# Patient Record
Sex: FEMALE | Employment: UNEMPLOYED | URBAN - METROPOLITAN AREA
[De-identification: names, ages, dates, MRNs, and addresses within clinical notes are randomized per-mention and may not be internally consistent; named-entity substitution may affect disease eponyms.]

---

## 2017-01-03 ENCOUNTER — TELEPHONE (OUTPATIENT)
Dept: FAMILY MEDICINE CLINIC | Facility: CLINIC | Age: 42
End: 2017-01-03

## 2017-01-23 ENCOUNTER — OFFICE VISIT (OUTPATIENT)
Dept: FAMILY MEDICINE CLINIC | Facility: CLINIC | Age: 42
End: 2017-01-23

## 2017-01-23 ENCOUNTER — LAB ENCOUNTER (OUTPATIENT)
Dept: LAB | Age: 42
End: 2017-01-23
Attending: FAMILY MEDICINE
Payer: COMMERCIAL

## 2017-01-23 VITALS
TEMPERATURE: 99 F | HEART RATE: 72 BPM | BODY MASS INDEX: 26.16 KG/M2 | HEIGHT: 65 IN | RESPIRATION RATE: 18 BRPM | SYSTOLIC BLOOD PRESSURE: 124 MMHG | WEIGHT: 157 LBS | DIASTOLIC BLOOD PRESSURE: 80 MMHG

## 2017-01-23 DIAGNOSIS — R10.32 LEFT LOWER QUADRANT PAIN: ICD-10-CM

## 2017-01-23 DIAGNOSIS — G43.109 MIGRAINE WITH AURA AND WITHOUT STATUS MIGRAINOSUS, NOT INTRACTABLE: ICD-10-CM

## 2017-01-23 DIAGNOSIS — M50.30 DEGENERATIVE DISC DISEASE, CERVICAL: ICD-10-CM

## 2017-01-23 DIAGNOSIS — M54.2 NECK PAIN: ICD-10-CM

## 2017-01-23 DIAGNOSIS — E56.9 VITAMIN DEFICIENCY: ICD-10-CM

## 2017-01-23 DIAGNOSIS — F32.A ANXIETY AND DEPRESSION: ICD-10-CM

## 2017-01-23 DIAGNOSIS — Z00.00 GENERAL MEDICAL EXAM: ICD-10-CM

## 2017-01-23 DIAGNOSIS — R20.2 PARESTHESIA: ICD-10-CM

## 2017-01-23 DIAGNOSIS — F41.9 ANXIETY AND DEPRESSION: ICD-10-CM

## 2017-01-23 DIAGNOSIS — J45.30 MILD PERSISTENT ASTHMA WITHOUT COMPLICATION: Primary | ICD-10-CM

## 2017-01-23 LAB
25-HYDROXYVITAMIN D (TOTAL): 32.5 NG/ML (ref 30–100)
ALBUMIN SERPL-MCNC: 4.3 G/DL (ref 3.5–4.8)
ALP LIVER SERPL-CCNC: 64 U/L (ref 37–98)
ALT SERPL-CCNC: 31 U/L (ref 14–54)
AST SERPL-CCNC: 16 U/L (ref 15–41)
BASOPHILS # BLD AUTO: 0.05 X10(3) UL (ref 0–0.1)
BASOPHILS NFR BLD AUTO: 0.9 %
BILIRUB SERPL-MCNC: 0.3 MG/DL (ref 0.1–2)
BILIRUB UR QL STRIP.AUTO: NEGATIVE
BUN BLD-MCNC: 9 MG/DL (ref 8–20)
CALCIUM BLD-MCNC: 9.5 MG/DL (ref 8.3–10.3)
CHLORIDE: 106 MMOL/L (ref 101–111)
CHOLEST SMN-MCNC: 239 MG/DL (ref ?–200)
CLARITY UR REFRACT.AUTO: CLEAR
CO2: 30 MMOL/L (ref 22–32)
CREAT BLD-MCNC: 0.8 MG/DL (ref 0.55–1.02)
EOSINOPHIL # BLD AUTO: 0.12 X10(3) UL (ref 0–0.3)
EOSINOPHIL NFR BLD AUTO: 2.2 %
ERYTHROCYTE [DISTWIDTH] IN BLOOD BY AUTOMATED COUNT: 12.2 % (ref 11.5–16)
EST. AVERAGE GLUCOSE BLD GHB EST-MCNC: 108 MG/DL (ref 68–126)
FREE T4: 0.9 NG/DL (ref 0.9–1.8)
GLUCOSE BLD-MCNC: 82 MG/DL (ref 70–99)
GLUCOSE UR STRIP.AUTO-MCNC: NEGATIVE MG/DL
HAV AB SERPL IA-ACNC: >2000 PG/ML (ref 193–986)
HBA1C MFR BLD HPLC: 5.4 % (ref ?–5.7)
HCT VFR BLD AUTO: 41.1 % (ref 34–50)
HDLC SERPL-MCNC: 66 MG/DL (ref 45–?)
HDLC SERPL: 3.62 {RATIO} (ref ?–4.44)
HGB BLD-MCNC: 14.2 G/DL (ref 12–16)
IMMATURE GRANULOCYTE COUNT: 0 X10(3) UL (ref 0–1)
IMMATURE GRANULOCYTE RATIO %: 0 %
KETONES UR STRIP.AUTO-MCNC: NEGATIVE MG/DL
LDLC SERPL CALC-MCNC: 151 MG/DL (ref ?–130)
LEUKOCYTE ESTERASE UR QL STRIP.AUTO: NEGATIVE
LYMPHOCYTES # BLD AUTO: 1.79 X10(3) UL (ref 0.9–4)
LYMPHOCYTES NFR BLD AUTO: 33.2 %
M PROTEIN MFR SERPL ELPH: 7.3 G/DL (ref 6.1–8.3)
MCH RBC QN AUTO: 32.4 PG (ref 27–33.2)
MCHC RBC AUTO-ENTMCNC: 34.5 G/DL (ref 31–37)
MCV RBC AUTO: 93.8 FL (ref 81–100)
MONOCYTES # BLD AUTO: 0.55 X10(3) UL (ref 0.1–0.6)
MONOCYTES NFR BLD AUTO: 10.2 %
NEUTROPHIL ABS PRELIM: 2.88 X10 (3) UL (ref 1.3–6.7)
NEUTROPHILS # BLD AUTO: 2.88 X10(3) UL (ref 1.3–6.7)
NEUTROPHILS NFR BLD AUTO: 53.5 %
NITRITE UR QL STRIP.AUTO: NEGATIVE
NONHDLC SERPL-MCNC: 173 MG/DL (ref ?–130)
PH UR STRIP.AUTO: 7 [PH] (ref 4.5–8)
PLATELET # BLD AUTO: 240 10(3)UL (ref 150–450)
POTASSIUM SERPL-SCNC: 4.4 MMOL/L (ref 3.6–5.1)
PROT UR STRIP.AUTO-MCNC: NEGATIVE MG/DL
RBC # BLD AUTO: 4.38 X10(6)UL (ref 3.8–5.1)
RED CELL DISTRIBUTION WIDTH-SD: 42.4 FL (ref 35.1–46.3)
SODIUM SERPL-SCNC: 140 MMOL/L (ref 136–144)
SP GR UR STRIP.AUTO: 1.01 (ref 1–1.03)
T3FREE SERPL-MCNC: 2.53 PG/ML (ref 2.3–4.2)
TRIGLYCERIDES: 108 MG/DL (ref ?–150)
TSI SER-ACNC: 0.91 MIU/ML (ref 0.35–5.5)
UROBILINOGEN UR STRIP.AUTO-MCNC: <2 MG/DL
VLDL: 22 MG/DL (ref 5–40)
WBC # BLD AUTO: 5.4 X10(3) UL (ref 4–13)

## 2017-01-23 PROCEDURE — 96372 THER/PROPH/DIAG INJ SC/IM: CPT | Performed by: FAMILY MEDICINE

## 2017-01-23 PROCEDURE — 82607 VITAMIN B-12: CPT

## 2017-01-23 PROCEDURE — 84443 ASSAY THYROID STIM HORMONE: CPT

## 2017-01-23 PROCEDURE — 84481 FREE ASSAY (FT-3): CPT

## 2017-01-23 PROCEDURE — 36415 COLL VENOUS BLD VENIPUNCTURE: CPT

## 2017-01-23 PROCEDURE — 85025 COMPLETE CBC W/AUTO DIFF WBC: CPT

## 2017-01-23 PROCEDURE — 80053 COMPREHEN METABOLIC PANEL: CPT

## 2017-01-23 PROCEDURE — 82306 VITAMIN D 25 HYDROXY: CPT

## 2017-01-23 PROCEDURE — 84439 ASSAY OF FREE THYROXINE: CPT

## 2017-01-23 PROCEDURE — 99214 OFFICE O/P EST MOD 30 MIN: CPT | Performed by: FAMILY MEDICINE

## 2017-01-23 PROCEDURE — 81001 URINALYSIS AUTO W/SCOPE: CPT

## 2017-01-23 PROCEDURE — 80061 LIPID PANEL: CPT

## 2017-01-23 PROCEDURE — 83036 HEMOGLOBIN GLYCOSYLATED A1C: CPT

## 2017-01-23 RX ORDER — METHYLPHENIDATE HYDROCHLORIDE 20 MG/1
20 TABLET ORAL DAILY
COMMUNITY
End: 2017-03-20

## 2017-01-23 RX ORDER — FLUTICASONE PROPIONATE AND SALMETEROL 250; 50 UG/1; UG/1
1 POWDER RESPIRATORY (INHALATION) EVERY 12 HOURS SCHEDULED
Qty: 3 PACKAGE | Refills: 0 | Status: SHIPPED | OUTPATIENT
Start: 2017-01-23 | End: 2017-01-23

## 2017-01-23 RX ORDER — TOPIRAMATE 25 MG/1
TABLET ORAL
Qty: 120 TABLET | Refills: 0 | Status: SHIPPED | OUTPATIENT
Start: 2017-01-23 | End: 2017-02-17

## 2017-01-23 RX ORDER — CYANOCOBALAMIN 1000 UG/ML
1000 INJECTION INTRAMUSCULAR; SUBCUTANEOUS ONCE
Status: COMPLETED | OUTPATIENT
Start: 2017-01-23 | End: 2017-01-23

## 2017-01-23 RX ORDER — MONTELUKAST SODIUM 10 MG/1
10 TABLET ORAL NIGHTLY
Qty: 30 TABLET | Refills: 2 | Status: SHIPPED | OUTPATIENT
Start: 2017-01-23 | End: 2017-04-08

## 2017-01-23 RX ADMIN — CYANOCOBALAMIN 1000 MCG: 1000 INJECTION INTRAMUSCULAR; SUBCUTANEOUS at 14:19:00

## 2017-01-23 NOTE — PROGRESS NOTES
HPI:   Tiff Avila is a 37year old female here to follow up on asthma, mood disorder, migraines, GERD, neck pain     Pt is still having migraines; worse with increased stress.   Off propranolol  Side effects with amitriptyline   Discussed topomax; wor Folic Acid 1 MG Oral Tab Take 1 mg by mouth daily. Disp:  Rfl:    Omega-3 Fatty Acids (FISH OIL) 1000 MG Oral Cap Take  by mouth daily. Disp:  Rfl:    Cholecalciferol (VITAMIN D) 2000 UNITS Oral Cap Take  by mouth.  daily Disp:  Rfl:    B Complex Vitamins 7/20/2015      Family History   Problem Relation Age of Onset   • ADD[other] [OTHER] Father    • Diabetes Father    • Hypertension Father    • Breast Cancer Mother 54   • Other[other] [OTHER] Mother    • Cancer Sister      uterine   • Migraines Sister    • RRG  MUSCULOSKELETAL: back is not tender,FROM of the back  EXTREMITIES: no cyanosis, clubbing or edema  NEURO: cranial nerves are intact,motor and sensory are grossly intact  PSYCH: normal mood and affect good eye contact appropriate     ASSESSMENT AND CARLOTTA

## 2017-02-08 ENCOUNTER — HOSPITAL ENCOUNTER (OUTPATIENT)
Dept: MRI IMAGING | Age: 42
Discharge: HOME | End: 2017-02-08
Attending: FAMILY MEDICINE
Payer: COMMERCIAL

## 2017-02-08 DIAGNOSIS — M50.30 DEGENERATIVE DISC DISEASE, CERVICAL: ICD-10-CM

## 2017-02-08 DIAGNOSIS — M54.2 NECK PAIN: ICD-10-CM

## 2017-02-08 DIAGNOSIS — R20.2 PARESTHESIA: ICD-10-CM

## 2017-02-08 PROCEDURE — 72141 MRI NECK SPINE W/O DYE: CPT

## 2017-02-14 ENCOUNTER — TELEPHONE (OUTPATIENT)
Dept: FAMILY MEDICINE CLINIC | Facility: CLINIC | Age: 42
End: 2017-02-14

## 2017-02-14 NOTE — TELEPHONE ENCOUNTER
SAW JAVAN ABOUT BACK PAIN AND NECK PAIN--HAD MRI AND HAS DISC PROBLEMS    WHEN SHE WAKES UP FROM SLEEPING SHE IS IN PAIN AND PAIN IS ALSO WAKING HER UP AT NIGHT. SHE WANTS TO KNOW WHAT SHE CAN DO AT THIS POINT. PLS CALL BACK TO [facility].

## 2017-02-14 NOTE — TELEPHONE ENCOUNTER
Per Dr. Grace Soni visit notes on 1/23/17 patient to see Neuro. Referral had been placed. Information on Dr. Martín Simmons given to patient. She will f/u with Neuro.

## 2017-02-17 RX ORDER — TOPIRAMATE 25 MG/1
50 TABLET ORAL 2 TIMES DAILY
Qty: 120 TABLET | Refills: 0 | Status: SHIPPED | OUTPATIENT
Start: 2017-02-17 | End: 2017-05-30

## 2017-03-06 ENCOUNTER — APPOINTMENT (OUTPATIENT)
Dept: CT IMAGING | Age: 42
End: 2017-03-06
Attending: PHYSICIAN ASSISTANT
Payer: COMMERCIAL

## 2017-03-06 ENCOUNTER — OFFICE VISIT (OUTPATIENT)
Dept: FAMILY MEDICINE CLINIC | Facility: CLINIC | Age: 42
End: 2017-03-06

## 2017-03-06 ENCOUNTER — APPOINTMENT (OUTPATIENT)
Dept: GENERAL RADIOLOGY | Age: 42
End: 2017-03-06
Attending: PHYSICIAN ASSISTANT
Payer: COMMERCIAL

## 2017-03-06 ENCOUNTER — HOSPITAL ENCOUNTER (OUTPATIENT)
Age: 42
Discharge: HOME | End: 2017-03-06
Payer: COMMERCIAL

## 2017-03-06 VITALS
HEIGHT: 65.4 IN | OXYGEN SATURATION: 98 % | TEMPERATURE: 98 F | BODY MASS INDEX: 25.84 KG/M2 | SYSTOLIC BLOOD PRESSURE: 110 MMHG | WEIGHT: 157 LBS | DIASTOLIC BLOOD PRESSURE: 76 MMHG | HEART RATE: 94 BPM | RESPIRATION RATE: 16 BRPM

## 2017-03-06 VITALS
DIASTOLIC BLOOD PRESSURE: 85 MMHG | OXYGEN SATURATION: 98 % | HEART RATE: 100 BPM | SYSTOLIC BLOOD PRESSURE: 124 MMHG | TEMPERATURE: 98 F | RESPIRATION RATE: 18 BRPM

## 2017-03-06 DIAGNOSIS — M54.6 ACUTE RIGHT-SIDED THORACIC BACK PAIN: Primary | ICD-10-CM

## 2017-03-06 LAB
APPEARANCE: CLEAR
MULTISTIX LOT#: ABNORMAL NUMERIC
PH, URINE: 7 (ref 4.5–8)
SPECIFIC GRAVITY: 1.02 (ref 1–1.03)
URINE-COLOR: YELLOW
UROBILINOGEN,SEMI-QN: 0.2 MG/DL (ref 0–1.9)

## 2017-03-06 PROCEDURE — 99214 OFFICE O/P EST MOD 30 MIN: CPT

## 2017-03-06 PROCEDURE — 81003 URINALYSIS AUTO W/O SCOPE: CPT | Performed by: NURSE PRACTITIONER

## 2017-03-06 PROCEDURE — 71020 XR CHEST PA + LAT CHEST (CPT=71020): CPT

## 2017-03-06 PROCEDURE — 87086 URINE CULTURE/COLONY COUNT: CPT | Performed by: NURSE PRACTITIONER

## 2017-03-06 PROCEDURE — 74176 CT ABD & PELVIS W/O CONTRAST: CPT

## 2017-03-06 RX ORDER — CYCLOBENZAPRINE HCL 5 MG
5 TABLET ORAL 3 TIMES DAILY PRN
Qty: 15 TABLET | Refills: 0 | Status: SHIPPED | OUTPATIENT
Start: 2017-03-06 | End: 2017-03-11

## 2017-03-06 RX ORDER — HYDROCODONE BITARTRATE AND ACETAMINOPHEN 5; 325 MG/1; MG/1
1 TABLET ORAL EVERY 6 HOURS PRN
Qty: 15 TABLET | Refills: 0 | Status: SHIPPED | OUTPATIENT
Start: 2017-03-06 | End: 2017-03-09

## 2017-03-06 NOTE — ED PROVIDER NOTES
Patient Seen in: THE MEDICAL [facility]    History   Patient presents with:  Back Pain    Stated Complaint: r side back painx 1 month     HPI    Rosa Graf is a 60-year-old female presents today for evaluation of right-sided back pain.  She has • Anxiety state, unspecified    • Dyspareunia    • Back problem    • Glucose intolerance (pre-diabetes)      diet control   • Malignant hyperthermia      patient's son at 3years of age - 2006           Past Surgical History    TARSAL TUNNEL RELEASE  2005 naproxen (NAPROSYN) 500 MG Oral Tab,  Take 1 tablet by mouth 2 (two) times daily with meals.    VENTOLIN  (90 BASE) MCG/ACT Inhalation Aero Soln,     Esomeprazole Magnesium (NEXIUM) 40 MG Oral Capsule Delayed Release,  Take 40 mg by mouth 2 (two) ti Current:/85 mmHg  Pulse 100  Temp(Src) 98.2 °F (36.8 °C) (Temporal)  Resp 18  SpO2 98%  LMP 01/01/2004        Physical Exam   Constitutional: She is oriented to person, place, and time. She appears well-developed and well-nourished.    HENT:   Head: N 3/6/2017  PROCEDURE:  CT ABDOMEN+PELVIS KIDNEYSTONE 2D RNDR(NO IV,NO ORAL)(CPT=74176)  COMPARISON:  [place], CT ABDOMEN+PELVIS(CONTRAST ONLY)(CPT=74177), 10/10/2016, 13:38. INDICATIONS:  Right-sided back pain for one month.   TECHNIQUE:  Unenhanced mult MDM   Clinical impression: Acute right sided thoracic back pain  Plan: I discussed the x-ray and CT findings with the patient. With the imaging, we have taken kidney stones, kidney masses and pneumonia, reliably off the table.   This could be musculoskelet

## 2017-03-06 NOTE — PROGRESS NOTES
CHIEF COMPLAINT:     Patient presents with:  Back Pain: Mid back, 1 month. HPI:   Tez Sol is a 37year old female who is here for complaints of right mid back pain. Symptoms have been present and slowly worsening for 1 month.   Pain is descri HCl 20 MG Oral Tab Take 1 tablet (20 mg total) by mouth 4 (four) times daily before meals and nightly. Every 6 hrs as needed Disp: 120 tablet Rfl: 1   naproxen (NAPROSYN) 500 MG Oral Tab Take 1 tablet by mouth 2 (two) times daily with meals.  Disp: 60 table patient's son at 4years of age - 81      Social History:    Smoking Status: Former Smoker                   Packs/Day: 0.00  Years:           Types: Cigarettes      Quit date: 01/01/1999    Smokeless Status: Never Used                        Alcohol Dr. Sharri Weeks. - Pt left [facility] in stable condition/no distress and headed to [facility].  - No charge for [facility] visit- just the dipstick/culture (provided pt does present to [facility]).         No prescriptions requested or ordered in this encounter      There are no Patient In

## 2017-03-06 NOTE — ED INITIAL ASSESSMENT (HPI)
Here for eval of right sided back pain x1 month that is getting worse. Sts that the pain is worse when lying on it. Denies any known injury.

## 2017-03-09 ENCOUNTER — OFFICE VISIT (OUTPATIENT)
Dept: FAMILY MEDICINE CLINIC | Facility: CLINIC | Age: 42
End: 2017-03-09

## 2017-03-09 VITALS
TEMPERATURE: 99 F | OXYGEN SATURATION: 98 % | HEART RATE: 98 BPM | DIASTOLIC BLOOD PRESSURE: 70 MMHG | SYSTOLIC BLOOD PRESSURE: 122 MMHG | WEIGHT: 152 LBS | BODY MASS INDEX: 25 KG/M2 | RESPIRATION RATE: 22 BRPM

## 2017-03-09 DIAGNOSIS — G89.29 CHRONIC RIGHT SHOULDER PAIN: Primary | ICD-10-CM

## 2017-03-09 DIAGNOSIS — M25.511 CHRONIC RIGHT SHOULDER PAIN: Primary | ICD-10-CM

## 2017-03-09 DIAGNOSIS — M54.6 ACUTE RIGHT-SIDED THORACIC BACK PAIN: ICD-10-CM

## 2017-03-09 PROCEDURE — 99214 OFFICE O/P EST MOD 30 MIN: CPT | Performed by: FAMILY MEDICINE

## 2017-03-09 RX ORDER — HYDROCODONE BITARTRATE AND ACETAMINOPHEN 5; 325 MG/1; MG/1
1 TABLET ORAL EVERY 6 HOURS PRN
Qty: 30 TABLET | Refills: 0 | Status: SHIPPED | OUTPATIENT
Start: 2017-03-09 | End: 2017-04-26

## 2017-03-09 RX ORDER — METHYLPHENIDATE HYDROCHLORIDE 10 MG/1
TABLET ORAL
Refills: 0 | COMMUNITY
Start: 2017-02-10 | End: 2017-03-09

## 2017-03-09 NOTE — PROGRESS NOTES
HPI:   Michelle Plunkett is a 37year old female here to follow up on right flank pain and right shoulder pain    One month ago right thoracic pain   0/10- 9/10   IC- neg CT and neg cxr neg ucx  Flexeril 10mg not helping     Chronic right shoulder pain ; ab Capsule Delayed Release Take 40 mg by mouth 2 (two) times daily. Disp:  Rfl:    Folic Acid 1 MG Oral Tab Take 1 mg by mouth daily. Disp:  Rfl:    Omega-3 Fatty Acids (FISH OIL) 1000 MG Oral Cap Take  by mouth daily.  Disp:  Rfl:    Cholecalciferol (VITAMIN 1991    Comment multiple    TOTAL ABDOM HYSTERECTOMY      HYSTERECTOMY  7/20/2015      Family History   Problem Relation Age of Onset   • ADD[other] [OTHER] Father    • Diabetes Father    • Hypertension Father    • Breast Cancer Mother 54   • Other[other] lesions  GI: good BS's,no masses, no HSM no tenderness, no RRG  MUSCULOSKELETAL: + right thoracic paraspinal tenderness   + testing of right rotator cuff and long head of bicep  EXTREMITIES: no cyanosis, clubbing or edema  NEURO: cranial nerves are intact,

## 2017-03-16 ENCOUNTER — TELEPHONE (OUTPATIENT)
Dept: FAMILY MEDICINE CLINIC | Facility: CLINIC | Age: 42
End: 2017-03-16

## 2017-03-16 RX ORDER — METHYLPREDNISOLONE 4 MG/1
TABLET ORAL
Qty: 1 KIT | Refills: 0 | Status: SHIPPED | OUTPATIENT
Start: 2017-03-16 | End: 2017-05-04 | Stop reason: ALTCHOICE

## 2017-03-16 RX ORDER — METHYLPREDNISOLONE 4 MG/1
TABLET ORAL
Qty: 1 KIT | Refills: 0 | Status: CANCELLED | OUTPATIENT
Start: 2017-03-16

## 2017-03-16 RX ORDER — TOPIRAMATE 25 MG/1
TABLET ORAL
Qty: 120 TABLET | Refills: 0 | Status: SHIPPED | OUTPATIENT
Start: 2017-03-16 | End: 2017-10-04

## 2017-03-16 NOTE — TELEPHONE ENCOUNTER
Pt states she has low back pain with pain radiating down left leg. States her norco and muscle relaxer are not helping. Pt states she can hardly bend legs. Per Dr. Lien vargas dose kiko and appt next week.  LMTCB

## 2017-03-16 NOTE — TELEPHONE ENCOUNTER
Pt states she has low back pain with pain radiating down left leg. States her norco and muscle relaxer are not helping. Pt states she can hardly bend legs. Per Dr. Ramiro vargas dose kiko and appt next week. Rx sent to pharmacy, pt informed.

## 2017-03-20 ENCOUNTER — OFFICE VISIT (OUTPATIENT)
Dept: FAMILY MEDICINE CLINIC | Facility: CLINIC | Age: 42
End: 2017-03-20

## 2017-03-20 VITALS
BODY MASS INDEX: 26 KG/M2 | TEMPERATURE: 99 F | HEART RATE: 78 BPM | WEIGHT: 158 LBS | RESPIRATION RATE: 20 BRPM | DIASTOLIC BLOOD PRESSURE: 80 MMHG | SYSTOLIC BLOOD PRESSURE: 124 MMHG

## 2017-03-20 DIAGNOSIS — R31.9 HEMATURIA: ICD-10-CM

## 2017-03-20 DIAGNOSIS — F98.8 ADD (ATTENTION DEFICIT DISORDER): ICD-10-CM

## 2017-03-20 DIAGNOSIS — M54.50 LUMBAR PAIN WITH RADIATION DOWN LEFT LEG: Primary | ICD-10-CM

## 2017-03-20 DIAGNOSIS — M79.605 LUMBAR PAIN WITH RADIATION DOWN LEFT LEG: Primary | ICD-10-CM

## 2017-03-20 LAB
APPEARANCE: CLEAR
BILIRUBIN: NEGATIVE
GLUCOSE (URINE DIPSTICK): NEGATIVE MG/DL
KETONES (URINE DIPSTICK): NEGATIVE MG/DL
LEUKOCYTES: NEGATIVE
NITRITE, URINE: NEGATIVE
PH, URINE: 8 (ref 4.5–8)
PROTEIN (URINE DIPSTICK): NEGATIVE MG/DL
SPECIFIC GRAVITY: 1.01 (ref 1–1.03)
URINE-COLOR: YELLOW
UROBILINOGEN,SEMI-QN: 0.2 MG/DL (ref 0–1.9)

## 2017-03-20 PROCEDURE — 81003 URINALYSIS AUTO W/O SCOPE: CPT | Performed by: FAMILY MEDICINE

## 2017-03-20 PROCEDURE — 99214 OFFICE O/P EST MOD 30 MIN: CPT | Performed by: FAMILY MEDICINE

## 2017-03-20 RX ORDER — HYDROCODONE BITARTRATE AND ACETAMINOPHEN 10; 325 MG/1; MG/1
1 TABLET ORAL EVERY 6 HOURS PRN
Qty: 30 TABLET | Refills: 0 | Status: SHIPPED | OUTPATIENT
Start: 2017-03-20 | End: 2017-04-09

## 2017-03-20 RX ORDER — METHYLPHENIDATE HYDROCHLORIDE 20 MG/1
20 TABLET ORAL DAILY
Qty: 30 TABLET | Refills: 0 | Status: SHIPPED | COMMUNITY
Start: 2017-05-20 | End: 2017-03-20

## 2017-03-20 RX ORDER — METHYLPHENIDATE HYDROCHLORIDE 20 MG/1
20 TABLET ORAL DAILY
Qty: 30 TABLET | Refills: 0 | Status: SHIPPED | OUTPATIENT
Start: 2017-03-20 | End: 2017-05-04

## 2017-03-20 RX ORDER — METHYLPHENIDATE HYDROCHLORIDE 20 MG/1
20 TABLET ORAL DAILY
Qty: 30 TABLET | Refills: 0 | Status: SHIPPED | COMMUNITY
Start: 2017-04-20 | End: 2017-03-20

## 2017-03-20 RX ORDER — METHYLPHENIDATE HYDROCHLORIDE 20 MG/1
20 TABLET ORAL DAILY
Qty: 30 TABLET | Refills: 0 | Status: SHIPPED | COMMUNITY
Start: 2017-03-20 | End: 2017-03-20

## 2017-03-20 NOTE — PROGRESS NOTES
HPI:   Reggie Adkins is a 37year old female here with lumbar pain       It started 1 week ago   Pain across the lumbar region   Radiation to left leg   Pt was put on medrol dose pack   Pain is 5-10/10 sharp / feels like \"post op pain \"   Pt is a bit Disp: 120 tablet Rfl: 1   naproxen (NAPROSYN) 500 MG Oral Tab Take 1 tablet by mouth 2 (two) times daily with meals.  Disp: 60 tablet Rfl: 0   VENTOLIN  (90 BASE) MCG/ACT Inhalation Aero Soln  Disp:  Rfl: 1   Esomeprazole Magnesium (NEXIUM) 40 MG Ora NEEDLE LOCALIZATION W/ SPECIMEN 1 SITE RIGHT      COLONOSCOPY N/A 2/6/2015    Comment Procedure: COLONOSCOPY;  Surgeon: Camila Hopper MD;  Location: [facility] ENDOSCOPY    ENDOMETRIAL ABLATION      Comment 2004 2002 Daniele Pace auscultation  NECK: supple,no adenopathy,no thyromegaly, diffuse tenderness  HEENT: atraumatic, normocephalic,ears and throat are clear  EYES:PERRLA, EOMI, normal,conjunctiva are clear  SKIN: norashes,no suspicious lesions  GI: good BS's,no masses, no HSM

## 2017-04-05 ENCOUNTER — PATIENT MESSAGE (OUTPATIENT)
Dept: FAMILY MEDICINE CLINIC | Facility: CLINIC | Age: 42
End: 2017-04-05

## 2017-04-05 NOTE — TELEPHONE ENCOUNTER
From: Kaylene Quinonez  To: Sadia Wells DO  Sent: 4/5/2017 12:27 PM CDT  Subject: Other    Hello,  Awhile back you gave me a name of a neurologist, I can not find what i did with the paper i wrote it on. I really need it now.  My right arm keeps going co

## 2017-04-07 ENCOUNTER — TELEPHONE (OUTPATIENT)
Dept: FAMILY MEDICINE CLINIC | Facility: CLINIC | Age: 42
End: 2017-04-07

## 2017-04-07 DIAGNOSIS — M50.30 DEGENERATION OF CERVICAL INTERVERTEBRAL DISC: Primary | ICD-10-CM

## 2017-04-10 ENCOUNTER — PATIENT MESSAGE (OUTPATIENT)
Dept: FAMILY MEDICINE CLINIC | Facility: CLINIC | Age: 42
End: 2017-04-10

## 2017-04-10 RX ORDER — CITALOPRAM 40 MG/1
TABLET ORAL
Qty: 90 TABLET | Refills: 0 | Status: SHIPPED | OUTPATIENT
Start: 2017-04-10 | End: 2017-05-04

## 2017-04-10 RX ORDER — MONTELUKAST SODIUM 10 MG/1
TABLET ORAL
Qty: 30 TABLET | Refills: 0 | Status: SHIPPED | OUTPATIENT
Start: 2017-04-10 | End: 2017-05-04

## 2017-04-10 NOTE — TELEPHONE ENCOUNTER
From: Anjana Jeffers  To: Priya Mensah DO  Sent: 4/10/2017 11:32 AM CDT  Subject: Non-Urgent Medical Question    Hello,  Did you put the referral to the neurologist in the system? I am unable to make an appointment until you do.   I am so sorry I had to

## 2017-04-26 ENCOUNTER — HOSPITAL ENCOUNTER (OUTPATIENT)
Dept: ULTRASOUND IMAGING | Age: 42
Discharge: HOME | End: 2017-04-26
Attending: FAMILY MEDICINE
Payer: COMMERCIAL

## 2017-04-26 ENCOUNTER — OFFICE VISIT (OUTPATIENT)
Dept: FAMILY MEDICINE CLINIC | Facility: CLINIC | Age: 42
End: 2017-04-26

## 2017-04-26 VITALS
WEIGHT: 153.81 LBS | DIASTOLIC BLOOD PRESSURE: 84 MMHG | HEIGHT: 65 IN | HEART RATE: 68 BPM | OXYGEN SATURATION: 98 % | SYSTOLIC BLOOD PRESSURE: 120 MMHG | RESPIRATION RATE: 18 BRPM | BODY MASS INDEX: 25.63 KG/M2 | TEMPERATURE: 98 F

## 2017-04-26 DIAGNOSIS — M54.16 RADICULOPATHY, LUMBAR REGION: Primary | ICD-10-CM

## 2017-04-26 DIAGNOSIS — E04.9 GOITER: ICD-10-CM

## 2017-04-26 PROCEDURE — 99213 OFFICE O/P EST LOW 20 MIN: CPT | Performed by: FAMILY MEDICINE

## 2017-04-26 PROCEDURE — 76536 US EXAM OF HEAD AND NECK: CPT

## 2017-04-26 RX ORDER — HYDROCODONE BITARTRATE AND ACETAMINOPHEN 5; 325 MG/1; MG/1
1 TABLET ORAL EVERY 6 HOURS PRN
Qty: 30 TABLET | Refills: 0 | Status: SHIPPED | OUTPATIENT
Start: 2017-04-26 | End: 2017-07-13

## 2017-04-26 RX ORDER — METHYLPHENIDATE HYDROCHLORIDE 20 MG/1
TABLET ORAL
Refills: 0 | COMMUNITY
Start: 2017-03-22 | End: 2017-05-04

## 2017-04-26 RX ORDER — TOPIRAMATE 25 MG/1
TABLET ORAL
Qty: 120 TABLET | Refills: 0 | Status: SHIPPED | OUTPATIENT
Start: 2017-04-26 | End: 2017-05-04

## 2017-04-26 NOTE — PROGRESS NOTES
HPI:   Anjana Jeffers is a 37year old female here with lumbar pain     It started in march   Not getting better   Pain across the lumbar region   Radiation to left leg   Pain is 5-10/10 sharp / feels like \"post op pain \"   Pt takes norco or flexeril a Cholecalciferol (VITAMIN D) 2000 UNITS Oral Cap Take  by mouth. daily Disp:  Rfl:    B Complex Vitamins (VITAMIN B COMPLEX OR) Inject  as directed every 30 (thirty) days.  Disp:  Rfl:    Methylphenidate HCl 20 MG Oral Tab TK 1 T PO D Disp:  Rfl: 0      Pa • Diabetes Father    • Hypertension Father    • Breast Cancer Mother 54   • Other[other] [OTHER] Mother    • Cancer Sister      uterine   • Migraines Sister    • Migraines Son    • Heart Disorder Maternal Grandmother      CHF   • Stroke Maternal Grandfat EXTREMITIES: no cyanosis, clubbing or edema  BACK: + left lumbar pain  NEURO: cranial nerves are intact,motor and sensory are grossly intact  PSYCH: normal mood and affect good eye contact appropriate     ASSESSMENT AND PLAN:   Lizeth Watson is a 37 y

## 2017-04-27 RX ORDER — CITALOPRAM 20 MG/1
TABLET ORAL
Qty: 90 TABLET | Refills: 0 | OUTPATIENT
Start: 2017-04-27

## 2017-05-01 ENCOUNTER — TELEPHONE (OUTPATIENT)
Dept: FAMILY MEDICINE CLINIC | Facility: CLINIC | Age: 42
End: 2017-05-01

## 2017-05-01 RX ORDER — METHYLPHENIDATE HYDROCHLORIDE 20 MG/1
1 CAPSULE, EXTENDED RELEASE ORAL DAILY
Qty: 30 CAPSULE | Refills: 0 | Status: SHIPPED | OUTPATIENT
Start: 2017-05-01 | End: 2017-05-04 | Stop reason: ALTCHOICE

## 2017-05-01 RX ORDER — METHYLPHENIDATE HYDROCHLORIDE 20 MG/1
1 CAPSULE, EXTENDED RELEASE ORAL DAILY
Qty: 30 CAPSULE | Refills: 0 | Status: SHIPPED | OUTPATIENT
Start: 2017-05-31 | End: 2017-05-04 | Stop reason: ALTCHOICE

## 2017-05-01 RX ORDER — METHYLPHENIDATE HYDROCHLORIDE 20 MG/1
1 CAPSULE, EXTENDED RELEASE ORAL DAILY
Qty: 30 CAPSULE | Refills: 0 | Status: SHIPPED | OUTPATIENT
Start: 2017-06-30 | End: 2017-05-04

## 2017-05-04 ENCOUNTER — OFFICE VISIT (OUTPATIENT)
Dept: NEUROLOGY | Facility: CLINIC | Age: 42
End: 2017-05-04

## 2017-05-04 VITALS
DIASTOLIC BLOOD PRESSURE: 78 MMHG | SYSTOLIC BLOOD PRESSURE: 126 MMHG | BODY MASS INDEX: 26 KG/M2 | WEIGHT: 154 LBS | RESPIRATION RATE: 18 BRPM | HEART RATE: 80 BPM

## 2017-05-04 DIAGNOSIS — R20.0 BILATERAL HAND NUMBNESS: ICD-10-CM

## 2017-05-04 DIAGNOSIS — G43.009 MIGRAINE WITHOUT AURA AND WITHOUT STATUS MIGRAINOSUS, NOT INTRACTABLE: ICD-10-CM

## 2017-05-04 DIAGNOSIS — M47.812 OSTEOARTHRITIS OF CERVICAL SPINE, UNSPECIFIED SPINAL OSTEOARTHRITIS COMPLICATION STATUS: ICD-10-CM

## 2017-05-04 DIAGNOSIS — G44.86 CERVICOGENIC HEADACHE: Primary | ICD-10-CM

## 2017-05-04 PROCEDURE — 99245 OFF/OP CONSLTJ NEW/EST HI 55: CPT | Performed by: OTHER

## 2017-05-04 RX ORDER — MONTELUKAST SODIUM 10 MG/1
TABLET ORAL
Qty: 90 TABLET | Refills: 1 | Status: SHIPPED | OUTPATIENT
Start: 2017-05-04 | End: 2017-05-22

## 2017-05-04 RX ORDER — GABAPENTIN 100 MG/1
CAPSULE ORAL
Qty: 180 CAPSULE | Refills: 1 | Status: SHIPPED | OUTPATIENT
Start: 2017-05-04 | End: 2017-09-07

## 2017-05-04 NOTE — PATIENT INSTRUCTIONS
Refill policies:    • Allow 2 business days for refills; controlled substances may take longer.   • Contact your pharmacy at least 5 days prior to running out of medication and have them send an electronic request or submit request through the “request re insurance carrier to obtain pre-certification or prior authorization. Unfortunately, [facility] has seen an increase in denial of payment even though the procedure/test has been pre-certified.   You are strongly encouraged to contact your insurance carrier to v

## 2017-05-04 NOTE — PROGRESS NOTES
[facility] [id number]   Neurology- INITIAL CLINIC VISIT  2017, 3:27 PM     Tiff Avila Patient Status:  No patient class for patient encounter    1975 MRN [id number]   Location [address] [facility] Shakila Asher of thyroid      hypothyroidism   • Esophageal reflux    • Abnormal uterine bleeding    • Anxiety state, unspecified    • Dyspareunia    • Back problem    • Glucose intolerance (pre-diabetes)      diet control   • Malignant hyperthermia      patient's son a mg total) by mouth daily. , Disp: 30 tablet, Rfl: 0  •  gabapentin 100 MG Oral Cap, Week 1 take 1 cap TID, week 2 take 2 caps TID, Disp: 180 capsule, Rfl: 1  •  HYDROcodone-acetaminophen 5-325 MG Oral Tab, Take 1 tablet by mouth every 6 (six) hours as neede 37year old female in no acute distress  Cardiac: Normal rate & regular rhythm  Lungs: Clear to auscultation bilaterally  Skin: There are no rashes or other skin lesions.   Musculoskeletal: There is no scoliosis, or joint deformities  Neurologic examination seen with this. I will refer her to PT for neck pain which is triggering her migraines, as she probably has a degree of muscle spasm in her neck. Will start symptomatic treatment with gabapentin as below and obtain EMG.       Signed Prescriptions Disp Refil

## 2017-05-04 NOTE — TELEPHONE ENCOUNTER
Pt states her ER methylphenidate is $300 to more than her previous RX. Pt would like to go back to original RX, approve/deny?

## 2017-05-04 NOTE — PROGRESS NOTES
Patient states bilateral arm numbness and tingling for at least 8 months. States neck pain; related to DJD, recent MRI results.

## 2017-05-05 ENCOUNTER — TELEPHONE (OUTPATIENT)
Dept: NEUROLOGY | Facility: CLINIC | Age: 42
End: 2017-05-05

## 2017-05-05 NOTE — TELEPHONE ENCOUNTER
Pt dropped off 3 RXs yesterday late afternoon for Methylphenidate ER 20mg dated 5-1-17 with start dates of 5-1-17, 5-31-17, 6-30-17 to be shredded. Placed in shedder. Task completed.

## 2017-05-05 NOTE — TELEPHONE ENCOUNTER
Spoke with Dr. Etienne Richard regarding getting an EMG scheduled for the patient. She agreed to schedule the patient on 05/08/17 at 11:40 for an EMG in [place]. Spoke with patient and patient agreed with scheduling the above date.  Apologized for the inconv

## 2017-05-08 ENCOUNTER — OFFICE VISIT (OUTPATIENT)
Dept: NEUROLOGY | Facility: CLINIC | Age: 42
End: 2017-05-08

## 2017-05-08 DIAGNOSIS — R20.0 BILATERAL HAND NUMBNESS: Primary | ICD-10-CM

## 2017-05-08 PROCEDURE — 95886 MUSC TEST DONE W/N TEST COMP: CPT | Performed by: OTHER

## 2017-05-08 PROCEDURE — 95909 NRV CNDJ TST 5-6 STUDIES: CPT | Performed by: OTHER

## 2017-05-08 PROCEDURE — 95885 MUSC TST DONE W/NERV TST LIM: CPT | Performed by: OTHER

## 2017-05-09 NOTE — PROCEDURES
THE MEDICAL [facility]  Nerve Conduction & Electromyography Report            Patient: Anjana Jeffers  Patient ID: [id number]  Sex: Female  YOB: 1975  Age: 40 Years 0 Months  Referring MFITO.: Nela Loyd M.D.: [facility] [place] 2. The right median and ulnar motor responses at the APB and ADM, respectively, were normal.  3. Needle EMG was done on selected muscles of the right upper extremity. There was decreased recruitment in the FCR. Conclusion:   1.  This is a borderline norm

## 2017-05-22 PROBLEM — R10.11 RIGHT UPPER QUADRANT ABDOMINAL PAIN: Status: ACTIVE | Noted: 2017-05-22

## 2017-05-22 PROBLEM — R11.0 NAUSEA: Status: ACTIVE | Noted: 2017-05-22

## 2017-05-25 ENCOUNTER — HOSPITAL ENCOUNTER (OUTPATIENT)
Dept: NUCLEAR MEDICINE | Facility: HOSPITAL | Age: 42
Discharge: HOME | End: 2017-05-25
Attending: SPECIALIST
Payer: COMMERCIAL

## 2017-05-25 DIAGNOSIS — R10.11 RIGHT UPPER QUADRANT PAIN: ICD-10-CM

## 2017-05-25 PROCEDURE — 78227 HEPATOBIL SYST IMAGE W/DRUG: CPT | Performed by: SPECIALIST

## 2017-05-25 RX ORDER — CITALOPRAM 20 MG/1
TABLET ORAL
Qty: 90 TABLET | Refills: 0 | Status: SHIPPED | OUTPATIENT
Start: 2017-05-25 | End: 2017-08-31

## 2017-05-30 RX ORDER — TOPIRAMATE 25 MG/1
TABLET ORAL
Qty: 120 TABLET | Refills: 0 | Status: SHIPPED | OUTPATIENT
Start: 2017-05-30 | End: 2017-09-07

## 2017-07-03 RX ORDER — TOPIRAMATE 25 MG/1
TABLET ORAL
Qty: 120 TABLET | Refills: 0 | Status: SHIPPED | OUTPATIENT
Start: 2017-07-03 | End: 2017-08-01

## 2017-07-13 ENCOUNTER — HOSPITAL ENCOUNTER (OUTPATIENT)
Dept: GENERAL RADIOLOGY | Age: 42
Discharge: HOME | End: 2017-07-13
Attending: FAMILY MEDICINE
Payer: COMMERCIAL

## 2017-07-13 ENCOUNTER — OFFICE VISIT (OUTPATIENT)
Dept: FAMILY MEDICINE CLINIC | Facility: CLINIC | Age: 42
End: 2017-07-13

## 2017-07-13 VITALS
BODY MASS INDEX: 25.33 KG/M2 | OXYGEN SATURATION: 98 % | SYSTOLIC BLOOD PRESSURE: 112 MMHG | WEIGHT: 152 LBS | DIASTOLIC BLOOD PRESSURE: 68 MMHG | HEART RATE: 86 BPM | HEIGHT: 65 IN | RESPIRATION RATE: 20 BRPM | TEMPERATURE: 99 F

## 2017-07-13 DIAGNOSIS — G89.29 CHRONIC RIGHT SHOULDER PAIN: ICD-10-CM

## 2017-07-13 DIAGNOSIS — J45.20 MILD INTERMITTENT ASTHMA WITHOUT COMPLICATION: ICD-10-CM

## 2017-07-13 DIAGNOSIS — G89.29 CHRONIC RIGHT SHOULDER PAIN: Primary | ICD-10-CM

## 2017-07-13 DIAGNOSIS — M75.01 ADHESIVE CAPSULITIS OF RIGHT SHOULDER: ICD-10-CM

## 2017-07-13 DIAGNOSIS — M25.511 CHRONIC RIGHT SHOULDER PAIN: ICD-10-CM

## 2017-07-13 DIAGNOSIS — M25.511 CHRONIC RIGHT SHOULDER PAIN: Primary | ICD-10-CM

## 2017-07-13 PROBLEM — R11.0 NAUSEA: Status: RESOLVED | Noted: 2017-05-22 | Resolved: 2017-07-13

## 2017-07-13 PROCEDURE — 73030 X-RAY EXAM OF SHOULDER: CPT | Performed by: FAMILY MEDICINE

## 2017-07-13 PROCEDURE — 99214 OFFICE O/P EST MOD 30 MIN: CPT | Performed by: FAMILY MEDICINE

## 2017-07-13 RX ORDER — HYDROCODONE BITARTRATE AND ACETAMINOPHEN 10; 325 MG/1; MG/1
1 TABLET ORAL EVERY 6 HOURS PRN
Qty: 30 TABLET | Refills: 0 | Status: SHIPPED | OUTPATIENT
Start: 2017-07-13 | End: 2018-02-05

## 2017-07-13 NOTE — PROGRESS NOTES
HPI:   Michelle Plunkett is a 40year old female here with right shoulder     It started one year ago  Getting worse  No injury or fall  Pt takes ibuprofen daily   Pt is right handed  Pt reports 10/10 at rest  20/10 with movement   Sharp with movement   Ach (FISH OIL) 1000 MG Oral Cap Take  by mouth daily. Disp:  Rfl:    Cholecalciferol (VITAMIN D) 2000 UNITS Oral Cap Take  by mouth. daily Disp:  Rfl:    B Complex Vitamins (VITAMIN B COMPLEX OR) Inject  as directed every 30 (thirty) days.  Disp:  Rfl:       Pa ADD[other] [OTHER] Father    • Diabetes Father    • Hypertension Father    • Breast Cancer Mother 54   • Other[other] [OTHER] Mother    • Heart Disorder Maternal Grandmother      CHF   • Stroke Maternal Grandfather    • Diabetes Paternal Grandmother    • [age] year old female here with    1. Chronic right shoulder pain    - OP REFERRAL TO [facility]  - XR SHOULDER, COMPLETE (MIN 2 VIEWS), RIGHT (CPT=73530); Future  - HYDROcodone-acetaminophen (NORCO)  MG Oral Tab;  Take 1 tablet by mout

## 2017-07-21 ENCOUNTER — APPOINTMENT (OUTPATIENT)
Dept: PHYSICAL THERAPY | Age: 42
End: 2017-07-21
Attending: FAMILY MEDICINE
Payer: COMMERCIAL

## 2017-07-25 ENCOUNTER — OFFICE VISIT (OUTPATIENT)
Dept: PHYSICAL THERAPY | Age: 42
End: 2017-07-25
Attending: FAMILY MEDICINE
Payer: COMMERCIAL

## 2017-07-25 DIAGNOSIS — M75.01 ADHESIVE CAPSULITIS OF RIGHT SHOULDER: ICD-10-CM

## 2017-07-25 DIAGNOSIS — G89.29 CHRONIC RIGHT SHOULDER PAIN: ICD-10-CM

## 2017-07-25 DIAGNOSIS — M25.511 CHRONIC RIGHT SHOULDER PAIN: ICD-10-CM

## 2017-07-25 PROCEDURE — 97110 THERAPEUTIC EXERCISES: CPT | Performed by: PHYSICAL THERAPIST

## 2017-07-25 PROCEDURE — 97162 PT EVAL MOD COMPLEX 30 MIN: CPT | Performed by: PHYSICAL THERAPIST

## 2017-07-25 NOTE — PROGRESS NOTES
UPPER EXTREMITY EVALUATION:   Referring Physician: Dr. Karishma Garcia  Diagnosis: Chronic right shoulder pain (M25.511,G89.29), Adhesive capsulitis of right shoulder (M75.01) Date of Service: 7/25/2017     PATIENT SUMMARY   Reilly Koch is a 40year old y/o R    75%   NE  Repeated Retraction and Rotation- R 75%   NE  Rotation- L    WNL   NE    Palpation: Pain right bicepds long and short head tendons and sharp pain right supraspinatus tendon    Shoulder:   AROM:  Strength (right in neutral)   Flexion: R 95, p visits)  · Pt will improve shoulder abduction AROM to >120 degrees to improve ability to don deodorant, don/doff shirts, and wash hair (8 visits)  · Pt will increase shoulder AROM IR behind back to be able to fasten bra without difficulty or pain (8 visits

## 2017-07-27 ENCOUNTER — APPOINTMENT (OUTPATIENT)
Dept: PHYSICAL THERAPY | Age: 42
End: 2017-07-27
Attending: FAMILY MEDICINE
Payer: COMMERCIAL

## 2017-07-31 ENCOUNTER — TELEPHONE (OUTPATIENT)
Dept: PHYSICAL THERAPY | Age: 42
End: 2017-07-31

## 2017-08-01 ENCOUNTER — OFFICE VISIT (OUTPATIENT)
Dept: PHYSICAL THERAPY | Age: 42
End: 2017-08-01
Attending: FAMILY MEDICINE
Payer: COMMERCIAL

## 2017-08-01 ENCOUNTER — PATIENT MESSAGE (OUTPATIENT)
Dept: FAMILY MEDICINE CLINIC | Facility: CLINIC | Age: 42
End: 2017-08-01

## 2017-08-01 DIAGNOSIS — F41.9 ANXIETY AND DEPRESSION: ICD-10-CM

## 2017-08-01 DIAGNOSIS — F32.A ANXIETY AND DEPRESSION: ICD-10-CM

## 2017-08-01 PROCEDURE — 97110 THERAPEUTIC EXERCISES: CPT | Performed by: PHYSICAL THERAPIST

## 2017-08-01 RX ORDER — TOPIRAMATE 25 MG/1
TABLET ORAL
Qty: 120 TABLET | Refills: 0 | Status: SHIPPED | OUTPATIENT
Start: 2017-08-01 | End: 2017-09-07

## 2017-08-01 NOTE — TELEPHONE ENCOUNTER
From: Jetta Blizzard  To: Matheus Martinez DO  Sent: 8/1/2017 10:20 AM CDT  Subject: Prescription Question    Hello,  I was wondering if I could get a refill on the muscle relaxer for my my frozen shoulder?    I wanted to try that instead of the pain meds a

## 2017-08-01 NOTE — PROGRESS NOTES
Dx: Chronic right shoulder pain (M25.511,G89.29), Adhesive capsulitis of right shoulder (M75.01)  Authorized # of Visits: 2/8 Next MD visit: none scheduled  Fall Risk: standard Precautions: n/a    Subjective: Patient reports pain in entire right arm since

## 2017-08-02 RX ORDER — CYCLOBENZAPRINE HCL 5 MG
5 TABLET ORAL NIGHTLY
Qty: 10 TABLET | Refills: 0 | Status: SHIPPED | OUTPATIENT
Start: 2017-08-02 | End: 2017-09-07

## 2017-08-03 ENCOUNTER — APPOINTMENT (OUTPATIENT)
Dept: PHYSICAL THERAPY | Age: 42
End: 2017-08-03
Payer: COMMERCIAL

## 2017-08-03 RX ORDER — CITALOPRAM 40 MG/1
TABLET ORAL
Qty: 90 TABLET | Refills: 0 | Status: SHIPPED | OUTPATIENT
Start: 2017-08-03 | End: 2017-09-07

## 2017-08-03 RX ORDER — BUPROPION HYDROCHLORIDE 300 MG/1
TABLET ORAL
Qty: 90 TABLET | Refills: 0 | Status: SHIPPED | OUTPATIENT
Start: 2017-08-03 | End: 2017-09-07

## 2017-08-10 ENCOUNTER — APPOINTMENT (OUTPATIENT)
Dept: PHYSICAL THERAPY | Age: 42
End: 2017-08-10
Payer: COMMERCIAL

## 2017-08-10 DIAGNOSIS — F98.8 ATTENTION DEFICIT DISORDER, UNSPECIFIED HYPERACTIVITY PRESENCE: ICD-10-CM

## 2017-08-10 NOTE — TELEPHONE ENCOUNTER
Pt called to request a refill on her medidate prescription 20mg, pt was told by [facility] that they do not carry the brand any longer they can do generic.  Pt requested to please fill her medication as however william Landa will like to prescribe it, if gener

## 2017-08-11 RX ORDER — METHYLPHENIDATE HYDROCHLORIDE 20 MG/1
20 TABLET ORAL DAILY
Qty: 30 TABLET | Refills: 0 | Status: CANCELLED | COMMUNITY
Start: 2017-08-11 | End: 2017-09-10

## 2017-08-11 RX ORDER — METHYLPHENIDATE HYDROCHLORIDE 20 MG/1
20 TABLET ORAL DAILY
Qty: 30 TABLET | Refills: 0 | COMMUNITY
Start: 2017-09-10 | End: 2017-10-10

## 2017-08-11 RX ORDER — METHYLPHENIDATE HYDROCHLORIDE 20 MG/1
20 TABLET ORAL DAILY
Qty: 30 TABLET | Refills: 0 | Status: SHIPPED | COMMUNITY
Start: 2017-08-11 | End: 2017-09-10

## 2017-08-11 RX ORDER — METHYLPHENIDATE HYDROCHLORIDE 20 MG/1
20 TABLET ORAL DAILY
Qty: 30 TABLET | Refills: 0 | COMMUNITY
Start: 2017-10-10 | End: 2017-11-09

## 2017-08-15 ENCOUNTER — APPOINTMENT (OUTPATIENT)
Dept: PHYSICAL THERAPY | Age: 42
End: 2017-08-15
Payer: COMMERCIAL

## 2017-08-17 DIAGNOSIS — K58.9 IRRITABLE BOWEL SYNDROME WITHOUT DIARRHEA: ICD-10-CM

## 2017-08-17 RX ORDER — DICYCLOMINE HCL 20 MG
TABLET ORAL
Qty: 120 TABLET | Refills: 0 | Status: SHIPPED | OUTPATIENT
Start: 2017-08-17 | End: 2018-05-16

## 2017-09-01 RX ORDER — CITALOPRAM 20 MG/1
TABLET ORAL
Qty: 30 TABLET | Refills: 0 | Status: SHIPPED | OUTPATIENT
Start: 2017-09-01 | End: 2017-09-07

## 2017-09-01 RX ORDER — TOPIRAMATE 25 MG/1
TABLET ORAL
Qty: 120 TABLET | Refills: 0 | Status: SHIPPED | OUTPATIENT
Start: 2017-09-01 | End: 2017-09-07

## 2017-09-07 ENCOUNTER — OFFICE VISIT (OUTPATIENT)
Dept: FAMILY MEDICINE CLINIC | Facility: CLINIC | Age: 42
End: 2017-09-07

## 2017-09-07 VITALS
HEART RATE: 95 BPM | TEMPERATURE: 98 F | OXYGEN SATURATION: 96 % | DIASTOLIC BLOOD PRESSURE: 80 MMHG | WEIGHT: 152 LBS | SYSTOLIC BLOOD PRESSURE: 118 MMHG | HEIGHT: 65 IN | RESPIRATION RATE: 16 BRPM | BODY MASS INDEX: 25.33 KG/M2

## 2017-09-07 DIAGNOSIS — J45.20 MILD INTERMITTENT ASTHMA WITHOUT COMPLICATION: ICD-10-CM

## 2017-09-07 DIAGNOSIS — M75.01 ADHESIVE CAPSULITIS OF RIGHT SHOULDER: Primary | ICD-10-CM

## 2017-09-07 DIAGNOSIS — G43.109 MIGRAINE WITH AURA AND WITHOUT STATUS MIGRAINOSUS, NOT INTRACTABLE: ICD-10-CM

## 2017-09-07 DIAGNOSIS — F32.A ANXIETY AND DEPRESSION: ICD-10-CM

## 2017-09-07 DIAGNOSIS — F90.9 ATTENTION DEFICIT DISORDER WITH HYPERACTIVITY(314.01): ICD-10-CM

## 2017-09-07 DIAGNOSIS — F41.9 ANXIETY AND DEPRESSION: ICD-10-CM

## 2017-09-07 PROCEDURE — 99214 OFFICE O/P EST MOD 30 MIN: CPT | Performed by: FAMILY MEDICINE

## 2017-09-07 RX ORDER — CITALOPRAM 20 MG/1
TABLET ORAL
Qty: 30 TABLET | Refills: 5 | Status: SHIPPED | OUTPATIENT
Start: 2017-09-07 | End: 2018-05-17

## 2017-09-07 RX ORDER — BUPROPION HYDROCHLORIDE 300 MG/1
TABLET ORAL
Qty: 30 TABLET | Refills: 5 | Status: SHIPPED | OUTPATIENT
Start: 2017-09-07 | End: 2018-05-17

## 2017-09-07 RX ORDER — METHYLPHENIDATE HYDROCHLORIDE 20 MG/1
20 TABLET ORAL 2 TIMES DAILY
Qty: 60 TABLET | Refills: 0 | Status: SHIPPED | OUTPATIENT
Start: 2017-11-06 | End: 2017-10-04

## 2017-09-07 RX ORDER — CITALOPRAM 40 MG/1
TABLET ORAL
Qty: 30 TABLET | Refills: 5 | Status: SHIPPED | OUTPATIENT
Start: 2017-09-07 | End: 2018-05-17

## 2017-09-07 RX ORDER — MONTELUKAST SODIUM 10 MG/1
10 TABLET ORAL NIGHTLY
Qty: 30 TABLET | Refills: 11 | Status: SHIPPED | OUTPATIENT
Start: 2017-09-07 | End: 2021-03-18

## 2017-09-07 RX ORDER — TOPIRAMATE 25 MG/1
TABLET ORAL
Qty: 120 TABLET | Refills: 5 | Status: SHIPPED | OUTPATIENT
Start: 2017-09-07 | End: 2018-10-16

## 2017-09-07 RX ORDER — METHYLPHENIDATE HYDROCHLORIDE 20 MG/1
20 TABLET ORAL 2 TIMES DAILY
Qty: 60 TABLET | Refills: 0 | Status: SHIPPED | OUTPATIENT
Start: 2017-10-07 | End: 2017-11-06

## 2017-09-07 RX ORDER — CYCLOBENZAPRINE HCL 5 MG
5 TABLET ORAL NIGHTLY
Qty: 20 TABLET | Refills: 0 | Status: SHIPPED | OUTPATIENT
Start: 2017-09-07 | End: 2018-02-05

## 2017-09-07 RX ORDER — METHYLPHENIDATE HYDROCHLORIDE 20 MG/1
20 TABLET ORAL 2 TIMES DAILY
Qty: 60 TABLET | Refills: 0 | Status: SHIPPED | OUTPATIENT
Start: 2017-09-07 | End: 2017-10-04

## 2017-09-07 NOTE — PROGRESS NOTES
HPI:   Radha Vigil is a 40year old female here to follow up on asthma, mood disorder, migraines, GERD, ongoing right shoulder issues    Migraines better on topomax    Asthma is well controlled   Allergies worse ; pt no longer has dust mite cover on m (90 BASE) MCG/ACT Inhalation Aero Soln  Disp:  Rfl: 1   Esomeprazole Magnesium (NEXIUM) 40 MG Oral Capsule Delayed Release Take 40 mg by mouth 2 (two) times daily. Disp:  Rfl:    Folic Acid 1 MG Oral Tab Take 1 mg by mouth daily.  Disp:  Rfl:    Omega-3 Fat date: [facility] NEEDLE LOCALIZATION W/ SPECIMEN 1 SITE RIG*  2001: OTHER SURGICAL HISTORY      Comment: esophagogastroduodenoscopy  2005: TARSAL TUNNEL RELEASE      Comment: right foot  No date: TOTAL ABDOM HYSTERECTOMY   Family History   Problem Relation Age of thyromegaly, diffuse tenderness  UE: 5+ strength 2+ DTR's normal sensation    Right shoulder : decreased ROM; abduction to 40 degrees   HEENT: atraumatic, normocephalic,ears and throat are clear  EYES:PERRLA, EOMI, normal,conjunctiva are clear  SKIN: noras Mild intermittent asthma without complication    - Montelukast Sodium (SINGULAIR) 10 MG Oral Tab; Take 1 tablet (10 mg total) by mouth nightly. Dispense: 30 tablet;  Refill: 11        Questions answered and patient indicates understanding of these issues a

## 2017-10-04 ENCOUNTER — OFFICE VISIT (OUTPATIENT)
Dept: FAMILY MEDICINE CLINIC | Facility: CLINIC | Age: 42
End: 2017-10-04

## 2017-10-04 ENCOUNTER — LABORATORY ENCOUNTER (OUTPATIENT)
Dept: LAB | Age: 42
End: 2017-10-04
Attending: FAMILY MEDICINE
Payer: COMMERCIAL

## 2017-10-04 VITALS
WEIGHT: 152 LBS | TEMPERATURE: 99 F | DIASTOLIC BLOOD PRESSURE: 70 MMHG | HEIGHT: 65 IN | RESPIRATION RATE: 20 BRPM | SYSTOLIC BLOOD PRESSURE: 122 MMHG | HEART RATE: 68 BPM | OXYGEN SATURATION: 99 % | BODY MASS INDEX: 25.33 KG/M2

## 2017-10-04 DIAGNOSIS — Z00.00 GENERAL MEDICAL EXAM: ICD-10-CM

## 2017-10-04 DIAGNOSIS — F90.8 ATTENTION DEFICIT HYPERACTIVITY DISORDER (ADHD), OTHER TYPE: ICD-10-CM

## 2017-10-04 DIAGNOSIS — Z12.31 ENCOUNTER FOR SCREENING MAMMOGRAM FOR HIGH-RISK PATIENT: ICD-10-CM

## 2017-10-04 DIAGNOSIS — J01.00 SUBACUTE MAXILLARY SINUSITIS: Primary | ICD-10-CM

## 2017-10-04 PROCEDURE — 83036 HEMOGLOBIN GLYCOSYLATED A1C: CPT

## 2017-10-04 PROCEDURE — 85025 COMPLETE CBC W/AUTO DIFF WBC: CPT

## 2017-10-04 PROCEDURE — 36415 COLL VENOUS BLD VENIPUNCTURE: CPT

## 2017-10-04 PROCEDURE — 82607 VITAMIN B-12: CPT

## 2017-10-04 PROCEDURE — 84443 ASSAY THYROID STIM HORMONE: CPT

## 2017-10-04 PROCEDURE — 99214 OFFICE O/P EST MOD 30 MIN: CPT | Performed by: FAMILY MEDICINE

## 2017-10-04 PROCEDURE — 82306 VITAMIN D 25 HYDROXY: CPT

## 2017-10-04 PROCEDURE — 80061 LIPID PANEL: CPT

## 2017-10-04 PROCEDURE — 84481 FREE ASSAY (FT-3): CPT

## 2017-10-04 PROCEDURE — 84439 ASSAY OF FREE THYROXINE: CPT

## 2017-10-04 PROCEDURE — 80053 COMPREHEN METABOLIC PANEL: CPT

## 2017-10-04 RX ORDER — AMOXICILLIN AND CLAVULANATE POTASSIUM 875; 125 MG/1; MG/1
1 TABLET, FILM COATED ORAL 2 TIMES DAILY
Qty: 20 TABLET | Refills: 0 | Status: SHIPPED | OUTPATIENT
Start: 2017-10-04 | End: 2017-10-14

## 2017-10-04 NOTE — PROGRESS NOTES
HPI:   Emeka Sanders is a 40year old female here to follow up on ADD and uri symptoms      Migraines better on topomax    Asthma is well controlled   Allergies worse ; pt no longer has dust mite cover on mattress   Sick today   Pt has been sick for 4 d Disp:  Rfl: 1   Esomeprazole Magnesium (NEXIUM) 40 MG Oral Capsule Delayed Release Take 40 mg by mouth 2 (two) times daily. Disp:  Rfl:    Folic Acid 1 MG Oral Tab Take 1 mg by mouth daily.  Disp:  Rfl:    Omega-3 Fatty Acids (FISH OIL) 1000 MG Oral Cap Esequiel 1 SITE RIG*  2001: OTHER SURGICAL HISTORY      Comment: esophagogastroduodenoscopy  2005: TARSAL TUNNEL RELEASE      Comment: right foot  No date: TOTAL ABDOM HYSTERECTOMY   Family History   Problem Relation Age of Onset   • ADD[other] [OTHER] Father    • sensation     HEENT: atraumatic, normocephalic,ears and throat are clear, + sinus tenderness   EYES:PERRLA, EOMI, normal,conjunctiva are clear  SKIN: norashes,no suspicious lesions  GI: good BS's,no masses, no HSM no tenderness, no RRG  MUSCULOSKELETAL: ba

## 2017-10-16 ENCOUNTER — TELEPHONE (OUTPATIENT)
Dept: FAMILY MEDICINE CLINIC | Facility: CLINIC | Age: 42
End: 2017-10-16

## 2017-10-16 DIAGNOSIS — M25.511 CHRONIC RIGHT SHOULDER PAIN: Primary | ICD-10-CM

## 2017-10-16 DIAGNOSIS — G89.29 CHRONIC RIGHT SHOULDER PAIN: Primary | ICD-10-CM

## 2017-10-16 NOTE — TELEPHONE ENCOUNTER
Pt seen 9/7/17 for shoulder pain, not getting better, pt requesting order for MRI or ortho referral.  Notes states pt only did PT x 3days

## 2017-10-16 NOTE — TELEPHONE ENCOUNTER
Patient called she states she was in and saw Dr Josie Avendano for Right shoulder pain, was put on meds. , they are not helping. The pain is getting worse. She tried calling physical therapy and they said that the order is not in system.      Can she have an MRI o

## 2017-10-16 NOTE — TELEPHONE ENCOUNTER
Dr[address] Mace Everett  [address] [facility] [place] [address]., [address]   Phone: [phone]   pt notified, referral entered

## 2017-11-10 ENCOUNTER — TELEPHONE (OUTPATIENT)
Dept: FAMILY MEDICINE CLINIC | Facility: CLINIC | Age: 42
End: 2017-11-10

## 2017-11-10 NOTE — TELEPHONE ENCOUNTER
This happened yesterday- patient advised she needs to be seen in UC or E/R. =still having irregular heartbeats.

## 2017-11-10 NOTE — TELEPHONE ENCOUNTER
Pt called to request to talk to a nurse, pt was sitting in the couch doing nothing and her heart was racing , her bp went up and she is having headaches, her pulse rate is about 120, pt is having irregular heart beats.  Pt's call transferred to triage nurse

## 2017-11-11 ENCOUNTER — OFFICE VISIT (OUTPATIENT)
Dept: FAMILY MEDICINE CLINIC | Facility: CLINIC | Age: 42
End: 2017-11-11

## 2017-11-11 VITALS
RESPIRATION RATE: 22 BRPM | DIASTOLIC BLOOD PRESSURE: 72 MMHG | WEIGHT: 152 LBS | HEART RATE: 86 BPM | BODY MASS INDEX: 24.43 KG/M2 | SYSTOLIC BLOOD PRESSURE: 110 MMHG | HEIGHT: 66 IN | OXYGEN SATURATION: 98 % | TEMPERATURE: 99 F

## 2017-11-11 DIAGNOSIS — R09.89 LABILE BLOOD PRESSURE: Primary | ICD-10-CM

## 2017-11-11 PROCEDURE — 99213 OFFICE O/P EST LOW 20 MIN: CPT | Performed by: FAMILY MEDICINE

## 2017-11-11 RX ORDER — HYDRALAZINE HYDROCHLORIDE 10 MG/1
10 TABLET, FILM COATED ORAL 4 TIMES DAILY PRN
Qty: 90 TABLET | Refills: 0 | Status: SHIPPED | OUTPATIENT
Start: 2017-11-11 | End: 2017-12-12

## 2017-11-11 NOTE — PROGRESS NOTES
HPI:   Azul Ponce is a 40year old female here to discuss BP    Pt has has moments of not feeling well with a diffuse HA  Pt noted an elevated bp on home cuff that has been checked for accuracy in the past.  + family history of HTN      Current Outpa every 30 (thirty) days.  Disp:  Rfl:       Past Medical History:   Diagnosis Date   • Abnormal uterine bleeding    • Acute bronchitis    • Acute esophagitis    • Acute upper respiratory infections of unspecified site    • Anxiety state, unspecified    • Att • Diabetes Father    • Hypertension Father    • Breast Cancer Mother 54   • Aminta Larios Mother    • Heart Disorder Maternal Grandmother      CHF   • Stroke Maternal Grandfather    • Diabetes Paternal Grandmother    • Cancer Sister      uterine Laisha Feldman is a 40year old female here with    1. Labile blood pressure    - hydrALAzine HCl 10 MG Oral Tab; Take 1 tablet (10 mg total) by mouth 4 (four) times daily as needed. Dispense: 90 tablet;  Refill: 0      Questions answered and patient indicates und

## 2017-12-04 ENCOUNTER — OFFICE VISIT (OUTPATIENT)
Dept: PHYSICAL THERAPY | Age: 42
End: 2017-12-04
Attending: FAMILY MEDICINE
Payer: COMMERCIAL

## 2017-12-04 DIAGNOSIS — M75.01 ADHESIVE CAPSULITIS OF RIGHT SHOULDER: ICD-10-CM

## 2017-12-04 PROCEDURE — 97161 PT EVAL LOW COMPLEX 20 MIN: CPT | Performed by: PHYSICAL THERAPIST

## 2017-12-04 PROCEDURE — 97140 MANUAL THERAPY 1/> REGIONS: CPT | Performed by: PHYSICAL THERAPIST

## 2017-12-04 NOTE — PROGRESS NOTES
UPPER EXTREMITY EVALUATION:   Referring Physician: Dr. Noris Duggan   Diagnosis: Adhesive capsulitis right shoulder     Date of Service: 12/4/2017     PATIENT SUMMARY   Reggie Adkins is a 40year old y/o female who presents to therapy today with complain intense pain along the right biceps       AROM:   Shoulder    Flexion: R 130  Abduction: R 100;   ER: R 30;   IR: R 60;    Remainder of right UE ROM is WNL. Left UE ROM WNL.  There is excessive scapular elevation right shoulder with flexion and abduction furnished under this plan of treatment and while under my care.     X___________________________________________________ Date____________________    Certification From: 76/7/5843  To:3/4/2018

## 2017-12-06 ENCOUNTER — OFFICE VISIT (OUTPATIENT)
Dept: PHYSICAL THERAPY | Age: 42
End: 2017-12-06
Attending: FAMILY MEDICINE
Payer: COMMERCIAL

## 2017-12-06 DIAGNOSIS — M75.01 ADHESIVE CAPSULITIS OF RIGHT SHOULDER: ICD-10-CM

## 2017-12-06 PROCEDURE — 97110 THERAPEUTIC EXERCISES: CPT | Performed by: PHYSICAL THERAPIST

## 2017-12-06 PROCEDURE — 97140 MANUAL THERAPY 1/> REGIONS: CPT | Performed by: PHYSICAL THERAPIST

## 2017-12-06 NOTE — PROGRESS NOTES
Dx: Adhesive capsulitis right shoulder         Authorized # of Visits:  8         Next MD visit: none scheduled  Fall Risk: standard         Precautions: n/a             Subjective:  The patient states she has the greatest pain in her right shoulder first t

## 2017-12-11 ENCOUNTER — APPOINTMENT (OUTPATIENT)
Dept: PHYSICAL THERAPY | Age: 42
End: 2017-12-11
Attending: FAMILY MEDICINE
Payer: COMMERCIAL

## 2017-12-11 ENCOUNTER — APPOINTMENT (OUTPATIENT)
Dept: LAB | Age: 42
End: 2017-12-11
Attending: FAMILY MEDICINE
Payer: COMMERCIAL

## 2017-12-11 DIAGNOSIS — E53.8 VITAMIN B12 DEFICIENCY: ICD-10-CM

## 2017-12-11 DIAGNOSIS — E55.9 VITAMIN D DEFICIENCY: ICD-10-CM

## 2017-12-11 DIAGNOSIS — E78.00 ELEVATED CHOLESTEROL: ICD-10-CM

## 2017-12-11 DIAGNOSIS — R73.09 ELEVATED GLUCOSE: ICD-10-CM

## 2017-12-11 PROCEDURE — 82306 VITAMIN D 25 HYDROXY: CPT

## 2017-12-11 PROCEDURE — 82607 VITAMIN B-12: CPT

## 2017-12-11 PROCEDURE — 36415 COLL VENOUS BLD VENIPUNCTURE: CPT

## 2017-12-11 PROCEDURE — 80061 LIPID PANEL: CPT

## 2017-12-11 PROCEDURE — 80053 COMPREHEN METABOLIC PANEL: CPT

## 2017-12-12 ENCOUNTER — OFFICE VISIT (OUTPATIENT)
Dept: FAMILY MEDICINE CLINIC | Facility: CLINIC | Age: 42
End: 2017-12-12

## 2017-12-12 VITALS
OXYGEN SATURATION: 98 % | RESPIRATION RATE: 20 BRPM | TEMPERATURE: 99 F | HEART RATE: 74 BPM | DIASTOLIC BLOOD PRESSURE: 78 MMHG | SYSTOLIC BLOOD PRESSURE: 122 MMHG | BODY MASS INDEX: 24.75 KG/M2 | HEIGHT: 66 IN | WEIGHT: 154 LBS

## 2017-12-12 DIAGNOSIS — R09.89 LABILE BLOOD PRESSURE: ICD-10-CM

## 2017-12-12 DIAGNOSIS — F90.8 ATTENTION DEFICIT HYPERACTIVITY DISORDER (ADHD), OTHER TYPE: ICD-10-CM

## 2017-12-12 DIAGNOSIS — R30.0 DYSURIA: ICD-10-CM

## 2017-12-12 DIAGNOSIS — E04.1 THYROID NODULE: ICD-10-CM

## 2017-12-12 DIAGNOSIS — R73.9 HYPERGLYCEMIA: ICD-10-CM

## 2017-12-12 DIAGNOSIS — E78.00 ELEVATED CHOLESTEROL: Primary | ICD-10-CM

## 2017-12-12 PROCEDURE — 81003 URINALYSIS AUTO W/O SCOPE: CPT | Performed by: FAMILY MEDICINE

## 2017-12-12 PROCEDURE — 87086 URINE CULTURE/COLONY COUNT: CPT | Performed by: FAMILY MEDICINE

## 2017-12-12 PROCEDURE — 99214 OFFICE O/P EST MOD 30 MIN: CPT | Performed by: FAMILY MEDICINE

## 2017-12-12 RX ORDER — LOSARTAN POTASSIUM 25 MG/1
25 TABLET ORAL DAILY
Qty: 30 TABLET | Refills: 0 | Status: SHIPPED | OUTPATIENT
Start: 2017-12-12 | End: 2018-01-15

## 2017-12-12 RX ORDER — LOVASTATIN 10 MG/1
10 TABLET ORAL NIGHTLY
Qty: 30 TABLET | Refills: 2 | Status: SHIPPED | OUTPATIENT
Start: 2017-12-12 | End: 2018-02-15

## 2017-12-12 RX ORDER — METHYLPHENIDATE HYDROCHLORIDE 20 MG/1
20 TABLET ORAL 2 TIMES DAILY
Qty: 30 TABLET | Refills: 0 | Status: SHIPPED | OUTPATIENT
Start: 2017-12-12 | End: 2018-01-15

## 2017-12-12 NOTE — PROGRESS NOTES
HPI:   Karin Cervantes is a 40year old female here to discuss BP, labs, ADD, urinary symptoms     Pt has has moments of not feeling well with a diffuse HA; better with the hydralazine  No side effects   Pt noted an elevated bp on home cuff that has been times daily with meals. Disp: 60 tablet Rfl: 0   VENTOLIN  (90 BASE) MCG/ACT Inhalation Aero Soln  Disp:  Rfl: 1   Esomeprazole Magnesium (NEXIUM) 40 MG Oral Capsule Delayed Release Take 40 mg by mouth 2 (two) times daily.  Disp:  Rfl:    Folic Acid HYSTEROSCOPY,ABLATION ENDOMETRIUM  1991: LAPAROSCOPY,DIAGNOSTIC      Comment: multiple  No date: [facility] NEEDLE LOCALIZATION W/ SPECIMEN 1 SITE RIG*  2001: OTHER SURGICAL HISTORY      Comment: esophagogastroduodenoscopy  No date: OTHER SURGICAL HISTORY      Co SpO2 98%   BMI 24.86 kg/m²   Body mass index is 24.86 kg/m².    GENERAL: alert and oriented X 3, well developed, well nourished,in no apparent distress  CARDIO: RRR without murmur  LUNGS: clear to auscultation  NECK: supple,no adenopathy,no thyromegaly, dif

## 2017-12-13 ENCOUNTER — OFFICE VISIT (OUTPATIENT)
Dept: PHYSICAL THERAPY | Age: 42
End: 2017-12-13
Attending: FAMILY MEDICINE
Payer: COMMERCIAL

## 2017-12-13 DIAGNOSIS — M75.01 ADHESIVE CAPSULITIS OF RIGHT SHOULDER: ICD-10-CM

## 2017-12-13 PROCEDURE — 97110 THERAPEUTIC EXERCISES: CPT | Performed by: PHYSICAL THERAPIST

## 2017-12-13 PROCEDURE — 97140 MANUAL THERAPY 1/> REGIONS: CPT | Performed by: PHYSICAL THERAPIST

## 2017-12-13 NOTE — PROGRESS NOTES
Dx: Adhesive capsulitis right shoulder         Authorized # of Visits:  8         Next MD visit: none scheduled  Fall Risk: standard         Precautions: n/a             Subjective: Pain 6/10 today.      Objective: MR behind back right shoulder and KULDEEP diaz

## 2017-12-18 ENCOUNTER — OFFICE VISIT (OUTPATIENT)
Dept: PHYSICAL THERAPY | Age: 42
End: 2017-12-18
Attending: FAMILY MEDICINE
Payer: COMMERCIAL

## 2017-12-18 DIAGNOSIS — M75.01 ADHESIVE CAPSULITIS OF RIGHT SHOULDER: ICD-10-CM

## 2017-12-18 PROCEDURE — 97140 MANUAL THERAPY 1/> REGIONS: CPT | Performed by: PHYSICAL THERAPIST

## 2017-12-18 PROCEDURE — 97110 THERAPEUTIC EXERCISES: CPT | Performed by: PHYSICAL THERAPIST

## 2017-12-18 NOTE — PROGRESS NOTES
Dx: Adhesive capsulitis right shoulder         Authorized # of Visits:  8         Next MD visit: none scheduled  Fall Risk: standard         Precautions: n/a             Subjective: Pain 7/10. Patient states she is now able to do her hair now.      Objectiv

## 2017-12-20 ENCOUNTER — OFFICE VISIT (OUTPATIENT)
Dept: PHYSICAL THERAPY | Age: 42
End: 2017-12-20
Attending: FAMILY MEDICINE
Payer: COMMERCIAL

## 2017-12-20 DIAGNOSIS — M75.01 ADHESIVE CAPSULITIS OF RIGHT SHOULDER: ICD-10-CM

## 2017-12-20 PROCEDURE — 97110 THERAPEUTIC EXERCISES: CPT | Performed by: PHYSICAL THERAPIST

## 2017-12-20 PROCEDURE — 97140 MANUAL THERAPY 1/> REGIONS: CPT | Performed by: PHYSICAL THERAPIST

## 2017-12-20 NOTE — PROGRESS NOTES
Dx: Adhesive capsulitis right shoulder         Authorized # of Visits:  8         Next MD visit: none scheduled  Fall Risk: standard         Precautions: n/a             Subjective: Pain 9/10 today.  The patient states it has been very sore since last sessi inferior/lat glides, long axis distraction       Pulley AAROM right shoulder 3 minutes PROM right shoulder, inferior/lat glides, long axis distraction Hold/relax lats right UE, IR/ER followed by passive motion         MR behind back 3 minutes with pulley

## 2017-12-26 ENCOUNTER — OFFICE VISIT (OUTPATIENT)
Dept: PHYSICAL THERAPY | Age: 42
End: 2017-12-26
Attending: FAMILY MEDICINE
Payer: COMMERCIAL

## 2017-12-26 DIAGNOSIS — M75.01 ADHESIVE CAPSULITIS OF RIGHT SHOULDER: ICD-10-CM

## 2017-12-26 PROCEDURE — 97140 MANUAL THERAPY 1/> REGIONS: CPT | Performed by: PHYSICAL THERAPIST

## 2017-12-26 PROCEDURE — 97110 THERAPEUTIC EXERCISES: CPT | Performed by: PHYSICAL THERAPIST

## 2017-12-26 NOTE — PROGRESS NOTES
Dx: Adhesive capsulitis right shoulder         Authorized # of Visits:  8         Next MD visit: none scheduled  Fall Risk: standard         Precautions: n/a             Subjective: Patient states most of her pain is at the front of her right shoulder.    O shoulder      Pulley AAROM right shoulder 3 minutes PROM right shoulder, inferior/lat glides, long axis distraction Hold/relax lats right UE, IR/ER followed by passive motion  PROM right shoulder, inferior/lat glides, long axis distraction       MR behind

## 2017-12-27 ENCOUNTER — APPOINTMENT (OUTPATIENT)
Dept: PHYSICAL THERAPY | Age: 42
End: 2017-12-27
Attending: FAMILY MEDICINE
Payer: COMMERCIAL

## 2017-12-28 ENCOUNTER — OFFICE VISIT (OUTPATIENT)
Dept: PHYSICAL THERAPY | Age: 42
End: 2017-12-28
Attending: FAMILY MEDICINE
Payer: COMMERCIAL

## 2017-12-28 DIAGNOSIS — M75.01 ADHESIVE CAPSULITIS OF RIGHT SHOULDER: ICD-10-CM

## 2017-12-28 PROCEDURE — 97110 THERAPEUTIC EXERCISES: CPT | Performed by: PHYSICAL THERAPIST

## 2017-12-28 PROCEDURE — 97140 MANUAL THERAPY 1/> REGIONS: CPT | Performed by: PHYSICAL THERAPIST

## 2017-12-28 NOTE — PROGRESS NOTES
Discharge Summary    Pt has attended 7 visits in Physical Therapy. Subjective: Right shoulder pain 6/10 at rest, 10/10 with activity. The patient states she is using her pulley at home.       Assessment: The patient continues to have capsular tightness 12/18/2017              TX#: 4/ Date:  12/20/2017             TX#: 5/ Date: 12/26/2017              TX#: 6/ Date:   12/28/2017            TX#: 7/ Date:               TX#: 8/   Yoly CHAPPELL 5 minutes UBE 6 minutes L1 3 minutes forward/back UBE L2 6 minutes

## 2018-01-13 DIAGNOSIS — R09.89 LABILE BLOOD PRESSURE: ICD-10-CM

## 2018-01-13 RX ORDER — LOSARTAN POTASSIUM 25 MG/1
TABLET ORAL
Qty: 30 TABLET | Refills: 0 | Status: CANCELLED | OUTPATIENT
Start: 2018-01-13

## 2018-01-15 ENCOUNTER — OFFICE VISIT (OUTPATIENT)
Dept: FAMILY MEDICINE CLINIC | Facility: CLINIC | Age: 43
End: 2018-01-15

## 2018-01-15 VITALS
HEIGHT: 66 IN | RESPIRATION RATE: 24 BRPM | SYSTOLIC BLOOD PRESSURE: 120 MMHG | HEART RATE: 82 BPM | WEIGHT: 152 LBS | BODY MASS INDEX: 24.43 KG/M2 | OXYGEN SATURATION: 98 % | DIASTOLIC BLOOD PRESSURE: 60 MMHG | TEMPERATURE: 99 F

## 2018-01-15 DIAGNOSIS — R09.89 LABILE BLOOD PRESSURE: ICD-10-CM

## 2018-01-15 DIAGNOSIS — F90.8 ATTENTION DEFICIT HYPERACTIVITY DISORDER (ADHD), OTHER TYPE: ICD-10-CM

## 2018-01-15 DIAGNOSIS — E78.00 ELEVATED CHOLESTEROL: Primary | ICD-10-CM

## 2018-01-15 PROCEDURE — 99214 OFFICE O/P EST MOD 30 MIN: CPT | Performed by: FAMILY MEDICINE

## 2018-01-15 RX ORDER — METHYLPHENIDATE HYDROCHLORIDE 20 MG/1
20 TABLET ORAL 2 TIMES DAILY
Qty: 30 TABLET | Refills: 0 | Status: SHIPPED | OUTPATIENT
Start: 2018-01-15 | End: 2018-03-06

## 2018-01-15 RX ORDER — LOSARTAN POTASSIUM 25 MG/1
25 TABLET ORAL DAILY
Qty: 30 TABLET | Refills: 2 | Status: SHIPPED | OUTPATIENT
Start: 2018-01-15 | End: 2018-05-17

## 2018-01-15 RX ORDER — HYDRALAZINE HYDROCHLORIDE 10 MG/1
10 TABLET, FILM COATED ORAL AS NEEDED
Refills: 0 | COMMUNITY
Start: 2017-11-11 | End: 2020-02-12

## 2018-01-15 RX ORDER — FLUTICASONE PROPIONATE 50 MCG
2 SPRAY, SUSPENSION (ML) NASAL NIGHTLY
Qty: 1 BOTTLE | Refills: 0 | Status: SHIPPED | OUTPATIENT
Start: 2018-01-15 | End: 2018-02-15

## 2018-01-15 NOTE — PROGRESS NOTES
HPI:   Tiff Avila is a 40year old female here to discuss BP, ADD and elevated cholesterol     Feeling better on the losartan ; pt does wake up HA most day   Pt has not checked her bp at this time   Patient denies chest pain, shortness of breath, diz (NAPROSYN) 500 MG Oral Tab Take 1 tablet by mouth 2 (two) times daily with meals.  Disp: 60 tablet Rfl: 0   VENTOLIN  (90 BASE) MCG/ACT Inhalation Aero Soln  Disp:  Rfl: 1   Esomeprazole Magnesium (NEXIUM) 40 MG Oral Capsule Delayed Release Take 40 m Comment: 2004 7/20/2015: HYSTERECTOMY  No date: HYSTEROSCOPY,ABLATION ENDOMETRIUM  1991: LAPAROSCOPY,DIAGNOSTIC      Comment: multiple  No date: VENKATESH NEEDLE LOCALIZATION W/ SPECIMEN 1 SITE RIG*  2001: OTHER SURGICAL HISTORY      Comment: Nolvia Russell Resp 24   Ht 66\"   Wt 152 lb   LMP 01/01/2004   SpO2 98%   BMI 24.53 kg/m²   Body mass index is 24.53 kg/m².    GENERAL: alert and oriented X 3, well developed, well nourished,in no apparent distress  CARDIO: RRR without murmur  LUNGS: clear to auscultatio

## 2018-01-25 ENCOUNTER — TELEPHONE (OUTPATIENT)
Dept: FAMILY MEDICINE CLINIC | Facility: CLINIC | Age: 43
End: 2018-01-25

## 2018-01-25 NOTE — TELEPHONE ENCOUNTER
Pt having surgery with Dr. Rivero Persons on 2/9/18,  Letter requesting CMP, EKG, and medical clearance received,    Appt made with pt on 2/1/18 for H & P clearance.

## 2018-02-05 ENCOUNTER — TELEPHONE (OUTPATIENT)
Dept: FAMILY MEDICINE CLINIC | Facility: CLINIC | Age: 43
End: 2018-02-05

## 2018-02-05 RX ORDER — MELATONIN 10 MG
10 CAPSULE ORAL NIGHTLY
COMMUNITY

## 2018-02-06 ENCOUNTER — TELEPHONE (OUTPATIENT)
Dept: FAMILY MEDICINE CLINIC | Facility: CLINIC | Age: 43
End: 2018-02-06

## 2018-02-06 ENCOUNTER — APPOINTMENT (OUTPATIENT)
Dept: LAB | Age: 43
End: 2018-02-06
Attending: FAMILY MEDICINE
Payer: COMMERCIAL

## 2018-02-06 ENCOUNTER — OFFICE VISIT (OUTPATIENT)
Dept: FAMILY MEDICINE CLINIC | Facility: CLINIC | Age: 43
End: 2018-02-06

## 2018-02-06 VITALS
SYSTOLIC BLOOD PRESSURE: 108 MMHG | HEIGHT: 66 IN | TEMPERATURE: 98 F | DIASTOLIC BLOOD PRESSURE: 60 MMHG | WEIGHT: 148 LBS | OXYGEN SATURATION: 99 % | BODY MASS INDEX: 23.78 KG/M2 | HEART RATE: 86 BPM | RESPIRATION RATE: 24 BRPM

## 2018-02-06 DIAGNOSIS — F98.8 ATTENTION DEFICIT DISORDER (ADD) WITHOUT HYPERACTIVITY: ICD-10-CM

## 2018-02-06 DIAGNOSIS — R73.9 HYPERGLYCEMIA: ICD-10-CM

## 2018-02-06 DIAGNOSIS — J45.20 MILD INTERMITTENT ASTHMA WITHOUT COMPLICATION: ICD-10-CM

## 2018-02-06 DIAGNOSIS — E78.00 ELEVATED CHOLESTEROL: ICD-10-CM

## 2018-02-06 DIAGNOSIS — Z01.818 PREOP TESTING: ICD-10-CM

## 2018-02-06 DIAGNOSIS — Z01.818 PREOPERATIVE CLEARANCE: ICD-10-CM

## 2018-02-06 DIAGNOSIS — E04.1 THYROID NODULE: ICD-10-CM

## 2018-02-06 DIAGNOSIS — M75.01 ADHESIVE CAPSULITIS OF RIGHT SHOULDER: Primary | ICD-10-CM

## 2018-02-06 DIAGNOSIS — R09.89 LABILE BLOOD PRESSURE: ICD-10-CM

## 2018-02-06 LAB
ALBUMIN SERPL-MCNC: 4 G/DL (ref 3.5–4.8)
ALP LIVER SERPL-CCNC: 60 U/L (ref 37–98)
ALT SERPL-CCNC: 27 U/L (ref 14–54)
AST SERPL-CCNC: 11 U/L (ref 15–41)
BILIRUB SERPL-MCNC: 0.4 MG/DL (ref 0.1–2)
BUN BLD-MCNC: 12 MG/DL (ref 8–20)
CALCIUM BLD-MCNC: 9.5 MG/DL (ref 8.3–10.3)
CHLORIDE: 107 MMOL/L (ref 101–111)
CHOLEST SMN-MCNC: 217 MG/DL (ref ?–200)
CO2: 27 MMOL/L (ref 22–32)
CREAT BLD-MCNC: 0.9 MG/DL (ref 0.55–1.02)
EST. AVERAGE GLUCOSE BLD GHB EST-MCNC: 114 MG/DL (ref 68–126)
FREE T4: 0.8 NG/DL (ref 0.9–1.8)
GLUCOSE BLD-MCNC: 110 MG/DL (ref 70–99)
HBA1C MFR BLD HPLC: 5.6 % (ref ?–5.7)
HDLC SERPL-MCNC: 53 MG/DL (ref 45–?)
HDLC SERPL: 4.09 {RATIO} (ref ?–4.44)
LDLC SERPL CALC-MCNC: 136 MG/DL (ref ?–130)
M PROTEIN MFR SERPL ELPH: 7.3 G/DL (ref 6.1–8.3)
NONHDLC SERPL-MCNC: 164 MG/DL (ref ?–130)
POTASSIUM SERPL-SCNC: 4.1 MMOL/L (ref 3.6–5.1)
SODIUM SERPL-SCNC: 141 MMOL/L (ref 136–144)
T3FREE SERPL-MCNC: 2.73 PG/ML (ref 2.3–4.2)
TRIGL SERPL-MCNC: 138 MG/DL (ref ?–150)
TSI SER-ACNC: 1.5 MIU/ML (ref 0.35–5.5)
VLDLC SERPL CALC-MCNC: 28 MG/DL (ref 5–40)

## 2018-02-06 PROCEDURE — 80053 COMPREHEN METABOLIC PANEL: CPT

## 2018-02-06 PROCEDURE — 99243 OFF/OP CNSLTJ NEW/EST LOW 30: CPT | Performed by: FAMILY MEDICINE

## 2018-02-06 PROCEDURE — 84439 ASSAY OF FREE THYROXINE: CPT

## 2018-02-06 PROCEDURE — 80061 LIPID PANEL: CPT

## 2018-02-06 PROCEDURE — 84443 ASSAY THYROID STIM HORMONE: CPT

## 2018-02-06 PROCEDURE — 83036 HEMOGLOBIN GLYCOSYLATED A1C: CPT

## 2018-02-06 PROCEDURE — 93000 ELECTROCARDIOGRAM COMPLETE: CPT | Performed by: FAMILY MEDICINE

## 2018-02-06 PROCEDURE — 36415 COLL VENOUS BLD VENIPUNCTURE: CPT

## 2018-02-06 PROCEDURE — 84481 FREE ASSAY (FT-3): CPT

## 2018-02-06 NOTE — PROGRESS NOTES
Elias Horner is a 40year old female who presents for preop clearance  Dr. Kenyatta Garrett reqesting clearance arthroscopic acromioplasty lysis of adhesions right shoulder. 2/13  At [facility]   HPI:   Pt complains of chronic shoulder pain.   Pt sigrid nightly. Disp:  Rfl:    Calcium Carbonate-Vitamin D (CALCIUM 600 + D OR) Take by mouth 2 (two) times daily.  Disp:  Rfl:    HYDROcodone-acetaminophen (NORCO) 5-325 MG Oral Tab 1-2 tablets po every 12 hours as needed for pain Disp: 20 tablet Rfl: 0   hydrALA Date   • Abnormal uterine bleeding    • Acute bronchitis    • Acute esophagitis    • Acute upper respiratory infections of unspecified site    • Anxiety state, unspecified    • Attention deficit disorder without mention of hyperactivity    • Back problem Problem Relation Age of Onset   • ADD[other] [OTHER] Father    • Diabetes Father    • Hypertension Father    • Breast Cancer Mother 54   • Otis Osuna Mother    • Heart Disorder Maternal Grandmother      CHF   • Stroke Maternal Grandfather    • auscultation  CARDIO: RRR without murmur  GI: good BS's,no masses, HSM or tenderness  MUSCULOSKELETAL: back is not tender,FROM of the back  EXTREMITIES: no cyanosis, clubbing or edema  NEURO: Oriented times three,cranial nerves are intact,motor and sensory

## 2018-02-06 NOTE — TELEPHONE ENCOUNTER
Bonifacio Dickerson from pre Admission dept called to request for the nurse to please fax pt's ekg confirmed and signed. To fax # [phone] pt is having surgery on the 13th.  Please call Bonifacio Dickerson if you have any questions regarding her request.

## 2018-02-07 ENCOUNTER — ANESTHESIA EVENT (OUTPATIENT)
Dept: SURGERY | Facility: HOSPITAL | Age: 43
End: 2018-02-07
Payer: COMMERCIAL

## 2018-02-12 NOTE — H&P
[facility]    PATIENT'S NAME: Suzan Joseph   ATTENDING PHYSICIAN: Jose Ortiz M.D.    PATIENT ACCOUNT#:   [de-identified]    LOCATION:  OR   [facility]  MEDICAL RECORD #:   [id number]       YOB: 1975  ADMISSION DATE:       02/13/2018    H arthroscopy, acromioplasty, lysis of adhesions, treatment of cuff and labral pathology as appropriate. Limitations, risks, complications, postoperative scenarios are stressed. She is well counseled and desires carrying out of surgical procedure.   She rey

## 2018-02-13 ENCOUNTER — HOSPITAL ENCOUNTER (OUTPATIENT)
Facility: HOSPITAL | Age: 43
Setting detail: HOSPITAL OUTPATIENT SURGERY
Discharge: HOME | End: 2018-02-13
Attending: ORTHOPAEDIC SURGERY | Admitting: ORTHOPAEDIC SURGERY
Payer: COMMERCIAL

## 2018-02-13 ENCOUNTER — ANESTHESIA (OUTPATIENT)
Dept: SURGERY | Facility: HOSPITAL | Age: 43
End: 2018-02-13
Payer: COMMERCIAL

## 2018-02-13 ENCOUNTER — SURGERY (OUTPATIENT)
Age: 43
End: 2018-02-13

## 2018-02-13 VITALS
BODY MASS INDEX: 24.83 KG/M2 | HEIGHT: 65 IN | SYSTOLIC BLOOD PRESSURE: 101 MMHG | TEMPERATURE: 97 F | WEIGHT: 149 LBS | RESPIRATION RATE: 16 BRPM | HEART RATE: 86 BPM | DIASTOLIC BLOOD PRESSURE: 69 MMHG | OXYGEN SATURATION: 98 %

## 2018-02-13 DIAGNOSIS — M75.01 ADHESIVE CAPSULITIS OF RIGHT SHOULDER: ICD-10-CM

## 2018-02-13 LAB — GLUCOSE BLD-MCNC: 104 MG/DL (ref 65–99)

## 2018-02-13 PROCEDURE — 76942 ECHO GUIDE FOR BIOPSY: CPT | Performed by: ORTHOPAEDIC SURGERY

## 2018-02-13 PROCEDURE — 76937 US GUIDE VASCULAR ACCESS: CPT | Performed by: ORTHOPAEDIC SURGERY

## 2018-02-13 PROCEDURE — 36410 VNPNXR 3YR/> PHY/QHP DX/THER: CPT | Performed by: ORTHOPAEDIC SURGERY

## 2018-02-13 PROCEDURE — 3E0T3BZ INTRODUCTION OF ANESTHETIC AGENT INTO PERIPHERAL NERVES AND PLEXI, PERCUTANEOUS APPROACH: ICD-10-PCS | Performed by: ANESTHESIOLOGY

## 2018-02-13 PROCEDURE — 0RNJ4ZZ RELEASE RIGHT SHOULDER JOINT, PERCUTANEOUS ENDOSCOPIC APPROACH: ICD-10-PCS | Performed by: ORTHOPAEDIC SURGERY

## 2018-02-13 PROCEDURE — 82962 GLUCOSE BLOOD TEST: CPT

## 2018-02-13 RX ORDER — MORPHINE SULFATE 2 MG/ML
2 INJECTION, SOLUTION INTRAMUSCULAR; INTRAVENOUS EVERY 5 MIN PRN
Status: DISCONTINUED | OUTPATIENT
Start: 2018-02-13 | End: 2018-02-13

## 2018-02-13 RX ORDER — SODIUM CHLORIDE, SODIUM LACTATE, POTASSIUM CHLORIDE, CALCIUM CHLORIDE 600; 310; 30; 20 MG/100ML; MG/100ML; MG/100ML; MG/100ML
INJECTION, SOLUTION INTRAVENOUS CONTINUOUS
Status: DISCONTINUED | OUTPATIENT
Start: 2018-02-13 | End: 2018-02-13

## 2018-02-13 RX ORDER — CEFAZOLIN SODIUM/WATER 2 G/20 ML
2 SYRINGE (ML) INTRAVENOUS ONCE
Status: DISCONTINUED | OUTPATIENT
Start: 2018-02-13 | End: 2018-02-13 | Stop reason: HOSPADM

## 2018-02-13 RX ORDER — HYDROCODONE BITARTRATE AND ACETAMINOPHEN 5; 325 MG/1; MG/1
2 TABLET ORAL AS NEEDED
Status: DISCONTINUED | OUTPATIENT
Start: 2018-02-13 | End: 2018-02-13

## 2018-02-13 RX ORDER — DEXAMETHASONE SODIUM PHOSPHATE 4 MG/ML
4 VIAL (ML) INJECTION AS NEEDED
Status: DISCONTINUED | OUTPATIENT
Start: 2018-02-13 | End: 2018-02-13

## 2018-02-13 RX ORDER — NALOXONE HYDROCHLORIDE 0.4 MG/ML
80 INJECTION, SOLUTION INTRAMUSCULAR; INTRAVENOUS; SUBCUTANEOUS AS NEEDED
Status: DISCONTINUED | OUTPATIENT
Start: 2018-02-13 | End: 2018-02-13

## 2018-02-13 RX ORDER — BUPIVACAINE HYDROCHLORIDE AND EPINEPHRINE 5; 5 MG/ML; UG/ML
INJECTION, SOLUTION EPIDURAL; INTRACAUDAL; PERINEURAL AS NEEDED
Status: DISCONTINUED | OUTPATIENT
Start: 2018-02-13 | End: 2018-02-13 | Stop reason: HOSPADM

## 2018-02-13 RX ORDER — SCOLOPAMINE TRANSDERMAL SYSTEM 1 MG/1
1 PATCH, EXTENDED RELEASE TRANSDERMAL
Status: DISCONTINUED | OUTPATIENT
Start: 2018-02-13 | End: 2018-02-13

## 2018-02-13 RX ORDER — ONDANSETRON 2 MG/ML
4 INJECTION INTRAMUSCULAR; INTRAVENOUS AS NEEDED
Status: DISCONTINUED | OUTPATIENT
Start: 2018-02-13 | End: 2018-02-13

## 2018-02-13 RX ORDER — HYDROCODONE BITARTRATE AND ACETAMINOPHEN 5; 325 MG/1; MG/1
1 TABLET ORAL AS NEEDED
Status: DISCONTINUED | OUTPATIENT
Start: 2018-02-13 | End: 2018-02-13

## 2018-02-13 RX ORDER — SCOLOPAMINE TRANSDERMAL SYSTEM 1 MG/1
PATCH, EXTENDED RELEASE TRANSDERMAL
Status: DISCONTINUED
Start: 2018-02-13 | End: 2018-02-13

## 2018-02-13 RX ORDER — DIPHENHYDRAMINE HYDROCHLORIDE 50 MG/ML
12.5 INJECTION INTRAMUSCULAR; INTRAVENOUS AS NEEDED
Status: DISCONTINUED | OUTPATIENT
Start: 2018-02-13 | End: 2018-02-13

## 2018-02-13 RX ORDER — DEXTROSE MONOHYDRATE 25 G/50ML
50 INJECTION, SOLUTION INTRAVENOUS
Status: DISCONTINUED | OUTPATIENT
Start: 2018-02-13 | End: 2018-02-13

## 2018-02-13 RX ORDER — HYDROCODONE BITARTRATE AND ACETAMINOPHEN 5; 325 MG/1; MG/1
TABLET ORAL
Qty: 40 TABLET | Refills: 0 | Status: SHIPPED | OUTPATIENT
Start: 2018-02-13 | End: 2018-03-06

## 2018-02-13 RX ORDER — MIDAZOLAM HYDROCHLORIDE 1 MG/ML
1 INJECTION INTRAMUSCULAR; INTRAVENOUS EVERY 5 MIN PRN
Status: DISCONTINUED | OUTPATIENT
Start: 2018-02-13 | End: 2018-02-13

## 2018-02-13 NOTE — ANESTHESIA POSTPROCEDURE EVALUATION
[facility] Patient Status:  Hospital Outpatient Surgery   Age/Gender 40year old female MRN [id number]   Location [facility] Attending Clint Galvan MD   Hosp Day # 0 PCP DO Flaquita Niño

## 2018-02-13 NOTE — BRIEF OP NOTE
Pre-Operative Diagnosis: Adhesive capsulitis of right shoulder [M75.01]     Post-Operative Diagnosis: Adhesive capsulitis of right shoulder [M75.01]     Procedure Performed:   Procedure(s):  ARTHROSCOPIC ACROMIOPLASTY LYSIS OF ADHESIONS RIGHT SHOULDER

## 2018-02-13 NOTE — OPERATIVE REPORT
[facility]    PATIENT'S NAME: Gisel Keys   ATTENDING PHYSICIAN: Maggie Burton M.D. OPERATING PHYSICIAN: Maggie Burton M.D.    PATIENT ACCOUNT#:   [de-identified]    LOCATION:  [address] [place] [address]  MEDICAL RECORD #:   [id number] to the left lateral decubitus position. The shoulder is examined under anesthetic.   There is profound restriction of motion with limitations of flexion at 90 degrees, abduction to 60, and external rotation to neutral.  With short lever arms, full manipula

## 2018-02-13 NOTE — ANESTHESIA PREPROCEDURE EVALUATION
PRE-OP EVALUATION    Patient Name: Johnny Blackwell    Pre-op Diagnosis: Adhesive capsulitis of right shoulder [M75.01]    Procedure(s):  ARTHROSCOPIC ACROMIOPLASTY LYSIS OF ADHESIONS RIGHT SHOULDER    Surgeon(s) and Role:     * Katt Martini MD - Prim tablet Rfl: 11   DICYCLOMINE HCL 20 MG Oral Tab TAKE ONE TABLET BY MOUTH FOUR TIMES DAILY( EVERY 6 HOURS) BEFORE MEALS AND NIGHTLY AS NEEDED Disp: 120 tablet Rfl: 0   naproxen (NAPROSYN) 500 MG Oral Tab Take 1 tablet by mouth 2 (two) times daily with meals Alcohol use Yes    Comment: occasionally       Drug use: No     Available pre-op labs reviewed.        Lab Results  Component Value Date    02/06/2018   K 4.1 02/06/2018    02/06/2018   CO2 27.0 02/06/2018   BUN 12 02/06/2018   CREATSERUM 0.90 0

## 2018-02-13 NOTE — INTERVAL H&P NOTE
Pre-op Diagnosis: Adhesive capsulitis of right shoulder [M75.01]    The above referenced H&P was reviewed by Irean Collet, MD on 2/13/2018, the patient was examined and no significant changes have occurred in the patient's condition since the H&P was pe

## 2018-02-14 ENCOUNTER — OFFICE VISIT (OUTPATIENT)
Dept: PHYSICAL THERAPY | Age: 43
End: 2018-02-14
Attending: ORTHOPAEDIC SURGERY
Payer: COMMERCIAL

## 2018-02-14 PROBLEM — Z47.89 ORTHOPEDIC AFTERCARE: Status: ACTIVE | Noted: 2018-02-14

## 2018-02-14 PROCEDURE — 97530 THERAPEUTIC ACTIVITIES: CPT

## 2018-02-14 PROCEDURE — 97161 PT EVAL LOW COMPLEX 20 MIN: CPT

## 2018-02-14 NOTE — PROGRESS NOTES
UPPER EXTREMITY EVALUATION:   Referring Physician: Dr. Kenyatta Garrett  Diagnosis: Right Shoulder Adhesive Capsulitis - S/P Manipulation      Date of Service: 2/14/2018     PATIENT SUMMARY   Elias Horner is a 40year old y/o female who presents to therapy Flexion: R 55/145; L 170  Abduction: R 80/125; L 170  ER: R 45; L 85  IR: R Iliac Crest ; L T7     Accessory motion: Mild loss in posterior and inferior glide when compared to the left side. Strength/MMT:NT.       Special tests: Deferred    Today’s T care.    X___________________________________________________ Date____________________    Certification From: 9/17/4412  To:5/15/2018

## 2018-02-15 DIAGNOSIS — E78.00 ELEVATED CHOLESTEROL: ICD-10-CM

## 2018-02-15 RX ORDER — LOVASTATIN 10 MG/1
TABLET ORAL
Qty: 30 TABLET | Refills: 2 | Status: SHIPPED | OUTPATIENT
Start: 2018-02-15 | End: 2018-05-17

## 2018-02-15 RX ORDER — FLUTICASONE PROPIONATE 50 MCG
SPRAY, SUSPENSION (ML) NASAL
Qty: 1 BOTTLE | Refills: 2 | Status: ON HOLD | OUTPATIENT
Start: 2018-02-15 | End: 2020-07-06

## 2018-02-15 RX ORDER — FLUTICASONE PROPIONATE 50 MCG
SPRAY, SUSPENSION (ML) NASAL
Refills: 0 | OUTPATIENT
Start: 2018-02-15

## 2018-02-16 ENCOUNTER — OFFICE VISIT (OUTPATIENT)
Dept: PHYSICAL THERAPY | Age: 43
End: 2018-02-16
Attending: ORTHOPAEDIC SURGERY
Payer: COMMERCIAL

## 2018-02-16 PROCEDURE — 97110 THERAPEUTIC EXERCISES: CPT

## 2018-02-16 PROCEDURE — 97140 MANUAL THERAPY 1/> REGIONS: CPT

## 2018-02-16 NOTE — PROGRESS NOTES
Dx: Right Sided Adhesive Capsulitis         Authorized # of Visits:  12         Next MD visit: none scheduled  Fall Risk: standard         Precautions: n/a             Subjective: States that the shoulder is sore, but she is maintaining HEP.       Objective

## 2018-02-19 ENCOUNTER — OFFICE VISIT (OUTPATIENT)
Dept: PHYSICAL THERAPY | Age: 43
End: 2018-02-19
Attending: FAMILY MEDICINE
Payer: COMMERCIAL

## 2018-02-19 PROCEDURE — 97110 THERAPEUTIC EXERCISES: CPT

## 2018-02-19 PROCEDURE — 97140 MANUAL THERAPY 1/> REGIONS: CPT

## 2018-02-19 NOTE — PROGRESS NOTES
Dx: Right Sided Adhesive Capsulitis         Authorized # of Visits:  12         Next MD visit: none scheduled  Fall Risk: standard         Precautions: n/a             Subjective: States that the shoulder is sore. Experiencing some \"spasms. \"    Oleksandr Nawaf

## 2018-02-20 ENCOUNTER — APPOINTMENT (OUTPATIENT)
Dept: PHYSICAL THERAPY | Age: 43
End: 2018-02-20
Attending: FAMILY MEDICINE
Payer: COMMERCIAL

## 2018-02-23 ENCOUNTER — APPOINTMENT (OUTPATIENT)
Dept: PHYSICAL THERAPY | Age: 43
End: 2018-02-23
Attending: FAMILY MEDICINE
Payer: COMMERCIAL

## 2018-02-23 PROCEDURE — 97110 THERAPEUTIC EXERCISES: CPT

## 2018-02-23 PROCEDURE — 97140 MANUAL THERAPY 1/> REGIONS: CPT

## 2018-02-23 NOTE — PROGRESS NOTES
Dx: Right Sided Adhesive Capsulitis         Authorized # of Visits:  12         Next MD visit:   Fall Risk: standard         Precautions: n/a             Subjective: Saw the MD who said that she is doing well.   Has not been using the arm as much as she zelda

## 2018-02-26 ENCOUNTER — APPOINTMENT (OUTPATIENT)
Dept: PHYSICAL THERAPY | Age: 43
End: 2018-02-26
Attending: FAMILY MEDICINE
Payer: COMMERCIAL

## 2018-02-27 ENCOUNTER — APPOINTMENT (OUTPATIENT)
Dept: PHYSICAL THERAPY | Age: 43
End: 2018-02-27
Attending: FAMILY MEDICINE
Payer: COMMERCIAL

## 2018-02-28 ENCOUNTER — OFFICE VISIT (OUTPATIENT)
Dept: PHYSICAL THERAPY | Age: 43
End: 2018-02-28
Attending: FAMILY MEDICINE
Payer: COMMERCIAL

## 2018-02-28 PROCEDURE — 97140 MANUAL THERAPY 1/> REGIONS: CPT

## 2018-02-28 PROCEDURE — 97110 THERAPEUTIC EXERCISES: CPT

## 2018-02-28 NOTE — PROGRESS NOTES
Dx: Right Sided Adhesive Capsulitis         Authorized # of Visits:  12         Next MD visit:   Fall Risk: standard         Precautions: n/a             Subjective: The patient missed 2 appointments because of illness.   States that the shoulder is stiff t 4. Assess and establish goals for MMT as tolerated (progressing). Plan: Assess response and progress as tolerated. Charges: 2 Manual PT (25 min);  2 TherEx (25 min)       Total Timed Treatment: 50 min  Total Treatment Time: 50 min

## 2018-03-05 ENCOUNTER — OFFICE VISIT (OUTPATIENT)
Dept: PHYSICAL THERAPY | Age: 43
End: 2018-03-05
Attending: FAMILY MEDICINE
Payer: COMMERCIAL

## 2018-03-05 PROCEDURE — 97110 THERAPEUTIC EXERCISES: CPT

## 2018-03-05 PROCEDURE — 97140 MANUAL THERAPY 1/> REGIONS: CPT

## 2018-03-05 NOTE — PROGRESS NOTES
Dx: Right Sided Adhesive Capsulitis         Authorized # of Visits:  12         Next MD visit:   Fall Risk: standard         Precautions: n/a             Subjective: Feels the shoulder stiffness has increased.   Is now having tricep pain that wasn't present shoulder to 160 degrees FLXN, 155 degrees ABD, and 75 degrees ER (progressing). 3. 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     4. Assess and establish goals

## 2018-03-06 ENCOUNTER — OFFICE VISIT (OUTPATIENT)
Dept: PHYSICAL THERAPY | Age: 43
End: 2018-03-06
Attending: FAMILY MEDICINE
Payer: COMMERCIAL

## 2018-03-06 DIAGNOSIS — Z47.89 ORTHOPEDIC AFTERCARE: ICD-10-CM

## 2018-03-06 DIAGNOSIS — M75.01 ADHESIVE CAPSULITIS OF RIGHT SHOULDER: Primary | ICD-10-CM

## 2018-03-06 PROCEDURE — 97110 THERAPEUTIC EXERCISES: CPT

## 2018-03-06 PROCEDURE — 97140 MANUAL THERAPY 1/> REGIONS: CPT

## 2018-03-06 NOTE — PROGRESS NOTES
HPI:   Mar Huertas is a 40year old female here to discuss anxiety     Pt reports her anxiety level is very high ; more tearful  Pt in a lot of pain s/p shoulder surgery   Pt reports she is not sleeping   She is overdoing it due to moving again.    Pt Disp: 30 tablet Rfl: 11   DICYCLOMINE HCL 20 MG Oral Tab TAKE ONE TABLET BY MOUTH FOUR TIMES DAILY( EVERY 6 HOURS) BEFORE MEALS AND NIGHTLY AS NEEDED Disp: 120 tablet Rfl: 0   naproxen (NAPROSYN) 500 MG Oral Tab Take 1 tablet by mouth 2 (two) times daily w COLONOSCOPY N/A      Comment: Procedure: COLONOSCOPY;  Surgeon: Mark Sales MD;  Location: [address] [place] [address] ENDOSCOPY  No date: ENDOMETRIAL ABLATION      Comment: 2004 7/20/2015: HYSTERECTOMY  No date: [address] ENDOMETRIUM  1991: Kim Mcmillan history  ALL/ASTHMA: + asthma    EXAM:   /80   Pulse 91   Temp 98.3 °F (36.8 °C) (Oral)   Resp 20   Ht 66\"   Wt 151 lb   LMP 01/01/2004   SpO2 98%   BMI 24.37 kg/m²   Body mass index is 24.37 kg/m².    GENERAL: alert and oriented X 3, well developed,

## 2018-03-06 NOTE — PROGRESS NOTES
Dx: Right Sided Adhesive Capsulitis         Authorized # of Visits:  12         Next MD visit:   Fall Risk: standard         Precautions: n/a             Subjective: States that she had increased pain after last session, but kept pushing through her packin tolerance PROM to tolerance PROM to tolerance        Assessment: Responded well with increased ROM after treatment. Increased motion and less pain after treatment. Ice to shoulder x 10 minutes following treatment. Goals (12 visits):    1.  Increase

## 2018-03-09 ENCOUNTER — OFFICE VISIT (OUTPATIENT)
Dept: PHYSICAL THERAPY | Age: 43
End: 2018-03-09
Attending: FAMILY MEDICINE
Payer: COMMERCIAL

## 2018-03-09 PROCEDURE — 97140 MANUAL THERAPY 1/> REGIONS: CPT

## 2018-03-09 PROCEDURE — 97110 THERAPEUTIC EXERCISES: CPT

## 2018-03-09 NOTE — PROGRESS NOTES
Dx: Right Sided Adhesive Capsulitis         Authorized # of Visits:  12         Next MD visit:   Fall Risk: standard         Precautions: n/a             Subjective: States that she is feeling better from last session.   Notes an increase in ROM with less p to tolerance. PROM to tolerance. PROM to tolerance PROM to tolerance PROM to tolerance PROM to tolerance       Assessment: Responded well with increased ROM after treatment. Increased motion and less pain after treatment.   Ice to shoulder x 10 minutes

## 2018-03-12 ENCOUNTER — APPOINTMENT (OUTPATIENT)
Dept: PHYSICAL THERAPY | Age: 43
End: 2018-03-12
Attending: FAMILY MEDICINE
Payer: COMMERCIAL

## 2018-03-13 ENCOUNTER — OFFICE VISIT (OUTPATIENT)
Dept: PHYSICAL THERAPY | Age: 43
End: 2018-03-13
Attending: FAMILY MEDICINE
Payer: COMMERCIAL

## 2018-03-13 PROCEDURE — 97140 MANUAL THERAPY 1/> REGIONS: CPT

## 2018-03-13 PROCEDURE — 97110 THERAPEUTIC EXERCISES: CPT

## 2018-03-13 NOTE — PROGRESS NOTES
Dx: Right Sided Adhesive Capsulitis         Authorized # of Visits:  12         Next MD visit: 03.21.2018  Fall Risk: standard         Precautions: n/a             Subjective: States that the shoulder is moving more and that the pain is \"not too bad. \"  D (progressing). 4. Assess and establish goals for MMT as tolerated (progressing). Plan: Assess response and progress as tolerated. Charges: 2 Manual PT (25 min);  2 TherEx (25 min)       Total Timed Treatment: 50 min  Total Treatment Time: 50 min

## 2018-03-14 ENCOUNTER — OFFICE VISIT (OUTPATIENT)
Dept: PHYSICAL THERAPY | Age: 43
End: 2018-03-14
Attending: FAMILY MEDICINE
Payer: COMMERCIAL

## 2018-03-14 PROCEDURE — 97140 MANUAL THERAPY 1/> REGIONS: CPT

## 2018-03-14 PROCEDURE — 97110 THERAPEUTIC EXERCISES: CPT

## 2018-03-14 NOTE — PROGRESS NOTES
Dx: Right Sided Adhesive Capsulitis         Authorized # of Visits:  12         Next MD visit: 03.21.2018  Fall Risk: standard         Precautions: n/a             Subjective: States that she has been a bit more active with the arm over the past 24 hours w manipulation. Ice to shoulder x 10 minutes following treatment. Goals (12 visits):    1. Increase FOTO > 11% from INE.    2. Patient will demonstrate an increase in AROM of the right shoulder to 160 degrees FLXN, 155 degrees ABD, and 75 degrees ER (

## 2018-03-16 ENCOUNTER — OFFICE VISIT (OUTPATIENT)
Dept: PHYSICAL THERAPY | Age: 43
End: 2018-03-16
Attending: FAMILY MEDICINE
Payer: COMMERCIAL

## 2018-03-16 PROCEDURE — 97140 MANUAL THERAPY 1/> REGIONS: CPT

## 2018-03-16 PROCEDURE — 97110 THERAPEUTIC EXERCISES: CPT

## 2018-03-16 NOTE — PROGRESS NOTES
Dx: Right Sided Adhesive Capsulitis         Authorized # of Visits:  12         Next MD visit: 03.21.2018  Fall Risk: standard         Precautions: n/a             Subjective: States that she grabbed a full pitcher of iced tea and noted an increase in shou to tolerance PROM to tolerance PROM to tolerance PROM to tolerance PROM to tolerance PROM to tolerance       Assessment: Responded well with increased ROM with treatment.   Pain appears to be [facility] joint in nature as the GHJ is more restricted at the capsule t

## 2018-03-20 ENCOUNTER — OFFICE VISIT (OUTPATIENT)
Dept: PHYSICAL THERAPY | Age: 43
End: 2018-03-20
Attending: FAMILY MEDICINE
Payer: COMMERCIAL

## 2018-03-20 PROCEDURE — 97140 MANUAL THERAPY 1/> REGIONS: CPT

## 2018-03-20 PROCEDURE — 97110 THERAPEUTIC EXERCISES: CPT

## 2018-03-20 NOTE — PROGRESS NOTES
Dx: Right Sided Adhesive Capsulitis         Authorized # of Visits:  12         Next MD visit: 03.26.2018  Fall Risk: standard         Precautions: n/a             Subjective: Feels she has \"messed the shoulder up. \"  States that any extension or backward PT (25 min) Manual PT (25 min) Manual PT (25 min) Manual PT (25 min) Manual PT (25 min) Manual PT (25 min) Manual PT (25 min)    Posterior and inferior GHJ glides  Posterior and inferior GHJ glides  Posterior and inferior GHJ glides  Posterior and inferior

## 2018-03-28 ENCOUNTER — APPOINTMENT (OUTPATIENT)
Dept: PHYSICAL THERAPY | Age: 43
End: 2018-03-28
Attending: FAMILY MEDICINE
Payer: COMMERCIAL

## 2018-04-03 ENCOUNTER — OFFICE VISIT (OUTPATIENT)
Dept: PHYSICAL THERAPY | Age: 43
End: 2018-04-03
Attending: FAMILY MEDICINE
Payer: COMMERCIAL

## 2018-04-03 PROCEDURE — 97140 MANUAL THERAPY 1/> REGIONS: CPT

## 2018-04-03 PROCEDURE — 97110 THERAPEUTIC EXERCISES: CPT

## 2018-04-03 NOTE — PROGRESS NOTES
Dx: Right Sided Adhesive Capsulitis         Authorized # of Visits:  12         Next MD visit: 03.26.2018  Fall Risk: standard         Precautions: n/a             Subjective: Patient saw the MD, who said that the shoulder looks good and she could drop hussein CC: ADD/EXTN 24# x 20          SL ER/ABD 1# x 20   Manual PT (25 min) Manual PT (25 min) Manual PT (25 min) Manual PT (25 min) Manual PT (25 min) Manual PT (25 min) Manual PT (25 min) Manual PT (25 min)   Posterior and inferior GHJ glides  Posterior and in

## 2018-04-06 ENCOUNTER — APPOINTMENT (OUTPATIENT)
Dept: PHYSICAL THERAPY | Age: 43
End: 2018-04-06
Attending: FAMILY MEDICINE
Payer: COMMERCIAL

## 2018-04-09 ENCOUNTER — TELEPHONE (OUTPATIENT)
Dept: FAMILY MEDICINE CLINIC | Facility: CLINIC | Age: 43
End: 2018-04-09

## 2018-04-09 DIAGNOSIS — R20.2 PARESTHESIA: Primary | ICD-10-CM

## 2018-04-09 NOTE — TELEPHONE ENCOUNTER
Pt called and requested a referral to see Dr. Roberta Garay, pended referral. Please provide the DX and sign off if you approve.      Thank you,   [facility]   Referral Dept    Patient Name: Tayo Zhang   : 77   Reason for the order/referral: justus

## 2018-04-10 ENCOUNTER — OFFICE VISIT (OUTPATIENT)
Dept: PHYSICAL THERAPY | Age: 43
End: 2018-04-10
Attending: FAMILY MEDICINE
Payer: COMMERCIAL

## 2018-04-10 PROCEDURE — 97140 MANUAL THERAPY 1/> REGIONS: CPT

## 2018-04-10 PROCEDURE — 97110 THERAPEUTIC EXERCISES: CPT

## 2018-04-10 NOTE — PROGRESS NOTES
Dx: Right Sided Adhesive Capsulitis         Authorized # of Visits:  12         Next MD visit: 03.26.2018  Fall Risk: standard         Precautions: n/a             Subjective: States that she is having an increase in hand paraesthesia bilaterally.   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 4. Assess and establish goals for MMT as tolerated (progressing). Plan: Assess response and progress as tolerated.       Charges: 2 Manual

## 2018-04-17 ENCOUNTER — APPOINTMENT (OUTPATIENT)
Dept: PHYSICAL THERAPY | Age: 43
End: 2018-04-17
Attending: FAMILY MEDICINE
Payer: COMMERCIAL

## 2018-04-24 ENCOUNTER — OFFICE VISIT (OUTPATIENT)
Dept: PHYSICAL THERAPY | Age: 43
End: 2018-04-24
Attending: FAMILY MEDICINE
Payer: COMMERCIAL

## 2018-04-24 PROCEDURE — 97140 MANUAL THERAPY 1/> REGIONS: CPT

## 2018-04-24 PROCEDURE — 97110 THERAPEUTIC EXERCISES: CPT

## 2018-04-24 NOTE — PROGRESS NOTES
Dx: Right Sided Adhesive Capsulitis         Authorized # of Visits:  12         Next MD visit: 03.26.2018  Fall Risk: standard         Precautions: n/a             Subjective: States that she is feeling better. Seeing neurologist on Friday.   States that s well.  ROM increasing actively and passively. Less pain and increased motion. Strength is progressing. Posterior capsule is still most restricted at this time. Goals (12 visits):    1. Increase FOTO > 11% from INE.    2. Patient will demonstrate a

## 2018-04-27 ENCOUNTER — OFFICE VISIT (OUTPATIENT)
Dept: SURGERY | Facility: CLINIC | Age: 43
End: 2018-04-27

## 2018-04-27 VITALS — SYSTOLIC BLOOD PRESSURE: 100 MMHG | DIASTOLIC BLOOD PRESSURE: 80 MMHG | HEART RATE: 80 BPM

## 2018-04-27 DIAGNOSIS — R26.9 NEUROLOGIC GAIT DYSFUNCTION: ICD-10-CM

## 2018-04-27 DIAGNOSIS — G95.9 CERVICAL MYELOPATHY WITH CERVICAL RADICULOPATHY (HCC): Primary | ICD-10-CM

## 2018-04-27 DIAGNOSIS — R29.898 WEAKNESS OF BOTH UPPER EXTREMITIES: ICD-10-CM

## 2018-04-27 DIAGNOSIS — R29.898 WEAKNESS OF BOTH LOWER EXTREMITIES: ICD-10-CM

## 2018-04-27 DIAGNOSIS — M54.12 CERVICAL MYELOPATHY WITH CERVICAL RADICULOPATHY (HCC): Primary | ICD-10-CM

## 2018-04-27 DIAGNOSIS — R07.89 CHEST WALL PAIN: ICD-10-CM

## 2018-04-27 DIAGNOSIS — R32 URINARY INCONTINENCE, UNSPECIFIED TYPE: ICD-10-CM

## 2018-04-27 PROCEDURE — 99205 OFFICE O/P NEW HI 60 MIN: CPT | Performed by: PHYSICIAN ASSISTANT

## 2018-04-27 RX ORDER — CYCLOBENZAPRINE HCL 10 MG
10 TABLET ORAL 3 TIMES DAILY PRN
Qty: 60 TABLET | Refills: 1 | Status: SHIPPED | OUTPATIENT
Start: 2018-04-27 | End: 2019-09-12

## 2018-04-27 RX ORDER — NAPROXEN 500 MG/1
500 TABLET ORAL 2 TIMES DAILY WITH MEALS
Qty: 60 TABLET | Refills: 1 | Status: SHIPPED | OUTPATIENT
Start: 2018-04-27 | End: 2018-07-21

## 2018-04-27 RX ORDER — HYDROCODONE BITARTRATE AND ACETAMINOPHEN 10; 325 MG/1; MG/1
TABLET ORAL
Qty: 60 TABLET | Refills: 0 | Status: SHIPPED | OUTPATIENT
Start: 2018-04-27 | End: 2018-06-12

## 2018-04-27 RX ORDER — METHYLPREDNISOLONE 4 MG/1
TABLET ORAL
Qty: 1 PACKAGE | Refills: 0 | Status: SHIPPED | OUTPATIENT
Start: 2018-04-27 | End: 2018-05-08 | Stop reason: ALTCHOICE

## 2018-04-27 NOTE — PATIENT INSTRUCTIONS
Refill policies:    • Allow 2-3 business days for refills; controlled substances may take longer.   • Contact your pharmacy at least 5 days prior to running out of medication and have them send an electronic request or submit request through the “request re entire amount billed. Precertification and Prior Authorizations: If your physician has recommended that you have a procedure or additional testing performed.   [facility]) will contact your insurance carrier to obtain pre-certi nighttime spasms  3. Medrol Dosepak followed by naproxen  4. Follow-up in 1-2 weeks  5. Avoid flexion based activities  6.   Norco for pain, Flexeril for

## 2018-04-27 NOTE — H&P
[facility] Neurosurgery Consultation      HISTORY OF PRESENT Sheldon Beck is a 40year old RH  female here for spinal consultation. Patient's been complaining of neck pain on and off for several years.   She complains of cervical pain which goes f without mention of hyperactivity    • Back problem     cervical and lumbar   • Cauda equina syndrome without mention of neurogenic bladder    • Depression    • Diabetes ([facility] [address]     Diet controlled   • Diabetes mellitus ([facility] [address]     Diet controlled   • Dysesthes includes ADD in her father; BRCA gene + in her sister; Breast Cancer (age of onset: 54) in her mother; Cancer in her paternal cousin female, sister, and sister; Diabetes in her father and paternal grandmother; Heart Disorder in her maternal grandmother; Hy times daily. Disp:  Rfl:    hydrALAzine HCl 10 MG Oral Tab  Disp:  Rfl: 0   Losartan Potassium 25 MG Oral Tab Take 1 tablet (25 mg total) by mouth daily.  Disp: 30 tablet Rfl: 2   Citalopram Hydrobromide 20 MG Oral Tab TAKE 1 TABLET BY MOUTH ONCE DAILY( CHERRI spasms in her thoracic and lumbar on forward flexion she gets tightness in her low back that improves with cervical extension. She has no mechanical back pain on flexion-extension. She has a mild scoliosis in her thoracic spine. Gait intact.   Poor tandem stenosis      ASSESSMENT:  1.  C4-5 C5-6 C6-7 spondylosis with kyphosis and axial neck pain  2. Occipital headaches  3. Cervical radiculopathy and myelopathy  4. Upper and lower extremity weakness  5. Urinary incontinence  6. Gait dysfunction  7.   Sta

## 2018-04-27 NOTE — PROGRESS NOTES
Location of Pain: neck pain radiating into shoulder, numbness in both arms and numbness and tingling in both hands    Date Pain Began: yrs worsening since Feb 2018          Work Related:   No        Receiving Work Comp/Disability:   No    Numeric Rating Sc

## 2018-04-29 ENCOUNTER — TELEPHONE (OUTPATIENT)
Dept: NEUROLOGY | Facility: CLINIC | Age: 43
End: 2018-04-29

## 2018-04-29 NOTE — TELEPHONE ENCOUNTER
Paged for having pain after sleeping in brace, defer to Neurosurgery/Taylor, please call pt on Monday morning to follow up.

## 2018-04-30 NOTE — TELEPHONE ENCOUNTER
Spoke with patient who stated she tried using the soft cervical collar on Saturday night. She woke up and was having pain to the back of her head radiating up with neck spasms. States the collar made her pain worse.  She did not use the collar last night an

## 2018-05-01 ENCOUNTER — APPOINTMENT (OUTPATIENT)
Dept: PHYSICAL THERAPY | Age: 43
End: 2018-05-01
Attending: FAMILY MEDICINE
Payer: COMMERCIAL

## 2018-05-03 ENCOUNTER — HOSPITAL ENCOUNTER (OUTPATIENT)
Dept: GENERAL RADIOLOGY | Age: 43
Discharge: HOME | End: 2018-05-03
Attending: PHYSICIAN ASSISTANT
Payer: COMMERCIAL

## 2018-05-03 ENCOUNTER — HOSPITAL ENCOUNTER (OUTPATIENT)
Dept: MRI IMAGING | Age: 43
Discharge: HOME | End: 2018-05-03
Attending: PHYSICIAN ASSISTANT
Payer: COMMERCIAL

## 2018-05-03 DIAGNOSIS — R29.898 WEAKNESS OF BOTH LOWER EXTREMITIES: ICD-10-CM

## 2018-05-03 DIAGNOSIS — R26.9 NEUROLOGIC GAIT DYSFUNCTION: ICD-10-CM

## 2018-05-03 DIAGNOSIS — G95.9 CERVICAL MYELOPATHY WITH CERVICAL RADICULOPATHY (HCC): ICD-10-CM

## 2018-05-03 DIAGNOSIS — M54.12 CERVICAL MYELOPATHY WITH CERVICAL RADICULOPATHY (HCC): ICD-10-CM

## 2018-05-03 DIAGNOSIS — R29.898 WEAKNESS OF BOTH UPPER EXTREMITIES: ICD-10-CM

## 2018-05-03 DIAGNOSIS — R32 URINARY INCONTINENCE, UNSPECIFIED TYPE: ICD-10-CM

## 2018-05-03 PROCEDURE — 72052 X-RAY EXAM NECK SPINE 6/>VWS: CPT | Performed by: PHYSICIAN ASSISTANT

## 2018-05-03 PROCEDURE — 72141 MRI NECK SPINE W/O DYE: CPT | Performed by: PHYSICIAN ASSISTANT

## 2018-05-08 ENCOUNTER — OFFICE VISIT (OUTPATIENT)
Dept: PHYSICAL THERAPY | Age: 43
End: 2018-05-08
Attending: FAMILY MEDICINE
Payer: COMMERCIAL

## 2018-05-08 ENCOUNTER — OFFICE VISIT (OUTPATIENT)
Dept: SURGERY | Facility: CLINIC | Age: 43
End: 2018-05-08

## 2018-05-08 VITALS — HEART RATE: 100 BPM | DIASTOLIC BLOOD PRESSURE: 70 MMHG | SYSTOLIC BLOOD PRESSURE: 100 MMHG

## 2018-05-08 DIAGNOSIS — R26.9 NEUROLOGIC GAIT DYSFUNCTION: ICD-10-CM

## 2018-05-08 DIAGNOSIS — R29.898 WEAKNESS OF BOTH UPPER EXTREMITIES: ICD-10-CM

## 2018-05-08 DIAGNOSIS — M54.12 CERVICAL MYELOPATHY WITH CERVICAL RADICULOPATHY (HCC): Primary | ICD-10-CM

## 2018-05-08 DIAGNOSIS — R32 URINARY INCONTINENCE, UNSPECIFIED TYPE: ICD-10-CM

## 2018-05-08 DIAGNOSIS — G95.9 CERVICAL MYELOPATHY WITH CERVICAL RADICULOPATHY (HCC): Primary | ICD-10-CM

## 2018-05-08 DIAGNOSIS — R29.898 WEAKNESS OF BOTH LOWER EXTREMITIES: ICD-10-CM

## 2018-05-08 PROCEDURE — 97110 THERAPEUTIC EXERCISES: CPT

## 2018-05-08 PROCEDURE — 97140 MANUAL THERAPY 1/> REGIONS: CPT

## 2018-05-08 PROCEDURE — 99213 OFFICE O/P EST LOW 20 MIN: CPT | Performed by: PHYSICIAN ASSISTANT

## 2018-05-08 NOTE — PROGRESS NOTES
[facility] Neurosurgery follow-up        HISTORY OF PRESENT Simone Mcgowan is a 40year old RH  female here to review imaging. She did get a soft cervical collar.   It is somewhat large for her neck and it seems to be causing extension and increasing oc sweats.     PAST MEDICAL HISTORY:       Past Medical History:   Diagnosis Date   • Abnormal uterine bleeding     • Acute bronchitis     • Acute esophagitis     • Acute upper respiratory infections of unspecified site     • Anxiety state, unspecified     • esophagogastroduodenoscopy  No date: OTHER SURGICAL HISTORY      Comment: right thumb trigger finger release   No date: OTHER SURGICAL HISTORY      Comment: ganglion cyst removed left wrist   2005: TARSAL TUNNEL RELEASE      Comment: right foot  No date: T Disp: 1 Bottle Rfl: 2   LOVASTATIN 10 MG Oral Tab TAKE 1 TABLET(10 MG) BY MOUTH EVERY NIGHT Disp: 30 tablet Rfl: 2   Cyanocobalamin (VITAMIN B12 OR) Take by mouth daily. Disp:  Rfl:    Melatonin 10 MG Oral Cap Take by mouth nightly.  Disp:  Rfl:    Calcium Normocephalic, atraumatic  SKIN: Warm, dry, no rashes.     NEUROLOGICAL:  This patient is alert and orientated x 3. Speech fluent. Comprehension intact. Face is symmetrical.      SPINE: Last exam  Moderate neck pain on extension.  Sensation to light touch shows loss of cervical lordosis with a kyphosis apex at C4-5 and C5-6. On flexion she has a 1 mm anterior listhesis of C4-5 and on extension a 1 mm retrolisthesis of C4-5. She has an anterior listhesis of C5-6 on flexion.     MRI of the cervical spine fro

## 2018-05-08 NOTE — PROGRESS NOTES
Dx: Right Sided Adhesive Capsulitis         Authorized # of Visits:  12         Next MD visit: 05.10.2018  Fall Risk: standard         Precautions: n/a             Subjective: Shoulder is doing well. Less pain and increased motion.       Objective: Treatme ANGELICA rubi   PROM to tolerance PROM to tolerance PROM to tolerance          Assessment: Doing well. ROM increasing actively and passively. Less pain and increased motion. Strength is progressing. Posterior capsule is still most restricted at this time.

## 2018-05-08 NOTE — PATIENT INSTRUCTIONS
Refill policies:    • Allow 2-3 business days for refills; controlled substances may take longer.   • Contact your pharmacy at least 5 days prior to running out of medication and have them send an electronic request or submit request through the “request re entire amount billed. Precertification and Prior Authorizations: If your physician has recommended that you have a procedure or additional testing performed.   [facility]) will contact your insurance carrier to obtain pre-certi

## 2018-05-11 DIAGNOSIS — G95.9 CERVICAL MYELOPATHY WITH CERVICAL RADICULOPATHY (HCC): Primary | ICD-10-CM

## 2018-05-11 DIAGNOSIS — M54.12 CERVICAL MYELOPATHY WITH CERVICAL RADICULOPATHY (HCC): Primary | ICD-10-CM

## 2018-05-11 DIAGNOSIS — R29.898 WEAKNESS OF BOTH UPPER EXTREMITIES: ICD-10-CM

## 2018-05-11 DIAGNOSIS — R29.898 WEAKNESS OF BOTH LOWER EXTREMITIES: ICD-10-CM

## 2018-05-15 ENCOUNTER — HOSPITAL ENCOUNTER (OUTPATIENT)
Dept: MRI IMAGING | Age: 43
Discharge: HOME | End: 2018-05-15
Attending: PHYSICIAN ASSISTANT
Payer: COMMERCIAL

## 2018-05-15 DIAGNOSIS — R29.898 WEAKNESS OF BOTH UPPER EXTREMITIES: ICD-10-CM

## 2018-05-15 PROCEDURE — 72141 MRI NECK SPINE W/O DYE: CPT | Performed by: PHYSICIAN ASSISTANT

## 2018-05-16 ENCOUNTER — OFFICE VISIT (OUTPATIENT)
Dept: SURGERY | Facility: CLINIC | Age: 43
End: 2018-05-16

## 2018-05-16 VITALS — SYSTOLIC BLOOD PRESSURE: 100 MMHG | HEART RATE: 80 BPM | DIASTOLIC BLOOD PRESSURE: 70 MMHG

## 2018-05-16 DIAGNOSIS — R29.898 WEAKNESS OF BOTH UPPER EXTREMITIES: ICD-10-CM

## 2018-05-16 DIAGNOSIS — M54.12 CERVICAL MYELOPATHY WITH CERVICAL RADICULOPATHY (HCC): Primary | ICD-10-CM

## 2018-05-16 DIAGNOSIS — R26.9 NEUROLOGIC GAIT DYSFUNCTION: ICD-10-CM

## 2018-05-16 DIAGNOSIS — K58.9 IRRITABLE BOWEL SYNDROME WITHOUT DIARRHEA: ICD-10-CM

## 2018-05-16 DIAGNOSIS — R29.898 WEAKNESS OF BOTH LOWER EXTREMITIES: ICD-10-CM

## 2018-05-16 DIAGNOSIS — G95.9 CERVICAL MYELOPATHY WITH CERVICAL RADICULOPATHY (HCC): Primary | ICD-10-CM

## 2018-05-16 RX ORDER — METHYLPREDNISOLONE 4 MG/1
TABLET ORAL
Qty: 1 PACKAGE | Refills: 0 | Status: SHIPPED | OUTPATIENT
Start: 2018-05-16 | End: 2018-06-12 | Stop reason: ALTCHOICE

## 2018-05-16 RX ORDER — DICYCLOMINE HCL 20 MG
TABLET ORAL
Qty: 120 TABLET | Refills: 0 | Status: SHIPPED | OUTPATIENT
Start: 2018-05-16 | End: 2018-10-23

## 2018-05-16 NOTE — PATIENT INSTRUCTIONS
Refill policies:    • Allow 2-3 business days for refills; controlled substances may take longer.   • Contact your pharmacy at least 5 days prior to running out of medication and have them send an electronic request or submit request through the “request re entire amount billed. Precertification and Prior Authorizations: If your physician has recommended that you have a procedure or additional testing performed.   [facility]) will contact your insurance carrier to obtain pre-certi

## 2018-05-17 ENCOUNTER — OFFICE VISIT (OUTPATIENT)
Dept: FAMILY MEDICINE CLINIC | Facility: CLINIC | Age: 43
End: 2018-05-17

## 2018-05-17 VITALS
WEIGHT: 151 LBS | BODY MASS INDEX: 24.27 KG/M2 | DIASTOLIC BLOOD PRESSURE: 78 MMHG | SYSTOLIC BLOOD PRESSURE: 120 MMHG | RESPIRATION RATE: 16 BRPM | TEMPERATURE: 98 F | HEART RATE: 94 BPM | HEIGHT: 66 IN

## 2018-05-17 DIAGNOSIS — F41.9 ANXIETY: ICD-10-CM

## 2018-05-17 DIAGNOSIS — R09.89 LABILE BLOOD PRESSURE: ICD-10-CM

## 2018-05-17 DIAGNOSIS — F41.9 ANXIETY AND DEPRESSION: ICD-10-CM

## 2018-05-17 DIAGNOSIS — F98.8 ATTENTION DEFICIT DISORDER, UNSPECIFIED HYPERACTIVITY PRESENCE: ICD-10-CM

## 2018-05-17 DIAGNOSIS — E78.00 ELEVATED CHOLESTEROL: ICD-10-CM

## 2018-05-17 DIAGNOSIS — M54.12 RADICULOPATHY, CERVICAL REGION: Primary | ICD-10-CM

## 2018-05-17 DIAGNOSIS — F32.A ANXIETY AND DEPRESSION: ICD-10-CM

## 2018-05-17 PROCEDURE — 99214 OFFICE O/P EST MOD 30 MIN: CPT | Performed by: FAMILY MEDICINE

## 2018-05-17 RX ORDER — CITALOPRAM 20 MG/1
TABLET ORAL
Qty: 30 TABLET | Refills: 5 | Status: SHIPPED | OUTPATIENT
Start: 2018-05-17 | End: 2018-10-29 | Stop reason: ALTCHOICE

## 2018-05-17 RX ORDER — METHYLPHENIDATE HYDROCHLORIDE 20 MG/1
10 TABLET ORAL 2 TIMES DAILY
Qty: 60 TABLET | Refills: 0 | Status: SHIPPED | OUTPATIENT
Start: 2018-06-12 | End: 2018-05-17

## 2018-05-17 RX ORDER — METHYLPHENIDATE HYDROCHLORIDE 20 MG/1
10 TABLET ORAL 2 TIMES DAILY
Qty: 60 TABLET | Refills: 0 | Status: SHIPPED | OUTPATIENT
Start: 2018-05-17 | End: 2018-05-17

## 2018-05-17 RX ORDER — CITALOPRAM 40 MG/1
TABLET ORAL
Qty: 30 TABLET | Refills: 5 | Status: ON HOLD | OUTPATIENT
Start: 2018-05-17 | End: 2018-12-19

## 2018-05-17 RX ORDER — LOVASTATIN 10 MG/1
TABLET ORAL
Qty: 30 TABLET | Refills: 5 | Status: SHIPPED | OUTPATIENT
Start: 2018-05-17 | End: 2018-12-19

## 2018-05-17 RX ORDER — BUPROPION HYDROCHLORIDE 300 MG/1
TABLET ORAL
Qty: 30 TABLET | Refills: 5 | Status: SHIPPED | OUTPATIENT
Start: 2018-05-17 | End: 2018-12-19

## 2018-05-17 RX ORDER — ALPRAZOLAM 0.25 MG/1
0.25 TABLET ORAL 2 TIMES DAILY PRN
Qty: 30 TABLET | Refills: 0 | Status: SHIPPED | OUTPATIENT
Start: 2018-05-17 | End: 2018-11-20

## 2018-05-17 RX ORDER — LOSARTAN POTASSIUM 25 MG/1
25 TABLET ORAL DAILY
Qty: 30 TABLET | Refills: 5 | Status: SHIPPED | OUTPATIENT
Start: 2018-05-17 | End: 2019-09-12

## 2018-05-17 RX ORDER — METHYLPHENIDATE HYDROCHLORIDE 20 MG/1
10 TABLET ORAL 2 TIMES DAILY
Qty: 60 TABLET | Refills: 0 | Status: SHIPPED | OUTPATIENT
Start: 2018-07-08 | End: 2018-06-12 | Stop reason: ALTCHOICE

## 2018-05-17 NOTE — PROGRESS NOTES
HPI:   Genaro Manjarrez is a 44year old female here to discuss radicular cervical issues, BP, ADD, mood and elevated cholesterol. Pt has radicular neck issues ; needs PT    Doing well on the losartan.   Patient denies chest pain, shortness of breath, di Carbonate-Vitamin D (CALCIUM 600 + D OR) Take by mouth 2 (two) times daily. Disp:  Rfl:    hydrALAzine HCl 10 MG Oral Tab  Disp:  Rfl: 0   Losartan Potassium 25 MG Oral Tab Take 1 tablet (25 mg total) by mouth daily.  Disp: 30 tablet Rfl: 2   Citalopram Hyd menstrual cycle    • Lipid screening 09/17/2011   • Lump or mass in breast    • Malaise    • Malignant hyperthermia     patient's son at 3years of age - 80   • Mastodynia    • Migraines    • Other screening mammogram 06/11/2012   • Pap smear for cervica Years: 10.00        Types: Cigarettes     Quit date: 2/5/1999  Smokeless tobacco: Never Used                      Alcohol use: Yes              Comment: occasionally    Occ: . : . Children: 3.    Exercise: minimal.  Diet: watches minimally     REV Elevated cholesterol    - Lovastatin 10 MG Oral Tab; TAKE 1 TABLET(10 MG) BY MOUTH EVERY NIGHT  Dispense: 30 tablet; Refill: 5    5. Attention deficit disorder, unspecified hyperactivity presence  cpm     6. Anxiety    - ALPRAZolam 0.25 MG Oral Tab;  Take 1

## 2018-05-18 ENCOUNTER — TELEPHONE (OUTPATIENT)
Dept: SURGERY | Facility: CLINIC | Age: 43
End: 2018-05-18

## 2018-05-18 NOTE — TELEPHONE ENCOUNTER
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 She is using soft cervical collar as instructed from last office visit.  She spoke with her PCP who advise

## 2018-05-23 NOTE — TELEPHONE ENCOUNTER
Patient was called overall she is no better. She has not started physical therapy yet. She feels she is about the same since her initial visit about a month ago. We will see her at her June appointment.   If she is no better or worse we will have her s

## 2018-06-06 ENCOUNTER — OFFICE VISIT (OUTPATIENT)
Dept: PHYSICAL THERAPY | Age: 43
End: 2018-06-06
Attending: FAMILY MEDICINE
Payer: COMMERCIAL

## 2018-06-06 DIAGNOSIS — M54.12 RADICULOPATHY, CERVICAL REGION: ICD-10-CM

## 2018-06-06 PROCEDURE — 97530 THERAPEUTIC ACTIVITIES: CPT

## 2018-06-06 PROCEDURE — 97161 PT EVAL LOW COMPLEX 20 MIN: CPT

## 2018-06-06 NOTE — PROGRESS NOTES
SPINE EVALUATION:   Referring Physician: Mr. Medina Ocampo   Diagnosis: Cervical Instability with Cervical Radiculopathy     Date of Service: 6/6/2018     PATIENT SUMMARY   Vinod Narayanan is a 44year old y/o female who presents to therapy today with compla posterior scapular strength. Precautions:  None  OBJECTIVE:   Observation/Posture: The patient presents with a structural assessment of a decrease in thoracic kyphosis with increased lower cervical lordosis. Her head sits into left sidebending.     Stella Garcia limitations and be able to correct them independently. 3. Patient will have an increase in spinal mobility to 100% full motion in order to return to looking over her shoulder when driving.     4. Patient will have an increase in core strength to assist w

## 2018-06-08 ENCOUNTER — OFFICE VISIT (OUTPATIENT)
Dept: PHYSICAL THERAPY | Age: 43
End: 2018-06-08
Attending: FAMILY MEDICINE
Payer: COMMERCIAL

## 2018-06-08 PROCEDURE — 97140 MANUAL THERAPY 1/> REGIONS: CPT

## 2018-06-08 PROCEDURE — 97112 NEUROMUSCULAR REEDUCATION: CPT

## 2018-06-08 NOTE — PROGRESS NOTES
Dx: Cervical Instability with Radiculopathy Bilaterally. Authorized # of Visits:  8         Next MD visit: none scheduled  Fall Risk: standard         Precautions: n/a             Subjective: Patient states that she has had a severe HA x 2 day.

## 2018-06-11 ENCOUNTER — OFFICE VISIT (OUTPATIENT)
Dept: PHYSICAL THERAPY | Age: 43
End: 2018-06-11
Attending: FAMILY MEDICINE
Payer: COMMERCIAL

## 2018-06-11 PROCEDURE — 97112 NEUROMUSCULAR REEDUCATION: CPT

## 2018-06-11 PROCEDURE — 97140 MANUAL THERAPY 1/> REGIONS: CPT

## 2018-06-11 PROCEDURE — 97110 THERAPEUTIC EXERCISES: CPT

## 2018-06-11 NOTE — PROGRESS NOTES
Dx: Cervical Instability with Radiculopathy Bilaterally. Authorized # of Visits:  8         Next MD visit: none scheduled  Fall Risk: standard         Precautions: n/a             Subjective: Patient states that she has had a severe HA x 2 day. core strength to assist with returning to all FADLs including lifting objects greater than 10 pounds. 5. Patient will demonstrate an increase in MLT of the pec major and upper trapezius in order to return to sustained posturing.       Plan: Assess respon

## 2018-06-12 ENCOUNTER — OFFICE VISIT (OUTPATIENT)
Dept: SURGERY | Facility: CLINIC | Age: 43
End: 2018-06-12

## 2018-06-12 VITALS
HEIGHT: 65 IN | OXYGEN SATURATION: 99 % | WEIGHT: 148 LBS | HEART RATE: 82 BPM | SYSTOLIC BLOOD PRESSURE: 100 MMHG | DIASTOLIC BLOOD PRESSURE: 78 MMHG | BODY MASS INDEX: 24.66 KG/M2

## 2018-06-12 VITALS — DIASTOLIC BLOOD PRESSURE: 72 MMHG | HEART RATE: 84 BPM | SYSTOLIC BLOOD PRESSURE: 100 MMHG

## 2018-06-12 DIAGNOSIS — G44.86 HEADACHE, CERVICOGENIC: Primary | ICD-10-CM

## 2018-06-12 DIAGNOSIS — R26.9 NEUROLOGIC GAIT DYSFUNCTION: ICD-10-CM

## 2018-06-12 DIAGNOSIS — R32 URINARY INCONTINENCE, UNSPECIFIED TYPE: ICD-10-CM

## 2018-06-12 DIAGNOSIS — R29.898 WEAKNESS OF BOTH LOWER EXTREMITIES: ICD-10-CM

## 2018-06-12 DIAGNOSIS — G95.9 CERVICAL MYELOPATHY WITH CERVICAL RADICULOPATHY (HCC): Primary | ICD-10-CM

## 2018-06-12 DIAGNOSIS — M54.12 CERVICAL RADICULAR PAIN: ICD-10-CM

## 2018-06-12 DIAGNOSIS — R29.898 WEAKNESS OF BOTH UPPER EXTREMITIES: ICD-10-CM

## 2018-06-12 DIAGNOSIS — M54.12 CERVICAL MYELOPATHY WITH CERVICAL RADICULOPATHY (HCC): Primary | ICD-10-CM

## 2018-06-12 PROCEDURE — 99203 OFFICE O/P NEW LOW 30 MIN: CPT | Performed by: ANESTHESIOLOGY

## 2018-06-12 PROCEDURE — 99213 OFFICE O/P EST LOW 20 MIN: CPT | Performed by: PHYSICIAN ASSISTANT

## 2018-06-12 RX ORDER — HYDROCODONE BITARTRATE AND ACETAMINOPHEN 10; 325 MG/1; MG/1
TABLET ORAL
Qty: 90 TABLET | Refills: 0 | Status: SHIPPED | OUTPATIENT
Start: 2018-06-12 | End: 2018-07-19

## 2018-06-12 NOTE — PROGRESS NOTES
HPI:    Patient ID: Mina Alexander is a 44year old female.     HPI    Review of Systems         Current Outpatient Prescriptions:  HYDROcodone-acetaminophen (NORCO)  MG Oral Tab One to two tablets by mouth every four to six hours as needed for pain topiramate 25 MG Oral Tab TAKE 2 TABLETS BY MOUTH TWICE DAILY Disp: 120 tablet Rfl: 5   Montelukast Sodium (SINGULAIR) 10 MG Oral Tab Take 1 tablet (10 mg total) by mouth nightly.  Disp: 30 tablet Rfl: 11   VENTOLIN  (90 BASE) MCG/ACT Inhalation Aero Aggravating Factors:     Other bending neck down    Past Treatments for Current Pain Condition:   Other Norco, flexeril, seroids, PT    Prior diagnostic testing for your pain:  MRI, X-ray

## 2018-06-12 NOTE — H&P
Name: Sabrina Mcneil   : 1975   DOS: 2018     Chief complaint: Headache and upper extremity radicular symptoms    History of present illness:  Sabrina Mcneil is a 44year old female with a complex past medical history who presents with a tw intolerance (pre-diabetes)     diet control   • Hiatal hernia    • High blood pressure    • Hyperlipidemia    • IBS (irritable bowel syndrome)    • Irregular menstrual cycle    • Lipid screening 09/17/2011   • Lump or mass in breast    • Malaise    • Malig 1   Methylphenidate HCl 20 MG Oral Tab Take 1 tablet (20 mg total) by mouth 2 (two) times daily.  Disp: 60 tablet Rfl: 0   FLUTICASONE PROPIONATE 50 MCG/ACT Nasal Suspension SHAKE LIQUID AND USE 2 SPRAYS IN EACH NOSTRIL EVERY NIGHT Disp: 1 Bottle Rfl: 2   C Family History   Problem Relation Age of Onset   • ADD[other] [OTHER] Father    • Diabetes Father    • Hypertension Father    • Breast Cancer Mother 54   • Other[other] [OTHER] Mother    • Heart Disorder Maternal Grandmother      CHF   • Stroke Maternal 5   Knee Extension:   5    5   Knee Flexion:    5    5   Ant.  Tibialis:    5    5  Extensor Hallucis Longus:   5    5  Peroneals:     5    5  Gastrocsoleus:     5    5    Radiology diagnostic studies:     Cervical MRI reviewed independently and with the pa

## 2018-06-12 NOTE — PROGRESS NOTES
[facility] Neurosurgery follow-up        HISTORY OF PRESENT Jordyn Else is a 40year old RH  female here in follow-up. She has gotten a different size cervical collar but he does not seem to be giving any relief. Physical therapy is not helping.   Karan Anthony went off the Norco and is still having constipation she is having frequency of urination or after voiding she has to return and void again in a short time span.   Complains of arm stiffness and like stiffness in the morning.     She denies any loss of visio Procedure: COLONOSCOPY;  Surgeon: Moe Schofield MD;  Location: [facility] ENDOSCOPY  No date: ENDOMETRIAL ABLATION      Comment: 2004 7/20/2015: HYSTERECTOMY  No date: HYSTEROSCOPY,ABLATION ENDOMETRIUM  1991: LAPAROSCOPY,DIAGNOSTIC      Comment: Normocephalic, atraumatic  SKIN: Warm, dry, no rashes.     NEUROLOGICAL:  This patient is alert and orientated x 3. Speech fluent. Comprehension intact. Face is symmetrical.      SPINE: Last exam  Moderate neck pain on extension.  Sensation to light touch alignment. C4-5 C5-6 mild spondylosis with no significant stenosis. Her MRI from 2017 looks worse than this MRI due to her neck position. Cervical flexion and extension imaging were not performed. CT of the abdomen shows mild spondylosis L1-L5.   Mode

## 2018-06-12 NOTE — PATIENT INSTRUCTIONS
Refill policies:    • Allow 2-3 business days for refills; controlled substances may take longer.   • Contact your pharmacy at least 5 days prior to running out of medication and have them send an electronic request or submit request through the “request re entire amount billed. Precertification and Prior Authorizations: If your physician has recommended that you have a procedure or additional testing performed.   [facility]) will contact your insurance carrier to obtain pre-certi Discontinue physical therapy  2. Pain service for cervical epidural and possibly occipital blocks  3. Follow-up with Dr. Roseann Baltazar in 4-6 weeks.   If she is making no improvement and will consider cervical surgery

## 2018-06-12 NOTE — PATIENT INSTRUCTIONS
Refill policies:    • Allow 2-3 business days for refills; controlled substances may take longer.   • Contact your pharmacy at least 5 days prior to running out of medication and have them send an electronic request or submit request through the “request re entire amount billed. Precertification and Prior Authorizations: If your physician has recommended that you have a procedure or additional testing performed.   [facility]) will contact your insurance carrier to obtain pre-certi procedure:  Please update us prior to the procedure if you are experiencing any symptoms of infection such as cough, fever, chills, urinary symptoms, or have recently been prescribed antibiotics, have open wounds, have recently had surgery or dental proced hours  • Greater than 81 mg (325mg) 7 days  • Coumadin       5 days  • Procedure may be cancelled if INR is elevated.    • Excedrin (with aspirin) 7 days  • Plavix (Clopidogrel)  • Epidural ____ - 10 days  • Others 7 days   NSAIDs: 24 hours    • Ibuprofen ( SEVEN DAYS PRIOR TO PROCEDURE**

## 2018-06-15 ENCOUNTER — TELEPHONE (OUTPATIENT)
Dept: SURGERY | Facility: CLINIC | Age: 43
End: 2018-06-15

## 2018-06-15 NOTE — TELEPHONE ENCOUNTER
Left message for patient, confirmed procedure date of 6/20/18 and to be checked in at outpatient registration at 130 pm. Patient instructed to call pre-procedure line before procedure at [phone].  Patient instructed to call office if there are addition

## 2018-06-20 ENCOUNTER — SURGERY (OUTPATIENT)
Age: 43
End: 2018-06-20

## 2018-06-20 ENCOUNTER — HOSPITAL ENCOUNTER (OUTPATIENT)
Facility: HOSPITAL | Age: 43
Setting detail: HOSPITAL OUTPATIENT SURGERY
Discharge: HOME | End: 2018-06-20
Attending: ANESTHESIOLOGY | Admitting: ANESTHESIOLOGY
Payer: COMMERCIAL

## 2018-06-20 ENCOUNTER — APPOINTMENT (OUTPATIENT)
Dept: GENERAL RADIOLOGY | Facility: HOSPITAL | Age: 43
End: 2018-06-20
Attending: ANESTHESIOLOGY
Payer: COMMERCIAL

## 2018-06-20 VITALS
DIASTOLIC BLOOD PRESSURE: 87 MMHG | HEART RATE: 79 BPM | TEMPERATURE: 98 F | OXYGEN SATURATION: 97 % | SYSTOLIC BLOOD PRESSURE: 110 MMHG | RESPIRATION RATE: 20 BRPM

## 2018-06-20 DIAGNOSIS — G44.86 HEADACHE, CERVICOGENIC: ICD-10-CM

## 2018-06-20 DIAGNOSIS — M54.12 CERVICAL RADICULAR PAIN: ICD-10-CM

## 2018-06-20 PROCEDURE — 3E0S33Z INTRODUCTION OF ANTI-INFLAMMATORY INTO EPIDURAL SPACE, PERCUTANEOUS APPROACH: ICD-10-PCS | Performed by: ANESTHESIOLOGY

## 2018-06-20 PROCEDURE — 99152 MOD SED SAME PHYS/QHP 5/>YRS: CPT | Performed by: ANESTHESIOLOGY

## 2018-06-20 RX ORDER — DEXAMETHASONE SODIUM PHOSPHATE 10 MG/ML
INJECTION, SOLUTION INTRAMUSCULAR; INTRAVENOUS AS NEEDED
Status: DISCONTINUED | OUTPATIENT
Start: 2018-06-20 | End: 2018-06-20 | Stop reason: HOSPADM

## 2018-06-20 RX ORDER — DEXTROSE MONOHYDRATE 25 G/50ML
50 INJECTION, SOLUTION INTRAVENOUS
Status: DISCONTINUED | OUTPATIENT
Start: 2018-06-20 | End: 2018-06-20 | Stop reason: HOSPADM

## 2018-06-20 RX ORDER — 0.9 % SODIUM CHLORIDE 0.9 %
VIAL (ML) INJECTION AS NEEDED
Status: DISCONTINUED | OUTPATIENT
Start: 2018-06-20 | End: 2018-06-20 | Stop reason: HOSPADM

## 2018-06-20 RX ORDER — DIPHENHYDRAMINE HYDROCHLORIDE 50 MG/ML
50 INJECTION INTRAMUSCULAR; INTRAVENOUS ONCE AS NEEDED
Status: DISCONTINUED | OUTPATIENT
Start: 2018-06-20 | End: 2018-06-20 | Stop reason: HOSPADM

## 2018-06-20 RX ORDER — INSULIN ASPART 100 [IU]/ML
3 INJECTION, SOLUTION INTRAVENOUS; SUBCUTANEOUS ONCE
Status: DISCONTINUED | OUTPATIENT
Start: 2018-06-20 | End: 2018-06-20 | Stop reason: HOSPADM

## 2018-06-20 RX ORDER — SODIUM CHLORIDE, SODIUM LACTATE, POTASSIUM CHLORIDE, CALCIUM CHLORIDE 600; 310; 30; 20 MG/100ML; MG/100ML; MG/100ML; MG/100ML
100 INJECTION, SOLUTION INTRAVENOUS CONTINUOUS
Status: DISCONTINUED | OUTPATIENT
Start: 2018-06-20 | End: 2018-06-20

## 2018-06-20 RX ORDER — ONDANSETRON 2 MG/ML
4 INJECTION INTRAMUSCULAR; INTRAVENOUS ONCE AS NEEDED
Status: DISCONTINUED | OUTPATIENT
Start: 2018-06-20 | End: 2018-06-20 | Stop reason: HOSPADM

## 2018-06-20 RX ORDER — LIDOCAINE HYDROCHLORIDE 10 MG/ML
INJECTION, SOLUTION EPIDURAL; INFILTRATION; INTRACAUDAL; PERINEURAL AS NEEDED
Status: DISCONTINUED | OUTPATIENT
Start: 2018-06-20 | End: 2018-06-20 | Stop reason: HOSPADM

## 2018-06-20 RX ORDER — MIDAZOLAM HYDROCHLORIDE 1 MG/ML
INJECTION INTRAMUSCULAR; INTRAVENOUS AS NEEDED
Status: DISCONTINUED | OUTPATIENT
Start: 2018-06-20 | End: 2018-06-20 | Stop reason: HOSPADM

## 2018-06-20 NOTE — H&P
History & Physical Examination    Patient Name: Azul Ponce  MRN: [id number]  [facility]: [id number]  YOB: 1975    Pre-Operative Diagnosis:  Cervical radicular pain [M54.12]  Headache, cervicogenic [R51]    Present Illness: She was cervical radic Rfl: 2 Taking   Cyanocobalamin (VITAMIN B12 OR) Take by mouth daily. Disp:  Rfl:  Taking   Melatonin 10 MG Oral Cap Take by mouth nightly. Disp:  Rfl:  Taking   Calcium Carbonate-Vitamin D (CALCIUM 600 + D OR) Take by mouth 2 (two) times daily.  Disp:  Rfl: uterine bleeding    • Acute bronchitis    • Acute esophagitis    • Acute upper respiratory infections of unspecified site    • Anxiety state, unspecified    • Attention deficit disorder without mention of hyperactivity    • Back problem     cervical and edelmira ganglion cyst removed left wrist   2005: TARSAL TUNNEL RELEASE      Comment: right foot  No date: TOTAL ABDOM HYSTERECTOMY  Family History   Problem Relation Age of Onset   • ADD[other] [OTHER] Father    • Diabetes Father    • Hypertension Father    • Farida Mask

## 2018-06-20 NOTE — OPERATIVE REPORT
[facility]  Operative Report  2018     Hiwot Bocanegra Patient Status:  Hospital Outpatient Surgery    1975 MRN [id number]   [facility] SURGERY Attending Alisson Dobbins MD   Hosp Day # 0 PCP Trip Cadena DO     Indication:  through the needle. After obtaining a good epidurogram by injecting Omnipaque 180 1 mL, a combination of normal saline and dexamethasone 10 mg in total volume of 4 mL was injected. The needle was then flushed with normal saline 1 mL.   The stylet re-applie

## 2018-06-21 ENCOUNTER — TELEPHONE (OUTPATIENT)
Dept: SURGERY | Facility: CLINIC | Age: 43
End: 2018-06-21

## 2018-06-21 NOTE — TELEPHONE ENCOUNTER
Left message for patient, confirmed procedure date of 6/27/18 and to be checked in at outpatient registration at 815 am.Patient instructed to call pre-procedure line before procedure at [phone].  Patient instructed to call office if there are additiona

## 2018-06-22 ENCOUNTER — APPOINTMENT (OUTPATIENT)
Dept: PHYSICAL THERAPY | Age: 43
End: 2018-06-22
Attending: FAMILY MEDICINE
Payer: COMMERCIAL

## 2018-06-27 ENCOUNTER — SURGERY (OUTPATIENT)
Age: 43
End: 2018-06-27

## 2018-06-27 ENCOUNTER — APPOINTMENT (OUTPATIENT)
Dept: GENERAL RADIOLOGY | Facility: HOSPITAL | Age: 43
End: 2018-06-27
Attending: ANESTHESIOLOGY
Payer: COMMERCIAL

## 2018-06-27 ENCOUNTER — TELEPHONE (OUTPATIENT)
Dept: SURGERY | Facility: CLINIC | Age: 43
End: 2018-06-27

## 2018-06-27 ENCOUNTER — HOSPITAL ENCOUNTER (OUTPATIENT)
Facility: HOSPITAL | Age: 43
Setting detail: HOSPITAL OUTPATIENT SURGERY
Discharge: HOME | End: 2018-06-27
Attending: ANESTHESIOLOGY | Admitting: ANESTHESIOLOGY
Payer: COMMERCIAL

## 2018-06-27 VITALS
TEMPERATURE: 98 F | RESPIRATION RATE: 18 BRPM | HEART RATE: 86 BPM | SYSTOLIC BLOOD PRESSURE: 111 MMHG | DIASTOLIC BLOOD PRESSURE: 82 MMHG | OXYGEN SATURATION: 99 %

## 2018-06-27 DIAGNOSIS — G44.86 HEADACHE, CERVICOGENIC: ICD-10-CM

## 2018-06-27 DIAGNOSIS — M54.12 CERVICAL RADICULAR PAIN: ICD-10-CM

## 2018-06-27 PROCEDURE — 3E0R33Z INTRODUCTION OF ANTI-INFLAMMATORY INTO SPINAL CANAL, PERCUTANEOUS APPROACH: ICD-10-PCS | Performed by: ANESTHESIOLOGY

## 2018-06-27 PROCEDURE — 82962 GLUCOSE BLOOD TEST: CPT

## 2018-06-27 PROCEDURE — 99152 MOD SED SAME PHYS/QHP 5/>YRS: CPT | Performed by: ANESTHESIOLOGY

## 2018-06-27 RX ORDER — SODIUM CHLORIDE, SODIUM LACTATE, POTASSIUM CHLORIDE, CALCIUM CHLORIDE 600; 310; 30; 20 MG/100ML; MG/100ML; MG/100ML; MG/100ML
100 INJECTION, SOLUTION INTRAVENOUS CONTINUOUS
Status: DISCONTINUED | OUTPATIENT
Start: 2018-06-27 | End: 2018-06-27

## 2018-06-27 RX ORDER — DEXAMETHASONE SODIUM PHOSPHATE 10 MG/ML
INJECTION, SOLUTION INTRAMUSCULAR; INTRAVENOUS AS NEEDED
Status: DISCONTINUED | OUTPATIENT
Start: 2018-06-27 | End: 2018-06-27 | Stop reason: HOSPADM

## 2018-06-27 RX ORDER — INSULIN ASPART 100 [IU]/ML
3 INJECTION, SOLUTION INTRAVENOUS; SUBCUTANEOUS ONCE
Status: DISCONTINUED | OUTPATIENT
Start: 2018-06-27 | End: 2018-06-27 | Stop reason: HOSPADM

## 2018-06-27 RX ORDER — 0.9 % SODIUM CHLORIDE 0.9 %
VIAL (ML) INJECTION AS NEEDED
Status: DISCONTINUED | OUTPATIENT
Start: 2018-06-27 | End: 2018-06-27 | Stop reason: HOSPADM

## 2018-06-27 RX ORDER — DIPHENHYDRAMINE HYDROCHLORIDE 50 MG/ML
50 INJECTION INTRAMUSCULAR; INTRAVENOUS ONCE AS NEEDED
Status: DISCONTINUED | OUTPATIENT
Start: 2018-06-27 | End: 2018-06-27

## 2018-06-27 RX ORDER — ONDANSETRON 2 MG/ML
4 INJECTION INTRAMUSCULAR; INTRAVENOUS ONCE AS NEEDED
Status: DISCONTINUED | OUTPATIENT
Start: 2018-06-27 | End: 2018-06-27

## 2018-06-27 RX ORDER — LIDOCAINE HYDROCHLORIDE 10 MG/ML
INJECTION, SOLUTION EPIDURAL; INFILTRATION; INTRACAUDAL; PERINEURAL AS NEEDED
Status: DISCONTINUED | OUTPATIENT
Start: 2018-06-27 | End: 2018-06-27 | Stop reason: HOSPADM

## 2018-06-27 RX ORDER — MIDAZOLAM HYDROCHLORIDE 1 MG/ML
INJECTION INTRAMUSCULAR; INTRAVENOUS AS NEEDED
Status: DISCONTINUED | OUTPATIENT
Start: 2018-06-27 | End: 2018-06-27 | Stop reason: HOSPADM

## 2018-06-27 RX ORDER — DEXTROSE MONOHYDRATE 25 G/50ML
50 INJECTION, SOLUTION INTRAVENOUS
Status: DISCONTINUED | OUTPATIENT
Start: 2018-06-27 | End: 2018-06-27 | Stop reason: HOSPADM

## 2018-06-27 NOTE — TELEPHONE ENCOUNTER
Spoke with pt who had procedure this morning around 9:30, pt c/o not feeling her left arm it is tingling and numb. Pt has had on going issues with her arms going numb d/t her neck pain for quite some time.  Pt reports the numbness goes to the fingers, mostl

## 2018-06-27 NOTE — OPERATIVE REPORT
[facility]  Operative Report  2018     [facility] Patient Status:  Hospital Outpatient Surgery    1975 MRN [id number]   [facility] SURGERY Attending Lucila Stark MD   Hosp Day # 0 PCP Yonatan Rios DO     Indication: Jose Flroes blood through the needle. After obtaining a good epidurogram by injecting Omnipaque 180 1 mL, a combination of normal saline and dexamethasone 10 mg in total volume of 4 mL was injected. The needle was then flushed with normal saline 1 mL.   The stylet re-

## 2018-06-27 NOTE — TELEPHONE ENCOUNTER
Spoke with Dr. Blanche Paul and informed of message below. Called pt back and confirmed that yes, pt is able to move her arm and fingers without difficulty. Pt educated that she would need to go to the ER if she couldn't move them.  Further explained that per

## 2018-06-27 NOTE — H&P
History & Physical Examination    Patient Name: Gilbert Troy  MRN: [id number]  CSN: [id number]  YOB: 1975    Pre-Operative Diagnosis:  Cervical radicular pain [M54.12]  Headache, cervicogenic [R51]    Present Illness: Patient with cervical NOSTRIL EVERY NIGHT Disp: 1 Bottle Rfl: 2 Taking   Cyanocobalamin (VITAMIN B12 OR) Take by mouth daily. Disp:  Rfl:  Taking   Melatonin 10 MG Oral Cap Take by mouth nightly.  Disp:  Rfl:  Taking   Calcium Carbonate-Vitamin D (CALCIUM 600 + D OR) Take by kai UNKNOWN    Past Medical History:   Diagnosis Date   • Abnormal uterine bleeding    • Acute bronchitis    • Acute esophagitis    • Acute upper respiratory infections of unspecified site    • Anxiety state, unspecified    • Attention deficit disorder without finger release   No date: OTHER SURGICAL HISTORY      Comment: ganglion cyst removed left wrist   2005: TARSAL TUNNEL RELEASE      Comment: right foot  No date: TOTAL ABDOM HYSTERECTOMY  Family History   Problem Relation Age of Onset   • ADD[other] [OTHER]

## 2018-07-02 ENCOUNTER — TELEPHONE (OUTPATIENT)
Dept: SURGERY | Facility: CLINIC | Age: 43
End: 2018-07-02

## 2018-07-02 NOTE — TELEPHONE ENCOUNTER
Pt states the injections don't seem to be helping, pt seems to be more on the right side instead of left. Pt experiences \"horrible headache the day after an injection\".  Please would like call back or [facility] message on whether to continue injections

## 2018-07-06 ENCOUNTER — TELEPHONE (OUTPATIENT)
Dept: SURGERY | Facility: CLINIC | Age: 43
End: 2018-07-06

## 2018-07-06 NOTE — TELEPHONE ENCOUNTER
Left message for patient, confirmed procedure date of 7/11/18 with Dr Ina George and to be checked in at outpatient registration at 9:15 am. Patient instructed to call pre-procedure line before procedure at [phone].  Patient instructed to call office if ther

## 2018-07-19 ENCOUNTER — OFFICE VISIT (OUTPATIENT)
Dept: SURGERY | Facility: CLINIC | Age: 43
End: 2018-07-19

## 2018-07-19 VITALS — HEART RATE: 88 BPM | DIASTOLIC BLOOD PRESSURE: 80 MMHG | SYSTOLIC BLOOD PRESSURE: 120 MMHG

## 2018-07-19 DIAGNOSIS — R29.898 WEAKNESS OF BOTH UPPER EXTREMITIES: ICD-10-CM

## 2018-07-19 DIAGNOSIS — R32 URINARY INCONTINENCE, UNSPECIFIED TYPE: ICD-10-CM

## 2018-07-19 DIAGNOSIS — M54.12 CERVICAL RADICULAR PAIN: ICD-10-CM

## 2018-07-19 DIAGNOSIS — M54.12 CERVICAL MYELOPATHY WITH CERVICAL RADICULOPATHY (HCC): Primary | ICD-10-CM

## 2018-07-19 DIAGNOSIS — G95.9 CERVICAL MYELOPATHY WITH CERVICAL RADICULOPATHY (HCC): Primary | ICD-10-CM

## 2018-07-19 DIAGNOSIS — R29.898 WEAKNESS OF BOTH LOWER EXTREMITIES: ICD-10-CM

## 2018-07-19 DIAGNOSIS — R26.9 NEUROLOGIC GAIT DYSFUNCTION: ICD-10-CM

## 2018-07-19 PROCEDURE — 99213 OFFICE O/P EST LOW 20 MIN: CPT | Performed by: PHYSICIAN ASSISTANT

## 2018-07-19 RX ORDER — HYDROCODONE BITARTRATE AND ACETAMINOPHEN 10; 325 MG/1; MG/1
TABLET ORAL
Qty: 90 TABLET | Refills: 0 | Status: SHIPPED | OUTPATIENT
Start: 2018-07-19 | End: 2018-10-29 | Stop reason: ALTCHOICE

## 2018-07-19 NOTE — PROGRESS NOTES
[facility] Neurosurgery follow-up        HISTORY OF PRESENT Marilyn Curran is a 40year old RH  female here in follow-up discuss surgical intervention. Continues to have cervical pain right C6 radiculopathy.   She had a cervical epidural 6/20 and 6/27 a thoracic and lumbar spasms which get worse at night and with sitting. The spasms are better standing. She has had some tingling down her spine. Prior to her shoulder surgery she was having some incontinence now she is having more incontinence.   She markos cervical cancer screening 7-3-2012     wnl   • Personal history of urinary (tract) infection     • Unspecified disorder of thyroid       hypothyroidism   • Unspecified viral infection, in conditions classified elsewhere and of unspecified site           PA Comment:Tardive dyskinesia  Betadine [Povidone *    Itching  Levofloxacin            Rash, Tightness in Chest  Meperidine              Nausea and vomiting  Other                       Comment:Sensitive skin, milk,dust mites  Quinolones              Unknown loss of cervical lordosis with a kyphosis apex at C4-5 and C5-6. On flexion she has a 1 mm anterior listhesis of C4-5 and on extension a 1 mm retrolisthesis of C4-5. She has an anterior listhesis of C5-6 on flexion.   Images were reviewed by Dr. Lul Kelly

## 2018-07-19 NOTE — PATIENT INSTRUCTIONS
Refill policies:    • Allow 2-3 business days for refills; controlled substances may take longer.   • Contact your pharmacy at least 5 days prior to running out of medication and have them send an electronic request or submit request through the “request re entire amount billed. Precertification and Prior Authorizations: If your physician has recommended that you have a procedure or additional testing performed.   [facility]) will contact your insurance carrier to obtain pre-certi

## 2018-07-21 DIAGNOSIS — R07.89 CHEST WALL PAIN: ICD-10-CM

## 2018-07-23 RX ORDER — NAPROXEN 500 MG/1
TABLET ORAL
Qty: 60 TABLET | Refills: 0 | Status: SHIPPED | OUTPATIENT
Start: 2018-07-23 | End: 2018-08-24

## 2018-07-23 NOTE — TELEPHONE ENCOUNTER
Medication: Naproxen 500 MG     Date of last refill: 4/27/18  60 Tabs  1 Refill   [place]     Last office visit: 7/19/18  Due back to clinic per last office note:  2 to 3 Months   Date next office visit scheduled:  9/

## 2018-08-09 ENCOUNTER — LAB ENCOUNTER (OUTPATIENT)
Dept: LAB | Age: 43
End: 2018-08-09
Attending: FAMILY MEDICINE
Payer: COMMERCIAL

## 2018-08-09 ENCOUNTER — OFFICE VISIT (OUTPATIENT)
Dept: FAMILY MEDICINE CLINIC | Facility: CLINIC | Age: 43
End: 2018-08-09

## 2018-08-09 VITALS
HEART RATE: 88 BPM | SYSTOLIC BLOOD PRESSURE: 120 MMHG | TEMPERATURE: 98 F | BODY MASS INDEX: 25.33 KG/M2 | WEIGHT: 152 LBS | HEIGHT: 65 IN | RESPIRATION RATE: 16 BRPM | DIASTOLIC BLOOD PRESSURE: 66 MMHG

## 2018-08-09 DIAGNOSIS — E04.9 GOITER: ICD-10-CM

## 2018-08-09 DIAGNOSIS — K21.00 GASTROESOPHAGEAL REFLUX DISEASE WITH ESOPHAGITIS: ICD-10-CM

## 2018-08-09 DIAGNOSIS — R10.11 RUQ PAIN: Primary | ICD-10-CM

## 2018-08-09 DIAGNOSIS — R10.11 RUQ PAIN: ICD-10-CM

## 2018-08-09 LAB
ALBUMIN SERPL-MCNC: 4.1 G/DL (ref 3.5–4.8)
ALBUMIN/GLOB SERPL: 1.2 {RATIO} (ref 1–2)
ALP LIVER SERPL-CCNC: 64 U/L (ref 37–98)
ALT SERPL-CCNC: 31 U/L (ref 14–54)
ANION GAP SERPL CALC-SCNC: 7 MMOL/L (ref 0–18)
AST SERPL-CCNC: 17 U/L (ref 15–41)
BASOPHILS # BLD AUTO: 0.04 X10(3) UL (ref 0–0.1)
BASOPHILS NFR BLD AUTO: 0.7 %
BILIRUB SERPL-MCNC: 0.6 MG/DL (ref 0.1–2)
BUN BLD-MCNC: 13 MG/DL (ref 8–20)
BUN/CREAT SERPL: 12.7 (ref 10–20)
CALCIUM BLD-MCNC: 10 MG/DL (ref 8.3–10.3)
CHLORIDE SERPL-SCNC: 110 MMOL/L (ref 101–111)
CO2 SERPL-SCNC: 25 MMOL/L (ref 22–32)
CREAT BLD-MCNC: 1.02 MG/DL (ref 0.55–1.02)
EOSINOPHIL # BLD AUTO: 0.13 X10(3) UL (ref 0–0.3)
EOSINOPHIL NFR BLD AUTO: 2.3 %
ERYTHROCYTE [DISTWIDTH] IN BLOOD BY AUTOMATED COUNT: 12.7 % (ref 11.5–16)
GLOBULIN PLAS-MCNC: 3.4 G/DL (ref 2.5–3.7)
GLUCOSE BLD-MCNC: 104 MG/DL (ref 70–99)
HCT VFR BLD AUTO: 43.7 % (ref 34–50)
HGB BLD-MCNC: 14.5 G/DL (ref 12–16)
IMMATURE GRANULOCYTE COUNT: 0.01 X10(3) UL (ref 0–1)
IMMATURE GRANULOCYTE RATIO %: 0.2 %
LYMPHOCYTES # BLD AUTO: 1.96 X10(3) UL (ref 0.9–4)
LYMPHOCYTES NFR BLD AUTO: 34.8 %
M PROTEIN MFR SERPL ELPH: 7.5 G/DL (ref 6.1–8.3)
MCH RBC QN AUTO: 31.3 PG (ref 27–33.2)
MCHC RBC AUTO-ENTMCNC: 33.2 G/DL (ref 31–37)
MCV RBC AUTO: 94.4 FL (ref 81–100)
MONOCYTES # BLD AUTO: 0.54 X10(3) UL (ref 0.1–1)
MONOCYTES NFR BLD AUTO: 9.6 %
NEUTROPHIL ABS PRELIM: 2.96 X10 (3) UL (ref 1.3–6.7)
NEUTROPHILS # BLD AUTO: 2.96 X10(3) UL (ref 1.3–6.7)
NEUTROPHILS NFR BLD AUTO: 52.4 %
OSMOLALITY SERPL CALC.SUM OF ELEC: 294 MOSM/KG (ref 275–295)
PLATELET # BLD AUTO: 231 10(3)UL (ref 150–450)
POTASSIUM SERPL-SCNC: 5 MMOL/L (ref 3.6–5.1)
RBC # BLD AUTO: 4.63 X10(6)UL (ref 3.8–5.1)
RED CELL DISTRIBUTION WIDTH-SD: 43.7 FL (ref 35.1–46.3)
SODIUM SERPL-SCNC: 142 MMOL/L (ref 136–144)
T4 FREE SERPL-MCNC: 0.7 NG/DL (ref 0.9–1.8)
THYROGLOB SERPL-MCNC: <15 U/ML (ref ?–60)
THYROPEROXIDASE AB SERPL-ACNC: <28 U/ML (ref ?–60)
TSI SER-ACNC: 1.35 MIU/ML (ref 0.35–5.5)
WBC # BLD AUTO: 5.6 X10(3) UL (ref 4–13)

## 2018-08-09 PROCEDURE — 36415 COLL VENOUS BLD VENIPUNCTURE: CPT

## 2018-08-09 PROCEDURE — 84443 ASSAY THYROID STIM HORMONE: CPT

## 2018-08-09 PROCEDURE — 84439 ASSAY OF FREE THYROXINE: CPT

## 2018-08-09 PROCEDURE — 85025 COMPLETE CBC W/AUTO DIFF WBC: CPT

## 2018-08-09 PROCEDURE — 86800 THYROGLOBULIN ANTIBODY: CPT

## 2018-08-09 PROCEDURE — 86376 MICROSOMAL ANTIBODY EACH: CPT

## 2018-08-09 PROCEDURE — 80053 COMPREHEN METABOLIC PANEL: CPT

## 2018-08-09 PROCEDURE — 99214 OFFICE O/P EST MOD 30 MIN: CPT | Performed by: FAMILY MEDICINE

## 2018-08-09 NOTE — PROGRESS NOTES
HPI:   Juana Roper is a 44year old female here to discuss RUQ pain     Pt reports this started 6-8 months ago  Off and on   Pain is off and on; pain will be 8-9/10 lasting  30 min ; 20 min after eating   No pale stool   No fever  No dysuria  some GERD by mouth daily. Disp:  Rfl:    Melatonin 10 MG Oral Cap Take by mouth nightly. Disp:  Rfl:    Calcium Carbonate-Vitamin D (CALCIUM 600 + D OR) Take by mouth 2 (two) times daily.  Disp:  Rfl:    hydrALAzine HCl 10 MG Oral Tab  Disp:  Rfl: 0   topiramate 25 M Unspecified disorder of thyroid     hypothyroidism   • Unspecified viral infection, in conditions classified elsewhere and of unspecified site       Past Surgical History:  1991: APPENDECTOMY  2/6/2015: COLONOSCOPY N/A      Comment: Procedure: COLONOSCOPY; lesions  EYES:denies blurred vision or double vision  HEENT: denies nasal congestion, sinus pain or ST  LUNGS: denies shortness of breath with exertion  CARDIOVASCULAR: denies chest pain on exertion  MUSCULOSKELETAL: denies back pain  HEMATOLOGIC: denies h

## 2018-08-10 ENCOUNTER — HOSPITAL ENCOUNTER (OUTPATIENT)
Dept: ULTRASOUND IMAGING | Age: 43
Discharge: HOME | End: 2018-08-10
Attending: FAMILY MEDICINE
Payer: COMMERCIAL

## 2018-08-10 DIAGNOSIS — E04.9 GOITER: ICD-10-CM

## 2018-08-10 DIAGNOSIS — R10.11 RUQ PAIN: ICD-10-CM

## 2018-08-10 PROCEDURE — 76700 US EXAM ABDOM COMPLETE: CPT | Performed by: FAMILY MEDICINE

## 2018-08-10 PROCEDURE — 76536 US EXAM OF HEAD AND NECK: CPT | Performed by: FAMILY MEDICINE

## 2018-08-16 ENCOUNTER — TELEPHONE (OUTPATIENT)
Dept: SURGERY | Facility: CLINIC | Age: 43
End: 2018-08-16

## 2018-08-16 NOTE — TELEPHONE ENCOUNTER
rcvd fax order confirmation from [facility] products stating they are out of network for the cervical traction requested.  endorsed to RN

## 2018-08-17 ENCOUNTER — TELEPHONE (OUTPATIENT)
Dept: FAMILY MEDICINE CLINIC | Facility: CLINIC | Age: 43
End: 2018-08-17

## 2018-08-17 DIAGNOSIS — R10.11 RIGHT UPPER QUADRANT ABDOMINAL PAIN: Primary | ICD-10-CM

## 2018-08-17 DIAGNOSIS — R11.0 NAUSEA: ICD-10-CM

## 2018-08-17 NOTE — TELEPHONE ENCOUNTER
Pt was referred to go see GI Doctor Gisella Chi and he is able to see her till sept 11, pt is not doing well she is having stomach pain and nausea, pt is not eating she can barely eat saltines and even those she is having a hard time keeping in, pt is a

## 2018-08-17 NOTE — TELEPHONE ENCOUNTER
Please put in referral to [facility]   I would like her to be evaluated by them before ordering another hida scan

## 2018-08-17 NOTE — TELEPHONE ENCOUNTER
See previous message pt states when she does eat she gets nausea and pain to right rib area she is sure its her gall bladder pt would like a Hida scan and Zofran . Acid reflux med not working pt also on [employer] office told her the absolute soonest

## 2018-08-20 ENCOUNTER — OFFICE VISIT (OUTPATIENT)
Dept: SURGERY | Facility: CLINIC | Age: 43
End: 2018-08-20

## 2018-08-20 VITALS
WEIGHT: 152 LBS | HEART RATE: 94 BPM | DIASTOLIC BLOOD PRESSURE: 77 MMHG | HEIGHT: 65 IN | TEMPERATURE: 99 F | SYSTOLIC BLOOD PRESSURE: 109 MMHG | RESPIRATION RATE: 16 BRPM | BODY MASS INDEX: 25.33 KG/M2

## 2018-08-20 DIAGNOSIS — R10.11 RUQ ABDOMINAL PAIN: Primary | ICD-10-CM

## 2018-08-20 DIAGNOSIS — K21.00 GASTROESOPHAGEAL REFLUX DISEASE WITH ESOPHAGITIS: ICD-10-CM

## 2018-08-20 DIAGNOSIS — K58.1 IRRITABLE BOWEL SYNDROME WITH CONSTIPATION: ICD-10-CM

## 2018-08-20 PROCEDURE — 99243 OFF/OP CNSLTJ NEW/EST LOW 30: CPT | Performed by: COLON & RECTAL SURGERY

## 2018-08-20 NOTE — H&P
New Patient Visit Note       Active Problems      1. RUQ abdominal pain    2. Gastroesophageal reflux disease with esophagitis    3.  Irritable bowel syndrome with constipation        Chief Complaint   Patient presents with:  Abdominal Pain: Pt. was referre cervical neck pain. She is currently being evaluated by Dr. Azul Suarez for possible cervical spine surgery in the near future. She currently takes Norco 10's for this pain. She also takes bentyl for IBS.     The patient's medical history is significant for hig syndrome without mention of neurogenic bladder    • Depression    • Diabetes ([address]     Diet controlled   • Diabetes mellitus ([facility] [address]     Diet controlled   • Dysesthesia    • Dyspareunia    • Esophageal reflux    • Essential hypertension    • Extrinsic asthma Diabetes Father    • Hypertension Father    • Breast Cancer Mother 54   • Julio Garay Mother    • Heart Disorder Maternal Grandmother      CHF   • Stroke Maternal Grandfather    • Diabetes Paternal Grandmother    • Cancer Sister      uterine   • B Citalopram Hydrobromide 20 MG Oral Tab TAKE 1 TABLET BY MOUTH ONCE DAILY( TAKE WITH 40 MG TABLETS) Disp: 30 tablet Rfl: 5   DICYCLOMINE HCL 20 MG Oral Tab TAKE ONE TABLET BY MOUTH FOUR TIMES DAILY( EVERY 6 HOURS) BEFORE MEALS AND NIGHTLY AS NEEDED Disp: swelling. Gastrointestinal: Positive for abdominal pain, constipation, nausea and vomiting. Negative for abdominal distention, anal bleeding, blood in stool and diarrhea. Genitourinary: Negative for difficulty urinating, dysuria, frequency and urgency. not diaphoretic. No erythema. Psychiatric: She has a normal mood and affect. Her behavior is normal. Judgment and thought content normal.   Nursing note and vitals reviewed.         PROCEDURE:  NUCLEAR MEDICINE HEPATOBILIARY GALLBLADDER SCAN     COMPARISO 6 months. FINDINGS:    LIVER:  Uniform echotexture. BILIARY:  Nondistended gallbladder. No shadowing calculus. Normal caliber bile ducts. CBD is measured at 0.4 cm. PANCREAS:  Uniform echogenicity. SPLEEN:  Uniform echotexture.   Bipolar Size This was performed by Dr. Jennifer Griffin. In 2015, the patient underwent a total abdominal hysterectomy/bilateral salpingo-oophorectomy with Dr. Sarita Raza. Dr. Kathryn Rolle assisted with lysis of adhesions in the pelvis at that time.   The patient has required additional her HIDA scan. If the patient's HIDA scan is negative for low ejection fraction, the patient does not need to follow-up further with myself. She may see Dr. Emi Scott in gastroenterology for further workup and management of her irritable bowel syndrome.  Sharonda Mckeon

## 2018-08-20 NOTE — PATIENT INSTRUCTIONS
The patient is a pleasant 28-year-old female who presents in consultation for right upper quadrant pain. The patient is referred to me by her primary provider, Dr. Lary Matias.     The patient has been having symptoms of right upper quadrant pain for the disease. The patient presents today with an ultrasound dated August 10, 2018. This examination is negative for gallstones, gallbladder sludge, gallbladder wall thickening, pericholecystic fluid, or dilated intra-/extrahepatic bile ducts.   The patient h

## 2018-08-22 NOTE — TELEPHONE ENCOUNTER
Authorized, but has [employer] as vendor  Contacted Fredrick BELLAMY who stated he would attempt to get unit for patient     Order and authorization faxed to [facility]

## 2018-08-24 DIAGNOSIS — R07.89 CHEST WALL PAIN: ICD-10-CM

## 2018-08-24 RX ORDER — NAPROXEN 500 MG/1
TABLET ORAL
Qty: 60 TABLET | Refills: 1 | Status: ON HOLD | OUTPATIENT
Start: 2018-08-24 | End: 2018-12-20

## 2018-08-24 NOTE — TELEPHONE ENCOUNTER
Medication: Naproxen 500 Mg     Date of last refill: 7/23/18   60 Tabs     [place]     Last office visit: 7/19/18     Due back to clinic per last office note:  2 to 3 Months     Date next office visit scheduled:  9/21

## 2018-08-28 ENCOUNTER — TELEPHONE (OUTPATIENT)
Dept: SURGERY | Facility: CLINIC | Age: 43
End: 2018-08-28

## 2018-08-28 NOTE — TELEPHONE ENCOUNTER
pt bought OTC neck traction device, was just informed that it was approved from insurance for [employer] for neck traction. Should she get that one or could she use the one from OTC?

## 2018-08-28 NOTE — TELEPHONE ENCOUNTER
Spoke with patient who stated she has been using the OTC neck traction and hasn't really notice much difference. Informed patient it is up to her if she would like to try the one that has been authorized by her insurance to see if there is any difference.

## 2018-09-05 ENCOUNTER — HOSPITAL ENCOUNTER (OUTPATIENT)
Dept: NUCLEAR MEDICINE | Facility: HOSPITAL | Age: 43
Discharge: HOME | End: 2018-09-05
Attending: COLON & RECTAL SURGERY
Payer: COMMERCIAL

## 2018-09-05 DIAGNOSIS — R10.11 RUQ ABDOMINAL PAIN: ICD-10-CM

## 2018-09-05 PROCEDURE — 78227 HEPATOBIL SYST IMAGE W/DRUG: CPT | Performed by: COLON & RECTAL SURGERY

## 2018-09-11 NOTE — PROGRESS NOTES
Talked to pt per Mable Irwin. Had fu appt with [facility] today. Pt verbalizes understanding.

## 2018-09-21 ENCOUNTER — TELEPHONE (OUTPATIENT)
Dept: FAMILY MEDICINE CLINIC | Facility: CLINIC | Age: 43
End: 2018-09-21

## 2018-09-21 ENCOUNTER — OFFICE VISIT (OUTPATIENT)
Dept: SURGERY | Facility: CLINIC | Age: 43
End: 2018-09-21

## 2018-09-21 ENCOUNTER — TELEPHONE (OUTPATIENT)
Dept: SURGERY | Facility: CLINIC | Age: 43
End: 2018-09-21

## 2018-09-21 VITALS — SYSTOLIC BLOOD PRESSURE: 108 MMHG | DIASTOLIC BLOOD PRESSURE: 70 MMHG | HEART RATE: 90 BPM

## 2018-09-21 DIAGNOSIS — M54.12 CERVICAL RADICULAR PAIN: ICD-10-CM

## 2018-09-21 DIAGNOSIS — R32 URINARY INCONTINENCE, UNSPECIFIED TYPE: ICD-10-CM

## 2018-09-21 DIAGNOSIS — M54.12 CERVICAL MYELOPATHY WITH CERVICAL RADICULOPATHY (HCC): Primary | ICD-10-CM

## 2018-09-21 DIAGNOSIS — R29.898 WEAKNESS OF BOTH UPPER EXTREMITIES: ICD-10-CM

## 2018-09-21 DIAGNOSIS — R26.9 NEUROLOGIC GAIT DYSFUNCTION: ICD-10-CM

## 2018-09-21 DIAGNOSIS — R29.898 WEAKNESS OF BOTH LOWER EXTREMITIES: ICD-10-CM

## 2018-09-21 DIAGNOSIS — G95.9 CERVICAL MYELOPATHY WITH CERVICAL RADICULOPATHY (HCC): Primary | ICD-10-CM

## 2018-09-21 PROCEDURE — 99214 OFFICE O/P EST MOD 30 MIN: CPT | Performed by: PHYSICIAN ASSISTANT

## 2018-09-21 RX ORDER — HYDROCODONE BITARTRATE AND ACETAMINOPHEN 10; 325 MG/1; MG/1
1 TABLET ORAL EVERY 4 HOURS PRN
Qty: 90 TABLET | Refills: 0 | Status: ON HOLD | OUTPATIENT
Start: 2018-09-21 | End: 2018-12-20

## 2018-09-21 NOTE — PROGRESS NOTES
Pt is here for follow up for neck pain. Pt states that she is still the same since the last time we seen her.

## 2018-09-21 NOTE — TELEPHONE ENCOUNTER
You are scheduled for 2 level  Anterior Cervical Discectomy and Fusion on 12/19/2018 with .     Pre-op instructions discussed with patient and surgical packet provided:     · You will need to contact the Pre-admission department at [phone] to  [employer]th[employer], you will need to contact them for a fitting. We will provide you with the contact information for these vendors. · You may need an external bone growth stimulator.  If ordered for your surgery the vendor, someone from [employer] or [employer] will

## 2018-09-21 NOTE — PATIENT INSTRUCTIONS
Refill policies:    • Allow 2-3 business days for refills; controlled substances may take longer.   • Contact your pharmacy at least 5 days prior to running out of medication and have them send an electronic request or submit request through the “request re entire amount billed. Precertification and Prior Authorizations: If your physician has recommended that you have a procedure or additional testing performed.   [facility]) will contact your insurance carrier to obtain pre-certi needed. You will be informed about a spine class as it related to your surgery. Although the class is not mandatory, your are strongly encouraged to attend. Information for the spine class is provided below.    · The blood work will  include MRSA/MSSA testi (such as Coumadin, Pradaxa, Xarelto, etc) prior to surgery that we had you stop for surgery, please make sure you get instructions about when to resume the medication before you are discharged        from the hospital   · Post operative appointment to angela castro

## 2018-09-21 NOTE — TELEPHONE ENCOUNTER
[facility] Date[facility] 12/19/18. Paperwork placed at [facility]'s triage bin for fup.

## 2018-09-21 NOTE — PROGRESS NOTES
[facility] Neurosurgery follow-up        HISTORY OF PRESENT Mónica Ncihole is a 44year old RH  female here in follow-up for cervical myelopathy. She continues to have cervical pain which is an 8/10.   She continues to have bilateral C5-C6 radiculopathy Patient's been complaining of neck pain on and off for several years. She complains of cervical pain which goes from a 0 to a 10/10. She gets occipital headaches.   She most recently had her right shoulder repaired 2/13/18.     She complains of right C6 r • Cauda equina syndrome without mention of neurogenic bladder     • Depression     • Diabetes (HCC)       Diet controlled   • Diabetes mellitus ([facility] [address]       Diet controlled   • Dysesthesia     • Dyspareunia     • Esophageal reflux     • Essential hypertensi family history includes ADD in her father; BRCA gene + in her sister; Breast Cancer (age of onset: 54) in her mother; Cancer in her paternal cousin female, sister, and sister; Diabetes in her father and paternal grandmother; Heart Disorder in her maternal Right         4*        5         4+*          5 5 4+ 4 4 4+   Left         4*        5         4*          5 5 4+ 4 4 5-      Lower extremity strength:      Iliopsoas  Hamstrings   Quads    D-flexion P-flexion Eversion   Right       4+*         5       5 1.  C4-5 C5-6 spondylosis with kyphosis and axial neck pain  2. Occipital headaches  3. Cervical radiculopathy and myelopathy secondary to anterior cord compression at C4-5, with right C6 uropathy and bicep weakness probably from C5-6  4.   Upper and lowe

## 2018-09-24 ENCOUNTER — TELEPHONE (OUTPATIENT)
Dept: SURGERY | Facility: CLINIC | Age: 43
End: 2018-09-24

## 2018-09-24 NOTE — TELEPHONE ENCOUNTER
Discussed with provider:  No lifting more than 20 Lbs, no over head work, no hyperextending the neck or looking up. Called pt, informed her of message above, she was very thankful and   would like a letter sent via [employer].     Letter pended for provide

## 2018-09-25 NOTE — TELEPHONE ENCOUNTER
Patient has IHP-PA referral will be needed.   CPT: 29334, [address], 34889, 02943  ICD-10: M47.12, M54.12, R29.898, R26.9, R32    Surgery PA pending #: [id number]    Collar pending PA #: [id number]  [facility]GS pending PA #: [id number]

## 2018-10-16 DIAGNOSIS — G43.109 MIGRAINE WITH AURA AND WITHOUT STATUS MIGRAINOSUS, NOT INTRACTABLE: ICD-10-CM

## 2018-10-17 NOTE — TELEPHONE ENCOUNTER
Spoke with patient and reminded her to schedule her appointment with PCP for medical clearance. Patient understood.

## 2018-10-18 RX ORDER — TOPIRAMATE 25 MG/1
TABLET ORAL
Qty: 60 TABLET | Refills: 0 | Status: SHIPPED | OUTPATIENT
Start: 2018-10-18 | End: 2018-11-15

## 2018-10-23 DIAGNOSIS — F41.9 ANXIETY AND DEPRESSION: ICD-10-CM

## 2018-10-23 DIAGNOSIS — K58.9 IRRITABLE BOWEL SYNDROME WITHOUT DIARRHEA: ICD-10-CM

## 2018-10-23 DIAGNOSIS — F32.A ANXIETY AND DEPRESSION: ICD-10-CM

## 2018-10-24 RX ORDER — CITALOPRAM 20 MG/1
TABLET ORAL
Qty: 30 TABLET | Refills: 0 | Status: SHIPPED | OUTPATIENT
Start: 2018-10-24 | End: 2018-11-20

## 2018-10-24 RX ORDER — DICYCLOMINE HCL 20 MG
TABLET ORAL
Qty: 120 TABLET | Refills: 0 | Status: SHIPPED | OUTPATIENT
Start: 2018-10-24 | End: 2019-04-14

## 2018-10-24 NOTE — TELEPHONE ENCOUNTER
Approve/Deny. Set up for 30 days. Last OV 8-9-18 LE RUQ/GERD  Previous OV LE 5-17-18 Anxiety/Depression. Last refill 5/18 for 6 months.

## 2018-10-24 NOTE — TELEPHONE ENCOUNTER
Medication: Cyclobenzaprine 10 Mg     Date of last refill: 4/27/18  60 Tabs 1 Refill     [place]    Last office visit: 9/21/18     Due back to clinic per last office note: Follow up in 14 days postop.      Date next o

## 2018-10-26 RX ORDER — CYCLOBENZAPRINE HCL 10 MG
TABLET ORAL
Qty: 60 TABLET | Refills: 0 | OUTPATIENT
Start: 2018-10-26

## 2018-10-26 NOTE — TELEPHONE ENCOUNTER
She was given Flexeril in April 2018. With no additional refills since. Patient is requesting a refill today. She was placed on citalopram and SSRI in May 2018.       There is potential for serotonin syndrome with these 2 medications so refill was markos

## 2018-10-30 ENCOUNTER — TELEPHONE (OUTPATIENT)
Dept: SURGERY | Facility: CLINIC | Age: 43
End: 2018-10-30

## 2018-10-30 NOTE — TELEPHONE ENCOUNTER
Left a voicemail for the patient. We can try her in a Vista collar if she is Myron received it for surgery. Her surgical date is December 19. Otherwise she can come [facility] for [facility].     As a last resort we could try her on another round of

## 2018-10-30 NOTE — TELEPHONE ENCOUNTER
Patient calling to update Phoenix Mendezma on her condition. Her urinary incontinence is becoming more frequent, she has sensation but \"hits very quickly\". The amount that she \"dribbles\" seems to be a little more. She does not report bowel dysfunction.   Sh

## 2018-10-30 NOTE — TELEPHONE ENCOUNTER
Patient was told by Coral Lombardo to update office if anything changed.  This changed: urinary incontinence is worse, grasping is worse on right hand but both affected, pain down right arm

## 2018-10-31 ENCOUNTER — PATIENT MESSAGE (OUTPATIENT)
Dept: SURGERY | Facility: CLINIC | Age: 43
End: 2018-10-31

## 2018-10-31 NOTE — TELEPHONE ENCOUNTER
From: Bairon Covarrubias  To: Rafael Plata  Sent: 10/31/2018 10:20 AM CDT  Subject: Other    Hello   I am very sorry I missed your call. I am getting the collar, however I was told not until the week before surgery.  I am worried about the urine incon

## 2018-10-31 NOTE — TELEPHONE ENCOUNTER
See TE 10/30/18 for recommendations to below condition update. Patient has PCP clearance appointment scheduled for 11/20/18. Will check back on clearance at that time.

## 2018-11-01 RX ORDER — PREDNISONE 10 MG/1
10 TABLET ORAL DAILY
Qty: 30 TABLET | Refills: 0 | Status: ON HOLD | OUTPATIENT
Start: 2018-11-01 | End: 2018-12-19 | Stop reason: ALTCHOICE

## 2018-11-01 NOTE — TELEPHONE ENCOUNTER
My chart message was sent to the patient.   We will place her on prednisone taper  If we get a cancellation we will move her surgery up  If she continues to decline in spite of the steroids will discuss with Dr. Mirela Castillo about hospital admission

## 2018-11-15 DIAGNOSIS — G43.109 MIGRAINE WITH AURA AND WITHOUT STATUS MIGRAINOSUS, NOT INTRACTABLE: ICD-10-CM

## 2018-11-16 RX ORDER — TOPIRAMATE 25 MG/1
TABLET ORAL
Qty: 60 TABLET | Refills: 0 | Status: SHIPPED | OUTPATIENT
Start: 2018-11-16 | End: 2018-12-06

## 2018-11-20 ENCOUNTER — APPOINTMENT (OUTPATIENT)
Dept: LAB | Age: 43
End: 2018-11-20
Attending: FAMILY MEDICINE
Payer: COMMERCIAL

## 2018-11-20 ENCOUNTER — OFFICE VISIT (OUTPATIENT)
Dept: FAMILY MEDICINE CLINIC | Facility: CLINIC | Age: 43
End: 2018-11-20

## 2018-11-20 ENCOUNTER — LABORATORY ENCOUNTER (OUTPATIENT)
Dept: LAB | Age: 43
End: 2018-11-20
Attending: FAMILY MEDICINE
Payer: COMMERCIAL

## 2018-11-20 ENCOUNTER — TELEPHONE (OUTPATIENT)
Dept: FAMILY MEDICINE CLINIC | Facility: CLINIC | Age: 43
End: 2018-11-20

## 2018-11-20 VITALS
OXYGEN SATURATION: 98 % | BODY MASS INDEX: 24.83 KG/M2 | HEIGHT: 65 IN | DIASTOLIC BLOOD PRESSURE: 68 MMHG | WEIGHT: 149 LBS | RESPIRATION RATE: 16 BRPM | SYSTOLIC BLOOD PRESSURE: 106 MMHG | TEMPERATURE: 98 F | HEART RATE: 88 BPM

## 2018-11-20 DIAGNOSIS — F41.9 ANXIETY AND DEPRESSION: ICD-10-CM

## 2018-11-20 DIAGNOSIS — F90.8 ATTENTION DEFICIT HYPERACTIVITY DISORDER (ADHD), OTHER TYPE: ICD-10-CM

## 2018-11-20 DIAGNOSIS — Z01.818 PREOPERATIVE EXAMINATION: ICD-10-CM

## 2018-11-20 DIAGNOSIS — Z01.818 PREOPERATIVE EXAMINATION: Primary | ICD-10-CM

## 2018-11-20 DIAGNOSIS — Z86.39 HISTORY OF HYPOTHYROIDISM: ICD-10-CM

## 2018-11-20 DIAGNOSIS — G43.109 MIGRAINE WITH AURA AND WITHOUT STATUS MIGRAINOSUS, NOT INTRACTABLE: ICD-10-CM

## 2018-11-20 DIAGNOSIS — Z23 NEED FOR VACCINATION: ICD-10-CM

## 2018-11-20 DIAGNOSIS — F41.9 ANXIETY: ICD-10-CM

## 2018-11-20 DIAGNOSIS — J45.20 MILD INTERMITTENT ASTHMA WITHOUT COMPLICATION: ICD-10-CM

## 2018-11-20 DIAGNOSIS — M54.12 CERVICAL MYELOPATHY WITH CERVICAL RADICULOPATHY (HCC): ICD-10-CM

## 2018-11-20 DIAGNOSIS — R09.89 LABILE BLOOD PRESSURE: ICD-10-CM

## 2018-11-20 DIAGNOSIS — K21.00 GASTROESOPHAGEAL REFLUX DISEASE WITH ESOPHAGITIS: ICD-10-CM

## 2018-11-20 DIAGNOSIS — F32.A ANXIETY AND DEPRESSION: ICD-10-CM

## 2018-11-20 DIAGNOSIS — G95.9 CERVICAL MYELOPATHY WITH CERVICAL RADICULOPATHY (HCC): ICD-10-CM

## 2018-11-20 PROCEDURE — 93005 ELECTROCARDIOGRAM TRACING: CPT

## 2018-11-20 PROCEDURE — 99243 OFF/OP CNSLTJ NEW/EST LOW 30: CPT | Performed by: FAMILY MEDICINE

## 2018-11-20 PROCEDURE — 90686 IIV4 VACC NO PRSV 0.5 ML IM: CPT | Performed by: FAMILY MEDICINE

## 2018-11-20 PROCEDURE — 87081 CULTURE SCREEN ONLY: CPT

## 2018-11-20 PROCEDURE — 85610 PROTHROMBIN TIME: CPT

## 2018-11-20 PROCEDURE — 85025 COMPLETE CBC W/AUTO DIFF WBC: CPT

## 2018-11-20 PROCEDURE — 93010 ELECTROCARDIOGRAM REPORT: CPT | Performed by: INTERNAL MEDICINE

## 2018-11-20 PROCEDURE — 80053 COMPREHEN METABOLIC PANEL: CPT

## 2018-11-20 PROCEDURE — 36415 COLL VENOUS BLD VENIPUNCTURE: CPT

## 2018-11-20 PROCEDURE — 90471 IMMUNIZATION ADMIN: CPT | Performed by: FAMILY MEDICINE

## 2018-11-20 PROCEDURE — 85730 THROMBOPLASTIN TIME PARTIAL: CPT

## 2018-11-20 RX ORDER — METHYLPHENIDATE HYDROCHLORIDE 20 MG/1
20 TABLET ORAL 2 TIMES DAILY
Qty: 60 TABLET | Refills: 0 | Status: SHIPPED | OUTPATIENT
Start: 2018-11-20 | End: 2019-04-01

## 2018-11-20 RX ORDER — ALPRAZOLAM 0.25 MG/1
0.25 TABLET ORAL 2 TIMES DAILY PRN
Qty: 30 TABLET | Refills: 0 | Status: SHIPPED | OUTPATIENT
Start: 2018-11-20 | End: 2019-04-01

## 2018-11-20 RX ORDER — CITALOPRAM 20 MG/1
20 TABLET ORAL DAILY
Qty: 30 TABLET | Refills: 2 | Status: ON HOLD | OUTPATIENT
Start: 2018-11-20 | End: 2018-12-19

## 2018-11-20 NOTE — TELEPHONE ENCOUNTER
Per PCP- 11/20/18: \"Pt is low-moderate risk for a moderate risk procedure. Cleared for surgery. RTC 2 mo or sooner if needed. \"    Nothing further needed for upcoming surgery.

## 2018-11-20 NOTE — PROGRESS NOTES
Devin Alvarez is a 44year old female who presents for preop clearance  Dr. Cammie Wilkins reqesting clearance for ANTERIOR DISCECTOMY AND FUSION. CERVICAL 4-CERVICAL 5    AND CERVICAL 5-CERVICAL 6.  ALLOGRAFT, PLATE AND SCREWS  31/62/33   At [facility]   HPI: ONCE DAILY( TAKE WITH 40 MG TABLETS) Disp: 30 tablet Rfl: 0   DICYCLOMINE HCL 20 MG Oral Tab TAKE ONE TABLET BY MOUTH FOUR TIMES DAILY( EVERY 6 HOURS) BEFORE MEALS AND NIGHTLY AS NEEDED Disp: 120 tablet Rfl: 0   HYDROcodone-acetaminophen  MG Oral Tab MCG/ACT Inhalation Aero Soln as needed. Disp:  Rfl: 1   Esomeprazole Magnesium (NEXIUM) 40 MG Oral Capsule Delayed Release Take 40 mg by mouth 2 (two) times daily. Disp:  Rfl:    Omega-3 Fatty Acids (FISH OIL) 1000 MG Oral Cap Take  by mouth daily.  Disp: hyperthermia     patient's son at 4years of age - 2006   • Mastodynia    • Menses painful    • Migraines    • Nausea 2weeks   • Night sweats    • Other screening mammogram 06/11/2012   • Pain in joints    • Pain with bowel movements Years   • Pap smear fo ganglion cyst removed left wrist    • SHOULDER ARTHROSCOPY Right 2/13/2018    Performed by Marie Beauchamp MD at [address] [facility] OR   • [place] [id number]  2005    right foot   • TONSILLECTOMY     • TOTAL ABDOM HYSTERECTOMY Temp 98.2 °F (36.8 °C) (Oral)   Resp 16   Ht 65\"   Wt 149 lb   LMP 01/01/2004   SpO2 98%   BMI 24.79 kg/m²   Body mass index is 24.79 kg/m².    GENERAL: well developed, well nourished,in no apparent distress  SKIN: no rashes,no suspicious lesions  HEENT: a

## 2018-12-05 ENCOUNTER — HOSPITAL ENCOUNTER (OUTPATIENT)
Dept: PHYSICAL THERAPY | Facility: HOSPITAL | Age: 43
Discharge: HOME | End: 2018-12-05
Attending: NEUROLOGICAL SURGERY
Payer: COMMERCIAL

## 2018-12-05 DIAGNOSIS — G95.9 CERVICAL MYELOPATHY WITH CERVICAL RADICULOPATHY (HCC): ICD-10-CM

## 2018-12-05 DIAGNOSIS — M54.12 CERVICAL MYELOPATHY WITH CERVICAL RADICULOPATHY (HCC): ICD-10-CM

## 2018-12-06 DIAGNOSIS — G43.109 MIGRAINE WITH AURA AND WITHOUT STATUS MIGRAINOSUS, NOT INTRACTABLE: ICD-10-CM

## 2018-12-07 RX ORDER — TOPIRAMATE 25 MG/1
TABLET ORAL
Qty: 120 TABLET | Refills: 1 | Status: SHIPPED | OUTPATIENT
Start: 2018-12-07 | End: 2018-12-19

## 2018-12-07 NOTE — TELEPHONE ENCOUNTER
LOV 11/20/18    LRx 11/16/18 30 day supply    OK to switch to 90 day supply?   #180 pended - please review

## 2018-12-18 ENCOUNTER — TELEPHONE (OUTPATIENT)
Dept: SURGERY | Facility: CLINIC | Age: 43
End: 2018-12-18

## 2018-12-18 ENCOUNTER — ANESTHESIA EVENT (OUTPATIENT)
Dept: SURGERY | Facility: HOSPITAL | Age: 43
End: 2018-12-18

## 2018-12-18 NOTE — TELEPHONE ENCOUNTER
Spoke to provider,   She should not take her anxiety medication since she will be having sedation and other IV medications. She can have something intravenously when she arrives. Pt acknowledged. Nothing further.

## 2018-12-18 NOTE — H&P
History & Physical Examination    Patient Name: Isael Conner  MRN: [id number]  [facility]: [id number]  YOB: 1975    Diagnosis: Cervical myelopathy with cervical radiculopathy, cervical radicular pain, weakness of both upper and lower extremities with same incontinence she is dropping things more the heaviness is gotten worse in her arms feels like the numbness in her legs are getting worse as well.     Last hx:  She has gotten a different size cervical collar but he does not seem to be giving any some time is gotten worse since surgery she went off the Norco and is still having constipation she is having frequency of urination or after voiding she has to return and void again in a short time span.  Complains of arm stiffness and like stiffness in t asthma, unspecified    • Fatigue    • Flatulence/gas pain/belching 2weeks   • Food intolerance    • Frequent urination    • Frequent UTI    • Headache disorder    • Heartburn 29 years ago   • Heavy menses    • Hiatal hernia    • High blood pressure    • Hi (EGD) N/A 5/1/2015    Performed by Javi Baird MD at [facility] ENDOSCOPY   • ESOPHAGOGASTRODUODENOSCOPY (EGD) N/A 2/6/2015    Performed by Javi Baird MD at [facility] ENDOSCOPY   • HYSTERECTOMY  7/20/2015   • HYSTEROSCOPY,ABLATION ENDOMETRIUM     • LAPAROSCOPY ] [ Althea Sami [ Dawson Carbo [ Beatris Route [ Dawson Carbo [ Derinda Catching [ Dawson Carbo [ Jacqui Fire [ Dawson Carbo [ Sosa Buffy [ Dawson Carbo [ Toshia Sin [ Dawson Carbo [ Yamini Lacer     Awake, alert and orientated. Face symmetric. Speech fluent. Comprehension intact.   Upper ex

## 2018-12-19 ENCOUNTER — APPOINTMENT (OUTPATIENT)
Dept: GENERAL RADIOLOGY | Facility: HOSPITAL | Age: 43
DRG: 472 | End: 2018-12-19
Attending: NEUROLOGICAL SURGERY
Payer: COMMERCIAL

## 2018-12-19 ENCOUNTER — ANESTHESIA (OUTPATIENT)
Dept: SURGERY | Facility: HOSPITAL | Age: 43
End: 2018-12-19

## 2018-12-19 ENCOUNTER — HOSPITAL ENCOUNTER (INPATIENT)
Facility: HOSPITAL | Age: 43
LOS: 1 days | Discharge: HOME | DRG: 472 | End: 2018-12-20
Attending: NEUROLOGICAL SURGERY | Admitting: NEUROLOGICAL SURGERY
Payer: COMMERCIAL

## 2018-12-19 DIAGNOSIS — M54.12 CERVICAL RADICULAR PAIN: ICD-10-CM

## 2018-12-19 DIAGNOSIS — M54.12 CERVICAL MYELOPATHY WITH CERVICAL RADICULOPATHY (HCC): Primary | ICD-10-CM

## 2018-12-19 DIAGNOSIS — R29.898 WEAKNESS OF BOTH LOWER EXTREMITIES: ICD-10-CM

## 2018-12-19 DIAGNOSIS — R29.898 WEAKNESS OF BOTH UPPER EXTREMITIES: ICD-10-CM

## 2018-12-19 DIAGNOSIS — G95.9 CERVICAL MYELOPATHY WITH CERVICAL RADICULOPATHY (HCC): Primary | ICD-10-CM

## 2018-12-19 PROBLEM — K58.8 OTHER IRRITABLE BOWEL SYNDROME: Status: ACTIVE | Noted: 2018-08-20

## 2018-12-19 PROCEDURE — 99252 IP/OBS CONSLTJ NEW/EST SF 35: CPT | Performed by: HOSPITALIST

## 2018-12-19 PROCEDURE — 0RB30ZZ EXCISION OF CERVICAL VERTEBRAL DISC, OPEN APPROACH: ICD-10-PCS | Performed by: NEUROLOGICAL SURGERY

## 2018-12-19 PROCEDURE — 76001 XR FLUOROSCOPE EXAM >1 HR EXTENSIVE (CPT=76001): CPT | Performed by: NEUROLOGICAL SURGERY

## 2018-12-19 PROCEDURE — BR111ZZ FLUOROSCOPY OF CERVICAL DISC(S) USING LOW OSMOLAR CONTRAST: ICD-10-PCS | Performed by: NEUROLOGICAL SURGERY

## 2018-12-19 PROCEDURE — 00NW0ZZ RELEASE CERVICAL SPINAL CORD, OPEN APPROACH: ICD-10-PCS | Performed by: NEUROLOGICAL SURGERY

## 2018-12-19 PROCEDURE — 0RG20A0 FUSION OF 2 OR MORE CERVICAL VERTEBRAL JOINTS WITH INTERBODY FUSION DEVICE, ANTERIOR APPROACH, ANTERIOR COLUMN, OPEN APPROACH: ICD-10-PCS | Performed by: NEUROLOGICAL SURGERY

## 2018-12-19 DEVICE — BONE CERV 4 LIFENET VG2C-T46: Type: IMPLANTABLE DEVICE | Site: NECK | Status: FUNCTIONAL

## 2018-12-19 RX ORDER — SODIUM CHLORIDE, SODIUM LACTATE, POTASSIUM CHLORIDE, CALCIUM CHLORIDE 600; 310; 30; 20 MG/100ML; MG/100ML; MG/100ML; MG/100ML
INJECTION, SOLUTION INTRAVENOUS CONTINUOUS
Status: DISCONTINUED | OUTPATIENT
Start: 2018-12-19 | End: 2018-12-19 | Stop reason: HOSPADM

## 2018-12-19 RX ORDER — DIPHENHYDRAMINE HCL 25 MG
25 CAPSULE ORAL EVERY 4 HOURS PRN
Status: DISCONTINUED | OUTPATIENT
Start: 2018-12-19 | End: 2018-12-20

## 2018-12-19 RX ORDER — ACETAMINOPHEN 500 MG
1000 TABLET ORAL ONCE
Status: DISCONTINUED | OUTPATIENT
Start: 2018-12-19 | End: 2018-12-19 | Stop reason: HOSPADM

## 2018-12-19 RX ORDER — ESCITALOPRAM OXALATE 10 MG/1
10 TABLET ORAL EVERY MORNING
Status: DISCONTINUED | OUTPATIENT
Start: 2018-12-20 | End: 2018-12-20

## 2018-12-19 RX ORDER — TRAMADOL HYDROCHLORIDE 50 MG/1
TABLET ORAL EVERY 6 HOURS PRN
Status: DISCONTINUED | OUTPATIENT
Start: 2018-12-19 | End: 2018-12-20

## 2018-12-19 RX ORDER — CEFAZOLIN SODIUM/WATER 2 G/20 ML
2 SYRINGE (ML) INTRAVENOUS EVERY 8 HOURS
Status: COMPLETED | OUTPATIENT
Start: 2018-12-19 | End: 2018-12-20

## 2018-12-19 RX ORDER — ALPRAZOLAM 0.25 MG/1
0.25 TABLET ORAL 2 TIMES DAILY PRN
Status: DISCONTINUED | OUTPATIENT
Start: 2018-12-19 | End: 2018-12-20

## 2018-12-19 RX ORDER — ACETAMINOPHEN 10 MG/ML
INJECTION, SOLUTION INTRAVENOUS
Status: DISCONTINUED | OUTPATIENT
Start: 2018-12-19 | End: 2018-12-19 | Stop reason: HOSPADM

## 2018-12-19 RX ORDER — DEXAMETHASONE SODIUM PHOSPHATE 10 MG/ML
4 INJECTION, SOLUTION INTRAMUSCULAR; INTRAVENOUS EVERY 6 HOURS
Status: COMPLETED | OUTPATIENT
Start: 2018-12-19 | End: 2018-12-20

## 2018-12-19 RX ORDER — CEFAZOLIN SODIUM/WATER 2 G/20 ML
2 SYRINGE (ML) INTRAVENOUS ONCE
Status: COMPLETED | OUTPATIENT
Start: 2018-12-19 | End: 2018-12-19

## 2018-12-19 RX ORDER — TOPIRAMATE 25 MG/1
50 TABLET ORAL 2 TIMES DAILY
Status: DISCONTINUED | OUTPATIENT
Start: 2018-12-19 | End: 2018-12-20

## 2018-12-19 RX ORDER — SODIUM CHLORIDE 9 MG/ML
INJECTION, SOLUTION INTRAVENOUS CONTINUOUS
Status: DISCONTINUED | OUTPATIENT
Start: 2018-12-19 | End: 2018-12-20

## 2018-12-19 RX ORDER — MIDAZOLAM HYDROCHLORIDE 1 MG/ML
1 INJECTION INTRAMUSCULAR; INTRAVENOUS EVERY 5 MIN PRN
Status: DISCONTINUED | OUTPATIENT
Start: 2018-12-19 | End: 2018-12-19 | Stop reason: HOSPADM

## 2018-12-19 RX ORDER — PANTOPRAZOLE SODIUM 40 MG/1
40 TABLET, DELAYED RELEASE ORAL NIGHTLY
Status: DISCONTINUED | OUTPATIENT
Start: 2018-12-19 | End: 2018-12-20

## 2018-12-19 RX ORDER — ORPHENADRINE CITRATE 30 MG/ML
30 INJECTION INTRAMUSCULAR; INTRAVENOUS EVERY 6 HOURS
Status: DISCONTINUED | OUTPATIENT
Start: 2018-12-19 | End: 2018-12-20

## 2018-12-19 RX ORDER — DIPHENHYDRAMINE HYDROCHLORIDE 50 MG/ML
12.5 INJECTION INTRAMUSCULAR; INTRAVENOUS AS NEEDED
Status: DISCONTINUED | OUTPATIENT
Start: 2018-12-19 | End: 2018-12-19 | Stop reason: HOSPADM

## 2018-12-19 RX ORDER — BISACODYL 10 MG
10 SUPPOSITORY, RECTAL RECTAL
Status: DISCONTINUED | OUTPATIENT
Start: 2018-12-19 | End: 2018-12-20

## 2018-12-19 RX ORDER — LABETALOL HYDROCHLORIDE 5 MG/ML
5 INJECTION, SOLUTION INTRAVENOUS EVERY 5 MIN PRN
Status: DISCONTINUED | OUTPATIENT
Start: 2018-12-19 | End: 2018-12-19 | Stop reason: HOSPADM

## 2018-12-19 RX ORDER — ACETAMINOPHEN 500 MG
1000 TABLET ORAL EVERY 6 HOURS
Status: DISCONTINUED | OUTPATIENT
Start: 2018-12-19 | End: 2018-12-20

## 2018-12-19 RX ORDER — ONDANSETRON 2 MG/ML
4 INJECTION INTRAMUSCULAR; INTRAVENOUS EVERY 4 HOURS PRN
Status: DISCONTINUED | OUTPATIENT
Start: 2018-12-19 | End: 2018-12-20

## 2018-12-19 RX ORDER — SENNOSIDES 8.6 MG
17.2 TABLET ORAL NIGHTLY
Status: DISCONTINUED | OUTPATIENT
Start: 2018-12-19 | End: 2018-12-20

## 2018-12-19 RX ORDER — LOVASTATIN 10 MG/1
10 TABLET ORAL NIGHTLY
COMMUNITY
End: 2019-04-23

## 2018-12-19 RX ORDER — CITALOPRAM 40 MG/1
40 TABLET ORAL DAILY
Status: ON HOLD | COMMUNITY
End: 2018-12-19

## 2018-12-19 RX ORDER — MONTELUKAST SODIUM 10 MG/1
10 TABLET ORAL NIGHTLY
Status: DISCONTINUED | OUTPATIENT
Start: 2018-12-19 | End: 2018-12-20

## 2018-12-19 RX ORDER — DIPHENHYDRAMINE HYDROCHLORIDE 50 MG/ML
25 INJECTION INTRAMUSCULAR; INTRAVENOUS EVERY 4 HOURS PRN
Status: DISCONTINUED | OUTPATIENT
Start: 2018-12-19 | End: 2018-12-20

## 2018-12-19 RX ORDER — CITALOPRAM 20 MG/1
20 TABLET ORAL SEE ADMIN INSTRUCTIONS
COMMUNITY
End: 2019-04-01

## 2018-12-19 RX ORDER — LOSARTAN POTASSIUM 25 MG/1
25 TABLET ORAL DAILY
Status: DISCONTINUED | OUTPATIENT
Start: 2018-12-20 | End: 2018-12-20

## 2018-12-19 RX ORDER — DICYCLOMINE HCL 20 MG
20 TABLET ORAL
Status: DISCONTINUED | OUTPATIENT
Start: 2018-12-19 | End: 2018-12-20

## 2018-12-19 RX ORDER — PRAVASTATIN SODIUM 10 MG
10 TABLET ORAL NIGHTLY
Status: DISCONTINUED | OUTPATIENT
Start: 2018-12-19 | End: 2018-12-20

## 2018-12-19 RX ORDER — POLYETHYLENE GLYCOL 3350 17 G/17G
17 POWDER, FOR SOLUTION ORAL DAILY PRN
Status: DISCONTINUED | OUTPATIENT
Start: 2018-12-19 | End: 2018-12-20

## 2018-12-19 RX ORDER — MORPHINE SULFATE 4 MG/ML
4 INJECTION, SOLUTION INTRAMUSCULAR; INTRAVENOUS EVERY 2 HOUR PRN
Status: DISCONTINUED | OUTPATIENT
Start: 2018-12-19 | End: 2018-12-20

## 2018-12-19 RX ORDER — ONDANSETRON 2 MG/ML
4 INJECTION INTRAMUSCULAR; INTRAVENOUS AS NEEDED
Status: DISCONTINUED | OUTPATIENT
Start: 2018-12-19 | End: 2018-12-19 | Stop reason: HOSPADM

## 2018-12-19 RX ORDER — BUPROPION HYDROCHLORIDE 300 MG/1
300 TABLET ORAL DAILY
COMMUNITY
End: 2020-06-17

## 2018-12-19 RX ORDER — BACITRACIN 50000 [USP'U]/1
INJECTION, POWDER, LYOPHILIZED, FOR SOLUTION INTRAMUSCULAR AS NEEDED
Status: DISCONTINUED | OUTPATIENT
Start: 2018-12-19 | End: 2018-12-19 | Stop reason: HOSPADM

## 2018-12-19 RX ORDER — SODIUM PHOSPHATE, DIBASIC AND SODIUM PHOSPHATE, MONOBASIC 7; 19 G/133ML; G/133ML
1 ENEMA RECTAL ONCE AS NEEDED
Status: DISCONTINUED | OUTPATIENT
Start: 2018-12-19 | End: 2018-12-20

## 2018-12-19 RX ORDER — CITALOPRAM 40 MG/1
40 TABLET ORAL SEE ADMIN INSTRUCTIONS
COMMUNITY
End: 2019-04-01

## 2018-12-19 RX ORDER — MORPHINE SULFATE 4 MG/ML
2 INJECTION, SOLUTION INTRAMUSCULAR; INTRAVENOUS EVERY 2 HOUR PRN
Status: DISCONTINUED | OUTPATIENT
Start: 2018-12-19 | End: 2018-12-20

## 2018-12-19 RX ORDER — BUPROPION HYDROCHLORIDE 150 MG/1
150 TABLET, EXTENDED RELEASE ORAL
Status: DISCONTINUED | OUTPATIENT
Start: 2018-12-20 | End: 2018-12-20

## 2018-12-19 RX ORDER — NALOXONE HYDROCHLORIDE 0.4 MG/ML
80 INJECTION, SOLUTION INTRAMUSCULAR; INTRAVENOUS; SUBCUTANEOUS AS NEEDED
Status: DISCONTINUED | OUTPATIENT
Start: 2018-12-19 | End: 2018-12-19 | Stop reason: HOSPADM

## 2018-12-19 RX ORDER — MORPHINE SULFATE 4 MG/ML
1 INJECTION, SOLUTION INTRAMUSCULAR; INTRAVENOUS EVERY 2 HOUR PRN
Status: DISCONTINUED | OUTPATIENT
Start: 2018-12-19 | End: 2018-12-20

## 2018-12-19 RX ORDER — TOPIRAMATE 25 MG/1
50 TABLET ORAL 2 TIMES DAILY
COMMUNITY
End: 2019-04-01

## 2018-12-19 RX ORDER — ESCITALOPRAM OXALATE 20 MG/1
20 TABLET ORAL EVERY MORNING
Status: DISCONTINUED | OUTPATIENT
Start: 2018-12-20 | End: 2018-12-20

## 2018-12-19 NOTE — ANESTHESIA PREPROCEDURE EVALUATION
PRE-OP EVALUATION    Patient Name: Samra Service    Pre-op Diagnosis: Cervical myelopathy with cervical radiculopathy [M47.12]  Cervical radicular pain [M54.12]  Weakness of both upper extremities [R29.898]  Weakness of both lower extremities [R29.898] TABLET BY MOUTH ONCE DAILY( TAKE WITH 40 MG TABLETS) Disp: 30 tablet Rfl: 0   DICYCLOMINE HCL 20 MG Oral Tab TAKE ONE TABLET BY MOUTH FOUR TIMES DAILY( EVERY 6 HOURS) BEFORE MEALS AND NIGHTLY AS NEEDED Disp: 120 tablet Rfl: 0   [DISCONTINUED] TOPIRAMATE 25 Meperidine; Other; Quinolones      Anesthesia Evaluation    Patient summary reviewed.     Anesthetic Complications  (-) history of anesthetic complications         GI/Hepatic/Renal      (+) GERD   (+) hiatal hernia                   (+) irritable bowel synd Clint Galvan MD at [facility] MAIN OR   • [address]  2005    right foot   • TONSILLECTOMY     • TOTAL ABDOM HYSTERECTOMY       Social History    Tobacco Use      Smoking status: Former Smoker        Years: 10.00 Pain with bowel movements   Irregular bowel habits Diarrhea, unspecified   Constipation Food intolerance   Night sweats Loss of appetite   Sleep disturbance Weight loss   Headache disorder High cholesterol   Frequent UTI Frequent urination   Leaking of uri

## 2018-12-19 NOTE — BRIEF OP NOTE
Pre-Operative Diagnosis: Cervical myelopathy with cervical radiculopathy [M47.12]  Cervical radicular pain [M54.12]  Weakness of both upper extremities [R29.898]  Weakness of both lower extremities [R29.898]     Post-Operative Diagnosis: Cervical myelopath

## 2018-12-19 NOTE — ANESTHESIA POSTPROCEDURE EVALUATION
[address] Patient Status:  Outpatient in a Bed   Age/Gender 44year old female MRN [id number]   Location [address] [facility] Attending Sahara Levin MD   Hosp Day # 0 PCP Jaskaran Tripp DO       Anesthesia Po

## 2018-12-20 ENCOUNTER — APPOINTMENT (OUTPATIENT)
Dept: GENERAL RADIOLOGY | Facility: HOSPITAL | Age: 43
DRG: 472 | End: 2018-12-20
Attending: PHYSICIAN ASSISTANT
Payer: COMMERCIAL

## 2018-12-20 VITALS
BODY MASS INDEX: 24.88 KG/M2 | DIASTOLIC BLOOD PRESSURE: 69 MMHG | HEIGHT: 65.5 IN | HEART RATE: 96 BPM | OXYGEN SATURATION: 95 % | SYSTOLIC BLOOD PRESSURE: 124 MMHG | TEMPERATURE: 98 F | WEIGHT: 151.13 LBS | RESPIRATION RATE: 18 BRPM

## 2018-12-20 PROCEDURE — 99232 SBSQ HOSP IP/OBS MODERATE 35: CPT | Performed by: HOSPITALIST

## 2018-12-20 PROCEDURE — 72040 X-RAY EXAM NECK SPINE 2-3 VW: CPT | Performed by: PHYSICIAN ASSISTANT

## 2018-12-20 RX ORDER — HYDROCODONE BITARTRATE AND ACETAMINOPHEN 5; 325 MG/1; MG/1
1-2 TABLET ORAL EVERY 4 HOURS PRN
Status: DISCONTINUED | OUTPATIENT
Start: 2018-12-20 | End: 2018-12-20 | Stop reason: SDUPTHER

## 2018-12-20 RX ORDER — HYDROCODONE BITARTRATE AND ACETAMINOPHEN 5; 325 MG/1; MG/1
2 TABLET ORAL EVERY 4 HOURS PRN
Status: DISCONTINUED | OUTPATIENT
Start: 2018-12-20 | End: 2018-12-20

## 2018-12-20 RX ORDER — DIAZEPAM 5 MG/1
5 TABLET ORAL EVERY 6 HOURS PRN
Status: DISCONTINUED | OUTPATIENT
Start: 2018-12-20 | End: 2018-12-20

## 2018-12-20 RX ORDER — HYDROCODONE BITARTRATE AND ACETAMINOPHEN 5; 325 MG/1; MG/1
1-2 TABLET ORAL EVERY 6 HOURS PRN
Qty: 60 TABLET | Refills: 0 | Status: SHIPPED | OUTPATIENT
Start: 2018-12-20 | End: 2019-01-04

## 2018-12-20 RX ORDER — HYDROCODONE BITARTRATE AND ACETAMINOPHEN 5; 325 MG/1; MG/1
1 TABLET ORAL EVERY 4 HOURS PRN
Status: DISCONTINUED | OUTPATIENT
Start: 2018-12-20 | End: 2018-12-20

## 2018-12-20 RX ORDER — DIAZEPAM 5 MG/1
5 TABLET ORAL EVERY 6 HOURS PRN
Qty: 60 TABLET | Refills: 0 | Status: SHIPPED | OUTPATIENT
Start: 2018-12-20 | End: 2019-01-04

## 2018-12-20 NOTE — PROGRESS NOTES
[facility]  Progress Note     Page Joshua Patient Status:  Inpatient    1975 MRN [id number]   [facility] 3SW-A Attending Adolph Howard MD   Hosp Day # 1 PCP Jeff Sharp DO     Chief Complaint: medical management     S: 10 mg Oral Nightly   • Montelukast Sodium  10 mg Oral Nightly   • topiramate  50 mg Oral BID   • melatonin  10 mg Oral Nightly       ASSESSMENT / PLAN:       1. Pod#1 s/p ACDF  2. HTN-resume home meds-monitor bp  3. IBS-resume bentyl  4.  Hx migraines-resum

## 2018-12-20 NOTE — PHYSICAL THERAPY NOTE
PHYSICAL THERAPY QUICK EVALUATION - INPATIENT    Room Number: 355/355-A  Evaluation Date: 12/20/2018  Presenting Problem: S/p ACDF 4-C5,C5-C6,C6-C7 on 12/19/18  Physician Order: PT Eval and Treat    Problem List  Active Problems:    Other irritable bowel Night sweats    • Other screening mammogram 06/11/2012   • Pain in joints    • Pain with bowel movements Years   • Pap smear for cervical cancer screening 7-3-2012    wnl   • Personal history of urinary (tract) infection    • Pneumonia due to organism    • MAIN OR   • SIGMOIDOSCOPY,DIAGNOSTIC     • TARSAL TUNNEL RELEASE  2005    right foot   • TONSILLECTOMY     • TOTAL ABDOM HYSTERECTOMY         HOME SITUATION  Type of Home: House   Home Layout: One level  Stairs to Enter : 1  Railing: No  Stairs to Bedroom: patient currently need. ..   -   Moving to and from a bed to a chair (including a wheelchair)?: None   -   Need to walk in hospital room?: None   -   Climbing 3-5 steps with a railing?: A Little       AM-PAC Score:  Raw Score: 23   Approx Degree of Impairme Patient discharged from Physical Therapy services. Please re-order if a new functional limitation presents during this admission. GOALS  Patient was able to achieve the following goals . ..     Patient was able to transfer Modified independent   Patient

## 2018-12-20 NOTE — PROGRESS NOTES
POD #1 spine newsletter and swallowing precautions provided to patient. Reviewed indications, side effects of pain medication/narcotics and constipation prevention.  Stressed importance of increased fluids/roughage in diet, continued use stool softeners wander

## 2018-12-20 NOTE — OCCUPATIONAL THERAPY NOTE
OCCUPATIONAL THERAPY QUICK EVALUATION - INPATIENT    Room Number: 355/355-A  Evaluation Date: 12/20/2018     Type of Evaluation: Quick Eval  Presenting Problem: (C4-6 ACDF)    Physician Order: IP Consult to Occupational Therapy  Reason for Therapy:  ADL/IA Occasionally on my legs, not due to dry skin   • Leaking of urine    • Lipid screening 09/17/2011   • Loss of appetite    • Lump or mass in breast    • Malaise    • Malignant hyperthermia     patient's son at 3years of age - 80   • Mastodynia    • Me ADHESIONS RIGHT SHOULDER   • OTHER SURGICAL HISTORY  2001    esophagogastroduodenoscopy   • OTHER SURGICAL HISTORY      right thumb trigger finger release    • OTHER SURGICAL HISTORY      ganglion cyst removed left wrist    • SHOULDER ARTHROSCOPY Right 2/1 None  -   Putting on and taking off regular upper body clothing?: A Little  -   Taking care of personal grooming such as brushing teeth?: None  -   Eating meals?: None    AM-PAC Score:  Score: 22  Approx Degree of Impairment: 25.8%  Standardized Score (AM- leisure and social participation. The AM-JUDY ' '6-Clicks' Inpatient Daily Activity Short Form was completed and  this patient  is demonstrating a 26% degree impairment in activities of daily living.  Research supports that patients with this level of impa

## 2018-12-20 NOTE — PROGRESS NOTES
[facility]  Neurosurgery Progress Note    Romina Coronel Patient Status:  Inpatient    1975 MRN [id number]   [facility] 3SW-A Attending Mark Peterson MD   [facility] Day # 1 PCP Jolanta Rhodes DO     Subjective:  Pt examined, reportin for spasms  Discontinue Tramadol and change to Norco  May benefit from pain management consult  PT/OT consults  DVT prophylaxis- SCDs TEDs  Medical management per hospitalist  If pain controlled, may discharge home later today    MARIO Newell

## 2018-12-21 NOTE — OPERATIVE REPORT
[facility]    OPERATIVE REPORT    Patient:  Marco Tomlinson;  YOB: 1975     CSN:  [id number]; Medical Record Number:  [id number]    Admission Date:  12/19/2018 Operation Date:  12/19/2018    . ..........................     Operating P alignment. Tegaderm was applied to the shoulders protect the skin and adhesive tape was used for shoulder traction to expose the appropriate cervical levels for use of C-arm fluoroscopy.  The C-arm was brought into position and appropriate anterior-posterio bodies down to mid vertebral body level. Loose disc material was curetted out and removed with pituitary rongeurs to visualize the trajectory of the disc space. The distraction pins were used to open up the disc space.   The operative microscope was sukhi sized central osteophyte with significant ventral cord compression and moderate to severe right foraminal stenosis.   At C4-5 was primarily an extremely hypermobile segment with subluxation present until the distraction pins we created a more normal lordosi 14 mm self-tapping screws. At C6 initially 16 mm screws were selected but appeared potentially slightly too long when partially tightened down so they were switched out for 2 14 mm self-tapping rescue screws.  Once an acceptable position on imaging and by

## 2018-12-26 ENCOUNTER — TELEPHONE (OUTPATIENT)
Dept: FAMILY MEDICINE CLINIC | Facility: CLINIC | Age: 43
End: 2018-12-26

## 2018-12-26 ENCOUNTER — OFFICE VISIT (OUTPATIENT)
Dept: FAMILY MEDICINE CLINIC | Facility: CLINIC | Age: 43
End: 2018-12-26

## 2018-12-26 DIAGNOSIS — Z02.9 ADMINISTRATIVE ENCOUNTER: Primary | ICD-10-CM

## 2018-12-26 PROCEDURE — 99998 NO SHOW: CPT | Performed by: FAMILY MEDICINE

## 2018-12-26 NOTE — PROGRESS NOTES
NO SHOW           ROS:   Review of Systems    PHYSICAL EXAM:   Patient's last menstrual period was 01/01/2004. Estimated body mass index is 24.77 kg/m² as calculated from the following:    Height as of 12/19/18: 65.5\".     Weight as of 12/19/18: 151 lb 2 o

## 2018-12-26 NOTE — TELEPHONE ENCOUNTER
PT HAD A 10:45AM WITH JAVAN AND CAME AT 11:25AM.    DR BOONE SAID SHE WOULD SEE HER AFTER SEEING HER SCHED APPTS. OR PATIENT CAN RESCHED. PT DID RESCHED FOR 1-8-19.

## 2019-01-02 ENCOUNTER — TELEPHONE (OUTPATIENT)
Dept: SURGERY | Facility: CLINIC | Age: 44
End: 2019-01-02

## 2019-01-02 RX ORDER — HYDROCODONE BITARTRATE AND ACETAMINOPHEN 5; 325 MG/1; MG/1
1-2 TABLET ORAL EVERY 6 HOURS PRN
Qty: 60 TABLET | Refills: 0 | Status: CANCELLED | OUTPATIENT
Start: 2019-01-02

## 2019-01-02 NOTE — TELEPHONE ENCOUNTER
Patient calling with condition update. Patient has follow up scheduled 1/04/2019 with Bashir Bill PA-C.      Patient has been wearing hard collar as prescribed and only removing when using BGS and showering but in the last 2 days she reports increase in pa

## 2019-01-04 ENCOUNTER — OFFICE VISIT (OUTPATIENT)
Dept: SURGERY | Facility: CLINIC | Age: 44
End: 2019-01-04

## 2019-01-04 VITALS — HEART RATE: 82 BPM | SYSTOLIC BLOOD PRESSURE: 108 MMHG | DIASTOLIC BLOOD PRESSURE: 78 MMHG

## 2019-01-04 DIAGNOSIS — M54.12 CERVICAL MYELOPATHY WITH CERVICAL RADICULOPATHY (HCC): Primary | ICD-10-CM

## 2019-01-04 DIAGNOSIS — M54.12 CERVICAL RADICULAR PAIN: ICD-10-CM

## 2019-01-04 DIAGNOSIS — G95.9 CERVICAL MYELOPATHY WITH CERVICAL RADICULOPATHY (HCC): Primary | ICD-10-CM

## 2019-01-04 PROCEDURE — 99024 POSTOP FOLLOW-UP VISIT: CPT | Performed by: PHYSICIAN ASSISTANT

## 2019-01-04 RX ORDER — HYDROCODONE BITARTRATE AND ACETAMINOPHEN 10; 325 MG/1; MG/1
1 TABLET ORAL EVERY 4 HOURS PRN
Qty: 90 TABLET | Refills: 0 | Status: SHIPPED | OUTPATIENT
Start: 2019-01-04 | End: 2019-02-01

## 2019-01-04 RX ORDER — DIAZEPAM 10 MG/1
10 TABLET ORAL EVERY 8 HOURS PRN
Qty: 60 TABLET | Refills: 0 | Status: SHIPPED | OUTPATIENT
Start: 2019-01-04 | End: 2019-02-01

## 2019-01-04 NOTE — PROGRESS NOTES
Pt is here for post op appointment for the cervical.   Pt states that 3 days ago she has been having pain in the cervical and shoulders. Shooting tingling pain.      Pain scale: 8/10     SX date 12/19/18

## 2019-01-04 NOTE — PROGRESS NOTES
[facility] Neurosurgery follow-up        HISTORY OF PRESENT Reynaldo Lomeli is a 44year old RH  female follow-up status post a C4-5 C5-6 ACDF 12/19/18 by Dr. Lorena Thapa. She has left-sided posterior neck pain which started last several days.   She was taki She has gotten a different size cervical collar but he does not seem to be giving any relief. Physical therapy is not helping. The Medrol did not help. Continues with cervical pain which gets up to a 9/10. Her right arm radiculopathy is about a 6/10. She complains of right C6 radiculopathy. She has bilateral arm and hand numbness in all 5 fingers. She does get some numbness in her feet as well. Her numbness is worse at night and when she wakes up in the morning.   She states since surgery that she fe • Extrinsic asthma, unspecified     • Fatigue     • Glucose intolerance (pre-diabetes)       diet control   • Hiatal hernia     • High blood pressure     • Hyperlipidemia     • IBS (irritable bowel syndrome)     • Irregular menstrual cycle     • Lipid scre family history includes ADD in her father; BRCA gene + in her sister; Breast Cancer (age of onset: 54) in her mother; Cancer in her paternal cousin female, sister, and sister; Diabetes in her father and paternal grandmother; Heart Disorder in her maternal      Deltoid    Triceps     Biceps        Wrist Extension  Finger extension Thumb Abduction  Thumb adduction Intrinsics   Right         5        5         5          5 5 5 4 5 5-   Left         5        5         5-          5 5 5 4 5 5-      Lower ext MRI of the cervical spine from 2/2017 shows loss of cervical lordosis with kyphosis at C4-5 central stenosis at C4-5 with slight flattening of the anterior cord. C5-6 stenosis with deformity of the cord. C6-7 stenosis.   Contrary to the report which repor

## 2019-01-11 ENCOUNTER — TELEPHONE (OUTPATIENT)
Dept: SURGERY | Facility: CLINIC | Age: 44
End: 2019-01-11

## 2019-01-11 ENCOUNTER — APPOINTMENT (OUTPATIENT)
Dept: GENERAL RADIOLOGY | Facility: HOSPITAL | Age: 44
End: 2019-01-11
Attending: EMERGENCY MEDICINE
Payer: COMMERCIAL

## 2019-01-11 ENCOUNTER — HOSPITAL ENCOUNTER (EMERGENCY)
Facility: HOSPITAL | Age: 44
Discharge: HOME | End: 2019-01-11
Attending: EMERGENCY MEDICINE
Payer: COMMERCIAL

## 2019-01-11 VITALS
BODY MASS INDEX: 24.99 KG/M2 | WEIGHT: 150 LBS | OXYGEN SATURATION: 98 % | DIASTOLIC BLOOD PRESSURE: 68 MMHG | RESPIRATION RATE: 16 BRPM | TEMPERATURE: 97 F | HEIGHT: 65 IN | HEART RATE: 70 BPM | SYSTOLIC BLOOD PRESSURE: 100 MMHG

## 2019-01-11 DIAGNOSIS — R33.9 URINARY RETENTION: Primary | ICD-10-CM

## 2019-01-11 LAB
BILIRUB UR QL STRIP.AUTO: NEGATIVE
CLARITY UR REFRACT.AUTO: CLEAR
COLOR UR AUTO: YELLOW
GLUCOSE UR STRIP.AUTO-MCNC: NEGATIVE MG/DL
KETONES UR STRIP.AUTO-MCNC: NEGATIVE MG/DL
LEUKOCYTE ESTERASE UR QL STRIP.AUTO: NEGATIVE
NITRITE UR QL STRIP.AUTO: NEGATIVE
PH UR STRIP.AUTO: 6 [PH] (ref 4.5–8)
PROT UR STRIP.AUTO-MCNC: NEGATIVE MG/DL
RBC UR QL AUTO: NEGATIVE
SP GR UR STRIP.AUTO: 1.01 (ref 1–1.03)
UROBILINOGEN UR STRIP.AUTO-MCNC: <2 MG/DL

## 2019-01-11 PROCEDURE — 81003 URINALYSIS AUTO W/O SCOPE: CPT | Performed by: EMERGENCY MEDICINE

## 2019-01-11 PROCEDURE — 99284 EMERGENCY DEPT VISIT MOD MDM: CPT | Performed by: EMERGENCY MEDICINE

## 2019-01-11 PROCEDURE — 51701 INSERT BLADDER CATHETER: CPT | Performed by: EMERGENCY MEDICINE

## 2019-01-11 PROCEDURE — 72040 X-RAY EXAM NECK SPINE 2-3 VW: CPT | Performed by: EMERGENCY MEDICINE

## 2019-01-11 NOTE — ED INITIAL ASSESSMENT (HPI)
Pt complaining of urinary retention x 2 days. S/p cervical spinal fusion 12/19. States she had neck pain when she placed a sports bra 2 days ago. Pt on cervical collar.

## 2019-01-11 NOTE — TELEPHONE ENCOUNTER
Spoke with patient. She is having difficulty urinating for the last 2-3 days. Patient has been able to go minimally the last 2 days but today she has been unable to urinate completely. Patient states her whole body feels \"shaky\" and feels off.  Also

## 2019-01-11 NOTE — ED PROVIDER NOTES
Patient Seen in: [facility] Emergency Department    History   Patient presents with:  Urinary Symptoms (urologic)    Stated Complaint: Urinary retention x 2 days.  Post op spinal fusion 12/19    HPI    Patient is a 71-year-old female who presents wit • Hyperlipidemia    • IBS (irritable bowel syndrome)    • Irregular bowel habits    • Irregular menstrual cycle    • Itch of skin O    Occasionally on my legs, not due to dry skin   • Leaking of urine    • Lipid screening 09/17/2011   • Loss of appetite HYSTEROSCOPY,ABLATION ENDOMETRIUM     • LAPAROSCOPY,DIAGNOSTIC  1991    multiple   • LYSIS OF ADHESIONS     • VENKATESH NEEDLE LOCALIZATION W/ SPECIMEN 1 SITE RIGHT     • OTHER Right 02/2018    ARTHROSCOPIC ACROMIOPLASTY LYSIS OF ADHESIONS RIGHT SHOULDER   • OTH without rales/rhonchi. Equal breath sounds bilaterally  Cardiac: No tachycardia. No murmurs. Regular rate and rhythm. Abdomen: Soft and nontender throughout. No rebound or guarding  Extremities: No clubbing/cyanosis/edema.   Skin: No rashes, no pallor

## 2019-01-11 NOTE — ED NOTES
Pt reevaluated by er physician. Informed of her plan of care nd test reports. Pt vebralizing understanding.

## 2019-01-11 NOTE — ED NOTES
Pt tolerated straight catheterization, 800 cc clear yellow urine obtained. Specimen sent to lab. Pt verbalizing relief.

## 2019-01-15 ENCOUNTER — TELEPHONE (OUTPATIENT)
Dept: SURGERY | Facility: CLINIC | Age: 44
End: 2019-01-15

## 2019-01-15 NOTE — TELEPHONE ENCOUNTER
pt calling because was in ER on 1/11/19, was told by ER that she needs to f/u with Jorge A Bran in 3 days but has appt 1/31/19, wants to know if that appt is ok or should she come in sooner.  She is feeling weakness in the legs, shaky & weak

## 2019-01-15 NOTE — TELEPHONE ENCOUNTER
Patient is wondering if she needs to be seen prior to 1/31/2019 scheduled visit as ED told her to follow up this week. Please advise if sooner appointment is needed - also still reporting shaky and weakness in legs.

## 2019-01-30 ENCOUNTER — HOSPITAL ENCOUNTER (OUTPATIENT)
Dept: GENERAL RADIOLOGY | Facility: HOSPITAL | Age: 44
Discharge: HOME | End: 2019-01-30
Attending: PHYSICIAN ASSISTANT
Payer: COMMERCIAL

## 2019-01-30 DIAGNOSIS — M54.12 CERVICAL MYELOPATHY WITH CERVICAL RADICULOPATHY (HCC): ICD-10-CM

## 2019-01-30 DIAGNOSIS — M54.12 CERVICAL RADICULAR PAIN: ICD-10-CM

## 2019-01-30 DIAGNOSIS — G95.9 CERVICAL MYELOPATHY WITH CERVICAL RADICULOPATHY (HCC): ICD-10-CM

## 2019-01-30 PROCEDURE — 72040 X-RAY EXAM NECK SPINE 2-3 VW: CPT | Performed by: PHYSICIAN ASSISTANT

## 2019-02-01 ENCOUNTER — OFFICE VISIT (OUTPATIENT)
Dept: SURGERY | Facility: CLINIC | Age: 44
End: 2019-02-01

## 2019-02-01 VITALS — HEART RATE: 82 BPM | SYSTOLIC BLOOD PRESSURE: 126 MMHG | DIASTOLIC BLOOD PRESSURE: 76 MMHG

## 2019-02-01 DIAGNOSIS — R26.9 NEUROLOGIC GAIT DYSFUNCTION: ICD-10-CM

## 2019-02-01 DIAGNOSIS — M54.12 CERVICAL MYELOPATHY WITH CERVICAL RADICULOPATHY (HCC): Primary | ICD-10-CM

## 2019-02-01 DIAGNOSIS — R29.898 WEAKNESS OF BOTH UPPER EXTREMITIES: ICD-10-CM

## 2019-02-01 DIAGNOSIS — Z98.1 S/P CERVICAL SPINAL FUSION: ICD-10-CM

## 2019-02-01 DIAGNOSIS — R29.898 WEAKNESS OF BOTH LOWER EXTREMITIES: ICD-10-CM

## 2019-02-01 DIAGNOSIS — G95.9 CERVICAL MYELOPATHY WITH CERVICAL RADICULOPATHY (HCC): Primary | ICD-10-CM

## 2019-02-01 PROCEDURE — 99024 POSTOP FOLLOW-UP VISIT: CPT | Performed by: PHYSICIAN ASSISTANT

## 2019-02-01 RX ORDER — PREDNISONE 10 MG/1
10 TABLET ORAL DAILY
Qty: 30 TABLET | Refills: 0 | Status: SHIPPED | OUTPATIENT
Start: 2019-02-01 | End: 2019-05-02 | Stop reason: ALTCHOICE

## 2019-02-01 RX ORDER — HYDROCODONE BITARTRATE AND ACETAMINOPHEN 10; 325 MG/1; MG/1
1 TABLET ORAL EVERY 4 HOURS PRN
Qty: 90 TABLET | Refills: 0 | Status: SHIPPED | OUTPATIENT
Start: 2019-02-01 | End: 2019-02-15

## 2019-02-01 RX ORDER — DIAZEPAM 10 MG/1
10 TABLET ORAL EVERY 8 HOURS PRN
Qty: 60 TABLET | Refills: 0 | Status: SHIPPED | OUTPATIENT
Start: 2019-02-01 | End: 2019-03-21

## 2019-02-01 NOTE — PROGRESS NOTES
[facility] Neurosurgery follow-up        HISTORY OF PRESENT Bessie Gamez is a 44year old RH  female follow-up status post a C4-5 C5-6 ACDF 12/19/18 by Dr. Mirela Benz.     At her last visit she was in the emergency room 1/11/19 with bladder retention she wa She has gotten a different size cervical collar but he does not seem to be giving any relief. Physical therapy is not helping. The Medrol did not help. Continues with cervical pain which gets up to a 9/10. Her right arm radiculopathy is about a 6/10. She complains of right C6 radiculopathy. She has bilateral arm and hand numbness in all 5 fingers. She does get some numbness in her feet as well. Her numbness is worse at night and when she wakes up in the morning.   She states since surgery that she fe • Extrinsic asthma, unspecified     • Fatigue     • Glucose intolerance (pre-diabetes)       diet control   • Hiatal hernia     • High blood pressure     • Hyperlipidemia     • IBS (irritable bowel syndrome)     • Irregular menstrual cycle     • Lipid scre family history includes ADD in her father; BRCA gene + in her sister; Breast Cancer (age of onset: 54) in her mother; Cancer in her paternal cousin female, sister, and sister; Diabetes in her father and paternal grandmother; Heart Disorder in her maternal Right         5        5         5          5 5 5 4 5 5-   Left         5        5         5-          5 5 5 4 5 4+      Lower extremity strength:      Iliopsoas  Hamstrings   Quads    D-flexion P-flexion Eversion   Right       5         5       5 MRI of the cervical spine from 2/2017 shows loss of cervical lordosis with kyphosis at C4-5 central stenosis at C4-5 with slight flattening of the anterior cord. C5-6 stenosis with deformity of the cord. C6-7 stenosis.   Contrary to the report which repor

## 2019-02-01 NOTE — TELEPHONE ENCOUNTER
Patient is scheduled today for follow up appointment     Future Appointments   Date Time Provider Tanisha Plunkett   2/1/2019  2:30 PM Rafael Griffin [facility] [place]

## 2019-02-01 NOTE — PROGRESS NOTES
Pt is here for post op for the cervical/ Imaging Xray of the cervical was obtained. Pt states that she still has soreness in the back of the neck. Pt is wearing her vista collar.      Pain scale: 7/10

## 2019-02-01 NOTE — PATIENT INSTRUCTIONS
Refill policies:    • Allow 2-3 business days for refills; controlled substances may take longer.   • Contact your pharmacy at least 5 days prior to running out of medication and have them send an electronic request or submit request through the “request re been approved by your insurer. Depending on your insurance carrier, approval may take 3-10 days. It is highly recommended patients contact their insurance carrier directly to determine coverage.   If test is done without insurance authorization, patient ma Follow-up in 1-2 weeks  4.   CT of the cervical spine

## 2019-02-05 ENCOUNTER — HOSPITAL ENCOUNTER (OUTPATIENT)
Dept: CT IMAGING | Facility: HOSPITAL | Age: 44
Discharge: HOME | End: 2019-02-05
Attending: PHYSICIAN ASSISTANT
Payer: COMMERCIAL

## 2019-02-05 DIAGNOSIS — M54.12 CERVICAL MYELOPATHY WITH CERVICAL RADICULOPATHY (HCC): ICD-10-CM

## 2019-02-05 DIAGNOSIS — R29.898 WEAKNESS OF BOTH LOWER EXTREMITIES: ICD-10-CM

## 2019-02-05 DIAGNOSIS — G95.9 CERVICAL MYELOPATHY WITH CERVICAL RADICULOPATHY (HCC): ICD-10-CM

## 2019-02-05 DIAGNOSIS — Z98.1 S/P CERVICAL SPINAL FUSION: ICD-10-CM

## 2019-02-05 DIAGNOSIS — R26.9 NEUROLOGIC GAIT DYSFUNCTION: ICD-10-CM

## 2019-02-05 DIAGNOSIS — R29.898 WEAKNESS OF BOTH UPPER EXTREMITIES: ICD-10-CM

## 2019-02-05 PROCEDURE — 72125 CT NECK SPINE W/O DYE: CPT | Performed by: PHYSICIAN ASSISTANT

## 2019-02-07 ENCOUNTER — TELEPHONE (OUTPATIENT)
Dept: SURGERY | Facility: CLINIC | Age: 44
End: 2019-02-07

## 2019-02-07 NOTE — TELEPHONE ENCOUNTER
Pt asking if MRI is needed, states she was advised by JOSH Mack that MRI may not be needed based on CT results, please call patient to discuss

## 2019-02-11 ENCOUNTER — HOSPITAL ENCOUNTER (OUTPATIENT)
Dept: MRI IMAGING | Facility: HOSPITAL | Age: 44
Discharge: HOME | End: 2019-02-11
Attending: PHYSICIAN ASSISTANT
Payer: COMMERCIAL

## 2019-02-11 DIAGNOSIS — R26.9 NEUROLOGIC GAIT DYSFUNCTION: ICD-10-CM

## 2019-02-11 DIAGNOSIS — Z98.1 S/P CERVICAL SPINAL FUSION: ICD-10-CM

## 2019-02-11 DIAGNOSIS — G95.9 CERVICAL MYELOPATHY WITH CERVICAL RADICULOPATHY (HCC): ICD-10-CM

## 2019-02-11 DIAGNOSIS — R29.898 WEAKNESS OF BOTH UPPER EXTREMITIES: ICD-10-CM

## 2019-02-11 DIAGNOSIS — R29.898 WEAKNESS OF BOTH LOWER EXTREMITIES: ICD-10-CM

## 2019-02-11 DIAGNOSIS — M54.12 CERVICAL MYELOPATHY WITH CERVICAL RADICULOPATHY (HCC): ICD-10-CM

## 2019-02-11 PROCEDURE — 72141 MRI NECK SPINE W/O DYE: CPT | Performed by: PHYSICIAN ASSISTANT

## 2019-02-15 ENCOUNTER — OFFICE VISIT (OUTPATIENT)
Dept: SURGERY | Facility: CLINIC | Age: 44
End: 2019-02-15

## 2019-02-15 DIAGNOSIS — R32 URINARY INCONTINENCE, UNSPECIFIED TYPE: ICD-10-CM

## 2019-02-15 DIAGNOSIS — R29.898 WEAKNESS OF BOTH LOWER EXTREMITIES: ICD-10-CM

## 2019-02-15 DIAGNOSIS — R33.9 URINARY RETENTION: ICD-10-CM

## 2019-02-15 DIAGNOSIS — M54.12 CERVICAL MYELOPATHY WITH CERVICAL RADICULOPATHY (HCC): Primary | ICD-10-CM

## 2019-02-15 DIAGNOSIS — R42 DIZZINESS: ICD-10-CM

## 2019-02-15 DIAGNOSIS — G95.9 CERVICAL MYELOPATHY WITH CERVICAL RADICULOPATHY (HCC): Primary | ICD-10-CM

## 2019-02-15 DIAGNOSIS — Z98.1 S/P CERVICAL SPINAL FUSION: ICD-10-CM

## 2019-02-15 DIAGNOSIS — R26.9 NEUROLOGIC GAIT DYSFUNCTION: ICD-10-CM

## 2019-02-15 DIAGNOSIS — R29.898 WEAKNESS OF BOTH UPPER EXTREMITIES: ICD-10-CM

## 2019-02-15 PROCEDURE — 99024 POSTOP FOLLOW-UP VISIT: CPT | Performed by: PHYSICIAN ASSISTANT

## 2019-02-15 RX ORDER — HYDROCODONE BITARTRATE AND ACETAMINOPHEN 10; 325 MG/1; MG/1
1 TABLET ORAL EVERY 4 HOURS PRN
Qty: 90 TABLET | Refills: 0 | Status: SHIPPED | OUTPATIENT
Start: 2019-02-15 | End: 2019-09-12

## 2019-02-15 NOTE — PROGRESS NOTES
[facility] Neurosurgery follow-up        HISTORY OF PRESENT Rosa Gramajo is a 44year old RH  female follow-up status post a C4-5 C5-6 ACDF 12/19/18 by Dr. Donald Collier. Here in follow-up.   She still states she is having some trouble starting urination sh here in follow-up for cervical myelopathy. She continues to have cervical pain which is an 8/10. She continues to have bilateral C5-C6 radiculopathy which is a 5-6/10. She continues with numbness and tingling in both hands.   She continues to have [employer] She complains of right C6 radiculopathy. She has bilateral arm and hand numbness in all 5 fingers. She does get some numbness in her feet as well. Her numbness is worse at night and when she wakes up in the morning.   She states since surgery that she fe • Extrinsic asthma, unspecified     • Fatigue     • Glucose intolerance (pre-diabetes)       diet control   • Hiatal hernia     • High blood pressure     • Hyperlipidemia     • IBS (irritable bowel syndrome)     • Irregular menstrual cycle     • Lipid scre family history includes ADD in her father; BRCA gene + in her sister; Breast Cancer (age of onset: 54) in her mother; Cancer in her paternal cousin female, sister, and sister; Diabetes in her father and paternal grandmother; Heart Disorder in her maternal Right         5        5         5          5 5 5 4 5 5-   Left         5        5         5-          5 5 5 4 5 4+      Lower extremity strength:      Iliopsoas  Hamstrings   Quads    D-flexion P-flexion Eversion   Right       5         5       5 CT of the abdomen shows mild spondylosis L1-L5. Moderate spondylosis at L5-S1 with an intact pars.   No significant stenosis     Chest x-ray shows a mild thoracic scoliosis     MRI of the cervical spine from 2/2017 shows loss of cervical lordosis with kyph

## 2019-02-15 NOTE — PATIENT INSTRUCTIONS
Refill policies:    • Allow 2-3 business days for refills; controlled substances may take longer.   • Contact your pharmacy at least 5 days prior to running out of medication and have them send an electronic request or submit request through the “request re been approved by your insurer. Depending on your insurance carrier, approval may take 3-10 days. It is highly recommended patients contact their insurance carrier directly to determine coverage.   If test is done without insurance authorization, patient ma Dr. Anabelle Laura  4. MRI of the brain, thoracic and lumbar spine.   If these are negative will consider a neurology consultation and possibly urology consultation

## 2019-02-20 ENCOUNTER — HOSPITAL ENCOUNTER (OUTPATIENT)
Dept: MRI IMAGING | Age: 44
Discharge: HOME | End: 2019-02-20
Attending: PHYSICIAN ASSISTANT
Payer: COMMERCIAL

## 2019-02-20 DIAGNOSIS — R32 URINARY INCONTINENCE, UNSPECIFIED TYPE: ICD-10-CM

## 2019-02-20 DIAGNOSIS — R42 DIZZINESS: ICD-10-CM

## 2019-02-20 DIAGNOSIS — R26.9 NEUROLOGIC GAIT DYSFUNCTION: ICD-10-CM

## 2019-02-20 DIAGNOSIS — R33.9 URINARY RETENTION: ICD-10-CM

## 2019-02-20 DIAGNOSIS — R29.898 WEAKNESS OF BOTH LOWER EXTREMITIES: ICD-10-CM

## 2019-02-20 DIAGNOSIS — R29.898 WEAKNESS OF BOTH UPPER EXTREMITIES: ICD-10-CM

## 2019-02-20 PROCEDURE — 72146 MRI CHEST SPINE W/O DYE: CPT | Performed by: PHYSICIAN ASSISTANT

## 2019-02-20 PROCEDURE — 70551 MRI BRAIN STEM W/O DYE: CPT | Performed by: PHYSICIAN ASSISTANT

## 2019-02-20 PROCEDURE — 72148 MRI LUMBAR SPINE W/O DYE: CPT | Performed by: PHYSICIAN ASSISTANT

## 2019-03-06 ENCOUNTER — TELEPHONE (OUTPATIENT)
Dept: FAMILY MEDICINE CLINIC | Facility: CLINIC | Age: 44
End: 2019-03-06

## 2019-03-06 NOTE — TELEPHONE ENCOUNTER
Fax recd from [facility] / Disease Management - patient was flagged as a diabetic. Diabetes-diet controlled is listed in her past medical history but she is not on any diabetes medication per fax    Please verify if diabetes is an active problem for patient.

## 2019-03-07 ENCOUNTER — OFFICE VISIT (OUTPATIENT)
Dept: SURGERY | Facility: CLINIC | Age: 44
End: 2019-03-07

## 2019-03-07 VITALS — DIASTOLIC BLOOD PRESSURE: 74 MMHG | SYSTOLIC BLOOD PRESSURE: 124 MMHG | HEART RATE: 80 BPM

## 2019-03-07 DIAGNOSIS — Z98.1 S/P CERVICAL SPINAL FUSION: Primary | ICD-10-CM

## 2019-03-07 DIAGNOSIS — R26.9 NEUROLOGIC GAIT DYSFUNCTION: ICD-10-CM

## 2019-03-07 DIAGNOSIS — M54.2 CERVICAL PAIN: ICD-10-CM

## 2019-03-07 DIAGNOSIS — R32 URINARY INCONTINENCE, UNSPECIFIED TYPE: ICD-10-CM

## 2019-03-07 PROCEDURE — 99024 POSTOP FOLLOW-UP VISIT: CPT | Performed by: PHYSICIAN ASSISTANT

## 2019-03-07 NOTE — PROGRESS NOTES
[facility] Neurosurgery follow-up        HISTORY OF PRESENT Bessie Gamez is a 44year old RH  female follow-up status post a C4-5 C5-6 ACDF 12/19/18 by Dr. Mirela Benz. Coming in to review imaging. Last hx:   Here in follow-up.   She still states she here in follow-up for cervical myelopathy. She continues to have cervical pain which is an 8/10. She continues to have bilateral C5-C6 radiculopathy which is a 5-6/10. She continues with numbness and tingling in both hands.   She continues to have [employer] She complains of right C6 radiculopathy. She has bilateral arm and hand numbness in all 5 fingers. She does get some numbness in her feet as well. Her numbness is worse at night and when she wakes up in the morning.   She states since surgery that she fe • Extrinsic asthma, unspecified     • Fatigue     • Glucose intolerance (pre-diabetes)       diet control   • Hiatal hernia     • High blood pressure     • Hyperlipidemia     • IBS (irritable bowel syndrome)     • Irregular menstrual cycle     • Lipid scre family history includes ADD in her father; BRCA gene + in her sister; Breast Cancer (age of onset: 54) in her mother; Cancer in her paternal cousin female, sister, and sister; Diabetes in her father and paternal grandmother; Heart Disorder in her maternal    Iliopsoas  Hamstrings   Quads    D-flexion P-flexion Eversion   Right       5         5       5         5 5 5   Left       5         5       5         5 5 5      Reflexes DTRs:      Biceps   Brachioradialis   Triceps    Patellar    Ankle  Hamstring   Ri CT of the abdomen shows mild spondylosis L1-L5. Moderate spondylosis at L5-S1 with an intact pars.   No significant stenosis     Chest x-ray shows a mild thoracic scoliosis     MRI of the cervical spine from 2/2017 shows loss of cervical lordosis with kyph

## 2019-03-07 NOTE — PATIENT INSTRUCTIONS
Refill policies:    • Allow 2-3 business days for refills; controlled substances may take longer.   • Contact your pharmacy at least 5 days prior to running out of medication and have them send an electronic request or submit request through the “request re been approved by your insurer. Depending on your insurance carrier, approval may take 3-10 days. It is highly recommended patients contact their insurance carrier directly to determine coverage.   If test is done without insurance authorization, patient ma XR CS next visit  4.  consider a neurology consultation and possibly urology consultation

## 2019-03-07 NOTE — PROGRESS NOTES
Pt is here for follow up for post imaging MRI of the brain thoracic and lumbar. Pt states that she is still the same since LOV. Pt states no new symptomes to be reported.

## 2019-03-08 NOTE — TELEPHONE ENCOUNTER
Violet Mann from [facility] is calling about on weather this pt is considered an active diabetic pt. There was paper work faxed over the other day. It was put in Dr Christopher Navarro. Once completed, it can be faxed over to [phone].     Any questions

## 2019-03-12 NOTE — TELEPHONE ENCOUNTER
Please sign/initial form stating diabetes is NOT an active problem for this pt.  Left in LE inbox, return to triage

## 2019-03-14 ENCOUNTER — TELEPHONE (OUTPATIENT)
Dept: SURGERY | Facility: CLINIC | Age: 44
End: 2019-03-14

## 2019-03-14 DIAGNOSIS — Z98.1 S/P CERVICAL SPINAL FUSION: Primary | ICD-10-CM

## 2019-03-14 DIAGNOSIS — R26.9 NEUROLOGIC GAIT DYSFUNCTION: ICD-10-CM

## 2019-03-19 DIAGNOSIS — E78.00 ELEVATED CHOLESTEROL: ICD-10-CM

## 2019-03-20 RX ORDER — LOVASTATIN 10 MG/1
TABLET ORAL
Qty: 30 TABLET | Refills: 0 | Status: SHIPPED | OUTPATIENT
Start: 2019-03-20 | End: 2019-04-01

## 2019-03-20 NOTE — TELEPHONE ENCOUNTER
Called patient, pt states she understands that she must follow up for controlled RX and get her lipid panel done. She was transferred to scheduling.

## 2019-03-20 NOTE — TELEPHONE ENCOUNTER
Rx Request  Lovastatin 10 mg    Disp:   30                 R: 0    Last Visit: 12/26/2018     Last Refilled: 12/19/2018

## 2019-03-21 ENCOUNTER — TELEPHONE (OUTPATIENT)
Dept: SURGERY | Facility: CLINIC | Age: 44
End: 2019-03-21

## 2019-03-21 RX ORDER — DIAZEPAM 10 MG/1
10 TABLET ORAL EVERY 8 HOURS PRN
Qty: 60 TABLET | Refills: 1 | Status: SHIPPED
Start: 2019-03-21 | End: 2019-06-10

## 2019-03-21 NOTE — TELEPHONE ENCOUNTER
Pt states can't get to the neurologist until May 5th & she shakes all over her body & the valium seemed to help. Can she get a refill until she gets in to see the neurologist? Or can he get her in sooner?

## 2019-03-21 NOTE — TELEPHONE ENCOUNTER
Patient has appointment scheduled with  5/2/19. Will see if diazepam can be refilled until neurology appointment.     Medication: Diazepam 10 mg    Date of last refill: 2/1/19  [place] 9[place]84    Last office vis

## 2019-03-22 ENCOUNTER — HOSPITAL ENCOUNTER (OUTPATIENT)
Dept: PHYSICAL THERAPY | Facility: HOSPITAL | Age: 44
Setting detail: THERAPIES SERIES
Discharge: HOME | End: 2019-03-22
Attending: PHYSICIAN ASSISTANT
Payer: COMMERCIAL

## 2019-03-22 DIAGNOSIS — R26.9 NEUROLOGIC GAIT DYSFUNCTION: ICD-10-CM

## 2019-03-22 DIAGNOSIS — Z98.1 S/P CERVICAL SPINAL FUSION: ICD-10-CM

## 2019-03-22 PROCEDURE — 97110 THERAPEUTIC EXERCISES: CPT

## 2019-03-22 PROCEDURE — 97163 PT EVAL HIGH COMPLEX 45 MIN: CPT

## 2019-03-22 NOTE — TELEPHONE ENCOUNTER
Faxed medication Diazepam 10 Mg to Pt's preferred [facility] [employer].      Fax Number: [phone]

## 2019-03-22 NOTE — PROGRESS NOTES
SPINE EVALUATION:   Referring Physician: Dr. Bailey Carlsile  Diagnosis: S/P cervical spinal fusion,  Neurologic gait dysfunction   Date of Service: 3/22/2019     PATIENT SUMMARY   Junie Mcghee is a 44year old female who presents to therapy today with comp include everything; she states that the constant pain and feeling of weakness/lacking endurance makes most activities and movements a chore for her. States that her legs often get shaky and weak, the more she does, the more she shakes.  She has fallen 3x si • Frequent UTI    • Headache disorder    • Heartburn 29 years ago   • Heavy menses    • Hiatal hernia    • High cholesterol    • Hyperlipidemia    • IBS (irritable bowel syndrome)    • Irregular bowel habits    • Irregular menstrual cycle    • Itch of sk balance deficits not in accordance with her age.  In agreement with FOTO score and clinical rationale, this evaluation involved High Complexity decision making due to 3+ personal factors/comorbidities, 4+ body structures involved/activity limitations, and e 3 sec, L 5 sec    FGA: to be assessed in the future     Today’s Treatment and Response: Good overall response to tx, patient happy to be working on returning to functional activities. No notable increase in pain following evaluation and assessment.    Alessio referral. Please co-sign or sign and return this letter via fax as soon as possible to [phone].  If you have any questions, please contact me at Dept: [phone]    Sincerely,  Electronically signed by therapist: Corbin Camarillo PT, DPT    Physician's

## 2019-04-01 ENCOUNTER — OFFICE VISIT (OUTPATIENT)
Dept: FAMILY MEDICINE CLINIC | Facility: CLINIC | Age: 44
End: 2019-04-01

## 2019-04-01 ENCOUNTER — HOSPITAL ENCOUNTER (OUTPATIENT)
Dept: PHYSICAL THERAPY | Facility: HOSPITAL | Age: 44
Setting detail: THERAPIES SERIES
Discharge: HOME | End: 2019-04-01
Attending: PHYSICIAN ASSISTANT
Payer: COMMERCIAL

## 2019-04-01 DIAGNOSIS — Z00.00 GENERAL MEDICAL EXAM: Primary | ICD-10-CM

## 2019-04-01 DIAGNOSIS — E78.00 ELEVATED CHOLESTEROL: ICD-10-CM

## 2019-04-01 DIAGNOSIS — Z12.31 ENCOUNTER FOR SCREENING MAMMOGRAM FOR HIGH-RISK PATIENT: ICD-10-CM

## 2019-04-01 DIAGNOSIS — F41.9 ANXIETY: ICD-10-CM

## 2019-04-01 DIAGNOSIS — J45.20 MILD INTERMITTENT ASTHMA WITHOUT COMPLICATION: ICD-10-CM

## 2019-04-01 DIAGNOSIS — F90.8 ATTENTION DEFICIT HYPERACTIVITY DISORDER (ADHD), OTHER TYPE: ICD-10-CM

## 2019-04-01 DIAGNOSIS — G43.109 MIGRAINE WITH AURA AND WITHOUT STATUS MIGRAINOSUS, NOT INTRACTABLE: ICD-10-CM

## 2019-04-01 DIAGNOSIS — F32.0 CURRENT MILD EPISODE OF MAJOR DEPRESSIVE DISORDER, UNSPECIFIED WHETHER RECURRENT (HCC): ICD-10-CM

## 2019-04-01 PROCEDURE — 97110 THERAPEUTIC EXERCISES: CPT

## 2019-04-01 PROCEDURE — 99214 OFFICE O/P EST MOD 30 MIN: CPT | Performed by: FAMILY MEDICINE

## 2019-04-01 PROCEDURE — 97140 MANUAL THERAPY 1/> REGIONS: CPT

## 2019-04-01 RX ORDER — TOPIRAMATE 25 MG/1
50 TABLET ORAL 2 TIMES DAILY
Qty: 60 TABLET | Refills: 5 | Status: SHIPPED | OUTPATIENT
Start: 2019-04-01 | End: 2019-05-02

## 2019-04-01 RX ORDER — CITALOPRAM 20 MG/1
20 TABLET ORAL SEE ADMIN INSTRUCTIONS
Qty: 30 TABLET | Refills: 5 | Status: SHIPPED | OUTPATIENT
Start: 2019-04-01 | End: 2019-09-12

## 2019-04-01 RX ORDER — METHYLPHENIDATE HYDROCHLORIDE 20 MG/1
20 TABLET ORAL 2 TIMES DAILY
Qty: 60 TABLET | Refills: 0 | Status: SHIPPED | OUTPATIENT
Start: 2019-04-01 | End: 2019-06-26

## 2019-04-01 RX ORDER — ALPRAZOLAM 0.25 MG/1
0.25 TABLET ORAL 2 TIMES DAILY PRN
Qty: 30 TABLET | Refills: 0 | Status: SHIPPED | OUTPATIENT
Start: 2019-04-01 | End: 2019-07-12

## 2019-04-01 RX ORDER — LOVASTATIN 10 MG/1
TABLET ORAL
Qty: 30 TABLET | Refills: 5 | Status: SHIPPED | OUTPATIENT
Start: 2019-04-01 | End: 2019-04-23 | Stop reason: ALTCHOICE

## 2019-04-01 RX ORDER — CITALOPRAM 40 MG/1
40 TABLET ORAL SEE ADMIN INSTRUCTIONS
Qty: 30 TABLET | Refills: 5 | Status: SHIPPED | OUTPATIENT
Start: 2019-04-01 | End: 2019-09-12

## 2019-04-01 RX ORDER — BUPROPION HYDROCHLORIDE 150 MG/1
450 TABLET ORAL DAILY
Qty: 90 TABLET | Refills: 0 | Status: SHIPPED | OUTPATIENT
Start: 2019-04-01 | End: 2019-06-26

## 2019-04-01 NOTE — PROGRESS NOTES
Dx: S/P cervical spinal fusion, Neurologic gait dysfunction           Authorized # of Visits:  10 requested         Next MD visit: none scheduled  Fall Risk: Moderate         Precautions: Fall Risk, no lifting greater than 10#, cervical mobility within goyo TX#: 5/ Date:               TX#: 6/ Date:               TX#: 7/ Date:               TX#: 8/   UBE L1, 2 min fwd/2 min retro          Supine cervical AAROM rotation within available range 3 x 5 R/L         YTB rows 2 x 10         YTB SONYA ext 2

## 2019-04-01 NOTE — PROGRESS NOTES
HPI:   Kaylene Quinonez is a 44year old female here to discuss cervical spine issues, BP, ADD, mood and elevated cholesterol.     Pt has radicular neck issues- s/p surgery ; pt has another bulging disc  ; in PT  Some weakness  Balance is off   Some dizzine HYDROcodone-acetaminophen  MG Oral Tab Take 1 tablet by mouth every 4 (four) hours as needed for Pain. Disp: 90 tablet Rfl: 0   predniSONE 10 MG Oral Tab Take 1 tablet (10 mg total) by mouth daily.  40 mg per day x 4 days, 30 mg per day x 3 days, 20 Acute bronchitis    • Acute esophagitis    • Acute upper respiratory infections of unspecified site    • Anxiety state, unspecified    • Asthma    • Attention deficit disorder without mention of hyperactivity    • Back pain    • Back problem     cervical a conditions classified elsewhere and of unspecified site    • Visual impairment     glasses   • Vomiting 1week   • Wears glasses    • Weight loss       Past Surgical History:   Procedure Laterality Date   • ABDOMINAL HYSTERECTOMY, BSO Bilateral 7/20/2015 • Breast Cancer Mother 54   • Other (Other[other]) Mother    • Diabetes Mother    • Stroke Mother    • Heart Disorder Maternal Grandmother         CHF   • Stroke Maternal Grandfather    • Diabetes Paternal Grandmother    • Cancer Sister         uterine year old female here with  1. General medical exam    - FREE T4 (FREE THYROXINE); Future  - ASSAY, THYROID STIM HORMONE; Future  - LIPID PANEL; Future  - CBC WITH DIFFERENTIAL WITH PLATELET;  Future  - VITAMIN B12; Future  - VITAMIN D, 25-HYDROXY; Future  - tablet; Refill: 5  - Citalopram Hydrobromide 40 MG Oral Tab; Take 1 tablet (40 mg total) by mouth See Admin Instructions. Takes with Citalopram Hydrobromide 20 MG . Total dose of 60mg daily. Dispense: 30 tablet;  Refill: 5  - buPROPion HCl ER, XL, 150 MG O

## 2019-04-02 VITALS
HEIGHT: 65 IN | TEMPERATURE: 98 F | BODY MASS INDEX: 25.99 KG/M2 | RESPIRATION RATE: 18 BRPM | HEART RATE: 93 BPM | WEIGHT: 156 LBS | SYSTOLIC BLOOD PRESSURE: 116 MMHG | DIASTOLIC BLOOD PRESSURE: 70 MMHG | OXYGEN SATURATION: 98 %

## 2019-04-03 ENCOUNTER — HOSPITAL ENCOUNTER (OUTPATIENT)
Dept: PHYSICAL THERAPY | Facility: HOSPITAL | Age: 44
Setting detail: THERAPIES SERIES
Discharge: HOME | End: 2019-04-03
Attending: PHYSICIAN ASSISTANT
Payer: COMMERCIAL

## 2019-04-03 PROCEDURE — 97140 MANUAL THERAPY 1/> REGIONS: CPT

## 2019-04-03 PROCEDURE — 97110 THERAPEUTIC EXERCISES: CPT

## 2019-04-03 NOTE — PROGRESS NOTES
Dx: S/P cervical spinal fusion, Neurologic gait dysfunction           Authorized # of Visits:  10 requested         Next MD visit: none scheduled  Fall Risk: Moderate         Precautions: Fall Risk, no lifting greater than 10#, cervical mobility within goyo TX#: 3/10   Date:               TX#: 4/ Date:               TX#: 5/ Date:               TX#: 6/ Date:               TX#: 7/ Date:               TX#: 8/   UBE L1, 2 min fwd/2 min retro  UBE L1 x 4 min fwd         Supine cervical AAROM rotation within availa

## 2019-04-08 ENCOUNTER — HOSPITAL ENCOUNTER (OUTPATIENT)
Dept: PHYSICAL THERAPY | Facility: HOSPITAL | Age: 44
Setting detail: THERAPIES SERIES
Discharge: HOME | End: 2019-04-08
Attending: PHYSICIAN ASSISTANT
Payer: COMMERCIAL

## 2019-04-08 PROCEDURE — 97110 THERAPEUTIC EXERCISES: CPT

## 2019-04-08 PROCEDURE — 97140 MANUAL THERAPY 1/> REGIONS: CPT

## 2019-04-08 NOTE — PROGRESS NOTES
Dx: S/P cervical spinal fusion, Neurologic gait dysfunction           Authorized # of Visits:  10 requested         Next MD visit: none scheduled  Fall Risk: Moderate         Precautions: Fall Risk, no lifting greater than 10#, cervical mobility within goyo to maintain progress made in PT. - issued and progressed    Plan: Continue PT with progression as indicated, UE and scap strengthening. Follow-up deep neck flexor strengthening.    Date: 4/1/2019 TX#: 2/10 Date: 4/3/19      TX#: 3/10   Date: 4/8/19     TX#: HEP as listed below. HEP: waking program, cervical rotation AROM (non-painful available only), feet together EC, yellow putty squeezes/manipulation, RTB rows, RTB SONYA ext, chin tuck.      Charges: Manual x 1, Therex x 2       Total Timed Treatment: 45 min

## 2019-04-14 DIAGNOSIS — K58.9 IRRITABLE BOWEL SYNDROME WITHOUT DIARRHEA: ICD-10-CM

## 2019-04-15 ENCOUNTER — HOSPITAL ENCOUNTER (OUTPATIENT)
Dept: PHYSICAL THERAPY | Facility: HOSPITAL | Age: 44
Setting detail: THERAPIES SERIES
Discharge: HOME | End: 2019-04-15
Attending: PHYSICIAN ASSISTANT
Payer: COMMERCIAL

## 2019-04-15 PROCEDURE — 97110 THERAPEUTIC EXERCISES: CPT

## 2019-04-15 PROCEDURE — 97140 MANUAL THERAPY 1/> REGIONS: CPT

## 2019-04-15 RX ORDER — DICYCLOMINE HCL 20 MG
TABLET ORAL
Qty: 120 TABLET | Refills: 0 | Status: SHIPPED | OUTPATIENT
Start: 2019-04-15 | End: 2019-07-02

## 2019-04-15 NOTE — PROGRESS NOTES
Dx: S/P cervical spinal fusion, Neurologic gait dysfunction           Authorized # of Visits:  10 requested         Next MD visit: none scheduled  Fall Risk: Moderate         Precautions: Fall Risk, no lifting greater than 10#, cervical mobility within goyo when driving. - progress  2. Patient will improve BUE strength to at least 4+/5 to improve ease of ADLs and performing housework like loading groceries into cabinets. - progress  3.  Patient will improve  strength to at least 70# SONYA to improve ability cervical retraction with lift 2 x 5      DKTC 3 x 20s Airex  - DLS feet together, passing 2# ball behind to therapist with cervical rotation  - feet together EC balance, intermittent CGA 3 x 30s Airex  - DLS feet together, passing 2# ball behind to therapi

## 2019-04-17 ENCOUNTER — HOSPITAL ENCOUNTER (OUTPATIENT)
Dept: PHYSICAL THERAPY | Facility: HOSPITAL | Age: 44
Setting detail: THERAPIES SERIES
Discharge: HOME | End: 2019-04-17
Attending: PHYSICIAN ASSISTANT
Payer: COMMERCIAL

## 2019-04-17 PROCEDURE — 97110 THERAPEUTIC EXERCISES: CPT

## 2019-04-17 PROCEDURE — 97140 MANUAL THERAPY 1/> REGIONS: CPT

## 2019-04-17 NOTE — PROGRESS NOTES
Dx: S/P cervical spinal fusion, Neurologic gait dysfunction           Authorized # of Visits:  10 requested         Next MD visit: none scheduled  Fall Risk: Moderate         Precautions: Fall Risk, no lifting greater than 10#, cervical mobility within goyo a coffee mug and perform fine motor tasks. - progress  4. Patient will score at least 26/30 on the FGA to be considered at dec risk for falls at home and in the community. - progress  5.  Patient will be independent and compliant in HEP to maintain progress retraction x 10  - supine cervical retraction with lift 2 x 5 - 12# ball SONYA bicep curl x 10, pronated curl x 10  - supine cervical retraction x 10  - supine cervical retraction with lift x 10     DKTC 3 x 20s Airex  - DLS feet together, passing 2# ball be min  Total Treatment Time: 45 min

## 2019-04-22 ENCOUNTER — TELEPHONE (OUTPATIENT)
Dept: FAMILY MEDICINE CLINIC | Facility: CLINIC | Age: 44
End: 2019-04-22

## 2019-04-22 DIAGNOSIS — R32 URINARY INCONTINENCE, UNSPECIFIED TYPE: Primary | ICD-10-CM

## 2019-04-22 NOTE — TELEPHONE ENCOUNTER
Pt was in on 4/1/19 and was told to call and remind Dr Ramiro Dorman to look for the 3rd immune test by Dr Stefany Gutierrez. Dr Mirela Ocampo did take copy of the script. She wasn't sure if she needed to call lab .  Pt also had a cervical spine infusion Pt is having trouble going

## 2019-04-22 NOTE — TELEPHONE ENCOUNTER
Spoke to patient - she states you took a copy of labs needed from Dr. Gold Ellington that patient had brought in. 2. She had infusion by Dr. Ghassan Paige and contacted them about urine issue and they referred her to a Urologist but she can't remember who.  Dr. Ghassan Paige office

## 2019-04-22 NOTE — TELEPHONE ENCOUNTER
Pt states Dr Vincent Salcido the [facility] requested a lab and she was supposed to remind Dr Yoly Tam about it.     Second problem - pt's urinary problems have been worsening, she has trouble starting and 10/10 pain for approx 30 minutes pos

## 2019-04-23 ENCOUNTER — OFFICE VISIT (OUTPATIENT)
Dept: SURGERY | Facility: CLINIC | Age: 44
End: 2019-04-23

## 2019-04-23 ENCOUNTER — HOSPITAL ENCOUNTER (OUTPATIENT)
Dept: GENERAL RADIOLOGY | Facility: HOSPITAL | Age: 44
Discharge: HOME | End: 2019-04-23
Attending: PHYSICIAN ASSISTANT
Payer: COMMERCIAL

## 2019-04-23 VITALS — SYSTOLIC BLOOD PRESSURE: 105 MMHG | DIASTOLIC BLOOD PRESSURE: 68 MMHG | HEART RATE: 78 BPM

## 2019-04-23 DIAGNOSIS — M54.2 CERVICAL PAIN: ICD-10-CM

## 2019-04-23 DIAGNOSIS — R32 URINARY INCONTINENCE, UNSPECIFIED TYPE: ICD-10-CM

## 2019-04-23 DIAGNOSIS — Z98.1 S/P CERVICAL SPINAL FUSION: ICD-10-CM

## 2019-04-23 DIAGNOSIS — R26.9 NEUROLOGIC GAIT DYSFUNCTION: ICD-10-CM

## 2019-04-23 DIAGNOSIS — M54.2 NECK PAIN: ICD-10-CM

## 2019-04-23 DIAGNOSIS — Z98.1 S/P CERVICAL SPINAL FUSION: Primary | ICD-10-CM

## 2019-04-23 PROCEDURE — 72050 X-RAY EXAM NECK SPINE 4/5VWS: CPT | Performed by: PHYSICIAN ASSISTANT

## 2019-04-23 PROCEDURE — 99213 OFFICE O/P EST LOW 20 MIN: CPT | Performed by: PHYSICIAN ASSISTANT

## 2019-04-23 RX ORDER — DIAZEPAM 10 MG/1
10 TABLET ORAL EVERY 8 HOURS PRN
Qty: 30 TABLET | Refills: 0 | Status: SHIPPED | OUTPATIENT
Start: 2019-04-23 | End: 2019-05-02

## 2019-04-23 NOTE — PROGRESS NOTES
PT here for f/u. Left side of neck very painful over the last few days. PT twice daily, unable to go this morning, could not feel left leg, also had tingling. Feeling off balance, PT stated balance is off. Legs shake when going down stairs.   HA since x

## 2019-04-25 ENCOUNTER — TELEPHONE (OUTPATIENT)
Dept: PHYSICAL THERAPY | Facility: HOSPITAL | Age: 44
End: 2019-04-25

## 2019-04-25 ENCOUNTER — APPOINTMENT (OUTPATIENT)
Dept: PHYSICAL THERAPY | Facility: HOSPITAL | Age: 44
End: 2019-04-25
Attending: PHYSICIAN ASSISTANT
Payer: COMMERCIAL

## 2019-04-25 NOTE — TELEPHONE ENCOUNTER
Patient LVM to cancel PT for today due to LE symptoms and bed rest.     Called patient back at 10:00 to follow-up in regards to symptoms and plans for PT. LVM as patient did not answer. Requested return call at earliest convenience.

## 2019-04-29 ENCOUNTER — TELEPHONE (OUTPATIENT)
Dept: SURGERY | Facility: CLINIC | Age: 44
End: 2019-04-29

## 2019-04-29 DIAGNOSIS — M54.2 NECK PAIN: Primary | ICD-10-CM

## 2019-04-29 DIAGNOSIS — M54.12 CERVICAL RADICULOPATHY: ICD-10-CM

## 2019-04-29 DIAGNOSIS — R51.9 WORSENING HEADACHES: ICD-10-CM

## 2019-04-29 DIAGNOSIS — R32 URINARY INCONTINENCE, UNSPECIFIED TYPE: ICD-10-CM

## 2019-04-29 DIAGNOSIS — Z98.890 HX OF CERVICAL SPINE SURGERY: ICD-10-CM

## 2019-04-29 DIAGNOSIS — R26.9 NEUROLOGIC GAIT DYSFUNCTION: ICD-10-CM

## 2019-04-29 DIAGNOSIS — M50.30 DDD (DEGENERATIVE DISC DISEASE), CERVICAL: ICD-10-CM

## 2019-04-29 NOTE — TELEPHONE ENCOUNTER
Maryjo refd patient to pain management and Dr. Bhupendra Mosley per Dr. Glory Quijano. Needs referrals placed for both.

## 2019-04-30 NOTE — TELEPHONE ENCOUNTER
Patient called to clarify request for referrals. Patient has been seen prior by Dr. Jalil Torres with pain management. Patient states due to insurance, needs a new referral.  Referral placed. Patient is also requesting a referral to neurology.   States that she

## 2019-05-01 NOTE — TELEPHONE ENCOUNTER
BENJY Mitchell 2 Nurse 14 hours ago (6:31 PM)      Referrals placed, see TE. Patient aware. Anything else we need to do?  Thanks :)    Routing comment

## 2019-05-02 ENCOUNTER — OFFICE VISIT (OUTPATIENT)
Dept: NEUROLOGY | Facility: CLINIC | Age: 44
End: 2019-05-02

## 2019-05-02 VITALS
SYSTOLIC BLOOD PRESSURE: 128 MMHG | BODY MASS INDEX: 26 KG/M2 | RESPIRATION RATE: 18 BRPM | DIASTOLIC BLOOD PRESSURE: 72 MMHG | WEIGHT: 156 LBS | HEART RATE: 70 BPM

## 2019-05-02 DIAGNOSIS — G43.109 MIGRAINE WITH AURA AND WITHOUT STATUS MIGRAINOSUS, NOT INTRACTABLE: Primary | ICD-10-CM

## 2019-05-02 PROCEDURE — 99204 OFFICE O/P NEW MOD 45 MIN: CPT | Performed by: OTHER

## 2019-05-02 RX ORDER — TOPIRAMATE 25 MG/1
75 TABLET ORAL 2 TIMES DAILY
Qty: 180 TABLET | Refills: 1 | Status: SHIPPED | OUTPATIENT
Start: 2019-05-02 | End: 2019-07-12

## 2019-05-02 NOTE — PROGRESS NOTES
The patient has blurry vision, headaches and tremors in the lower extremities. The patient denies any falling. The patient states when turning her neck she felt a sharp pain and numbness on the left side of her body.  The patient states she has on and off n

## 2019-05-02 NOTE — H&P
Neurology H&P    Osmin Loaiza Patient Status:  No patient class for patient encounter    1975 MRN [id number]   Location [facility], [address] [place] [address] Drive, [address] [place] [address] Attending No att. providers found   Hosp Day # 0 PCP DO Freda Parra AT NIGHT AS NEEDED Disp: 120 tablet Rfl: 0   topiramate 25 MG Oral Tab Take 2 tablets (50 mg total) by mouth 2 (two) times daily. Disp: 60 tablet Rfl: 5   Citalopram Hydrobromide 20 MG Oral Tab Take 1 tablet (20 mg total) by mouth See Admin Instructions.  Fabiola Vernon 10 mg by mouth nightly. Disp:  Rfl:    Calcium Carbonate-Vitamin D (CALCIUM 600 + D OR) Take 1 tablet by mouth 2 (two) times daily. Disp:  Rfl:    hydrALAzine HCl 10 MG Oral Tab Take 10 mg by mouth as needed.    Disp:  Rfl: 0   Montelukast Sodium (ANITHA Bad breath     Started when stomach pain got worse   • Bloating 2weeks   • Cauda equina syndrome without mention of neurogenic bladder    • Constipation Occasionaly   • Decorative tattoo    • Depression    • [facility]     Diet controlled; no meds   • Performed by Elaine Gonzalez MD at [address]   • ANTERIOR CERVICAL FUSION BG & INST 2 LEVEL N/A 12/19/2018    Performed by Adolph Howard MD at [facility] MAIN OR   • APPENDECTOMY  1991   • CERVICAL EPIDURAL STEROID INJECTION N/A 6/27/2018    Performed by Pe Hypertension Father    • Colon Polyps Father    • Breast Cancer Mother 54   • Other (Other[other]) Mother    • Diabetes Mother    • Stroke Mother    • Heart Disorder Maternal Grandmother         CHF   • Stroke Maternal Grandfather    • Diabetes Paternal Gr fasciculations, normal strength throughout in UEs and LEs     SENSORY EXAMINATION:  UE: intact to light touch, pinprick intact  LE: intact to light touch, pinprick intact    COORDINATION:  No dysmetria, or intention tremors    REFLEXES: 2+ at biceps, 2+ tr these findings would include migraine headaches, postinfectious/postinflammatory changes, nonspecific gliosis,   and accelerated microvascular ischemic disease amongst various other etiologies. Clinical correlation is recommended. Assessment:   This is

## 2019-05-07 ENCOUNTER — TELEPHONE (OUTPATIENT)
Dept: PAIN CLINIC | Facility: CLINIC | Age: 44
End: 2019-05-07

## 2019-05-07 ENCOUNTER — OFFICE VISIT (OUTPATIENT)
Dept: PAIN CLINIC | Facility: CLINIC | Age: 44
End: 2019-05-07

## 2019-05-07 VITALS
HEIGHT: 65 IN | SYSTOLIC BLOOD PRESSURE: 106 MMHG | HEART RATE: 82 BPM | BODY MASS INDEX: 25.99 KG/M2 | WEIGHT: 156 LBS | OXYGEN SATURATION: 99 % | DIASTOLIC BLOOD PRESSURE: 78 MMHG

## 2019-05-07 DIAGNOSIS — M54.81 OCCIPITAL NEURALGIA OF LEFT SIDE: ICD-10-CM

## 2019-05-07 DIAGNOSIS — M47.812 FACET ARTHROPATHY, CERVICAL: Primary | ICD-10-CM

## 2019-05-07 PROCEDURE — 99214 OFFICE O/P EST MOD 30 MIN: CPT | Performed by: NURSE PRACTITIONER

## 2019-05-07 NOTE — TELEPHONE ENCOUNTER
Created [facility] referral [id number]  Cervical facet [id number]  Uploaded clinical notes  Case pending

## 2019-05-07 NOTE — PROGRESS NOTES
Name: Rosa Isela Solorzano   : 1975   DOS: 2019     Pain Clinic Follow Up Visit:   Rosa Isela Solorzano is a 44year old female who presents for recheck of her chronic neck pain. Patient is s/p ACDF on 19 by Dr. Ulices Buckley.  Today she is c/o pain to the mg total) by mouth 2 (two) times daily. Disp: 60 tablet Rfl: 0   buPROPion HCl ER, XL, 150 MG Oral Tablet 24 Hr Take 3 tablets (450 mg total) by mouth daily.  Disp: 90 tablet Rfl: 0   diazepam 10 MG Oral Tab Take 1 tablet (10 mg total) by mouth every 8 (eig BMI 25.96 kg/m²   General: Michelle Plunkett IS A 44year old female in no acute distress. Neurologic: Alert, oriented, articulate. Normal gait. Psychiatric: Normal mood. Neck: ++ tenderness to the left cervical facet area with palpation.  ++ tendernes

## 2019-05-07 NOTE — PROGRESS NOTES
Spoke with patient regarding injection scheduling process. Reviewed pre-op instructions. Patient verbalized understanding, no further questions at this time. Pre-op instructions given to patient in room.

## 2019-05-07 NOTE — PATIENT INSTRUCTIONS
Refill policies:    • Allow 2-3 business days for refills; controlled substances may take longer.   • Contact your pharmacy at least 5 days prior to running out of medication and have them send an electronic request or submit request through the “request re been approved by your insurer. Depending on your insurance carrier, approval may take 3-10 days. It is highly recommended patients contact their insurance carrier directly to determine coverage.   If test is done without insurance authorization, patient ma TO AVOID CANCELLATION AND/OR RESCHEDULING: PLEASE CALL [facility] PRE-PROCEDURE LINE -[phone] FOR DETAILED INSTRUCTIONS FIVE TO SEVEN DAYS PRIOR TO PROCEDURE**        Prior to the procedure:  Please update us prior to the procedure if you are experiencing medications:  • Aggrenox 10 days   • Agrylin (Anagrelide) 10 days   • Enbrel (Etanercept) 24 hours   • Fragmin (Dalteparin) 24 hours   • Pletal (Cilostazol) 7 days  • Effient (Prasugrel) 7 days  • Pradaxa 10 days  • Trental 7 days  • Eliquis (Apixaban) 3 d as you may require some medication adjustment. It is normal to have increased pain at injection site for up to 3-5 days after procedure, you can        use heat or ice (20 minutes on 20 minutes off) for comfort.     ** TO AVOID CANCELLATION AND/OR RE numbed, placing the injection needle often feels like more of a strong pressure and pinching, often not as sharp pain. Will I be \"put out\" for the facet joint injection? No, this procedure is done with a local anesthetic.  Some patients choose to recei recommended. Can I have more than three facet joint injections? Most patients do not receive more than three injections in a six-month period. This is because the effect of the medication injected frequently lasts for six months or more.  If three inject

## 2019-05-07 NOTE — TELEPHONE ENCOUNTER
Medical clearance needed- no     Pt seen in OV today by NIYAH Drake and recommended for Facet injection (X 1). Please begin PA process for procedure(s).      Laterality: Left  Level(s): c3-c7    Pt informed callback will be given when PA feedback re

## 2019-05-13 NOTE — TELEPHONE ENCOUNTER
Patient returned call, patient scheduled for Facet injection. Pre-procedure instructions reviewed, including fasting and ride requirements. Patient confirms that she has not taken any Fish Oil this week, advised to remain off.  Patient verbalized understand ? Please bring your Insurance Card, Photo ID, List of Current Medications and Referral (if applicable) to your appointment. Check in at [facility] ([address] [facility]. [address] [place] [address] [place], [place]) outpatient registration in the [employer].   ? Please note-No p Please contact your insurance carrier to determine what your financial  responsibility will be for the procedure(s).     Cancellation/Rescheduling Appointment:   In the event you need to cancel or reschedule your appointment, you must notify the office 24

## 2019-05-13 NOTE — TELEPHONE ENCOUNTER
Perry Corcoran from [facility] she states if providers have same tax id referral is still good and okay.

## 2019-05-13 NOTE — TELEPHONE ENCOUNTER
Prior authorization request completed for: FACET   Authorization #[id number]     Authorization dates: 05/07/19 thru 08/05/19  CPT codes approved: 34366,58007,64984  Number of visits/dates of service approved: 1  Physician: Justo Vail     Patient can be brandon

## 2019-05-15 ENCOUNTER — APPOINTMENT (OUTPATIENT)
Dept: GENERAL RADIOLOGY | Facility: HOSPITAL | Age: 44
End: 2019-05-15
Attending: ANESTHESIOLOGY
Payer: COMMERCIAL

## 2019-05-15 ENCOUNTER — HOSPITAL ENCOUNTER (OUTPATIENT)
Facility: HOSPITAL | Age: 44
Setting detail: HOSPITAL OUTPATIENT SURGERY
Discharge: HOME | End: 2019-05-15
Attending: ANESTHESIOLOGY | Admitting: ANESTHESIOLOGY
Payer: COMMERCIAL

## 2019-05-15 VITALS
DIASTOLIC BLOOD PRESSURE: 82 MMHG | SYSTOLIC BLOOD PRESSURE: 110 MMHG | OXYGEN SATURATION: 96 % | TEMPERATURE: 98 F | RESPIRATION RATE: 14 BRPM | HEART RATE: 73 BPM

## 2019-05-15 DIAGNOSIS — M47.812 FACET ARTHROPATHY, CERVICAL: ICD-10-CM

## 2019-05-15 PROCEDURE — 82962 GLUCOSE BLOOD TEST: CPT

## 2019-05-15 PROCEDURE — 3E0U33Z INTRODUCTION OF ANTI-INFLAMMATORY INTO JOINTS, PERCUTANEOUS APPROACH: ICD-10-PCS | Performed by: ANESTHESIOLOGY

## 2019-05-15 PROCEDURE — 99152 MOD SED SAME PHYS/QHP 5/>YRS: CPT | Performed by: ANESTHESIOLOGY

## 2019-05-15 PROCEDURE — 3E0U3BZ INTRODUCTION OF ANESTHETIC AGENT INTO JOINTS, PERCUTANEOUS APPROACH: ICD-10-PCS | Performed by: ANESTHESIOLOGY

## 2019-05-15 RX ORDER — DEXAMETHASONE SODIUM PHOSPHATE 10 MG/ML
INJECTION, SOLUTION INTRAMUSCULAR; INTRAVENOUS AS NEEDED
Status: DISCONTINUED | OUTPATIENT
Start: 2019-05-15 | End: 2019-05-15

## 2019-05-15 RX ORDER — SODIUM CHLORIDE, SODIUM LACTATE, POTASSIUM CHLORIDE, CALCIUM CHLORIDE 600; 310; 30; 20 MG/100ML; MG/100ML; MG/100ML; MG/100ML
100 INJECTION, SOLUTION INTRAVENOUS CONTINUOUS
Status: DISCONTINUED | OUTPATIENT
Start: 2019-05-15 | End: 2019-05-15

## 2019-05-15 RX ORDER — INSULIN ASPART 100 [IU]/ML
3 INJECTION, SOLUTION INTRAVENOUS; SUBCUTANEOUS ONCE
Status: DISCONTINUED | OUTPATIENT
Start: 2019-05-15 | End: 2019-05-15

## 2019-05-15 RX ORDER — MIDAZOLAM HYDROCHLORIDE 1 MG/ML
INJECTION INTRAMUSCULAR; INTRAVENOUS AS NEEDED
Status: DISCONTINUED | OUTPATIENT
Start: 2019-05-15 | End: 2019-05-15

## 2019-05-15 RX ORDER — DEXTROSE MONOHYDRATE 25 G/50ML
50 INJECTION, SOLUTION INTRAVENOUS
Status: DISCONTINUED | OUTPATIENT
Start: 2019-05-15 | End: 2019-05-15

## 2019-05-15 RX ORDER — LIDOCAINE HYDROCHLORIDE 10 MG/ML
INJECTION, SOLUTION EPIDURAL; INFILTRATION; INTRACAUDAL; PERINEURAL AS NEEDED
Status: DISCONTINUED | OUTPATIENT
Start: 2019-05-15 | End: 2019-05-15

## 2019-05-15 RX ORDER — ONDANSETRON 2 MG/ML
4 INJECTION INTRAMUSCULAR; INTRAVENOUS ONCE AS NEEDED
Status: COMPLETED | OUTPATIENT
Start: 2019-05-15 | End: 2019-05-15

## 2019-05-15 RX ORDER — ONDANSETRON 2 MG/ML
INJECTION INTRAMUSCULAR; INTRAVENOUS
Status: COMPLETED
Start: 2019-05-15 | End: 2019-05-15

## 2019-05-15 RX ORDER — 0.9 % SODIUM CHLORIDE 0.9 %
VIAL (ML) INJECTION AS NEEDED
Status: DISCONTINUED | OUTPATIENT
Start: 2019-05-15 | End: 2019-05-15

## 2019-05-15 RX ORDER — ONDANSETRON 2 MG/ML
4 INJECTION INTRAMUSCULAR; INTRAVENOUS ONCE AS NEEDED
Status: ACTIVE | OUTPATIENT
Start: 2019-05-15 | End: 2019-05-15

## 2019-05-15 RX ORDER — DIPHENHYDRAMINE HYDROCHLORIDE 50 MG/ML
50 INJECTION INTRAMUSCULAR; INTRAVENOUS ONCE AS NEEDED
Status: ACTIVE | OUTPATIENT
Start: 2019-05-15 | End: 2019-05-15

## 2019-05-15 NOTE — H&P
History & Physical Examination    Patient Name: Randy Bernstein  MRN: [id number]  CSN: [id number]  YOB: 1975    Pre-Operative Diagnosis:  Facet arthropathy, cervical [M47.812]    Present Illness: Patient with cervical facet arthritis here for tablet (10 mg total) by mouth 3 (three) times daily as needed for Muscle spasms.  Disp: 60 tablet Rfl: 1 Taking   FLUTICASONE PROPIONATE 50 MCG/ACT Nasal Suspension SHAKE LIQUID AND USE 2 SPRAYS IN EACH NOSTRIL EVERY NIGHT Disp: 1 Bottle Rfl: 2 Taking   Cya Comment:Tardive dyskinesia  Betadine [Povidone *    ITCHING  Meperidine              NAUSEA AND VOMITING  Other                   OTHER (SEE COMMENTS)    Comment:Sensitive skin, milk,dust mites  Quinolones              UNKNOWN    Past Medical History:   Ja Boss screening 7-3-2012    wnl   • Personal history of urinary (tract) infection    • Pneumonia due to organism    • PONV (postoperative nausea and vomiting)    • Problems with swallowing 29 years ago   • Sleep disturbance    • Stress    • Uncomfortable fullnes SHOULDER ARTHROSCOPY Right 2/13/2018    Performed by Marie Beauchamp MD at [facility] MAIN OR   • [address]  2005    right foot   • TONSILLECTOMY     • TOTAL ABDOM HYSTERECTOMY       Family History   Problem Relation Ag

## 2019-05-15 NOTE — OPERATIVE REPORT
[facility]  Operative Report  5/15/2019     Genaro Manjarrez Patient Status:  Hospital Outpatient Surgery    1975 MRN [id number]   [place] ENDOSCOPY Attending Bc Toussaint MD   Hosp Day # 0 PCP Anastasia Stephen DO     Indication: S Following negative aspiration for CSF and blood, 0.5 mL 1% lidocaine and 2.5 mg of Decadron were injected. Similar procedures were performed at C5, C6, and C7 levels. The needles were removed with tips intact.   The patient tolerated the procedure very we

## 2019-05-16 ENCOUNTER — TELEPHONE (OUTPATIENT)
Dept: FAMILY MEDICINE CLINIC | Facility: CLINIC | Age: 44
End: 2019-05-16

## 2019-05-16 NOTE — TELEPHONE ENCOUNTER
Notified pt to go to ER for eval.  She states she took b/p at 113/57, pt feeling better,  She agrees to go to ER if b/p drops or she feels dizzy.       Took b/p now 114/72

## 2019-05-16 NOTE — TELEPHONE ENCOUNTER
Pt b/p at procedure yesterday was 106/78. She usually takes valium at night,   Has not taken anything for pain,  She is sore from the injections. Feels dizzy on and off, no other symptoms, no chest pain or sob.    Took b/p 9am at 88/44, she feels okay as

## 2019-05-16 NOTE — TELEPHONE ENCOUNTER
Pt  is experiencing low blood pressure today 88/44  Pt had a procedure done yesterday and they could not give her pain med because it was low/ she did not remember the number  Pt is also experiencing dizzy spells  Le took her off bp med several months ago

## 2019-06-10 RX ORDER — DIAZEPAM 10 MG/1
TABLET ORAL
Refills: 0 | OUTPATIENT
Start: 2019-06-10

## 2019-06-10 RX ORDER — DIAZEPAM 10 MG/1
10 TABLET ORAL EVERY 12 HOURS PRN
Qty: 60 TABLET | Refills: 0 | Status: SHIPPED
Start: 2019-06-10 | End: 2019-09-12

## 2019-06-10 NOTE — TELEPHONE ENCOUNTER
Medication: Diazepam 10 Mg     Date of last refill: 3/21/19 60 Tabs 1 Refill     [place] Dispensed 9/5/18 60 Tabs     Last office visit: 4/23/19     Due back to clinic per last office note: 2 Months     Date next office The patient agrees with the plan, verbalized understanding and is appreciative.  All questions were sought out and thoroughly answered to satisfaction.

## 2019-06-11 RX ORDER — DIAZEPAM 10 MG/1
TABLET ORAL
Qty: 30 TABLET | Refills: 0 | OUTPATIENT
Start: 2019-06-11

## 2019-06-21 ENCOUNTER — TELEPHONE (OUTPATIENT)
Dept: SURGERY | Facility: CLINIC | Age: 44
End: 2019-06-21

## 2019-06-24 ENCOUNTER — TELEPHONE (OUTPATIENT)
Dept: ADMINISTRATIVE | Age: 44
End: 2019-06-24

## 2019-06-24 DIAGNOSIS — M54.12 CERVICAL MYELOPATHY WITH CERVICAL RADICULOPATHY (HCC): Primary | ICD-10-CM

## 2019-06-24 DIAGNOSIS — G95.9 CERVICAL MYELOPATHY WITH CERVICAL RADICULOPATHY (HCC): Primary | ICD-10-CM

## 2019-06-24 NOTE — TELEPHONE ENCOUNTER
.Reason for the order/referral: FOLLOW UP  PCP:  Dee Esposito DO  Refer to Provider (first and last name):  TYLER GASTON  Specialty: NEURO  Patient Insurance: Payor: [facility] BLUE PRECISION HMO / Plan: [id number] BP HMO / Product Type: HMO /   Duration/Summary of S

## 2019-06-26 DIAGNOSIS — F90.8 ATTENTION DEFICIT HYPERACTIVITY DISORDER (ADHD), OTHER TYPE: ICD-10-CM

## 2019-06-26 DIAGNOSIS — F32.0 CURRENT MILD EPISODE OF MAJOR DEPRESSIVE DISORDER, UNSPECIFIED WHETHER RECURRENT (HCC): ICD-10-CM

## 2019-06-26 RX ORDER — METHYLPHENIDATE HYDROCHLORIDE 20 MG/1
20 TABLET ORAL 2 TIMES DAILY
Qty: 60 TABLET | Refills: 0 | Status: SHIPPED | OUTPATIENT
Start: 2019-06-26 | End: 2019-09-12

## 2019-06-26 NOTE — TELEPHONE ENCOUNTER
Pt needs refill of methylphenidate not adderall. LOV 4/1/19    Last refill 4/1/19 #60.     Please advise    Routing to [facility] for LE

## 2019-06-27 ENCOUNTER — HOSPITAL ENCOUNTER (OUTPATIENT)
Dept: GENERAL RADIOLOGY | Facility: HOSPITAL | Age: 44
Discharge: HOME | End: 2019-06-27
Attending: PHYSICIAN ASSISTANT
Payer: COMMERCIAL

## 2019-06-27 ENCOUNTER — OFFICE VISIT (OUTPATIENT)
Dept: SURGERY | Facility: CLINIC | Age: 44
End: 2019-06-27

## 2019-06-27 VITALS — HEART RATE: 70 BPM | SYSTOLIC BLOOD PRESSURE: 132 MMHG | DIASTOLIC BLOOD PRESSURE: 70 MMHG

## 2019-06-27 DIAGNOSIS — Z98.1 S/P CERVICAL SPINAL FUSION: ICD-10-CM

## 2019-06-27 DIAGNOSIS — Z98.1 HX OF FUSION OF CERVICAL SPINE: ICD-10-CM

## 2019-06-27 DIAGNOSIS — M50.30 DDD (DEGENERATIVE DISC DISEASE), CERVICAL: Primary | ICD-10-CM

## 2019-06-27 PROCEDURE — 72052 X-RAY EXAM NECK SPINE 6/>VWS: CPT | Performed by: PHYSICIAN ASSISTANT

## 2019-06-27 PROCEDURE — 99213 OFFICE O/P EST LOW 20 MIN: CPT | Performed by: PHYSICIAN ASSISTANT

## 2019-06-27 RX ORDER — BUPROPION HYDROCHLORIDE 150 MG/1
TABLET ORAL
Qty: 90 TABLET | Refills: 0 | Status: SHIPPED | OUTPATIENT
Start: 2019-06-27 | End: 2019-08-14

## 2019-06-27 NOTE — PROGRESS NOTES
Neurosurgery Cervical  Follow up      REASON FOR VISIT: Hx of ACDF C4-6, cervical follow up      [address] Roseann Reynolds is a 38year old female with a history of ACDF 12/19/18 with Dr. Terrel Nyhan. Resents for cervical follow-up.     Had inj None       IMAGING:  PROCEDURE:  XR CERVICAL SPINE COMPLETE W/FLEX + EXT (CPT=72052)     TECHNIQUE:  AP, lateral, obliques, coned down view, and flexion/extension views of the spine were obtained.      COMPARISON:  [facility] , XR CERVICAL SPINE (4VIEWS) (CPT=7 reviewing imaging, providing patient education and counseling. [facility] TUNDE Spence M.S., BENJY Arellano  [address], [address] [facility] Fritz Garvey, [address] [place] [address]  [phone]  6/27/2019 2:06 PM

## 2019-06-27 NOTE — TELEPHONE ENCOUNTER
Rx Request  buPROPion HCl ER, XL, 150 MG Oral Tablet 24 Hr    Disp:      90              R: 0    Associated Dx: Current mild episode of major depressive disorder, unspecified whether recurrent     Last Visit: 04/01/2019    Last Refilled: 04/01/2019

## 2019-06-27 NOTE — PROGRESS NOTES
Pt is here for follow up. Imaging Xray of the cervical obtained.        LEFT CERVICAL FACET INJECTION WITH SEDATION 5/15/19 35% of relief to present

## 2019-07-02 DIAGNOSIS — K58.9 IRRITABLE BOWEL SYNDROME WITHOUT DIARRHEA: ICD-10-CM

## 2019-07-03 RX ORDER — DICYCLOMINE HCL 20 MG
TABLET ORAL
Qty: 120 TABLET | Refills: 0 | Status: SHIPPED | OUTPATIENT
Start: 2019-07-03 | End: 2019-09-15

## 2019-07-12 DIAGNOSIS — G43.109 MIGRAINE WITH AURA AND WITHOUT STATUS MIGRAINOSUS, NOT INTRACTABLE: ICD-10-CM

## 2019-07-12 DIAGNOSIS — F41.9 ANXIETY: ICD-10-CM

## 2019-07-15 RX ORDER — TOPIRAMATE 25 MG/1
TABLET ORAL
Qty: 180 TABLET | Refills: 0 | Status: SHIPPED | OUTPATIENT
Start: 2019-07-15 | End: 2019-08-15

## 2019-07-15 RX ORDER — ALPRAZOLAM 0.25 MG/1
TABLET ORAL
Qty: 30 TABLET | Refills: 0 | Status: SHIPPED | OUTPATIENT
Start: 2019-07-15 | End: 2019-08-27

## 2019-07-15 NOTE — TELEPHONE ENCOUNTER
Medication: TOPIRAMATE 25 MG Oral Tab    Date of last refill: 05/02/19 (#180/1)  [place]    Last office visit: 5/2/2019  Due back to clinic per last office note:  Around 07/02/19  Date next office visit scheduled:  N

## 2019-07-31 ENCOUNTER — PATIENT MESSAGE (OUTPATIENT)
Dept: SURGERY | Facility: CLINIC | Age: 44
End: 2019-07-31

## 2019-07-31 NOTE — TELEPHONE ENCOUNTER
From: Oleksandr Booth  To: Arsenio Fong [place]  Sent: 7/31/2019 11:12 AM CDT  Subject: Other    Hi   I was wondering if neck cracking, with some pulling is normal? It does cause pain.  Also, I know Dr Yobany Green on said my lower back does look bad, but the mo

## 2019-08-01 ENCOUNTER — PATIENT MESSAGE (OUTPATIENT)
Dept: SURGERY | Facility: CLINIC | Age: 44
End: 2019-08-01

## 2019-08-01 NOTE — TELEPHONE ENCOUNTER
From: Jessica Pinedo  To: Rafael Muir  Sent: 8/1/2019 11:59 AM CDT  Subject: Other    Hi. I’d be happy to come in and see you. I did have an X-ray done in June. And saw dr Jorge A Avila.  I can come back and take a new X-ray and see you if you think t

## 2019-08-14 DIAGNOSIS — F32.0 CURRENT MILD EPISODE OF MAJOR DEPRESSIVE DISORDER, UNSPECIFIED WHETHER RECURRENT (HCC): ICD-10-CM

## 2019-08-14 RX ORDER — BUPROPION HYDROCHLORIDE 150 MG/1
TABLET ORAL
Qty: 90 TABLET | Refills: 0 | Status: SHIPPED | OUTPATIENT
Start: 2019-08-14 | End: 2019-09-12

## 2019-08-15 DIAGNOSIS — G43.109 MIGRAINE WITH AURA AND WITHOUT STATUS MIGRAINOSUS, NOT INTRACTABLE: ICD-10-CM

## 2019-08-15 NOTE — TELEPHONE ENCOUNTER
Medication: TOPIRAMATE 25 MG Oral Tab    Date of last refill: 07/15/19 (#180/0)  [place]    Last office visit: 5/2/2019  Due back to clinic per last office note:  Around 07/02/19  Date next office visit scheduled:

## 2019-08-16 RX ORDER — TOPIRAMATE 25 MG/1
TABLET ORAL
Qty: 180 TABLET | Refills: 0 | Status: SHIPPED | OUTPATIENT
Start: 2019-08-16 | End: 2019-09-16

## 2019-08-27 ENCOUNTER — TELEPHONE (OUTPATIENT)
Dept: FAMILY MEDICINE CLINIC | Facility: CLINIC | Age: 44
End: 2019-08-27

## 2019-08-27 DIAGNOSIS — F41.9 ANXIETY: ICD-10-CM

## 2019-08-28 RX ORDER — ALPRAZOLAM 0.25 MG/1
TABLET ORAL
Qty: 15 TABLET | Refills: 0 | Status: SHIPPED | OUTPATIENT
Start: 2019-08-28 | End: 2019-08-28

## 2019-08-28 RX ORDER — ALPRAZOLAM 0.25 MG/1
TABLET ORAL
Qty: 15 TABLET | Refills: 0 | Status: SHIPPED | OUTPATIENT
Start: 2019-08-28 | End: 2019-10-31

## 2019-08-28 NOTE — TELEPHONE ENCOUNTER
Last ov 4/1/19  Last refill 7/15/19    . Pt is due for a f/u on anxiety medication.  Please call to schedule one

## 2019-09-12 ENCOUNTER — OFFICE VISIT (OUTPATIENT)
Dept: FAMILY MEDICINE CLINIC | Facility: CLINIC | Age: 44
End: 2019-09-12

## 2019-09-12 VITALS
BODY MASS INDEX: 24.16 KG/M2 | HEIGHT: 65 IN | TEMPERATURE: 98 F | HEART RATE: 95 BPM | RESPIRATION RATE: 16 BRPM | SYSTOLIC BLOOD PRESSURE: 112 MMHG | DIASTOLIC BLOOD PRESSURE: 74 MMHG | WEIGHT: 145 LBS | OXYGEN SATURATION: 98 %

## 2019-09-12 DIAGNOSIS — F41.9 ANXIETY: ICD-10-CM

## 2019-09-12 DIAGNOSIS — F90.8 ATTENTION DEFICIT HYPERACTIVITY DISORDER (ADHD), OTHER TYPE: ICD-10-CM

## 2019-09-12 DIAGNOSIS — G47.09 OTHER INSOMNIA: Primary | ICD-10-CM

## 2019-09-12 DIAGNOSIS — N31.9 BLADDER DYSFUNCTION: ICD-10-CM

## 2019-09-12 DIAGNOSIS — F32.0 CURRENT MILD EPISODE OF MAJOR DEPRESSIVE DISORDER, UNSPECIFIED WHETHER RECURRENT (HCC): ICD-10-CM

## 2019-09-12 LAB
BILIRUBIN: NEGATIVE
GLUCOSE (URINE DIPSTICK): NEGATIVE MG/DL
KETONES (URINE DIPSTICK): NEGATIVE MG/DL
LEUKOCYTES: NEGATIVE
MULTISTIX LOT#: NORMAL NUMERIC
NITRITE, URINE: NEGATIVE
OCCULT BLOOD: NEGATIVE
PH, URINE: 7.5 (ref 4.5–8)
PROTEIN (URINE DIPSTICK): NEGATIVE MG/DL
SPECIFIC GRAVITY: 1.02 (ref 1–1.03)
URINE-COLOR: YELLOW
UROBILINOGEN,SEMI-QN: 1 MG/DL (ref 0–1.9)

## 2019-09-12 PROCEDURE — 99214 OFFICE O/P EST MOD 30 MIN: CPT | Performed by: FAMILY MEDICINE

## 2019-09-12 PROCEDURE — 87086 URINE CULTURE/COLONY COUNT: CPT | Performed by: FAMILY MEDICINE

## 2019-09-12 PROCEDURE — 81003 URINALYSIS AUTO W/O SCOPE: CPT | Performed by: FAMILY MEDICINE

## 2019-09-12 RX ORDER — BUPROPION HYDROCHLORIDE 150 MG/1
TABLET ORAL
Qty: 90 TABLET | Refills: 5 | Status: SHIPPED | OUTPATIENT
Start: 2019-09-12 | End: 2019-12-27 | Stop reason: ALTCHOICE

## 2019-09-12 RX ORDER — MIRTAZAPINE 7.5 MG/1
7.5 TABLET, FILM COATED ORAL NIGHTLY
Qty: 30 TABLET | Refills: 0 | Status: SHIPPED | OUTPATIENT
Start: 2019-09-12 | End: 2019-10-10

## 2019-09-12 RX ORDER — METHYLPHENIDATE HYDROCHLORIDE 20 MG/1
20 TABLET ORAL 2 TIMES DAILY
Qty: 60 TABLET | Refills: 0 | Status: SHIPPED | OUTPATIENT
Start: 2019-09-12 | End: 2019-10-21

## 2019-09-12 RX ORDER — CITALOPRAM 40 MG/1
40 TABLET ORAL SEE ADMIN INSTRUCTIONS
Qty: 30 TABLET | Refills: 5 | Status: SHIPPED | OUTPATIENT
Start: 2019-09-12 | End: 2020-04-12

## 2019-09-12 RX ORDER — CITALOPRAM 20 MG/1
20 TABLET ORAL SEE ADMIN INSTRUCTIONS
Qty: 30 TABLET | Refills: 5 | Status: SHIPPED | OUTPATIENT
Start: 2019-09-12 | End: 2020-04-12

## 2019-09-12 NOTE — PROGRESS NOTES
HPI:   Kaylene Quinonez is a 38year old female here to discuss ADD, mood   Bladder complaints     Off bp meds since surgery     Pt reports ritalin working well ;pt taking it bid        Mood not great   Pt on max dose of celexa and wellbutrin   Lots of fam Cyclobenzaprine HCl 10 MG Oral Tab Take 1 tablet (10 mg total) by mouth 3 (three) times daily as needed for Muscle spasms.  Disp: 60 tablet Rfl: 1   FLUTICASONE PROPIONATE 50 MCG/ACT Nasal Suspension SHAKE LIQUID AND USE 2 SPRAYS IN EACH NOSTRIL EVERY NIG Dysesthesia    • Dyspareunia    • Easy bruising    • Endometriosis    • Esophageal reflux    • Essential hypertension    • Extrinsic asthma, unspecified    • Fatigue    • Flatulence/gas pain/belching 2weeks   • Food intolerance    • Frequent urination    • Bebe Del Toro MD at [facility] MAIN OR   • CERVICAL FACET INJECTION Left 5/15/2019    Performed by Bebe Del Toro MD at [facility] ENDOSCOPY   • COLONOSCOPY N/A 2/6/2015    Performed by Derrek Ta MD at [facility] ENDOSCOPY   • ENDOMETRIAL ABLATION      2004   • ESOPHAGOGASTRO • Other Daughter         Mvp, eds      Social History:   Social History    Tobacco Use      Smoking status: Former Smoker        Years: 10.00        Types: Cigarettes        Quit date: 1999        Years since quittin.6      Smokeless tobacco: Ne Admin Instructions. Take with Citalopram Hydrobromide 40 MG . Total dose of 60mg daily. Dispense: 30 tablet; Refill: 5  - Citalopram Hydrobromide 40 MG Oral Tab; Take 1 tablet (40 mg total) by mouth See Admin Instructions.  Takes with Citalopram Hydrobromi

## 2019-09-15 DIAGNOSIS — K58.9 IRRITABLE BOWEL SYNDROME WITHOUT DIARRHEA: ICD-10-CM

## 2019-09-16 DIAGNOSIS — G43.109 MIGRAINE WITH AURA AND WITHOUT STATUS MIGRAINOSUS, NOT INTRACTABLE: ICD-10-CM

## 2019-09-16 RX ORDER — DICYCLOMINE HCL 20 MG
TABLET ORAL
Qty: 120 TABLET | Refills: 0 | Status: SHIPPED | OUTPATIENT
Start: 2019-09-16 | End: 2020-02-12

## 2019-09-16 NOTE — TELEPHONE ENCOUNTER
Sent the patient a reminder to make an appt for medication.      Medication: TOPIRAMATE 25 MG Oral Tab    Date of last refill: 08/16/19 (#180/0)  [place]    Last office visit: 5/2/2019  Due back to clinic per last off

## 2019-09-17 RX ORDER — TOPIRAMATE 25 MG/1
TABLET ORAL
Qty: 180 TABLET | Refills: 0 | Status: ON HOLD | OUTPATIENT
Start: 2019-09-17 | End: 2020-07-06

## 2019-10-10 ENCOUNTER — LAB ENCOUNTER (OUTPATIENT)
Dept: LAB | Age: 44
End: 2019-10-10
Attending: FAMILY MEDICINE
Payer: COMMERCIAL

## 2019-10-10 ENCOUNTER — OFFICE VISIT (OUTPATIENT)
Dept: FAMILY MEDICINE CLINIC | Facility: CLINIC | Age: 44
End: 2019-10-10

## 2019-10-10 ENCOUNTER — APPOINTMENT (OUTPATIENT)
Dept: LAB | Age: 44
End: 2019-10-10
Attending: PEDIATRICS
Payer: COMMERCIAL

## 2019-10-10 VITALS
HEART RATE: 102 BPM | BODY MASS INDEX: 25.33 KG/M2 | SYSTOLIC BLOOD PRESSURE: 106 MMHG | DIASTOLIC BLOOD PRESSURE: 64 MMHG | HEIGHT: 65 IN | WEIGHT: 152 LBS | TEMPERATURE: 98 F | OXYGEN SATURATION: 98 % | RESPIRATION RATE: 16 BRPM

## 2019-10-10 DIAGNOSIS — G47.09 OTHER INSOMNIA: ICD-10-CM

## 2019-10-10 DIAGNOSIS — E04.9 GOITER: Primary | ICD-10-CM

## 2019-10-10 DIAGNOSIS — E78.00 ELEVATED CHOLESTEROL: ICD-10-CM

## 2019-10-10 DIAGNOSIS — F32.0 CURRENT MILD EPISODE OF MAJOR DEPRESSIVE DISORDER, UNSPECIFIED WHETHER RECURRENT (HCC): ICD-10-CM

## 2019-10-10 DIAGNOSIS — D83.9 COMMON VARIABLE IMMUNODEFICIENCY (HCC): Primary | ICD-10-CM

## 2019-10-10 DIAGNOSIS — Z00.00 GENERAL MEDICAL EXAM: ICD-10-CM

## 2019-10-10 PROCEDURE — 84443 ASSAY THYROID STIM HORMONE: CPT

## 2019-10-10 PROCEDURE — 36415 COLL VENOUS BLD VENIPUNCTURE: CPT

## 2019-10-10 PROCEDURE — 86774 TETANUS ANTIBODY: CPT

## 2019-10-10 PROCEDURE — 80061 LIPID PANEL: CPT

## 2019-10-10 PROCEDURE — 83036 HEMOGLOBIN GLYCOSYLATED A1C: CPT

## 2019-10-10 PROCEDURE — 86735 MUMPS ANTIBODY: CPT

## 2019-10-10 PROCEDURE — 82306 VITAMIN D 25 HYDROXY: CPT

## 2019-10-10 PROCEDURE — 84439 ASSAY OF FREE THYROXINE: CPT

## 2019-10-10 PROCEDURE — 82607 VITAMIN B-12: CPT

## 2019-10-10 PROCEDURE — 99214 OFFICE O/P EST MOD 30 MIN: CPT | Performed by: FAMILY MEDICINE

## 2019-10-10 PROCEDURE — 86317 IMMUNOASSAY INFECTIOUS AGENT: CPT

## 2019-10-10 PROCEDURE — 85025 COMPLETE CBC W/AUTO DIFF WBC: CPT

## 2019-10-10 PROCEDURE — 80053 COMPREHEN METABOLIC PANEL: CPT

## 2019-10-10 PROCEDURE — 86648 DIPHTHERIA ANTIBODY: CPT

## 2019-10-10 RX ORDER — MIRTAZAPINE 7.5 MG/1
7.5 TABLET, FILM COATED ORAL NIGHTLY
Qty: 30 TABLET | Refills: 2 | Status: SHIPPED | OUTPATIENT
Start: 2019-10-10 | End: 2020-06-17

## 2019-10-10 NOTE — PROGRESS NOTES
HPI:   Tayo Zhang is a 38year old female here to discuss ADD, mood     Pt reports ritalin working well ;pt taking it bid     Mood not great   Under stress with work and home like   [facility]   Pt on max dose of fanny FLUTICASONE PROPIONATE 50 MCG/ACT Nasal Suspension SHAKE LIQUID AND USE 2 SPRAYS IN EACH NOSTRIL EVERY NIGHT Disp: 1 Bottle Rfl: 2   Cyanocobalamin (VITAMIN B12 OR) Take 1 tablet by mouth daily.    Disp:  Rfl:    Melatonin 10 MG Oral Cap Take 10 mg by kai Frequent UTI    • Headache disorder    • Heartburn 29 years ago   • Heavy menses    • Hematuria    • Hiatal hernia    • High cholesterol    • Hyperlipidemia    • IBS (irritable bowel syndrome)    • Irregular bowel habits    • Irregular menstrual cycle    • ESOPHAGOGASTRODUODENOSCOPY (EGD) N/A 5/1/2015    Performed by Stacie Barron MD at [facility] ENDOSCOPY   • ESOPHAGOGASTRODUODENOSCOPY (EGD) N/A 2/6/2015    Performed by Stacie Barron MD at [facility] ENDOSCOPY   • HYSTERECTOMY  7/20/2015   • HYSTEROSCOPY,ABLATION EN tobacco: Never Used    Alcohol use: Yes      Comment: occasionally    Drug use: No    Occ: . : . Children: 3.    Exercise: minimal.  Diet: watches minimally     REVIEW OF SYSTEMS:   GENERAL: feels well otherwise  SKIN: denies any unusual skin lesions

## 2019-10-18 ENCOUNTER — PATIENT MESSAGE (OUTPATIENT)
Dept: SURGERY | Facility: CLINIC | Age: 44
End: 2019-10-18

## 2019-10-18 NOTE — TELEPHONE ENCOUNTER
From: Tayo Zhang  To: JOSH Springer  Sent: 10/18/2019 1:20 PM CDT  Subject: Non-Urgent Medical Question    Hello,  My arms are going numb a lot again, especially when i am doing anything that I need to look down for. Do I need to come in?   Melissa Huggins

## 2019-10-20 ENCOUNTER — PATIENT MESSAGE (OUTPATIENT)
Dept: FAMILY MEDICINE CLINIC | Facility: CLINIC | Age: 44
End: 2019-10-20

## 2019-10-20 DIAGNOSIS — F90.8 ATTENTION DEFICIT HYPERACTIVITY DISORDER (ADHD), OTHER TYPE: ICD-10-CM

## 2019-10-21 RX ORDER — METHYLPHENIDATE HYDROCHLORIDE 20 MG/1
20 TABLET ORAL 2 TIMES DAILY
Qty: 60 TABLET | Refills: 0 | Status: SHIPPED | OUTPATIENT
Start: 2019-10-21 | End: 2020-03-02

## 2019-10-21 NOTE — TELEPHONE ENCOUNTER
From: Michelle Plunkett  To: Josephine Way DO  Sent: 10/20/2019 12:14 PM CDT  Subject: Prescription Question    Hello,  When I saw Dr. Earline Ramachandran last I asked for a refill on my Methylphenidate 20mg. Tablets. I think she forgot to send it to the pharmacy.  I ta

## 2019-10-28 ENCOUNTER — OFFICE VISIT (OUTPATIENT)
Dept: SURGERY | Facility: CLINIC | Age: 44
End: 2019-10-28

## 2019-10-28 VITALS — HEART RATE: 68 BPM | SYSTOLIC BLOOD PRESSURE: 110 MMHG | DIASTOLIC BLOOD PRESSURE: 70 MMHG

## 2019-10-28 DIAGNOSIS — M50.30 DDD (DEGENERATIVE DISC DISEASE), CERVICAL: Primary | ICD-10-CM

## 2019-10-28 DIAGNOSIS — Z98.1 S/P CERVICAL SPINAL FUSION: ICD-10-CM

## 2019-10-28 DIAGNOSIS — Z98.1 HX OF FUSION OF CERVICAL SPINE: ICD-10-CM

## 2019-10-28 DIAGNOSIS — M54.2 NECK PAIN: ICD-10-CM

## 2019-10-28 PROCEDURE — 99213 OFFICE O/P EST LOW 20 MIN: CPT | Performed by: PHYSICIAN ASSISTANT

## 2019-10-28 RX ORDER — METHYLPREDNISOLONE 4 MG/1
TABLET ORAL
Qty: 1 PACKAGE | Refills: 0 | Status: SHIPPED | OUTPATIENT
Start: 2019-10-28 | End: 2019-11-18 | Stop reason: ALTCHOICE

## 2019-10-28 NOTE — PROGRESS NOTES
[facility] Neurosurgery follow-up        HISTORY OF PRESENT Philip Mcmahan is a 38year old RH  female follow-up. She states she did well when she was last seen back in June. However 4 weeks ago she woke up with both arms numb.   She is having some zelda She has left-sided posterior neck pain which started last several days. She was taking Norco 5 mg tablets 2 every 6 hours. She was taking 5 mg of Valium as well. She denies any arm pain she has had occasional numbness in her hands.   She feels like her s • Lump or mass in breast     • Malaise     • Malignant hyperthermia       patient's son at 3years of age - 80   • Mastodynia     • Migraines     • Other screening mammogram 06/11/2012   • Pap smear for cervical cancer screening 7-3-2012     wnl   • Lacie Carballo reports that she quit smoking about 19 years ago. Her smoking use included Cigarettes. She quit after 10.00 years of use. She has never used smokeless tobacco. She reports that she drinks alcohol.  She reports that she does not use drugs.     ALLERGIES:    EMG of the upper extremities from 5/19/17 shows possibly some early right C6-C7 radiculitis.   Negative for any peripheral nerve involvement     EMG from 2012 was normal     IMAGING:  X-rays of the cervical spine from June 2019 show spondylosis at C3-4 fusi MRI of the cervical spine from 2/2017 shows loss of cervical lordosis with kyphosis at C4-5 central stenosis at C4-5 with slight flattening of the anterior cord. C5-6 stenosis with deformity of the cord. C6-7 stenosis.   Contrary to the report which repor

## 2019-10-28 NOTE — PROGRESS NOTES
Pt is here for follow up: Pt was last seen 6/27/19 and advised to follow up PRN. Pt states that for the past month she has been having issues with numbness and tingling in the bilateral arms/hands. Pt reports that she has issues with weakness.  Pt state

## 2019-10-28 NOTE — PATIENT INSTRUCTIONS
Refill policies:    • Allow 2-3 business days for refills; controlled substances may take longer.   • Contact your pharmacy at least 5 days prior to running out of medication and have them send an electronic request or submit request through the “request re Depending on your insurance carrier, approval may take 3-10 days. It is highly recommended patients contact their insurance carrier directly to determine coverage.   If test is done without insurance authorization, patient may be responsible for the entire in 2–3 weeks

## 2019-10-30 ENCOUNTER — HOSPITAL ENCOUNTER (OUTPATIENT)
Dept: GENERAL RADIOLOGY | Facility: HOSPITAL | Age: 44
Discharge: HOME | End: 2019-10-30
Attending: PHYSICIAN ASSISTANT
Payer: COMMERCIAL

## 2019-10-30 DIAGNOSIS — M54.2 NECK PAIN: ICD-10-CM

## 2019-10-30 DIAGNOSIS — Z98.1 HX OF FUSION OF CERVICAL SPINE: ICD-10-CM

## 2019-10-30 DIAGNOSIS — M50.30 DDD (DEGENERATIVE DISC DISEASE), CERVICAL: ICD-10-CM

## 2019-10-30 DIAGNOSIS — Z98.1 S/P CERVICAL SPINAL FUSION: ICD-10-CM

## 2019-10-30 PROCEDURE — 72050 X-RAY EXAM NECK SPINE 4/5VWS: CPT | Performed by: PHYSICIAN ASSISTANT

## 2019-10-31 ENCOUNTER — TELEPHONE (OUTPATIENT)
Dept: SURGERY | Facility: CLINIC | Age: 44
End: 2019-10-31

## 2019-10-31 DIAGNOSIS — F41.9 ANXIETY: ICD-10-CM

## 2019-10-31 NOTE — TELEPHONE ENCOUNTER
Relayed below information per Moses Wang. She asked if she is able to go about lifting etc., I explained she should wait until her appointment on 11/18 for a reassessment. Patient happy for the return call.

## 2019-10-31 NOTE — TELEPHONE ENCOUNTER
Patient calling for xray results from 10/30/19    FINDINGS:    Changes of discectomy and anterior fusion from C4 through C6 are again demonstrated. No fracture or subluxation. Limited range of motion in flexion and good range of motion in extension.   No

## 2019-11-01 RX ORDER — ALPRAZOLAM 0.25 MG/1
TABLET ORAL
Qty: 15 TABLET | Refills: 0 | Status: SHIPPED | OUTPATIENT
Start: 2019-11-01 | End: 2020-06-10

## 2019-11-01 RX ORDER — DIAZEPAM 10 MG/1
TABLET ORAL
Qty: 60 TABLET | Refills: 0 | Status: SHIPPED | OUTPATIENT
Start: 2019-11-01 | End: 2019-12-27

## 2019-11-01 NOTE — TELEPHONE ENCOUNTER
Medication: Diazepam 10 Mg     Date of last refill: 6/10/19 60 Tabs     [place]     Last office visit: 10/28/19     Due back to clinic per last office note:2-3Weeks     Date next office visit scheduled: 11/18/19     L

## 2019-11-18 ENCOUNTER — OFFICE VISIT (OUTPATIENT)
Dept: SURGERY | Facility: CLINIC | Age: 44
End: 2019-11-18

## 2019-11-18 VITALS — HEART RATE: 70 BPM | DIASTOLIC BLOOD PRESSURE: 68 MMHG | SYSTOLIC BLOOD PRESSURE: 124 MMHG

## 2019-11-18 DIAGNOSIS — M54.2 CERVICAL PAIN: ICD-10-CM

## 2019-11-18 DIAGNOSIS — R26.9 NEUROLOGIC GAIT DYSFUNCTION: ICD-10-CM

## 2019-11-18 DIAGNOSIS — M54.2 NECK PAIN: ICD-10-CM

## 2019-11-18 DIAGNOSIS — M54.12 CERVICAL RADICULOPATHY: ICD-10-CM

## 2019-11-18 DIAGNOSIS — Z98.1 S/P CERVICAL SPINAL FUSION: ICD-10-CM

## 2019-11-18 DIAGNOSIS — M50.30 DDD (DEGENERATIVE DISC DISEASE), CERVICAL: Primary | ICD-10-CM

## 2019-11-18 DIAGNOSIS — M54.12 CERVICAL MYELOPATHY WITH CERVICAL RADICULOPATHY (HCC): ICD-10-CM

## 2019-11-18 DIAGNOSIS — R51.9 WORSENING HEADACHES: ICD-10-CM

## 2019-11-18 DIAGNOSIS — R32 URINARY INCONTINENCE, UNSPECIFIED TYPE: ICD-10-CM

## 2019-11-18 DIAGNOSIS — G95.9 CERVICAL MYELOPATHY WITH CERVICAL RADICULOPATHY (HCC): ICD-10-CM

## 2019-11-18 DIAGNOSIS — Z98.1 HX OF FUSION OF CERVICAL SPINE: ICD-10-CM

## 2019-11-18 PROCEDURE — 99213 OFFICE O/P EST LOW 20 MIN: CPT | Performed by: PHYSICIAN ASSISTANT

## 2019-11-18 RX ORDER — PREDNISONE 10 MG/1
10 TABLET ORAL DAILY
Qty: 30 TABLET | Refills: 0 | Status: SHIPPED | OUTPATIENT
Start: 2019-11-18 | End: 2019-12-27 | Stop reason: ALTCHOICE

## 2019-11-18 NOTE — PATIENT INSTRUCTIONS
Refill policies:    • Allow 2-3 business days for refills; controlled substances may take longer.   • Contact your pharmacy at least 5 days prior to running out of medication and have them send an electronic request or submit request through the “request re Depending on your insurance carrier, approval may take 3-10 days. It is highly recommended patients contact their insurance carrier directly to determine coverage.   If test is done without insurance authorization, patient may be responsible for the entire Follow-up in 5 weeks  4.   Prednisone taper

## 2019-11-18 NOTE — PROGRESS NOTES
Pt is here for follow up. Pt was last seen in office 10/28/19      cervical collar: Pt states that she wears the cervical collar daily. Pt states that she does get some relief.        Medrol Dosepak: Finished    Xray of the cervical: Obtained       Pt state

## 2019-11-18 NOTE — PROGRESS NOTES
[facility] Neurosurgery follow-up        HISTORY OF PRESENT Janay Castro is a 38year old RH  female follow-up. She took the Medrol Dosepak and it helped briefly. She still having quite severe pain at 8/10.   She continues with C7 radiculopathy on th well.  She denies any arm pain she has had occasional numbness in her hands. She feels like her strength is much improved. Her urinary urgency is resolved. She denies any bowel bladder incontinence.   Had one episode of shaking in her legs but overall sh mammogram 06/11/2012   • Pap smear for cervical cancer screening 7-3-2012     wnl   • Personal history of urinary (tract) infection     • Unspecified disorder of thyroid       hypothyroidism   • Unspecified viral infection, in conditions classified elsewhe Other (See Comments)    Comment:Tardive dyskinesia  Betadine [Povidone *    Itching  Levofloxacin            Rash, Tightness in Chest  Meperidine              Nausea and vomiting  Other                       Comment:Sensitive skin, milk,dust mites  Tim Jiménez with mild spondylosis C6-7    MRI Brain 2/20/19 Negative  MRI TS 2/20/19 negative  MRI LS spine 2/20/19 L4-5 spondylosis    CT of the neck 2/5/19 shows normal architecture changes consistent with a C4-6 ACDF no obvious modalities.     MRI of the cervical sp and blurry vision     PLAN:  1.   cervical collar comfort, Aspen cervical collar was reordered  2. Home exercise, wall glides  3. Follow-up in 5 weeks  4. Prednisone taper         TEugenio Herring M.S., PA-C  [address]

## 2019-12-06 DIAGNOSIS — E78.00 ELEVATED CHOLESTEROL: ICD-10-CM

## 2019-12-06 RX ORDER — LOVASTATIN 10 MG/1
TABLET ORAL
Qty: 30 TABLET | Refills: 0 | Status: SHIPPED | OUTPATIENT
Start: 2019-12-06 | End: 2020-01-10

## 2019-12-06 NOTE — TELEPHONE ENCOUNTER
Rx Request  Lovastatin 10 MG Oral Tab     Disp:       30             R: 0    Last Visit: 10/10/2019    Last Refilled: 04/01/2019 (D:30 R:5)    Protocol Passed?  Yes[ X ]       No[  ]

## 2019-12-24 ENCOUNTER — TELEPHONE (OUTPATIENT)
Dept: FAMILY MEDICINE CLINIC | Facility: CLINIC | Age: 44
End: 2019-12-24

## 2019-12-24 DIAGNOSIS — F90.8 ATTENTION DEFICIT HYPERACTIVITY DISORDER (ADHD), OTHER TYPE: ICD-10-CM

## 2019-12-24 RX ORDER — METHYLPHENIDATE HYDROCHLORIDE 20 MG/1
20 TABLET ORAL 2 TIMES DAILY
Qty: 60 TABLET | Refills: 0 | Status: SHIPPED | OUTPATIENT
Start: 2019-12-24 | End: 2020-01-24 | Stop reason: ALTCHOICE

## 2019-12-27 ENCOUNTER — TELEPHONE (OUTPATIENT)
Dept: SURGERY | Facility: CLINIC | Age: 44
End: 2019-12-27

## 2019-12-27 ENCOUNTER — OFFICE VISIT (OUTPATIENT)
Dept: SURGERY | Facility: CLINIC | Age: 44
End: 2019-12-27

## 2019-12-27 VITALS — HEART RATE: 70 BPM | SYSTOLIC BLOOD PRESSURE: 102 MMHG | DIASTOLIC BLOOD PRESSURE: 68 MMHG

## 2019-12-27 DIAGNOSIS — R32 URINARY INCONTINENCE, UNSPECIFIED TYPE: ICD-10-CM

## 2019-12-27 DIAGNOSIS — M54.2 CERVICAL PAIN: ICD-10-CM

## 2019-12-27 DIAGNOSIS — G95.9 CERVICAL MYELOPATHY WITH CERVICAL RADICULOPATHY (HCC): ICD-10-CM

## 2019-12-27 DIAGNOSIS — Z98.1 S/P CERVICAL SPINAL FUSION: ICD-10-CM

## 2019-12-27 DIAGNOSIS — R26.9 NEUROLOGIC GAIT DYSFUNCTION: ICD-10-CM

## 2019-12-27 DIAGNOSIS — R51.9 WORSENING HEADACHES: ICD-10-CM

## 2019-12-27 DIAGNOSIS — M54.2 NECK PAIN: ICD-10-CM

## 2019-12-27 DIAGNOSIS — M54.12 CERVICAL RADICULOPATHY: ICD-10-CM

## 2019-12-27 DIAGNOSIS — M54.2 NECK PAIN: Primary | ICD-10-CM

## 2019-12-27 DIAGNOSIS — Z98.1 HX OF FUSION OF CERVICAL SPINE: ICD-10-CM

## 2019-12-27 DIAGNOSIS — M54.12 CERVICAL MYELOPATHY WITH CERVICAL RADICULOPATHY (HCC): ICD-10-CM

## 2019-12-27 DIAGNOSIS — M50.30 DDD (DEGENERATIVE DISC DISEASE), CERVICAL: Primary | ICD-10-CM

## 2019-12-27 PROCEDURE — 99214 OFFICE O/P EST MOD 30 MIN: CPT | Performed by: PHYSICIAN ASSISTANT

## 2019-12-27 RX ORDER — HYDROCODONE BITARTRATE AND ACETAMINOPHEN 10; 325 MG/1; MG/1
1 TABLET ORAL EVERY 4 HOURS PRN
Qty: 60 TABLET | Refills: 0 | Status: SHIPPED | OUTPATIENT
Start: 2019-12-27 | End: 2020-01-24

## 2019-12-27 RX ORDER — DIAZEPAM 10 MG/1
TABLET ORAL
Qty: 60 TABLET | Refills: 1 | Status: SHIPPED | OUTPATIENT
Start: 2019-12-27 | End: 2020-02-03

## 2019-12-27 NOTE — TELEPHONE ENCOUNTER
Central scheduling was unable to use original order for cervical MRI placed at office visit today. Cervical MRI reordered.

## 2019-12-27 NOTE — PROGRESS NOTES
Pt is here for follow up      cervical collar     Aspen cervical collar: Haven't received. Prednisone taper: Completed    Pt states that she is still the same since LOV. No new symptome's to be reported.

## 2019-12-27 NOTE — PROGRESS NOTES
[facility] Neurosurgery follow-up        HISTORY OF PRESENT Lennox Branch is a 38year old RH  female follow-up. She did take the prednisone tapers she had some modest improvement while she was on it.   She continues to have symptoms her neck pain is a hands.  The steroids did not seem to help. She is in a cervical collar which does not seem to be helping. He has had some blurry vision and headaches.   Denies any other focal complaints    Last visit 2/1/19  At her last visit she was in the emergency andrew without mention of neurogenic bladder     • Depression     • Diabetes ([address]       Diet controlled   • Diabetes mellitus ([facility]       Diet controlled   • Dysesthesia     • Dyspareunia     • Esophageal reflux     • Essential hypertension     • Extrinsic asthm (age of onset: 54) in her mother; Cancer in her paternal cousin female, sister, and sister; Diabetes in her father and paternal grandmother; Heart Disorder in her maternal grandmother; Hypertension in her father; Migraines in her sister and son;  Other in h 4+       5-         4+ 5       Reflexes DTRs:      Biceps   Brachioradialis   Triceps    Patellar    Ankle  Hamstring   Right      1+           1+       0        2+       2+           Left      1+           1+       0        2+       2+               thoracic scoliosis     MRI of the cervical spine from 2/2017 shows loss of cervical lordosis with kyphosis at C4-5 central stenosis at C4-5 with slight flattening of the anterior cord. C5-6 stenosis with deformity of the cord. C6-7 stenosis.   Contrary to

## 2019-12-27 NOTE — PATIENT INSTRUCTIONS
Refill policies:    • Allow 2-3 business days for refills; controlled substances may take longer.   • Contact your pharmacy at least 5 days prior to running out of medication and have them send an electronic request or submit request through the “request re Depending on your insurance carrier, approval may take 3-10 days. It is highly recommended patients contact their insurance carrier directly to determine coverage.   If test is done without insurance authorization, patient may be responsible for the entire follow-up with Dr. Lorena Thapa  4. Valium and Norco given  5.   Repeat MRI of the cervical spine

## 2019-12-30 ENCOUNTER — HOSPITAL ENCOUNTER (OUTPATIENT)
Dept: MRI IMAGING | Age: 44
Discharge: HOME | End: 2019-12-30
Attending: PHYSICIAN ASSISTANT
Payer: COMMERCIAL

## 2019-12-30 DIAGNOSIS — Z98.1 S/P CERVICAL SPINAL FUSION: ICD-10-CM

## 2019-12-30 DIAGNOSIS — M54.2 NECK PAIN: ICD-10-CM

## 2019-12-30 DIAGNOSIS — R51.9 WORSENING HEADACHES: ICD-10-CM

## 2019-12-30 DIAGNOSIS — R26.9 NEUROLOGIC GAIT DYSFUNCTION: ICD-10-CM

## 2019-12-30 DIAGNOSIS — M54.12 CERVICAL MYELOPATHY WITH CERVICAL RADICULOPATHY (HCC): ICD-10-CM

## 2019-12-30 DIAGNOSIS — M54.12 CERVICAL RADICULOPATHY: ICD-10-CM

## 2019-12-30 DIAGNOSIS — G95.9 CERVICAL MYELOPATHY WITH CERVICAL RADICULOPATHY (HCC): ICD-10-CM

## 2019-12-30 DIAGNOSIS — M54.2 CERVICAL PAIN: ICD-10-CM

## 2019-12-30 PROCEDURE — 72141 MRI NECK SPINE W/O DYE: CPT | Performed by: PHYSICIAN ASSISTANT

## 2019-12-31 ENCOUNTER — TELEPHONE (OUTPATIENT)
Dept: SURGERY | Facility: CLINIC | Age: 44
End: 2019-12-31

## 2019-12-31 DIAGNOSIS — Z98.1 S/P CERVICAL SPINAL FUSION: ICD-10-CM

## 2019-12-31 DIAGNOSIS — M54.12 CERVICAL MYELOPATHY WITH CERVICAL RADICULOPATHY (HCC): ICD-10-CM

## 2019-12-31 DIAGNOSIS — G95.9 CERVICAL MYELOPATHY WITH CERVICAL RADICULOPATHY (HCC): ICD-10-CM

## 2019-12-31 DIAGNOSIS — R26.9 NEUROLOGIC GAIT DYSFUNCTION: ICD-10-CM

## 2019-12-31 DIAGNOSIS — R32 URINARY INCONTINENCE, UNSPECIFIED TYPE: ICD-10-CM

## 2019-12-31 DIAGNOSIS — M54.12 CERVICAL RADICULOPATHY: ICD-10-CM

## 2019-12-31 DIAGNOSIS — M54.2 NECK PAIN: ICD-10-CM

## 2019-12-31 DIAGNOSIS — M54.2 CERVICAL PAIN: ICD-10-CM

## 2019-12-31 DIAGNOSIS — R51.9 WORSENING HEADACHES: ICD-10-CM

## 2019-12-31 DIAGNOSIS — M50.30 DDD (DEGENERATIVE DISC DISEASE), CERVICAL: Primary | ICD-10-CM

## 2019-12-31 DIAGNOSIS — Z98.1 HX OF FUSION OF CERVICAL SPINE: ICD-10-CM

## 2020-01-02 ENCOUNTER — PATIENT MESSAGE (OUTPATIENT)
Dept: SURGERY | Facility: CLINIC | Age: 45
End: 2020-01-02

## 2020-01-02 NOTE — TELEPHONE ENCOUNTER
From: Brandy Dennisort  To: JOSH Arevalo  Sent: 1/2/2020 2:30 PM CST  Subject: Test Results Question    Hi,  I got the MRI results, which I really do not understand. Should I see you sooner then the 10?  I have been in a lot of pain and trying not t

## 2020-01-02 NOTE — TELEPHONE ENCOUNTER
LOV on 12/27/19 per JOSH Martin:    \"ASSESSMENT:  1.  C4-5 C5-6 ACDF 12/19/18 Dr. Nikita Archuleta  2.  Occipital headaches resolved  3.    C7 radiculopathy  4.  Upper and lower extremity weakness improved  5.  Urinary incontinence and retention same  6.  Ga

## 2020-01-06 NOTE — TELEPHONE ENCOUNTER
Spoke to patient regarding her insurance having changed. Unable to start prior authorization due to insurance not active. Called patient to inform. She states her insurance is changing to [facility].   She is to call us back with information

## 2020-01-06 NOTE — TELEPHONE ENCOUNTER
Spoke with Maribell Hernadez at [facility] [phone]    Per Maribell Hernadez patient is no longer active under insurance plan for [employer]. Maribell Hernadez mentioned that patient probably might have changed insurance.

## 2020-01-08 ENCOUNTER — HOSPITAL ENCOUNTER (OUTPATIENT)
Dept: ULTRASOUND IMAGING | Age: 45
Discharge: HOME | End: 2020-01-08
Attending: FAMILY MEDICINE
Payer: COMMERCIAL

## 2020-01-08 DIAGNOSIS — E04.9 GOITER: ICD-10-CM

## 2020-01-08 PROCEDURE — 76536 US EXAM OF HEAD AND NECK: CPT | Performed by: FAMILY MEDICINE

## 2020-01-10 ENCOUNTER — OFFICE VISIT (OUTPATIENT)
Dept: SURGERY | Facility: CLINIC | Age: 45
End: 2020-01-10
Payer: COMMERCIAL

## 2020-01-10 ENCOUNTER — TELEPHONE (OUTPATIENT)
Dept: SURGERY | Facility: CLINIC | Age: 45
End: 2020-01-10

## 2020-01-10 DIAGNOSIS — G95.9 CERVICAL MYELOPATHY WITH CERVICAL RADICULOPATHY (HCC): ICD-10-CM

## 2020-01-10 DIAGNOSIS — Z98.1 HX OF FUSION OF CERVICAL SPINE: ICD-10-CM

## 2020-01-10 DIAGNOSIS — M50.30 DDD (DEGENERATIVE DISC DISEASE), CERVICAL: Primary | ICD-10-CM

## 2020-01-10 DIAGNOSIS — Z98.1 S/P CERVICAL SPINAL FUSION: ICD-10-CM

## 2020-01-10 DIAGNOSIS — M54.12 CERVICAL MYELOPATHY WITH CERVICAL RADICULOPATHY (HCC): ICD-10-CM

## 2020-01-10 DIAGNOSIS — M54.2 NECK PAIN: ICD-10-CM

## 2020-01-10 DIAGNOSIS — E78.00 ELEVATED CHOLESTEROL: ICD-10-CM

## 2020-01-10 PROCEDURE — 99213 OFFICE O/P EST LOW 20 MIN: CPT | Performed by: PHYSICIAN ASSISTANT

## 2020-01-10 RX ORDER — LOVASTATIN 10 MG/1
TABLET ORAL
Qty: 30 TABLET | Refills: 0 | Status: SHIPPED | OUTPATIENT
Start: 2020-01-10 | End: 2020-03-30

## 2020-01-10 RX ORDER — PREDNISONE 10 MG/1
10 TABLET ORAL DAILY
Qty: 30 TABLET | Refills: 0 | Status: SHIPPED | OUTPATIENT
Start: 2020-01-10 | End: 2020-01-29 | Stop reason: ALTCHOICE

## 2020-01-10 NOTE — PATIENT INSTRUCTIONS
Refill policies:    • Allow 2-3 business days for refills; controlled substances may take longer.   • Contact your pharmacy at least 5 days prior to running out of medication and have them send an electronic request or submit request through the “request re Depending on your insurance carrier, approval may take 3-10 days. It is highly recommended patients contact their insurance carrier directly to determine coverage.   If test is done without insurance authorization, patient may be responsible for the entire taper  5.   Tioga therapy collar was dispensed  •

## 2020-01-10 NOTE — PROGRESS NOTES
HPI:   Vinod Narayanan is a 38year old female here to discuss ADD, mood     Pt is having more neurologic issues   Pt seeing Taylor  Back in a neck pain     Pt reports ritalin working well ;pt taking it bid   Working fairly well     Mood not great   Pt f total) by mouth See Admin Instructions. Take with Citalopram Hydrobromide 40 MG . Total dose of 60mg daily. 30 tablet 5   • Citalopram Hydrobromide 40 MG Oral Tab Take 1 tablet (40 mg total) by mouth See Admin Instructions.  Takes with Citalopram Hydrobromi bladder    • Constipation Occasionaly   • Decorative tattoo    • Depression    • Diabetes ([facility]     Diet controlled; no meds   • Diabetes mellitus ([facility]     Diet controlled   • Diarrhea, unspecified Occasionaly   • Dysesthesia    • Dyspareunia    • Easy br Danielle Yi MD at [facility] MAIN OR   • APPENDECTOMY  1991   • CERVICAL EPIDURAL STEROID INJECTION N/A 6/27/2018    Performed by Quinton Oh MD at [address] [place] [address]   • CERVICAL EPIDURAL STEROID INJECTION N/A 6/20/2018    Performed by Quinton Oh MD at [place] [address]. Sister         uterine   • BRCA gene + Sister    • Uterine Cancer Sister    • Bleeding Disorders Sister    • Cancer Paternal Cousin Female         cervical   • Cancer Sister         uterine   • Migraines Sister    • Migraines Son    • Diabetes Son    • [place] REFERRAL TO [facility]    3. Current mild episode of major depressive disorder, unspecified whether recurrent (HCC)    - OP REFERRAL TO [facility]    4. Anxiety    - OP REFERRAL TO [facility]    5.  Other chronic pain    - OP REFERRAL TO [facility]          Loi

## 2020-01-10 NOTE — PROGRESS NOTES
MARCO ANTONIO Neurosurgery follow-up        HISTORY OF PRESENT Marilyn Curran is a 38year old RH  female follow-up. She is having increasing left arm pain. She is having increasing weakness in the hands numbness in the right foot increasing neck pain. the legs she is tripping and falling. She is kicking furniture. She has had a return of her bladder spasms. Last hx:   Here in follow-up. She still states she is having some trouble starting urination she is controlling herself.   She has to bear do Medical History:   Diagnosis Date   • Abnormal uterine bleeding     • Acute bronchitis     • Acute esophagitis     • Acute upper respiratory infections of unspecified site     • Anxiety state, unspecified     • Attention deficit disorder without mention of SURGICAL HISTORY      Comment: right thumb trigger finger release   No date: OTHER SURGICAL HISTORY      Comment: ganglion cyst removed left wrist   2005: TARSAL TUNNEL RELEASE      Comment: right foot  No date: TOTAL ABDOM HYSTERECTOMY     FAMILY HISTORY: collar  Upper extremity strength:        Deltoid    Triceps     Biceps        Wrist Extension  Finger extension Thumb Abduction  Thumb adduction Intrinsics   Right         4        5         4+          4+ 5 4 4 4+ 4   Left         4        5         4 kyphosis apex at C4-5 and C5-6. On flexion she has a 1 mm anterior listhesis of C4-5 and on extension a 1 mm retrolisthesis of C4-5. She has an anterior listhesis of C5-6 on flexion.   Images were reviewed by Dr. Glory Quijano      MRI of the cervical spine from

## 2020-01-10 NOTE — TELEPHONE ENCOUNTER
Rx Request  Lovastatin 10 MG Oral Tab    Disp:     30               R: 0    Associated Dx: Elevated cholesterol    Last Visit: 01/10/20    Last Refilled: 12/06/2019    Protocol Passed?  Yes[ X ]       No[  ]

## 2020-01-10 NOTE — TELEPHONE ENCOUNTER
Patient face sheet, provider order, and insurance card faxed to [employer] at fax number:    [phone]

## 2020-01-13 ENCOUNTER — TELEPHONE (OUTPATIENT)
Dept: SURGERY | Facility: CLINIC | Age: 45
End: 2020-01-13

## 2020-01-13 NOTE — TELEPHONE ENCOUNTER
S: Bridger Lima in shower yesterday    B: S/P C4-5 C5-6 ACDF 12/19/18, LOV 12/27/19. A: Yesterday towards the end of her shower her legs \"all of a sudden\" got shaky / \"jello-ey\" and she fell, hit the top/hairline area of her head.     No LOC noted, did not s

## 2020-01-13 NOTE — TELEPHONE ENCOUNTER
Pt calling stating she fell in shower yestarday 1/12/20, Pt states she hit her head. Pt states on a scale 1-10, her pain is a 8.

## 2020-01-14 ENCOUNTER — PATIENT MESSAGE (OUTPATIENT)
Dept: SURGERY | Facility: CLINIC | Age: 45
End: 2020-01-14

## 2020-01-14 NOTE — TELEPHONE ENCOUNTER
From: Elias Horner  To: JOSH Senior  Sent: 1/14/2020 2:55 PM CST  Subject: Other    Hello,  I did not go to the hospital to get an MRI or xray after I fell. It would cost more $500 for   If you want to order and x-ray or anything please let

## 2020-01-14 NOTE — TELEPHONE ENCOUNTER
Most recent imaging completed on 12/30/19-MRI Cervical spine. 10/30/19 X-ray cervical spine. Hx: C4-5 C5-6 ACDF 12/19/18 Dr. Anabelle Laura     Patient's Elinore Luis scheduled for 1/30/20 with Dr. Anabelle Laura. Forwarded on to providers.

## 2020-01-21 ENCOUNTER — TELEPHONE (OUTPATIENT)
Dept: SURGERY | Facility: CLINIC | Age: 45
End: 2020-01-21

## 2020-01-21 NOTE — TELEPHONE ENCOUNTER
Patient states her began feeling numbness to both upper extremities off and on, also, she states when she removed her collar she feels \"drunk\" and walks funny. In speaking to patient, she was driving her car.   Explained this is not safe if her upper ext

## 2020-01-24 ENCOUNTER — OFFICE VISIT (OUTPATIENT)
Dept: SURGERY | Facility: CLINIC | Age: 45
End: 2020-01-24
Payer: COMMERCIAL

## 2020-01-24 VITALS — HEART RATE: 70 BPM | DIASTOLIC BLOOD PRESSURE: 68 MMHG | SYSTOLIC BLOOD PRESSURE: 100 MMHG

## 2020-01-24 DIAGNOSIS — M54.2 CERVICAL PAIN: ICD-10-CM

## 2020-01-24 DIAGNOSIS — R51.9 WORSENING HEADACHES: ICD-10-CM

## 2020-01-24 DIAGNOSIS — R26.9 NEUROLOGIC GAIT DYSFUNCTION: ICD-10-CM

## 2020-01-24 DIAGNOSIS — M54.12 CERVICAL MYELOPATHY WITH CERVICAL RADICULOPATHY (HCC): ICD-10-CM

## 2020-01-24 DIAGNOSIS — Z98.890 HX OF CERVICAL SPINE SURGERY: ICD-10-CM

## 2020-01-24 DIAGNOSIS — Z98.1 S/P CERVICAL SPINAL FUSION: ICD-10-CM

## 2020-01-24 DIAGNOSIS — R32 URINARY INCONTINENCE, UNSPECIFIED TYPE: ICD-10-CM

## 2020-01-24 DIAGNOSIS — Z98.1 HX OF FUSION OF CERVICAL SPINE: ICD-10-CM

## 2020-01-24 DIAGNOSIS — M54.2 NECK PAIN: ICD-10-CM

## 2020-01-24 DIAGNOSIS — G95.9 CERVICAL MYELOPATHY WITH CERVICAL RADICULOPATHY (HCC): ICD-10-CM

## 2020-01-24 DIAGNOSIS — M50.30 DDD (DEGENERATIVE DISC DISEASE), CERVICAL: Primary | ICD-10-CM

## 2020-01-24 DIAGNOSIS — M54.12 CERVICAL RADICULOPATHY: ICD-10-CM

## 2020-01-24 PROCEDURE — 99214 OFFICE O/P EST MOD 30 MIN: CPT | Performed by: PHYSICIAN ASSISTANT

## 2020-01-24 RX ORDER — HYDROCODONE BITARTRATE AND ACETAMINOPHEN 10; 325 MG/1; MG/1
1 TABLET ORAL EVERY 4 HOURS PRN
Qty: 60 TABLET | Refills: 0 | Status: SHIPPED | OUTPATIENT
Start: 2020-01-24 | End: 2020-05-17

## 2020-01-24 NOTE — PROGRESS NOTES
[facility]CO ANTONIO Neurosurgery follow-up        HISTORY OF PRESENT Rosa Gramajo is a 38year old RH  female follow-up. Since her last visit she states the prednisone did not help her between her visit on January 10 through January 16.   She was driving and h having quite severe pain at 8/10. She continues with C7 radiculopathy on the left. She has ongoing back pain and cramping in her legs. Last history: 10/28/2019  She states she did well when she was last seen back in June.   However 4 weeks ago she wo legs but overall she is doing much better with walking. She is wearing the cervical collar. Last hx: 9/21/18  here in follow-up for cervical myelopathy. She continues to have cervical pain which is an 8/10.   She continues to have bilateral C5-C6 ra conditions classified elsewhere and of unspecified site           PAST SURGICAL HISTORY:  Past Surgical History:  1991: APPENDECTOMY  2/6/2015: COLONOSCOPY N/A      Comment: Procedure: COLONOSCOPY;  Surgeon: Alfreda Cox MD;  Location: E skin, milk,dust mites  Quinolones              Unknown        REVIEW OF SYSTEMS:  Negative     PHYSICAL EXAMINATION:  GENERAL:  Patient is in no acute distress. HEENT:  Normocephalic, atraumatic    SKIN: Warm, dry, no rashes.   Anterior cervical incision i C4-C6 with mild spondylosis C6-7.   On cervical extension she does have some acquired foraminal stenosis at C6-7    MRI Brain 2/20/19 Negative  MRI TS 2/20/19 negative  MRI LS spine 2/20/19 L4-5 spondylosis    CT of the neck 2/5/19 shows normal architecture shoulder surgery 2/13/18  8. Abdominal pain with adhesions  9. Headaches and dizziness and blurry vision     PLAN:  1.   cervical collar comfort  2. Home exercise, wall glides  3. Follow-up next week with Dr. Fariha Bender  4. Cervical myelogram  5.   Vista

## 2020-01-24 NOTE — PROGRESS NOTES
Pt is here for follow up:     cervical collar: Daily     Home exercise: Daily     Prednisone taper: Completed.

## 2020-01-24 NOTE — PATIENT INSTRUCTIONS
Refill policies:    • Allow 2-3 business days for refills; controlled substances may take longer.   • Contact your pharmacy at least 5 days prior to running out of medication and have them send an electronic request or submit request through the “request re Depending on your insurance carrier, approval may take 3-10 days. It is highly recommended patients contact their insurance carrier directly to determine coverage.   If test is done without insurance authorization, patient may be responsible for the entire Annie  4. Cervical myelogram  5. Vista therapy collar   6.   Harmony for pain

## 2020-01-27 ENCOUNTER — TELEPHONE (OUTPATIENT)
Dept: SURGERY | Facility: CLINIC | Age: 45
End: 2020-01-27

## 2020-01-27 NOTE — TELEPHONE ENCOUNTER
Patient is scheduled for an imaging follow up appt with Dr Glory Quijano on 01- but her imaging is not scheduled until 02-. FYI to see if her appt with Dr Glory Quijano should be rescheduled.  Thank you

## 2020-01-27 NOTE — TELEPHONE ENCOUNTER
Please have her see Dr. Dariusz England as scheduled on January 30.   We can review the myelogram after her procedure on February 3

## 2020-01-27 NOTE — TELEPHONE ENCOUNTER
Called patient to inform she is to keep her appointment as scheduled with Dr. Jaylin Ortega on 01/30. Her Myelogram can be reviewed after her appointment .   Patient happy for the call back yes

## 2020-01-29 VITALS — HEIGHT: 65 IN | BODY MASS INDEX: 23.99 KG/M2 | WEIGHT: 144 LBS

## 2020-01-29 NOTE — PAT NURSING NOTE
Procedure Arrival Time: 1000     Arrival instructions:  Bring insurance card(s) and picture ID, Leave all valuables and jewelry at home, Wear appropriate clothing and Contacts or glasses as needed    Parking: CT biopsies: Park in blue lot, use Nvest

## 2020-01-30 ENCOUNTER — OFFICE VISIT (OUTPATIENT)
Dept: SURGERY | Facility: CLINIC | Age: 45
End: 2020-01-30
Payer: COMMERCIAL

## 2020-01-30 ENCOUNTER — TELEPHONE (OUTPATIENT)
Dept: SURGERY | Facility: CLINIC | Age: 45
End: 2020-01-30

## 2020-01-30 VITALS — HEART RATE: 86 BPM | DIASTOLIC BLOOD PRESSURE: 70 MMHG | SYSTOLIC BLOOD PRESSURE: 110 MMHG

## 2020-01-30 DIAGNOSIS — M54.2 CERVICAL PAIN: ICD-10-CM

## 2020-01-30 DIAGNOSIS — M50.30 DDD (DEGENERATIVE DISC DISEASE), CERVICAL: Primary | ICD-10-CM

## 2020-01-30 DIAGNOSIS — R32 URINARY INCONTINENCE, UNSPECIFIED TYPE: ICD-10-CM

## 2020-01-30 DIAGNOSIS — Z98.890 HX OF CERVICAL SPINE SURGERY: ICD-10-CM

## 2020-01-30 DIAGNOSIS — R51.9 WORSENING HEADACHES: ICD-10-CM

## 2020-01-30 DIAGNOSIS — Z98.1 HX OF FUSION OF CERVICAL SPINE: ICD-10-CM

## 2020-01-30 DIAGNOSIS — M54.12 CERVICAL RADICULOPATHY: ICD-10-CM

## 2020-01-30 DIAGNOSIS — Z98.1 S/P CERVICAL SPINAL FUSION: ICD-10-CM

## 2020-01-30 DIAGNOSIS — M54.12 CERVICAL MYELOPATHY WITH CERVICAL RADICULOPATHY (HCC): ICD-10-CM

## 2020-01-30 DIAGNOSIS — G95.9 CERVICAL MYELOPATHY WITH CERVICAL RADICULOPATHY (HCC): ICD-10-CM

## 2020-01-30 DIAGNOSIS — R26.9 NEUROLOGIC GAIT DYSFUNCTION: ICD-10-CM

## 2020-01-30 DIAGNOSIS — M54.2 NECK PAIN: ICD-10-CM

## 2020-01-30 PROCEDURE — 99213 OFFICE O/P EST LOW 20 MIN: CPT | Performed by: PHYSICIAN ASSISTANT

## 2020-01-30 NOTE — TELEPHONE ENCOUNTER
Pt directed to FU post myelogram w/Dr. Bonds Drop in 2-4 wks, please advise where to schedule, provider does not have availability in this time frame

## 2020-01-30 NOTE — PATIENT INSTRUCTIONS
Refill policies:    • Allow 2-3 business days for refills; controlled substances may take longer.   • Contact your pharmacy at least 5 days prior to running out of medication and have them send an electronic request or submit request through the “request re Depending on your insurance carrier, approval may take 3-10 days. It is highly recommended patients contact their insurance carrier directly to determine coverage.   If test is done without insurance authorization, patient may be responsible for the entire call with results  4. Cervical myelogram  5. Vista therapy collar   6. Norco for pain  7.  May need neurology evaluation

## 2020-01-30 NOTE — PROGRESS NOTES
MARCO ANTONIO Neurosurgery follow-up        HISTORY OF PRESENT [place] Riding is a 38year old RH  female follow-up. Left CS and shoulder pain. Unsteady with walking. Worse in the morning. Stairs unsteady. Writing declining. Urinary incontinence.      statu is unsteady she did break her toe because of a misstep. Last hx: 11/18/19  She took the Medrol Dosepak and it helped briefly. She still having quite severe pain at 8/10. She continues with C7 radiculopathy on the left.   She has ongoing back pain and c strength is much improved. Her urinary urgency is resolved. She denies any bowel bladder incontinence. Had one episode of shaking in her legs but overall she is doing much better with walking. She is wearing the cervical collar.        Last hx: 9/21/18 history of urinary (tract) infection     • Unspecified disorder of thyroid       hypothyroidism   • Unspecified viral infection, in conditions classified elsewhere and of unspecified site           PAST SURGICAL HISTORY:  Past Surgical History:  1991: HENRI Itching  Levofloxacin            Rash, Tightness in Chest  Meperidine              Nausea and vomiting  Other                       Comment:Sensitive skin, milk,dust mites  Quinolones              Unknown        REVIEW OF SYSTEMS:  Negative     PHYSICAL EX and C6-7. X-rays of the cervical spine from June 2019 show spondylosis at C3-4,  fusion from C4-C6 with mild spondylosis C6-7.   On cervical extension she does have some acquired foraminal stenosis at C6-7    MRI Brain 2/20/19 Negative  MRI TS 2/20/19 ne Urinary incontinence and retention same  6. Gait dysfunction improved  7. Status post right shoulder surgery 2/13/18  8. Abdominal pain with adhesions  9. Headaches and dizziness and blurry vision     PLAN:  1.  cervical collar comfort  2.   Home exerci

## 2020-01-30 NOTE — PROGRESS NOTES
Pt is here for follow up. Wall greyson: Pt states it does not help    Cervical myelogram: scheduled     Woolstock therapy collar: Pt states it is not helpful and uncomfortable. Norco: Pt states it is not helping.      Pt states she feels the same sinc

## 2020-01-31 NOTE — IMAGING NOTE
Left voicemail for patient regarding arrival time, NPO status,  and LOS. Instructed to hold anticoagulants, NSAIDs, and herbal supplements for x5 days. Call back number given with questions or concerns.

## 2020-02-03 ENCOUNTER — HOSPITAL ENCOUNTER (OUTPATIENT)
Dept: CT IMAGING | Facility: HOSPITAL | Age: 45
Discharge: HOME | End: 2020-02-03
Attending: PHYSICIAN ASSISTANT
Payer: COMMERCIAL

## 2020-02-03 ENCOUNTER — HOSPITAL ENCOUNTER (OUTPATIENT)
Dept: GENERAL RADIOLOGY | Facility: HOSPITAL | Age: 45
Discharge: HOME | End: 2020-02-03
Attending: PHYSICIAN ASSISTANT
Payer: COMMERCIAL

## 2020-02-03 VITALS
OXYGEN SATURATION: 97 % | DIASTOLIC BLOOD PRESSURE: 78 MMHG | RESPIRATION RATE: 18 BRPM | SYSTOLIC BLOOD PRESSURE: 102 MMHG | HEART RATE: 72 BPM

## 2020-02-03 DIAGNOSIS — M54.12 CERVICAL MYELOPATHY WITH CERVICAL RADICULOPATHY (HCC): ICD-10-CM

## 2020-02-03 DIAGNOSIS — M54.2 NECK PAIN: ICD-10-CM

## 2020-02-03 DIAGNOSIS — Z98.1 S/P CERVICAL SPINAL FUSION: ICD-10-CM

## 2020-02-03 DIAGNOSIS — G95.9 CERVICAL MYELOPATHY WITH CERVICAL RADICULOPATHY (HCC): ICD-10-CM

## 2020-02-03 DIAGNOSIS — Z98.1 HX OF FUSION OF CERVICAL SPINE: ICD-10-CM

## 2020-02-03 DIAGNOSIS — M50.30 DDD (DEGENERATIVE DISC DISEASE), CERVICAL: ICD-10-CM

## 2020-02-03 PROCEDURE — 72126 CT NECK SPINE W/DYE: CPT | Performed by: PHYSICIAN ASSISTANT

## 2020-02-03 PROCEDURE — 62302 MYELOGRAPHY LUMBAR INJECTION: CPT | Performed by: PHYSICIAN ASSISTANT

## 2020-02-03 RX ORDER — DIAZEPAM 10 MG/1
TABLET ORAL
Qty: 60 TABLET | Refills: 1 | Status: ON HOLD | OUTPATIENT
Start: 2020-02-03 | End: 2020-07-08

## 2020-02-03 NOTE — TELEPHONE ENCOUNTER
Medication: Diazepam 10 Mg     Date of last refill: 12/27/19 60 Tabs 1 Refills     [place]     Last office visit: 1/30/2020     Due back to clinic per last office note:  Follow up after Myelogram     Date next office v

## 2020-02-03 NOTE — IMAGING NOTE
Identified patient with full name and . Allergies, NPO status, , LOS and meds reviewed. Patient denies any NSAID, anticoagulant, vitamin E/ fish oil usage in x5 days. VSS. 1510 VSS. Dressing clean, dry, and intact.  Discharge instructed provide

## 2020-02-03 NOTE — IMAGING NOTE
1407:  Transfer of care from Antonieta King RN. Mrs. Florian Odom post cervical myelogram,  at bedside. Dressing to lower mid-back dry and intact. Mrs. Florian Odom reported neck and head discomfort 8/10.   Reports history of chronic neck discomfort and mi

## 2020-02-06 ENCOUNTER — TELEPHONE (OUTPATIENT)
Dept: SURGERY | Facility: CLINIC | Age: 45
End: 2020-02-06

## 2020-02-06 NOTE — TELEPHONE ENCOUNTER
Patient to be seen in office post ER visit from 02/06/20 and asking to see [facility].   Patient scheduled for 02/07/20

## 2020-02-06 NOTE — TELEPHONE ENCOUNTER
Pt calling stating she was in ER [facility] yesterday for inability to walk straight, cannot urinate, pain, headache; pt directed by ER to be seen asap, please advise where to schedule if urgent

## 2020-02-07 ENCOUNTER — OFFICE VISIT (OUTPATIENT)
Dept: SURGERY | Facility: CLINIC | Age: 45
End: 2020-02-07
Payer: COMMERCIAL

## 2020-02-07 VITALS — SYSTOLIC BLOOD PRESSURE: 100 MMHG | DIASTOLIC BLOOD PRESSURE: 70 MMHG | HEART RATE: 72 BPM

## 2020-02-07 DIAGNOSIS — R51.9 WORSENING HEADACHES: ICD-10-CM

## 2020-02-07 DIAGNOSIS — M50.30 DDD (DEGENERATIVE DISC DISEASE), CERVICAL: Primary | ICD-10-CM

## 2020-02-07 DIAGNOSIS — M54.2 NECK PAIN: ICD-10-CM

## 2020-02-07 DIAGNOSIS — R26.9 NEUROLOGIC GAIT DYSFUNCTION: ICD-10-CM

## 2020-02-07 DIAGNOSIS — R32 URINARY INCONTINENCE, UNSPECIFIED TYPE: ICD-10-CM

## 2020-02-07 DIAGNOSIS — M54.12 CERVICAL MYELOPATHY WITH CERVICAL RADICULOPATHY (HCC): ICD-10-CM

## 2020-02-07 DIAGNOSIS — Z98.890 HX OF CERVICAL SPINE SURGERY: ICD-10-CM

## 2020-02-07 DIAGNOSIS — G95.9 CERVICAL MYELOPATHY WITH CERVICAL RADICULOPATHY (HCC): ICD-10-CM

## 2020-02-07 DIAGNOSIS — Z98.1 S/P CERVICAL SPINAL FUSION: ICD-10-CM

## 2020-02-07 DIAGNOSIS — Z98.1 HX OF FUSION OF CERVICAL SPINE: ICD-10-CM

## 2020-02-07 DIAGNOSIS — M54.2 CERVICAL PAIN: ICD-10-CM

## 2020-02-07 DIAGNOSIS — M54.12 CERVICAL RADICULOPATHY: ICD-10-CM

## 2020-02-07 PROCEDURE — 99213 OFFICE O/P EST LOW 20 MIN: CPT | Performed by: PHYSICIAN ASSISTANT

## 2020-02-07 NOTE — PROGRESS NOTES
Pt is here for follow up     Pt was currently in the ER for neck pain 2/5/2020     Pt states that since  Then she has been having issues with weakness and pain in the cervical area, Pt states that she has been having issues with dizziness and vomiting.  Pt

## 2020-02-07 NOTE — PROGRESS NOTES
MARCO ANTONIO Neurosurgery follow-up        HISTORY OF PRESENT Hortensia Patel is a 38year old RH  female follow-up after a myelogram for cervical imaging on February 3, 2020. She comes in today for evaluation. She does have a spinal headache.   She state have a soft collar but the Vista therapy collar has not been approved yet. She will be changing insurances next month to a PPO week and we can dispense at next month. She continues with a left C7 radiculopathy which is 8/10. She had difficulty sleeping. pain having trouble with urinary control and having to bear down hard to void. Having increased shaking in her legs and tremors in the hands. Last history: 1/4/19  She has left-sided posterior neck pain which started last several days.   She was taking Hiatal hernia     • High blood pressure     • Hyperlipidemia     • IBS (irritable bowel syndrome)     • Irregular menstrual cycle     • Lipid screening 09/17/2011   • Lump or mass in breast     • Malaise     • Malignant hyperthermia       patient's son at smoking about 19 years ago. Her smoking use included Cigarettes. She quit after 10.00 years of use. She has never used smokeless tobacco. She reports that she drinks alcohol.  She reports that she does not use drugs.     ALLERGIES:     Demerol 1+           Left      1+           1+       2+        2+       1+                DIAGNOSTIC DATA:   EMG of the upper extremities from 5/19/17 shows possibly some early right C6-C7 radiculitis.   Negative for any peripheral nerve involvement     EMG f 2017 looks worse than this MRI due to her neck position. Cervical flexion and extension imaging were not performed. CT of the abdomen shows mild spondylosis L1-L5. Moderate spondylosis at L5-S1 with an intact pars.   No significant stenosis     Chest x

## 2020-02-07 NOTE — PATIENT INSTRUCTIONS
Refill policies:    • Allow 2-3 business days for refills; controlled substances may take longer.   • Contact your pharmacy at least 5 days prior to running out of medication and have them send an electronic request or submit request through the “request re Depending on your insurance carrier, approval may take 3-10 days. It is highly recommended patients contact their insurance carrier directly to determine coverage.   If test is done without insurance authorization, patient may be responsible for the entire neurology  4. Follow-up with Dr. King Grissom for review of her exam weakness for other etiologies  5. Vista therapy collar   6.   Pain clinic for possible blood patch

## 2020-02-10 ENCOUNTER — OFFICE VISIT (OUTPATIENT)
Dept: PAIN CLINIC | Facility: CLINIC | Age: 45
End: 2020-02-10
Payer: COMMERCIAL

## 2020-02-10 ENCOUNTER — TELEPHONE (OUTPATIENT)
Dept: PAIN CLINIC | Facility: CLINIC | Age: 45
End: 2020-02-10

## 2020-02-10 ENCOUNTER — ANESTHESIA EVENT (OUTPATIENT)
Dept: EMERGENCY DEPT | Facility: HOSPITAL | Age: 45
End: 2020-02-10
Payer: COMMERCIAL

## 2020-02-10 ENCOUNTER — APPOINTMENT (OUTPATIENT)
Dept: CT IMAGING | Facility: HOSPITAL | Age: 45
End: 2020-02-10
Attending: EMERGENCY MEDICINE
Payer: COMMERCIAL

## 2020-02-10 ENCOUNTER — HOSPITAL ENCOUNTER (EMERGENCY)
Facility: HOSPITAL | Age: 45
Discharge: HOME | End: 2020-02-10
Attending: EMERGENCY MEDICINE
Payer: COMMERCIAL

## 2020-02-10 ENCOUNTER — ANESTHESIA (OUTPATIENT)
Dept: EMERGENCY DEPT | Facility: HOSPITAL | Age: 45
End: 2020-02-10
Payer: COMMERCIAL

## 2020-02-10 VITALS
BODY MASS INDEX: 23.82 KG/M2 | HEART RATE: 87 BPM | WEIGHT: 143 LBS | DIASTOLIC BLOOD PRESSURE: 60 MMHG | HEIGHT: 65 IN | OXYGEN SATURATION: 98 % | SYSTOLIC BLOOD PRESSURE: 100 MMHG

## 2020-02-10 VITALS
WEIGHT: 143 LBS | BODY MASS INDEX: 23.82 KG/M2 | OXYGEN SATURATION: 95 % | RESPIRATION RATE: 16 BRPM | DIASTOLIC BLOOD PRESSURE: 79 MMHG | SYSTOLIC BLOOD PRESSURE: 132 MMHG | HEART RATE: 66 BPM | HEIGHT: 65 IN | TEMPERATURE: 98 F

## 2020-02-10 DIAGNOSIS — M54.2 NECK PAIN: ICD-10-CM

## 2020-02-10 DIAGNOSIS — Z98.1 HX OF FUSION OF CERVICAL SPINE: ICD-10-CM

## 2020-02-10 DIAGNOSIS — G97.1 POST-DURAL PUNCTURE HEADACHE: Primary | ICD-10-CM

## 2020-02-10 DIAGNOSIS — G97.1 SPINAL HEADACHE: Primary | ICD-10-CM

## 2020-02-10 LAB
ALBUMIN SERPL-MCNC: 4.1 G/DL (ref 3.4–5)
ALBUMIN/GLOB SERPL: 1.2 {RATIO} (ref 1–2)
ALP LIVER SERPL-CCNC: 60 U/L (ref 37–98)
ALT SERPL-CCNC: 120 U/L (ref 13–56)
ANION GAP SERPL CALC-SCNC: 3 MMOL/L (ref 0–18)
AST SERPL-CCNC: 85 U/L (ref 15–37)
BASOPHILS # BLD AUTO: 0.03 X10(3) UL (ref 0–0.2)
BASOPHILS NFR BLD AUTO: 0.5 %
BILIRUB SERPL-MCNC: 0.4 MG/DL (ref 0.1–2)
BUN BLD-MCNC: 8 MG/DL (ref 7–18)
BUN/CREAT SERPL: 9.3 (ref 10–20)
CALCIUM BLD-MCNC: 9.7 MG/DL (ref 8.5–10.1)
CHLORIDE SERPL-SCNC: 110 MMOL/L (ref 98–112)
CO2 SERPL-SCNC: 28 MMOL/L (ref 21–32)
CREAT BLD-MCNC: 0.86 MG/DL (ref 0.55–1.02)
DEPRECATED RDW RBC AUTO: 44.4 FL (ref 35.1–46.3)
EOSINOPHIL # BLD AUTO: 0.05 X10(3) UL (ref 0–0.7)
EOSINOPHIL NFR BLD AUTO: 0.8 %
ERYTHROCYTE [DISTWIDTH] IN BLOOD BY AUTOMATED COUNT: 13.1 % (ref 11–15)
GLOBULIN PLAS-MCNC: 3.3 G/DL (ref 2.8–4.4)
GLUCOSE BLD-MCNC: 96 MG/DL (ref 70–99)
HCT VFR BLD AUTO: 42.3 % (ref 35–48)
HGB BLD-MCNC: 14.1 G/DL (ref 12–16)
IMM GRANULOCYTES # BLD AUTO: 0.04 X10(3) UL (ref 0–1)
IMM GRANULOCYTES NFR BLD: 0.7 %
LYMPHOCYTES # BLD AUTO: 1.73 X10(3) UL (ref 1–4)
LYMPHOCYTES NFR BLD AUTO: 29 %
M PROTEIN MFR SERPL ELPH: 7.4 G/DL (ref 6.4–8.2)
MCH RBC QN AUTO: 30.9 PG (ref 26–34)
MCHC RBC AUTO-ENTMCNC: 33.3 G/DL (ref 31–37)
MCV RBC AUTO: 92.6 FL (ref 80–100)
MONOCYTES # BLD AUTO: 0.52 X10(3) UL (ref 0.1–1)
MONOCYTES NFR BLD AUTO: 8.7 %
NEUTROPHILS # BLD AUTO: 3.59 X10 (3) UL (ref 1.5–7.7)
NEUTROPHILS # BLD AUTO: 3.59 X10(3) UL (ref 1.5–7.7)
NEUTROPHILS NFR BLD AUTO: 60.3 %
OSMOLALITY SERPL CALC.SUM OF ELEC: 290 MOSM/KG (ref 275–295)
PLATELET # BLD AUTO: 209 10(3)UL (ref 150–450)
POTASSIUM SERPL-SCNC: 3.8 MMOL/L (ref 3.5–5.1)
RBC # BLD AUTO: 4.57 X10(6)UL (ref 3.8–5.3)
SODIUM SERPL-SCNC: 141 MMOL/L (ref 136–145)
WBC # BLD AUTO: 6 X10(3) UL (ref 4–11)

## 2020-02-10 PROCEDURE — 70450 CT HEAD/BRAIN W/O DYE: CPT | Performed by: EMERGENCY MEDICINE

## 2020-02-10 PROCEDURE — 62273 INJECT EPIDURAL PATCH: CPT | Performed by: ANESTHESIOLOGY

## 2020-02-10 PROCEDURE — 99214 OFFICE O/P EST MOD 30 MIN: CPT | Performed by: NURSE PRACTITIONER

## 2020-02-10 PROCEDURE — 96361 HYDRATE IV INFUSION ADD-ON: CPT

## 2020-02-10 PROCEDURE — 96375 TX/PRO/DX INJ NEW DRUG ADDON: CPT

## 2020-02-10 PROCEDURE — 80053 COMPREHEN METABOLIC PANEL: CPT | Performed by: EMERGENCY MEDICINE

## 2020-02-10 PROCEDURE — 99284 EMERGENCY DEPT VISIT MOD MDM: CPT

## 2020-02-10 PROCEDURE — 99285 EMERGENCY DEPT VISIT HI MDM: CPT

## 2020-02-10 PROCEDURE — 3E0S3GC INTRODUCTION OF OTHER THERAPEUTIC SUBSTANCE INTO EPIDURAL SPACE, PERCUTANEOUS APPROACH: ICD-10-PCS | Performed by: ANESTHESIOLOGY

## 2020-02-10 PROCEDURE — 85025 COMPLETE CBC W/AUTO DIFF WBC: CPT | Performed by: EMERGENCY MEDICINE

## 2020-02-10 PROCEDURE — 96374 THER/PROPH/DIAG INJ IV PUSH: CPT

## 2020-02-10 RX ORDER — KETOROLAC TROMETHAMINE 30 MG/ML
15 INJECTION, SOLUTION INTRAMUSCULAR; INTRAVENOUS ONCE
Status: COMPLETED | OUTPATIENT
Start: 2020-02-10 | End: 2020-02-10

## 2020-02-10 RX ORDER — HYDROMORPHONE HYDROCHLORIDE 1 MG/ML
1 INJECTION, SOLUTION INTRAMUSCULAR; INTRAVENOUS; SUBCUTANEOUS EVERY 30 MIN PRN
Status: DISCONTINUED | OUTPATIENT
Start: 2020-02-10 | End: 2020-02-10

## 2020-02-10 RX ORDER — SODIUM CHLORIDE 9 MG/ML
125 INJECTION, SOLUTION INTRAVENOUS CONTINUOUS
Status: DISCONTINUED | OUTPATIENT
Start: 2020-02-10 | End: 2020-02-10

## 2020-02-10 RX ORDER — ONDANSETRON 2 MG/ML
4 INJECTION INTRAMUSCULAR; INTRAVENOUS ONCE
Status: COMPLETED | OUTPATIENT
Start: 2020-02-10 | End: 2020-02-10

## 2020-02-10 RX ORDER — DIPHENHYDRAMINE HYDROCHLORIDE 50 MG/ML
25 INJECTION INTRAMUSCULAR; INTRAVENOUS ONCE
Status: COMPLETED | OUTPATIENT
Start: 2020-02-10 | End: 2020-02-10

## 2020-02-10 NOTE — ED PROVIDER NOTES
Patient Seen in: [facility] Emergency Department      History   Patient presents with:  Pain    Stated Complaint: pain, would like a blood patch    HPI    Patient a 78-year-old female who has a history of migraine headaches.   Patient also has back a • Heartburn 29 years ago   • Heavy menses    • Hematuria    • Hiatal hernia    • High cholesterol    • Hyperlipidemia    • IBS (irritable bowel syndrome)    • Irregular bowel habits    • Irregular menstrual cycle    • Itch of skin O    Occasionally on my N/A 5/1/2015    Performed by Lorena Velez MD at [facility] ENDOSCOPY   • ESOPHAGOGASTRODUODENOSCOPY (EGD) N/A 2/6/2015    Performed by Lorena Velez MD at [facility] ENDOSCOPY   • HYSTERECTOMY  7/20/2015   • HYSTEROSCOPY,ABLATION ENDOMETRIUM     • [place] Temp 98.3 °F (36.8 °C) (Oral)   Resp 16   Ht 165.1 cm (5' 5\")   Wt 64.9 kg   LMP 01/01/2004   SpO2 95%   BMI 23.80 kg/m²         Physical Exam  GENERAL: Patient resting comfortably on the cart in no acute distress.   HEENT: Extraocular muscles intact, pup blood patch performed in the emergency department. Patient's was given Dilaudid prior to this and headache did resolve on the emergency department. Patient felt comfortable going home.   Recommend follow-up with pain specialist.  Return if new or worse sy

## 2020-02-10 NOTE — ED INITIAL ASSESSMENT (HPI)
Pt was sent from pain clinic for blood patch. Pt was being seen for neck pain in the pain clinic. Pt has had a headache x 1 week. Pt states laying flat helps a small amount.

## 2020-02-10 NOTE — TELEPHONE ENCOUNTER
Medical clearance needed- No    Implanted cardiac device/SCS/PNS: NA    Pt seen in OV today by MARIO Parker and recommended for Blood patch (X 1) by Dr. Minoo Huggins. Please begin PA process for procedure(s).      Laterality: NA  Level(s): NA    Pt informed c

## 2020-02-10 NOTE — PROGRESS NOTES
Name: Sabrina Mcneil   : 1975   DOS: 2/10/2020     Pain Clinic Follow Up Visit:   Sabrina Mcneli is a 38year old female who presents for spinal headache.  Today she is c/o top of the head/frontal headache that started after she underwent a CT my 0  DICYCLOMINE HCL 20 MG Oral Tab, TAKE 1 TABLET BY MOUTH FOUR TIMES DAILY( EVERY 6 HOURS) BEFORE MEALS AND AT NIGHT AS NEEDED, Disp: 120 tablet, Rfl: 0  Citalopram Hydrobromide 20 MG Oral Tab, Take 1 tablet (20 mg total) by mouth See Admin Instructions.  Lindsey Martinez oriented, articulate. Normal gait. Psychiatric: Normal mood. Head: 10/10 headache with sitting to 4/10 headache with laying down for 2 minutes.      ASSESSMENT AND PLAN:   Post-dural puncture headache  (primary encounter diagnosis)  Hx of fusion of cervi

## 2020-02-10 NOTE — TELEPHONE ENCOUNTER
Prior authorization request completed for: Blood Patch   Authorization #No Prior Authorization/Nor Pre Determination is Required -Based on Medical Necessity.   Spoke with Naresh at [facility]  Reference #:[id number]    Maddie Cantu

## 2020-02-10 NOTE — ED NOTES
Upon entering room, pt crying. Pt states, \"I can't handle this pain anymore, did you call anesthesia? Are they coming, when is this going to be done, they said I would get a blood patch\". Reassurance given. Pt given warm blanket.  Family arrived at [facility]

## 2020-02-10 NOTE — PROGRESS NOTES
Spoke with patient and discussed scheduling process. Reviewed pre-op instructions. Patient verbalized understanding, and agreeable to holding Fish oil for 5 days (patient states she has not taken it recently).  Encouraged pt to contact office if changes in ? A family member or friend is required to stay in our waiting room because of the sedation you will receive, you may be sleepy and forgetful. You may not remember anything told to you after your procedure including discharge instructions.   ? Please park i Ibuprofen (Motrin, Advil, Vicoprofen), Naproxen (Naprosyn, Aleve), Piroxcam (Feldene), Meloxicam (Mobic)--(Cervical procedures will require 3 days), Oxaprozin (Daypro), Diclofenac (Voltaren), Indomethacin (Indocin), Etodolac (Lodine), Nabumetone (Relafen

## 2020-02-10 NOTE — ANESTHESIA PROCEDURE NOTES
Blood Patch  Performed by: Haley Saunders MD  Authorized by: Haley Saunders MD     General Information and Staff    Start Time:  2/10/2020 3:30 PM  End Time:  2/10/2020 3:39 PM  Anesthesiologist:  Haley Saunders MD  Performed b

## 2020-02-10 NOTE — PROGRESS NOTES
Patient presents in office today with reported pain in neck    Current pain level reported = 10/10    Last reported dose of HYDROcodone-acetaminophen (NORCO)  MG Oral Tab last night

## 2020-02-11 ENCOUNTER — TELEPHONE (OUTPATIENT)
Dept: FAMILY MEDICINE CLINIC | Facility: CLINIC | Age: 45
End: 2020-02-11

## 2020-02-11 ENCOUNTER — OFFICE VISIT (OUTPATIENT)
Dept: FAMILY MEDICINE CLINIC | Facility: CLINIC | Age: 45
End: 2020-02-11
Payer: COMMERCIAL

## 2020-02-11 VITALS
BODY MASS INDEX: 23.82 KG/M2 | HEIGHT: 65 IN | SYSTOLIC BLOOD PRESSURE: 104 MMHG | HEART RATE: 98 BPM | WEIGHT: 143 LBS | TEMPERATURE: 98 F | OXYGEN SATURATION: 96 % | DIASTOLIC BLOOD PRESSURE: 64 MMHG | RESPIRATION RATE: 16 BRPM

## 2020-02-11 DIAGNOSIS — J01.00 SUBACUTE MAXILLARY SINUSITIS: ICD-10-CM

## 2020-02-11 DIAGNOSIS — R11.0 NAUSEA: Primary | ICD-10-CM

## 2020-02-11 DIAGNOSIS — G97.1 SPINAL HEADACHE: ICD-10-CM

## 2020-02-11 DIAGNOSIS — R53.83 FATIGUE, UNSPECIFIED TYPE: ICD-10-CM

## 2020-02-11 PROCEDURE — 99214 OFFICE O/P EST MOD 30 MIN: CPT | Performed by: FAMILY MEDICINE

## 2020-02-11 RX ORDER — AMOXICILLIN AND CLAVULANATE POTASSIUM 875; 125 MG/1; MG/1
1 TABLET, FILM COATED ORAL 2 TIMES DAILY
Qty: 20 TABLET | Refills: 0 | Status: SHIPPED | OUTPATIENT
Start: 2020-02-11 | End: 2020-02-21

## 2020-02-11 RX ORDER — ONDANSETRON 4 MG/1
4 TABLET, ORALLY DISINTEGRATING ORAL EVERY 8 HOURS PRN
Qty: 20 TABLET | Refills: 0 | Status: SHIPPED | OUTPATIENT
Start: 2020-02-11 | End: 2020-08-04

## 2020-02-11 RX ORDER — BUPROPION HYDROCHLORIDE 150 MG/1
TABLET ORAL
COMMUNITY
Start: 2020-02-05 | End: 2020-02-12

## 2020-02-11 NOTE — TELEPHONE ENCOUNTER
Will you add this pt into your schedule tonight. Was in ER yesterday for spinal headaches, still not feeling well.

## 2020-02-11 NOTE — TELEPHONE ENCOUNTER
Patient was seen in ER yesterday for spinal headache. She is still having issues and wants to know if LE can squeeze her in today for 10 mintues. Please advise.

## 2020-02-12 ENCOUNTER — OFFICE VISIT (OUTPATIENT)
Dept: NEUROLOGY | Facility: CLINIC | Age: 45
End: 2020-02-12
Payer: COMMERCIAL

## 2020-02-12 VITALS
SYSTOLIC BLOOD PRESSURE: 108 MMHG | RESPIRATION RATE: 14 BRPM | BODY MASS INDEX: 24 KG/M2 | HEART RATE: 64 BPM | WEIGHT: 143 LBS | DIASTOLIC BLOOD PRESSURE: 60 MMHG

## 2020-02-12 DIAGNOSIS — M54.12 LEFT CERVICAL RADICULOPATHY: ICD-10-CM

## 2020-02-12 DIAGNOSIS — R29.898 WEAKNESS OF BOTH LEGS: ICD-10-CM

## 2020-02-12 DIAGNOSIS — R26.81 GAIT INSTABILITY: ICD-10-CM

## 2020-02-12 DIAGNOSIS — K58.9 IRRITABLE BOWEL SYNDROME WITHOUT DIARRHEA: ICD-10-CM

## 2020-02-12 DIAGNOSIS — R29.898 WEAKNESS OF BOTH HANDS: ICD-10-CM

## 2020-02-12 PROCEDURE — 99244 OFF/OP CNSLTJ NEW/EST MOD 40: CPT | Performed by: OTHER

## 2020-02-12 RX ORDER — DICYCLOMINE HCL 20 MG
TABLET ORAL
Qty: 120 TABLET | Refills: 0 | Status: ON HOLD | OUTPATIENT
Start: 2020-02-12 | End: 2020-07-06

## 2020-02-12 NOTE — TELEPHONE ENCOUNTER
Rx Request  DICYCLOMINE HCL 20 MG Oral Tab    Disp:       120             R: 0    Associated Dx:   Irritable bowel syndrome without diarrhea    Last Visit: 02/11/20    Last Refilled: 09/16/2019

## 2020-02-12 NOTE — PROGRESS NOTES
HPI:    Patient ID: Genaro Manjarrez is a 38year old female.   Referring provider: Dr Rodriguez Butler - [place]    HPI    Patient is a 38year old female with history of cervical disc disease post fusion surgery C4-7 in Dec 2018, lumbar disc bulging, migraines, a unspecified Occasionaly   • Dysesthesia    • Dyspareunia    • Easy bruising    • Endometriosis    • Esophageal reflux    • Essential hypertension    • Extrinsic asthma, unspecified    • Fatigue    • Flatulence/gas pain/belching 2weeks   • Food intolerance CERVICAL EPIDURAL STEROID INJECTION N/A 6/20/2018    Performed by Brandy Robles MD at [facility]   • CERVICAL FACET INJECTION Left 5/15/2019    Performed by Brandy Robles MD at [facility] ENDOSCOPY   • COLONOSCOPY N/A 2/6/2015    Performed by Sonido Marx MD at [facility]         uterine   • Migraines Sister    • Migraines Son    • Diabetes Son    • Other Son         Multiple issues   • Other Daughter         Mvp, eds      Social History    Tobacco Use      Smoking status: Former Smoker        Packs/day: 0.00 BEDTIME 30 tablet 0   • ALPRAZOLAM 0.25 MG Oral Tab TAKE 1 TABLET BY MOUTH TWICE DAILY AS NEEDED FOR ANXIETY OR SLEEP 15 tablet 0   • Methylphenidate HCl 20 MG Oral Tab Take 1 tablet (20 mg total) by mouth 2 (two) times daily.  60 tablet 0   • mirtazapine 7 Take  by mouth daily.        Allergies:  Demerol                 OTHER (SEE COMMENTS)    Comment:Difficulty breathing  Levofloxacin            RASH, Tightness in Chest  Reglan [Metoclopram*    OTHER (SEE COMMENTS)    Comment:Tardive dyskinesia  Betadine [Po Reflexes: symmetric and present, 1+ biceps and brachioradialis, 2+ triceps, 3+ patellar and 1+ achilles  Coordination: Intact   Gait: Normal based and mildly ataxic. Romberg sways to the left      IMAGING/TESTS     CT cervical myelogram- 2/3/2020  1.  L radiculopathy    (R26.81) Gait instability    Patient with status post C4-6 ACDF presenting with left C7 radiculopathy and weakness in both hands and legs and gait instability  Extensive cervical spine imaging done, cervical myelogram from 2/3/20 shows spo

## 2020-02-12 NOTE — PROGRESS NOTES
Pt reports she was referred for bulging cervical disk. Pt states [facility] told her they are not sure if surgery is warranted. She states she was told bulging disc is not well visualized.     Pt states she has been experiencing BUE numbness and tingling for 5 mon

## 2020-02-15 NOTE — PROGRESS NOTES
HPI:   Lizeth Watson is a 38year old female here to discuss sinus pain, spinal headache and nausea    Pt s/p ER visit for a blood patch for a spinal HA after pain injection   Pt reports overall better but still nauseated  Pt now can better differentiat See Admin Instructions. Take with Citalopram Hydrobromide 40 MG . Total dose of 60mg daily. 30 tablet 5   • Citalopram Hydrobromide 40 MG Oral Tab Take 1 tablet (40 mg total) by mouth See Admin Instructions. Takes with Citalopram Hydrobromide 20 MG .  Total meds   • Diabetes mellitus ([facility]     Diet controlled   • Diarrhea, unspecified Occasionaly   • Dysesthesia    • Dyspareunia    • Easy bruising    • Endometriosis    • Esophageal reflux    • Essential hypertension    • Extrinsic asthma, unspecified    • Fat INJECTION N/A 6/27/2018    Performed by Brandy Robles MD at [address] [place] [address]   • CERVICAL EPIDURAL STEROID INJECTION N/A 6/20/2018    Performed by Brandy Robles MD at [facility] MAIN OR   • CERVICAL FACET INJECTION Left 5/15/2019    Performed by Brandy Robles MD at [facility] ENDO Disorders Sister    • Cancer Paternal Cousin Female         cervical   • Cancer Sister         uterine   • Migraines Sister    • Migraines Son    • Diabetes Son    • Other Son         Multiple issues   • Other Daughter         Mvp, eds      Social History: as needed for Nausea. Dispense: 20 tablet; Refill: 0    2. Spinal headache      3. Subacute maxillary sinusitis    - Amoxicillin-Pot Clavulanate 875-125 MG Oral Tab; Take 1 tablet by mouth 2 (two) times daily for 10 days. Dispense: 20 tablet;  Refill: 0

## 2020-02-20 ENCOUNTER — OFFICE VISIT (OUTPATIENT)
Dept: SURGERY | Facility: CLINIC | Age: 45
End: 2020-02-20
Payer: COMMERCIAL

## 2020-02-20 VITALS — HEART RATE: 68 BPM | DIASTOLIC BLOOD PRESSURE: 62 MMHG | SYSTOLIC BLOOD PRESSURE: 100 MMHG

## 2020-02-20 DIAGNOSIS — M50.30 DDD (DEGENERATIVE DISC DISEASE), CERVICAL: Primary | ICD-10-CM

## 2020-02-20 DIAGNOSIS — M54.2 NECK PAIN: ICD-10-CM

## 2020-02-20 DIAGNOSIS — M54.12 CERVICAL MYELOPATHY WITH CERVICAL RADICULOPATHY (HCC): ICD-10-CM

## 2020-02-20 DIAGNOSIS — R51.9 WORSENING HEADACHES: ICD-10-CM

## 2020-02-20 DIAGNOSIS — Z98.1 S/P CERVICAL SPINAL FUSION: ICD-10-CM

## 2020-02-20 DIAGNOSIS — Z98.1 HX OF FUSION OF CERVICAL SPINE: ICD-10-CM

## 2020-02-20 DIAGNOSIS — G95.9 CERVICAL MYELOPATHY WITH CERVICAL RADICULOPATHY (HCC): ICD-10-CM

## 2020-02-20 PROCEDURE — 99213 OFFICE O/P EST LOW 20 MIN: CPT | Performed by: PHYSICIAN ASSISTANT

## 2020-02-20 NOTE — PROGRESS NOTES
Pt is here for follow up      cervical collar: Daily     Pt obtained blood patch: 2/10/2020     Pt seen Dr Wily Sin for re valuation on weakness. Per Pt headaches are better since LOV. Pt states that blood patch has helped.      Pt is scheduled to [facility]

## 2020-02-20 NOTE — PROGRESS NOTES
MARCO ANTONIO Neurosurgery follow-up        HISTORY OF PRESENT Alireza Plunkett is a 38year old RH  female here in follow up. She had a blood patch 2/10/2020. She is here to discuss CS myelogram. Mid scapular pain, Numbness. Worse sitting.  Left leg numbness take the prednisone tapers she had some modest improvement while she was on it. She continues to have symptoms her neck pain is a 6–8/10 its worst of motion. She does have a soft collar but the [place] therapy collar has not been approved yet.   She will be complaints    Last visit 2/1/19  At her last visit she was in the emergency room 1/11/19 with bladder retention she was straight cath it has improved. She has increasing neck pain having trouble with urinary control and having to bear down hard to void. Dyspareunia     • Esophageal reflux     • Essential hypertension     • Extrinsic asthma, unspecified     • Fatigue     • Glucose intolerance (pre-diabetes)       diet control   • Hiatal hernia     • High blood pressure     • Hyperlipidemia     • IBS (irrit grandmother; Hypertension in her father; Migraines in her sister and son; Other in her mother; Stroke in her maternal grandfather.     SOCIAL HISTORY:   reports that she quit smoking about 19 years ago. Her smoking use included Cigarettes.  She quit after 1 4+       5         4+ 5       Reflexes DTRs:'s last exam     Biceps   Brachioradialis   Triceps    Patellar    Ankle  Hamstring   Right      1+           1+       1+        2+       1+           Left      1+           1+       2+        2+       1+ 5/3/2018 shows a patient in somewhat lordotic posture of her cervical spine with near normal alignment. C4-5 C5-6 mild spondylosis with no significant stenosis. Her MRI from 2017 looks worse than this MRI due to her neck position.   Cervical flexion and e

## 2020-02-20 NOTE — PATIENT INSTRUCTIONS
Refill policies:    • Allow 2-3 business days for refills; controlled substances may take longer.   • Contact your pharmacy at least 5 days prior to running out of medication and have them send an electronic request or submit request through the “request re Depending on your insurance carrier, approval may take 3-10 days. It is highly recommended patients contact their insurance carrier directly to determine coverage.   If test is done without insurance authorization, patient may be responsible for the entire Follow-up 2 months  4. Follow-up with neurology  5. [place] therapy collar   6.   PT for CS

## 2020-02-25 ENCOUNTER — TELEPHONE (OUTPATIENT)
Dept: SURGERY | Facility: CLINIC | Age: 45
End: 2020-02-25

## 2020-02-25 DIAGNOSIS — Z98.1 S/P CERVICAL SPINAL FUSION: ICD-10-CM

## 2020-02-25 DIAGNOSIS — Z98.1 HX OF FUSION OF CERVICAL SPINE: ICD-10-CM

## 2020-02-25 DIAGNOSIS — M54.12 CERVICAL MYELOPATHY WITH CERVICAL RADICULOPATHY (HCC): ICD-10-CM

## 2020-02-25 DIAGNOSIS — M50.30 DDD (DEGENERATIVE DISC DISEASE), CERVICAL: Primary | ICD-10-CM

## 2020-02-25 DIAGNOSIS — G95.9 CERVICAL MYELOPATHY WITH CERVICAL RADICULOPATHY (HCC): ICD-10-CM

## 2020-02-25 DIAGNOSIS — M54.2 NECK PAIN: ICD-10-CM

## 2020-02-25 DIAGNOSIS — R51.9 WORSENING HEADACHES: ICD-10-CM

## 2020-02-26 NOTE — TELEPHONE ENCOUNTER
Called an d spoke to [facility] pharmacy     Prior Authorization is not required at this time per pharmacy     Nothing further needed at this time.

## 2020-02-27 ENCOUNTER — PROCEDURE VISIT (OUTPATIENT)
Dept: NEUROLOGY | Facility: CLINIC | Age: 45
End: 2020-02-27
Payer: COMMERCIAL

## 2020-02-27 VITALS — HEIGHT: 65 IN | WEIGHT: 142 LBS | BODY MASS INDEX: 23.66 KG/M2

## 2020-02-27 PROCEDURE — 95886 MUSC TEST DONE W/N TEST COMP: CPT | Performed by: OTHER

## 2020-02-27 PROCEDURE — 95913 NRV CNDJ TEST 13/> STUDIES: CPT | Performed by: OTHER

## 2020-02-27 NOTE — PROCEDURES
HYSICAL:    Cranial nerves grossly normal. Motor examination showed strength to be 5 of 5 throughout, however it was somewhat effort dependent. HISTORY:    Kris Mendez is a 38year old female with a complaint of generalized weakness and numbness. Nerve / Sites Muscle Latency Amplitude Rel Amp Durat Segments Distance Lat Diff Velocity     ms mV % ms  cm ms m/s   R Median - APB      Wrist APB 3.59 11.5 100 8.49 Wrist - APB 8        Elbow APB 7.86 11.6 100 8.59 Elbow - Wrist 22 4.27 52   L Median - AP Nerve / Sites Rec.  Site Onset Lat Peak Lat NP Amp PP Amp Segments Peak Diff     ms ms µV µV  ms   R Median - DigII UlnarV      Median Wrist Dig II 3.02 3.49 7.1 38.1 Median Wrist - Dig II       Ulnar Wrist Dig V 2.76 3.44 16.8 72.9 Ulnar Wrist - Median [place] L. First dorsal interosseous Ulnar C8-T1 N None None None None Normal N N N N   R. First dorsal interosseous Ulnar C8-T1 N None None None None Normal N N N N   L. Vastus medialis Femoral L2-L4 N None None None None Normal N N N N   R.  Vastus medialis Femor

## 2020-03-02 DIAGNOSIS — F90.8 ATTENTION DEFICIT HYPERACTIVITY DISORDER (ADHD), OTHER TYPE: ICD-10-CM

## 2020-03-03 RX ORDER — METHYLPHENIDATE HYDROCHLORIDE 20 MG/1
20 TABLET ORAL 2 TIMES DAILY
Qty: 60 TABLET | Refills: 0 | Status: ON HOLD | OUTPATIENT
Start: 2020-03-03 | End: 2020-07-06

## 2020-03-09 ENCOUNTER — LAB ENCOUNTER (OUTPATIENT)
Dept: LAB | Age: 45
End: 2020-03-09
Attending: FAMILY MEDICINE
Payer: COMMERCIAL

## 2020-03-09 DIAGNOSIS — R53.83 FATIGUE, UNSPECIFIED TYPE: ICD-10-CM

## 2020-03-09 LAB
ALBUMIN SERPL-MCNC: 4.2 G/DL (ref 3.4–5)
ALBUMIN/GLOB SERPL: 1.2 {RATIO} (ref 1–2)
ALP LIVER SERPL-CCNC: 53 U/L (ref 37–98)
ALT SERPL-CCNC: 24 U/L (ref 13–56)
ANION GAP SERPL CALC-SCNC: 3 MMOL/L (ref 0–18)
AST SERPL-CCNC: 13 U/L (ref 15–37)
BASOPHILS # BLD AUTO: 0.02 X10(3) UL (ref 0–0.2)
BASOPHILS NFR BLD AUTO: 0.4 %
BILIRUB SERPL-MCNC: 0.4 MG/DL (ref 0.1–2)
BUN BLD-MCNC: 13 MG/DL (ref 7–18)
BUN/CREAT SERPL: 15.1 (ref 10–20)
CALCIUM BLD-MCNC: 10.1 MG/DL (ref 8.5–10.1)
CHLORIDE SERPL-SCNC: 108 MMOL/L (ref 98–112)
CO2 SERPL-SCNC: 28 MMOL/L (ref 21–32)
CREAT BLD-MCNC: 0.86 MG/DL (ref 0.55–1.02)
DEPRECATED HBV CORE AB SER IA-ACNC: 23.9 NG/ML (ref 12–240)
DEPRECATED RDW RBC AUTO: 45.1 FL (ref 35.1–46.3)
EOSINOPHIL # BLD AUTO: 0.06 X10(3) UL (ref 0–0.7)
EOSINOPHIL NFR BLD AUTO: 1.2 %
ERYTHROCYTE [DISTWIDTH] IN BLOOD BY AUTOMATED COUNT: 13 % (ref 11–15)
GLOBULIN PLAS-MCNC: 3.4 G/DL (ref 2.8–4.4)
GLUCOSE BLD-MCNC: 101 MG/DL (ref 70–99)
HCT VFR BLD AUTO: 42.8 % (ref 35–48)
HGB BLD-MCNC: 14.5 G/DL (ref 12–16)
IMM GRANULOCYTES # BLD AUTO: 0.01 X10(3) UL (ref 0–1)
IMM GRANULOCYTES NFR BLD: 0.2 %
LYMPHOCYTES # BLD AUTO: 1.85 X10(3) UL (ref 1–4)
LYMPHOCYTES NFR BLD AUTO: 38.5 %
M PROTEIN MFR SERPL ELPH: 7.6 G/DL (ref 6.4–8.2)
MCH RBC QN AUTO: 32 PG (ref 26–34)
MCHC RBC AUTO-ENTMCNC: 33.9 G/DL (ref 31–37)
MCV RBC AUTO: 94.5 FL (ref 80–100)
MONOCYTES # BLD AUTO: 0.46 X10(3) UL (ref 0.1–1)
MONOCYTES NFR BLD AUTO: 9.6 %
NEUTROPHILS # BLD AUTO: 2.41 X10 (3) UL (ref 1.5–7.7)
NEUTROPHILS # BLD AUTO: 2.41 X10(3) UL (ref 1.5–7.7)
NEUTROPHILS NFR BLD AUTO: 50.1 %
OSMOLALITY SERPL CALC.SUM OF ELEC: 288 MOSM/KG (ref 275–295)
PATIENT FASTING Y/N/NP: NO
PLATELET # BLD AUTO: 224 10(3)UL (ref 150–450)
POTASSIUM SERPL-SCNC: 4.4 MMOL/L (ref 3.5–5.1)
RBC # BLD AUTO: 4.53 X10(6)UL (ref 3.8–5.3)
SODIUM SERPL-SCNC: 139 MMOL/L (ref 136–145)
T4 FREE SERPL-MCNC: 0.8 NG/DL (ref 0.8–1.7)
TSI SER-ACNC: 0.65 MIU/ML (ref 0.36–3.74)
VIT B12 SERPL-MCNC: 559 PG/ML (ref 193–986)
VIT D+METAB SERPL-MCNC: 56.4 NG/ML (ref 30–100)
WBC # BLD AUTO: 4.8 X10(3) UL (ref 4–11)

## 2020-03-09 PROCEDURE — 80050 GENERAL HEALTH PANEL: CPT | Performed by: FAMILY MEDICINE

## 2020-03-09 PROCEDURE — 82306 VITAMIN D 25 HYDROXY: CPT | Performed by: FAMILY MEDICINE

## 2020-03-09 PROCEDURE — 84439 ASSAY OF FREE THYROXINE: CPT | Performed by: FAMILY MEDICINE

## 2020-03-09 PROCEDURE — 82607 VITAMIN B-12: CPT | Performed by: FAMILY MEDICINE

## 2020-03-09 PROCEDURE — 36415 COLL VENOUS BLD VENIPUNCTURE: CPT | Performed by: FAMILY MEDICINE

## 2020-03-09 PROCEDURE — 82728 ASSAY OF FERRITIN: CPT | Performed by: FAMILY MEDICINE

## 2020-03-20 ENCOUNTER — TELEPHONE (OUTPATIENT)
Dept: NEUROLOGY | Facility: CLINIC | Age: 45
End: 2020-03-20

## 2020-03-20 NOTE — TELEPHONE ENCOUNTER
Spoke with patient regarding provider recommendations from [address] [place] [address]. Advised she speak with Neurosurgery regarding activity restrictions. Patient states she is still having intermittent numbness of arm.  Instructed to call if she has any other questions or [place]

## 2020-03-30 DIAGNOSIS — E78.00 ELEVATED CHOLESTEROL: ICD-10-CM

## 2020-03-31 RX ORDER — LOVASTATIN 10 MG/1
10 TABLET ORAL NIGHTLY
Qty: 30 TABLET | Refills: 0 | Status: SHIPPED | OUTPATIENT
Start: 2020-03-31 | End: 2020-05-21

## 2020-04-11 DIAGNOSIS — F41.9 ANXIETY: ICD-10-CM

## 2020-04-11 DIAGNOSIS — F32.0 CURRENT MILD EPISODE OF MAJOR DEPRESSIVE DISORDER, UNSPECIFIED WHETHER RECURRENT (HCC): ICD-10-CM

## 2020-04-12 RX ORDER — BUPROPION HYDROCHLORIDE 150 MG/1
TABLET ORAL
Qty: 90 TABLET | Refills: 0 | Status: SHIPPED | OUTPATIENT
Start: 2020-04-12 | End: 2020-06-04

## 2020-04-12 RX ORDER — CITALOPRAM 40 MG/1
TABLET ORAL
Qty: 30 TABLET | Refills: 0 | Status: SHIPPED | OUTPATIENT
Start: 2020-04-12 | End: 2020-05-18

## 2020-04-12 RX ORDER — CITALOPRAM 20 MG/1
TABLET ORAL
Qty: 30 TABLET | Refills: 0 | Status: SHIPPED | OUTPATIENT
Start: 2020-04-12 | End: 2020-05-18

## 2020-04-22 ENCOUNTER — TELEPHONE (OUTPATIENT)
Dept: SURGERY | Facility: CLINIC | Age: 45
End: 2020-04-22

## 2020-04-29 ENCOUNTER — VIRTUAL PHONE E/M (OUTPATIENT)
Dept: NEUROLOGY | Facility: CLINIC | Age: 45
End: 2020-04-29
Payer: COMMERCIAL

## 2020-04-29 DIAGNOSIS — R29.898 WEAKNESS OF BOTH LEGS: ICD-10-CM

## 2020-04-29 DIAGNOSIS — R29.898 WEAKNESS OF BOTH HANDS: ICD-10-CM

## 2020-04-29 DIAGNOSIS — M50.30 DDD (DEGENERATIVE DISC DISEASE), CERVICAL: ICD-10-CM

## 2020-04-29 PROCEDURE — 99212 OFFICE O/P EST SF 10 MIN: CPT | Performed by: OTHER

## 2020-04-29 NOTE — PROGRESS NOTES
Virtual Telephone Check-In    Shanta Casas verbally consents a Virtual/Telephone Check-In visit on 04/29/20. Patient understands and accepts financial responsibility for any deductible, co-insurance and/or co-pays associated with this service.

## 2020-05-15 DIAGNOSIS — F41.9 ANXIETY: ICD-10-CM

## 2020-05-15 DIAGNOSIS — F32.0 CURRENT MILD EPISODE OF MAJOR DEPRESSIVE DISORDER, UNSPECIFIED WHETHER RECURRENT (HCC): ICD-10-CM

## 2020-05-17 ENCOUNTER — PATIENT MESSAGE (OUTPATIENT)
Dept: SURGERY | Facility: CLINIC | Age: 45
End: 2020-05-17

## 2020-05-17 DIAGNOSIS — F90.8 ATTENTION DEFICIT HYPERACTIVITY DISORDER (ADHD), OTHER TYPE: ICD-10-CM

## 2020-05-18 RX ORDER — HYDROCODONE BITARTRATE AND ACETAMINOPHEN 10; 325 MG/1; MG/1
1 TABLET ORAL 2 TIMES DAILY PRN
Qty: 60 TABLET | Refills: 0 | Status: ON HOLD | OUTPATIENT
Start: 2020-05-18 | End: 2020-07-08

## 2020-05-18 RX ORDER — CITALOPRAM 20 MG/1
TABLET ORAL
Qty: 10 TABLET | Refills: 0 | Status: SHIPPED | OUTPATIENT
Start: 2020-05-18 | End: 2020-06-04

## 2020-05-18 RX ORDER — CITALOPRAM 40 MG/1
TABLET ORAL
Qty: 10 TABLET | Refills: 0 | Status: SHIPPED | OUTPATIENT
Start: 2020-05-18 | End: 2020-06-04

## 2020-05-18 RX ORDER — METHYLPHENIDATE HYDROCHLORIDE 20 MG/1
20 TABLET ORAL 2 TIMES DAILY
Qty: 60 TABLET | Refills: 0 | OUTPATIENT
Start: 2020-05-18

## 2020-05-18 NOTE — TELEPHONE ENCOUNTER
Norco changed to BID #[id number]. [facility] sent patient needs FU apt.    We will work on weaning

## 2020-05-18 NOTE — TELEPHONE ENCOUNTER
Medication:    Disp Refills Start End    HYDROcodone-acetaminophen (NORCO)  MG Oral Tab 60 tablet 0 1/24/2020     Sig - Route:  Take 1 tablet by mouth every 4 (four) hours as needed for Pain. - Oral    Sent to pharmacy as: HYDROcodone-Acetaminophen 10

## 2020-05-18 NOTE — TELEPHONE ENCOUNTER
Per JOSH Gray at [address] [place] [address] on 2/20/20:    \"ASSESSMENT:  1.  C4-5 C5-6 ACDF 12/19/18 Dr. Da Acosta  2.  Occipital headaches resolved  3.  C7 radiculopathy  4.  Upper and lower extremity weakness declined  5.  Urinary incontinence and retention same  6.  [place]

## 2020-05-18 NOTE — TELEPHONE ENCOUNTER
From: Jetta Blizzard  To: JOSH Albarado  Sent: 5/17/2020 9:05 AM CDT  Subject: Other    Hello,  I was doing some yard work,then the dogs got out, had to alfonso them. The next day my legs gave out, fell an hit my head.  Now I've been in bed for two d

## 2020-05-19 ENCOUNTER — OFFICE VISIT (OUTPATIENT)
Dept: SURGERY | Facility: CLINIC | Age: 45
End: 2020-05-19
Payer: MEDICAID

## 2020-05-19 VITALS — DIASTOLIC BLOOD PRESSURE: 72 MMHG | SYSTOLIC BLOOD PRESSURE: 105 MMHG | HEART RATE: 72 BPM

## 2020-05-19 DIAGNOSIS — G95.9 CERVICAL MYELOPATHY WITH CERVICAL RADICULOPATHY (HCC): ICD-10-CM

## 2020-05-19 DIAGNOSIS — R26.9 NEUROLOGIC GAIT DYSFUNCTION: ICD-10-CM

## 2020-05-19 DIAGNOSIS — M54.12 CERVICAL MYELOPATHY WITH CERVICAL RADICULOPATHY (HCC): ICD-10-CM

## 2020-05-19 DIAGNOSIS — R32 URINARY INCONTINENCE, UNSPECIFIED TYPE: ICD-10-CM

## 2020-05-19 DIAGNOSIS — Z98.1 HX OF FUSION OF CERVICAL SPINE: ICD-10-CM

## 2020-05-19 DIAGNOSIS — M50.30 DDD (DEGENERATIVE DISC DISEASE), CERVICAL: Primary | ICD-10-CM

## 2020-05-19 DIAGNOSIS — M54.2 NECK PAIN: ICD-10-CM

## 2020-05-19 PROCEDURE — 3078F DIAST BP <80 MM HG: CPT | Performed by: PHYSICIAN ASSISTANT

## 2020-05-19 PROCEDURE — 99214 OFFICE O/P EST MOD 30 MIN: CPT | Performed by: PHYSICIAN ASSISTANT

## 2020-05-19 PROCEDURE — 3074F SYST BP LT 130 MM HG: CPT | Performed by: PHYSICIAN ASSISTANT

## 2020-05-19 RX ORDER — METHYLPREDNISOLONE 4 MG/1
TABLET ORAL
Qty: 1 PACKAGE | Refills: 0 | Status: SHIPPED | OUTPATIENT
Start: 2020-05-19 | End: 2020-06-17

## 2020-05-19 NOTE — PATIENT INSTRUCTIONS
Refill policies:    • Allow 2-3 business days for refills; controlled substances may take longer.   • Contact your pharmacy at least 5 days prior to running out of medication and have them send an electronic request or submit request through the “request re Depending on your insurance carrier, approval may take 3-10 days. It is highly recommended patients contact their insurance carrier directly to determine coverage.   If test is done without insurance authorization, patient may be responsible for the entire Follow-up with neurology  5. [place] therapy collar   6.   Medrol Dosepak

## 2020-05-19 NOTE — PROGRESS NOTES
PT here for f/u. Had a fall her in her bedroom having hit her head. Having stabbing / numbing pain to neck, upper and lower extremities, having bladder leakage which has stopped after surgery last year and now returned.   Patient also states her gait is o

## 2020-05-19 NOTE — PROGRESS NOTES
AMRCO ANTONIO Neurosurgery follow-up        HISTORY OF PRESENT Karishma Muro is a 38year old RH  female here in follow up. Since her last visit she has had increasing symptoms.   She states she stumbled and fell hit her head on the headboard but did not She denied any changes in her symptoms in her upper and lower extremities with the impact. But she did find later she got more unsteady with ambulation. She was unsteady in the shower she slipped and fell due to her leg numbness.   She did not lose consci to an 8/10 it goes down between her shoulder blades and somewhat lower than what it was prior to her surgery. It worse with increased activity. She continues to have ongoing lower back pain which is old. She feels like her legs are shaky.   She is gettin worse.  She continues to drop things. The heaviness is getting worse in the arms and legs the numbness seems to be getting worse as well. She having more trouble walking.   She notices her hands and arms seem to be more shaky.       PAST MEDICAL HISTORY: multiple  No date: [facility] NEEDLE LOCALIZATION W/ SPECIMEN 1 SITE RIG*  02/2018: OTHER Right      Comment: ARTHROSCOPIC ACROMIOPLASTY LYSIS OF ADHESIONS                RIGHT SHOULDER  2001: OTHER SURGICAL HISTORY      Comment: esophagogastroduodenoscopy  No da decreased bilateral in both arms and legs. Gait intact more unsteady. Negative Chin's. Increased tone in both lower extremities left greater than the right.   With looking down she gets shaking in her torso and lower extremities with looking up this shows normal architecture changes consistent with a C4-6 ACDF no obvious modalities.     MRI of the cervical spine from 2/11/19 show artifact at C4-6 consistent with her surgery remaining levels of C2-3 C3-4 and C6-7 are intact with no acute disc herniation with neurology  5. [place] therapy collar   6. Medrol Dosepak      T.  Nic cabrales M.S., PABariC  [facility] [place] [facility]  [address] [facility], [address], [address]  [phone]

## 2020-05-20 ENCOUNTER — HOSPITAL ENCOUNTER (OUTPATIENT)
Dept: GENERAL RADIOLOGY | Age: 45
Discharge: HOME | End: 2020-05-20
Attending: PHYSICIAN ASSISTANT
Payer: MEDICAID

## 2020-05-20 ENCOUNTER — HOSPITAL ENCOUNTER (OUTPATIENT)
Dept: MRI IMAGING | Age: 45
Discharge: HOME | End: 2020-05-20
Attending: PHYSICIAN ASSISTANT
Payer: MEDICAID

## 2020-05-20 DIAGNOSIS — Z98.1 HX OF FUSION OF CERVICAL SPINE: ICD-10-CM

## 2020-05-20 DIAGNOSIS — M54.2 NECK PAIN: ICD-10-CM

## 2020-05-20 DIAGNOSIS — M54.12 CERVICAL MYELOPATHY WITH CERVICAL RADICULOPATHY (HCC): ICD-10-CM

## 2020-05-20 DIAGNOSIS — R26.9 NEUROLOGIC GAIT DYSFUNCTION: ICD-10-CM

## 2020-05-20 DIAGNOSIS — G95.9 CERVICAL MYELOPATHY WITH CERVICAL RADICULOPATHY (HCC): ICD-10-CM

## 2020-05-20 DIAGNOSIS — M50.30 DDD (DEGENERATIVE DISC DISEASE), CERVICAL: ICD-10-CM

## 2020-05-20 DIAGNOSIS — R32 URINARY INCONTINENCE, UNSPECIFIED TYPE: ICD-10-CM

## 2020-05-20 PROCEDURE — 72141 MRI NECK SPINE W/O DYE: CPT | Performed by: PHYSICIAN ASSISTANT

## 2020-05-20 PROCEDURE — 72050 X-RAY EXAM NECK SPINE 4/5VWS: CPT | Performed by: PHYSICIAN ASSISTANT

## 2020-05-21 ENCOUNTER — TELEPHONE (OUTPATIENT)
Dept: FAMILY MEDICINE CLINIC | Facility: CLINIC | Age: 45
End: 2020-05-21

## 2020-05-21 DIAGNOSIS — E78.00 ELEVATED CHOLESTEROL: ICD-10-CM

## 2020-05-21 RX ORDER — LOVASTATIN 10 MG/1
10 TABLET ORAL NIGHTLY
Qty: 30 TABLET | Refills: 0 | Status: SHIPPED | OUTPATIENT
Start: 2020-05-21 | End: 2020-07-27

## 2020-05-22 ENCOUNTER — OFFICE VISIT (OUTPATIENT)
Dept: SURGERY | Facility: CLINIC | Age: 45
End: 2020-05-22
Payer: MEDICAID

## 2020-05-22 VITALS — HEART RATE: 66 BPM | DIASTOLIC BLOOD PRESSURE: 82 MMHG | SYSTOLIC BLOOD PRESSURE: 120 MMHG

## 2020-05-22 DIAGNOSIS — G95.9 CERVICAL MYELOPATHY WITH CERVICAL RADICULOPATHY (HCC): ICD-10-CM

## 2020-05-22 DIAGNOSIS — M54.2 NECK PAIN: ICD-10-CM

## 2020-05-22 DIAGNOSIS — Z98.1 HX OF FUSION OF CERVICAL SPINE: ICD-10-CM

## 2020-05-22 DIAGNOSIS — M50.30 DDD (DEGENERATIVE DISC DISEASE), CERVICAL: Primary | ICD-10-CM

## 2020-05-22 DIAGNOSIS — Z98.1 S/P CERVICAL SPINAL FUSION: ICD-10-CM

## 2020-05-22 DIAGNOSIS — R26.9 NEUROLOGIC GAIT DYSFUNCTION: ICD-10-CM

## 2020-05-22 DIAGNOSIS — R32 URINARY INCONTINENCE, UNSPECIFIED TYPE: ICD-10-CM

## 2020-05-22 DIAGNOSIS — M54.12 CERVICAL MYELOPATHY WITH CERVICAL RADICULOPATHY (HCC): ICD-10-CM

## 2020-05-22 PROCEDURE — 99214 OFFICE O/P EST MOD 30 MIN: CPT | Performed by: PHYSICIAN ASSISTANT

## 2020-05-22 NOTE — PATIENT INSTRUCTIONS
Refill policies:    • Allow 2-3 business days for refills; controlled substances may take longer.   • Contact your pharmacy at least 5 days prior to running out of medication and have them send an electronic request or submit request through the “request re Depending on your insurance carrier, approval may take 3-10 days. It is highly recommended patients contact their insurance carrier directly to determine coverage.   If test is done without insurance authorization, patient may be responsible for the entire neurological findings on exam.  2.  Home exercise, wall glides  3. Follow-up next Thursday with Dr. Flaca Winston to discuss her cervical pathology  4. Follow-up with neurology  5.   [place] therapy collar

## 2020-05-22 NOTE — PROGRESS NOTES
Pt is here for follow up to review imaging     Imaging: MRI of the cervical and Xray of the cervical

## 2020-05-22 NOTE — PROGRESS NOTES
MARCO ANTONIO Neurosurgery follow-up        HISTORY OF PRESENT Hortensia Patel is a 38year old RH  female here in follow up after MRI and XR cervical spine. She continues to have cervical pain is a 6 to an 8/10.   It is at the upper part of her neck she is evaluation. She does have a spinal headache. She states is dependent when she is up is worse when she lays down its better she thinks it might be getting worse over the last couple of days. She is pushing fluids and caffeine.   She denies any fall since She has mild back pain and spasms she is getting increasing occipital headaches. She still getting leg cramping. She is have urinary urgency she is tripping and she is unsteady she did break her toe because of a misstep.     Last hx: 11/18/19  She took t Norco 5 mg tablets 2 every 6 hours. She was taking 5 mg of Valium as well. She denies any arm pain she has had occasional numbness in her hands. She feels like her strength is much improved. Her urinary urgency is resolved.   She denies any bowel bladde years of age - 81   • Mastodynia     • Migraines     • Other screening mammogram 06/11/2012   • Pap smear for cervical cancer screening 7-3-2012     wnl   • Personal history of urinary (tract) infection     • Unspecified disorder of thyroid       hypothy Other (See Comments)    Comment:Difficulty breathing  Reglan [Metoclopram*    Other (See Comments)    Comment:Tardive dyskinesia  Betadine [Povidone *    Itching  Levofloxacin            Rash, Tightness in Chest  Meperidine              Nausea and vomiting possibly some early right C6-C7 radiculitis. Negative for any peripheral nerve involvement     EMG from 2012 was normal     IMAGING:  XR CS 5/20/2020 C4-6 ACDF on flexion she has normal alignment at C3-4 on extension she has a retrolisthesis.     MRI of th Da Acosta      MRI of the cervical spine from 5/3/2018 shows a patient in somewhat lordotic posture of her cervical spine with near normal alignment. C4-5 C5-6 mild spondylosis with no significant stenosis.   Her MRI from 2017 looks worse than this MRI due to

## 2020-05-28 ENCOUNTER — PATIENT MESSAGE (OUTPATIENT)
Dept: SURGERY | Facility: CLINIC | Age: 45
End: 2020-05-28

## 2020-05-28 ENCOUNTER — TELEPHONE (OUTPATIENT)
Dept: SURGERY | Facility: CLINIC | Age: 45
End: 2020-05-28

## 2020-05-28 ENCOUNTER — OFFICE VISIT (OUTPATIENT)
Dept: SURGERY | Facility: CLINIC | Age: 45
End: 2020-05-28
Payer: MEDICAID

## 2020-05-28 VITALS — HEART RATE: 68 BPM | DIASTOLIC BLOOD PRESSURE: 74 MMHG | SYSTOLIC BLOOD PRESSURE: 118 MMHG

## 2020-05-28 DIAGNOSIS — M50.30 DDD (DEGENERATIVE DISC DISEASE), CERVICAL: Primary | ICD-10-CM

## 2020-05-28 DIAGNOSIS — G95.9 CERVICAL MYELOPATHY WITH CERVICAL RADICULOPATHY (HCC): ICD-10-CM

## 2020-05-28 DIAGNOSIS — Z98.1 HX OF FUSION OF CERVICAL SPINE: ICD-10-CM

## 2020-05-28 DIAGNOSIS — R26.9 NEUROLOGIC GAIT DYSFUNCTION: ICD-10-CM

## 2020-05-28 DIAGNOSIS — M54.12 CERVICAL MYELOPATHY WITH CERVICAL RADICULOPATHY (HCC): ICD-10-CM

## 2020-05-28 DIAGNOSIS — R32 URINARY INCONTINENCE, UNSPECIFIED TYPE: ICD-10-CM

## 2020-05-28 PROCEDURE — 99214 OFFICE O/P EST MOD 30 MIN: CPT | Performed by: PHYSICIAN ASSISTANT

## 2020-05-28 NOTE — PATIENT INSTRUCTIONS
Refill policies:    • Allow 2-3 business days for refills; controlled substances may take longer.   • Contact your pharmacy at least 5 days prior to running out of medication and have them send an electronic request or submit request through the “request re Depending on your insurance carrier, approval may take 3-10 days. It is highly recommended patients contact their insurance carrier directly to determine coverage.   If test is done without insurance authorization, patient may be responsible for the entire Home exercise, wall glides  3. Follow-up postop  4. Follow-up with neurology  5. [place] therapy collar   6.  Dr Louise Mcqueen for urodynamic for neurological bladder

## 2020-05-28 NOTE — PROGRESS NOTES
Pt is here for follow up. Pt was last seen in the office Friday. Pt states that since LOV she had noticed on Tuesday she has lost all function of her bladder.

## 2020-05-28 NOTE — PROGRESS NOTES
MARCO ANTONIO Neurosurgery follow-up        HISTORY OF PRESENT Rosa Gramajo is a 41year old RH  female here in follow up. Tuesday woke with urinary incontinence. Saw urology Dr Maddie Ortiz for bladder 2019 and did not find any cause. Using cane.      Last hx: 5/ scapular pain, Numbness. Worse sitting. Left leg numbness. Last hx:  follow-up after a myelogram for cervical imaging on February 3, 2020. She comes in today for evaluation. She does have a spinal headache.   She states is dependent when she is up is changing insurances next month to a PPO week and we can dispense at next month. She continues with a left C7 radiculopathy which is 8/10. She had difficulty sleeping. She has mild back pain and spasms she is getting increasing occipital headaches.   She increased shaking in her legs and tremors in the hands. Last history: 1/4/19  She has left-sided posterior neck pain which started last several days. She was taking Norco 5 mg tablets 2 every 6 hours. She was taking 5 mg of Valium as well.   She denies syndrome)     • Irregular menstrual cycle     • Lipid screening 09/17/2011   • Lump or mass in breast     • Malaise     • Malignant hyperthermia       patient's son at 3years of age - 80   • Mastodynia     • Migraines     • Other screening mammogram 06/ of use. She has never used smokeless tobacco. She reports that she drinks alcohol.  She reports that she does not use drugs.     ALLERGIES:     Demerol                 Other (See Comments)    Comment:Difficulty breathing  Reglan [Metoclopram*    Other (See Brachioradialis   Triceps    Patellar    Ankle  Hamstring   Right      1+           1+       1+        3+       3+           Left      1+           1+       1+        3+       3+                DIAGNOSTIC DATA:   EMG 2/27/2020 no radiculopathy    EMG of th C5-6    X-ray of the cervical spine 5/3/18 shows loss of cervical lordosis with a kyphosis apex at C4-5 and C5-6. On flexion she has a 1 mm anterior listhesis of C4-5 and on extension a 1 mm retrolisthesis of C4-5.   She has an anterior listhesis of C5-6 o

## 2020-05-29 NOTE — TELEPHONE ENCOUNTER
From: Osmin Loaiza  To: JOSH Plasencia  Sent: 5/28/2020 5:01 PM CDT  Subject: Visit Follow-up Question    Hi, so are we trying for surgery or am I supposed follow up with urology? It says on after visit about urology not surgery.   Also, few ques

## 2020-05-29 NOTE — TELEPHONE ENCOUNTER
You are scheduled for POSTERIOR CERVICAL LAMINECTOMY FORAMINOTOMY 3 LEV on 07/06/20 with Dee Katz     Pre-op instructions discussed with patient and surgical packet provided:    · You will need to contact the Pre-admission department at [phone].  You w clothes  Do not use any powders, creams, lotions or sprays on your body as these attract bacteria (germs)  Deodorant and facial creams are acceptable.    · (Laundering/cleaning: Chlorhexidine gluconate skin cleansers will cause stains if used with chlorine cancelled and re-evaluated after 14 days. When speaking with Pre-admissions you will be told about a spine class as it relates to your surgery. Although the class is not mandatory, you are strongly encouraged to attend.  Make sure to bring your surgical

## 2020-05-29 NOTE — TELEPHONE ENCOUNTER
Honoriohart message to provider noted. At [address] [place] [address] on 5/28/20 with Dr. Ulices Buckley and JOSH Chavez:    \"ASSESSMENT:  1.  C4-5 C5-6 ACDF 12/19/18 Dr. Ulices Buckley  2.  Occipital headaches resolved  3.  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  Upper and lower extremity weakness declined  5.

## 2020-06-01 DIAGNOSIS — F90.8 ATTENTION DEFICIT HYPERACTIVITY DISORDER (ADHD), OTHER TYPE: ICD-10-CM

## 2020-06-01 RX ORDER — METHYLPHENIDATE HYDROCHLORIDE 20 MG/1
20 TABLET ORAL 2 TIMES DAILY
Qty: 60 TABLET | Refills: 0 | OUTPATIENT
Start: 2020-06-01

## 2020-06-01 NOTE — TELEPHONE ENCOUNTER
Methylphenidate to [facility] on mykel - Also, Neck surgery scheduled July 9th

## 2020-06-02 DIAGNOSIS — F90.8 ATTENTION DEFICIT HYPERACTIVITY DISORDER (ADHD), OTHER TYPE: ICD-10-CM

## 2020-06-02 RX ORDER — METHYLPHENIDATE HYDROCHLORIDE 20 MG/1
20 TABLET ORAL 2 TIMES DAILY
Qty: 60 TABLET | Refills: 0 | OUTPATIENT
Start: 2020-06-02

## 2020-06-04 NOTE — PROGRESS NOTES
Virtual Telephone Check-In    Jaida Gregory verbally consents to a Virtual/Telephone Check-In visit on 06/04/20. Patient has been referred to the [facility] website at [email or web address] to review the yearly Consent to Treat document.     Patient unders appetite    Current Outpatient Medications   Medication Sig Dispense Refill   • Lovastatin 10 MG Oral Tab Take 1 tablet (10 mg total) by mouth nightly.  AT BEDTIME 30 tablet 0   • methylPREDNISolone (MEDROL) 4 MG Oral Tablet Therapy Pack As prescribed 1 Pac daily.       • Melatonin 10 MG Oral Cap Take 10 mg by mouth nightly. • Calcium Carbonate-Vitamin D (CALCIUM 600 + D OR) Take 1 tablet by mouth 2 (two) times daily.        • Montelukast Sodium (SINGULAIR) 10 MG Oral Tab Take 1 tablet (10 mg total) by m • Lipid screening 09/17/2011   • Loss of appetite    • Lump or mass in breast    • Malaise    • Malignant hyperthermia     patient's son at 3years of age - 2006   • Mastodynia    • Menses painful    • Migraines    • Nausea 2weeks   • Night sweats    • O LYSIS OF ADHESIONS     • V[place] LOCALIZATION WIRE 1 SITE RIGHT (NQL=[id number])     • OTHER Right 02/2018    ARTHROSCOPIC ACROMIOPLASTY LYSIS OF ADHESIONS RIGHT SHOULDER   • OTHER  12/19/2018    ANTERIOR DISCECTOMY AND FUSION.  CERVICAL 4-CERVICAL 5 AND CERVICAL 5- . Children: 3.    Exercise: minimal.  Diet: watches minimally     REVIEW OF SYSTEMS:   GENERAL: feels well otherwise  SKIN: denies any unusual skin lesions  EYES:denies blurred vision or double vision  HEENT: denies nasal congestion, sinus pain or ST  LUNGS

## 2020-06-05 ENCOUNTER — TELEPHONE (OUTPATIENT)
Dept: FAMILY MEDICINE CLINIC | Facility: CLINIC | Age: 45
End: 2020-06-05

## 2020-06-10 DIAGNOSIS — F41.9 ANXIETY: ICD-10-CM

## 2020-06-10 RX ORDER — ALPRAZOLAM 0.25 MG/1
0.25 TABLET ORAL 2 TIMES DAILY PRN
Qty: 15 TABLET | Refills: 0 | Status: SHIPPED | OUTPATIENT
Start: 2020-06-10 | End: 2020-11-09 | Stop reason: ALTCHOICE

## 2020-06-11 ENCOUNTER — PATIENT MESSAGE (OUTPATIENT)
Dept: SURGERY | Facility: CLINIC | Age: 45
End: 2020-06-11

## 2020-06-11 NOTE — TELEPHONE ENCOUNTER
From: Mina Alexander  To:  JOSH Zhao  Sent: 6/11/2020 3:26 PM CDT  Subject: Non-Urgent Medical Question    Hello,  On Monday night I turned my head to the right to look at something and I got a horrible pain that shot down the left back side of

## 2020-06-17 RX ORDER — MULTIVITAMIN
1 TABLET ORAL DAILY
COMMUNITY

## 2020-06-17 NOTE — TELEPHONE ENCOUNTER
Prior authorization request completed for:  Posterior Cervical Laminectomy Foraminotomy  Authorization #NO AUTHORIZATION REQUIRED  Authorization dates: 7-6-2020  CPT codes approved: 35640,74517,45846,06406,54529  Number of visits/dates of service approved:

## 2020-06-25 ENCOUNTER — TELEPHONE (OUTPATIENT)
Dept: FAMILY MEDICINE CLINIC | Facility: CLINIC | Age: 45
End: 2020-06-25

## 2020-06-25 ENCOUNTER — LABORATORY ENCOUNTER (OUTPATIENT)
Dept: LAB | Facility: HOSPITAL | Age: 45
End: 2020-06-25
Attending: NEUROLOGICAL SURGERY
Payer: MEDICAID

## 2020-06-25 DIAGNOSIS — G95.9 CERVICAL MYELOPATHY WITH CERVICAL RADICULOPATHY (HCC): ICD-10-CM

## 2020-06-25 DIAGNOSIS — M54.12 CERVICAL MYELOPATHY WITH CERVICAL RADICULOPATHY (HCC): ICD-10-CM

## 2020-06-25 PROCEDURE — 87081 CULTURE SCREEN ONLY: CPT

## 2020-06-25 PROCEDURE — 80053 COMPREHEN METABOLIC PANEL: CPT

## 2020-06-25 PROCEDURE — 85730 THROMBOPLASTIN TIME PARTIAL: CPT

## 2020-06-25 PROCEDURE — 85025 COMPLETE CBC W/AUTO DIFF WBC: CPT

## 2020-06-25 PROCEDURE — 85610 PROTHROMBIN TIME: CPT

## 2020-06-25 PROCEDURE — 36415 COLL VENOUS BLD VENIPUNCTURE: CPT

## 2020-06-25 NOTE — TELEPHONE ENCOUNTER
Dr. Olamide Barros,  See below. Patient had phone visit on 6/4 - she thought you were going to order labs due to bruising? See labs she had done with Dr. Da Acosta today and advise.

## 2020-06-25 NOTE — TELEPHONE ENCOUNTER
PT CALLING REGARDING LABS THAT DR. BOONE WAS GOING TO PUT IN FOR PATIENT    IRON THYROID AND IF LE WANTS TO ORDER ANYTHING ELSE    PLEASE ADVISE

## 2020-07-01 NOTE — TELEPHONE ENCOUNTER
Message below noted. Patient also has PCP appointment scheduled for 7/3/2020 at 9:30. Office will be closed 7/3/2020 so nursing will not be able to check if patient has been cleared prior to surgery Monday morning 7/6/2020.      PCP clearance and PA pending

## 2020-07-01 NOTE — TELEPHONE ENCOUNTER
Sara Pandya from [facility] Verfication at [facility] pt no longer had Medicaid pt is now [address] states ID# is [id number], i was unable to get eligibility. Sara Pandya states pt needs new PA started.

## 2020-07-01 NOTE — TELEPHONE ENCOUNTER
Prior Authorization for inpatient surgery initiated with [employer].   Requesting coverage for:Posterior Cervical Laminectomy Foraminotomy    Date of Service: 7/6/20   Inpatient days requested:1  CPT codes: 38258,49607,28787,[id number]    Request for s

## 2020-07-02 NOTE — TELEPHONE ENCOUNTER
Nilam Ortega completed peer to peer and authorization was given. Information will be faxed to our office. Auth #: [id number]    PCP clearance appt 7/3/2020.

## 2020-07-02 NOTE — TELEPHONE ENCOUNTER
Nathan Cat [phone] and spoke with Shani MILTON. She transferred me to the nurse . I spoke with Nando BHAT and she said because it is a multiple level surgery she can not approve it has to be done by a medical director.  She asked if patient is having

## 2020-07-02 NOTE — TELEPHONE ENCOUNTER
Called [facility]y the surgery has been denied. In brief the medical director said that the MRI and chart notes do not show the patient has a compressed nerve root or a ruptured disc at the level for the surgery.      Peer to Peer can be scheduled (possibly tod

## 2020-07-02 NOTE — TELEPHONE ENCOUNTER
Called [facility] to arrange Peer to Peer for Dr. Dariusz England to complete. Spoke with Ms. Dash Gottlieb. Dr. Dariusz England to have P 2 P with Viktoriya Sanches pain management physician. Routed to Dr. Dariusz England for notification.

## 2020-07-03 ENCOUNTER — OFFICE VISIT (OUTPATIENT)
Dept: FAMILY MEDICINE CLINIC | Facility: CLINIC | Age: 45
End: 2020-07-03
Payer: MEDICAID

## 2020-07-03 ENCOUNTER — LAB ENCOUNTER (OUTPATIENT)
Dept: LAB | Facility: HOSPITAL | Age: 45
End: 2020-07-03
Attending: NEUROLOGICAL SURGERY
Payer: MEDICAID

## 2020-07-03 VITALS
SYSTOLIC BLOOD PRESSURE: 113 MMHG | BODY MASS INDEX: 25.99 KG/M2 | HEIGHT: 65 IN | TEMPERATURE: 98 F | WEIGHT: 156 LBS | OXYGEN SATURATION: 98 % | RESPIRATION RATE: 16 BRPM | HEART RATE: 92 BPM | DIASTOLIC BLOOD PRESSURE: 68 MMHG

## 2020-07-03 DIAGNOSIS — Z01.818 PREOP EXAMINATION: Primary | ICD-10-CM

## 2020-07-03 DIAGNOSIS — M54.12 CERVICAL MYELOPATHY WITH CERVICAL RADICULOPATHY (HCC): ICD-10-CM

## 2020-07-03 DIAGNOSIS — J45.20 MILD INTERMITTENT ASTHMA WITHOUT COMPLICATION: ICD-10-CM

## 2020-07-03 DIAGNOSIS — G89.29 OTHER CHRONIC PAIN: ICD-10-CM

## 2020-07-03 DIAGNOSIS — G47.09 OTHER INSOMNIA: ICD-10-CM

## 2020-07-03 DIAGNOSIS — F90.8 ATTENTION DEFICIT HYPERACTIVITY DISORDER (ADHD), OTHER TYPE: ICD-10-CM

## 2020-07-03 DIAGNOSIS — F41.9 ANXIETY: ICD-10-CM

## 2020-07-03 DIAGNOSIS — F32.0 CURRENT MILD EPISODE OF MAJOR DEPRESSIVE DISORDER, UNSPECIFIED WHETHER RECURRENT (HCC): ICD-10-CM

## 2020-07-03 DIAGNOSIS — E78.00 ELEVATED CHOLESTEROL: ICD-10-CM

## 2020-07-03 DIAGNOSIS — G95.9 CERVICAL MYELOPATHY WITH CERVICAL RADICULOPATHY (HCC): ICD-10-CM

## 2020-07-03 DIAGNOSIS — M50.30 DDD (DEGENERATIVE DISC DISEASE), CERVICAL: ICD-10-CM

## 2020-07-03 PROCEDURE — 99214 OFFICE O/P EST MOD 30 MIN: CPT | Performed by: FAMILY MEDICINE

## 2020-07-03 NOTE — PROGRESS NOTES
Daniel Valdes is a 41year old female who presents for preop clearance  Dr. Dariusz England reqesting clearance for     CERVICAL 3-4, CERVICAL 4-5, CERVICAL 5-6 LAMINOPLASTY. PARTIAL CERVICAL 2 AND CERVICAL 7 LAMINOPLASTY.  POSSIBLE CERVICAL 6-7  FUSION WITH ALL R Dispense Refill   • Trospium Chloride ER 60 MG Oral Capsule SR 24 Hr Take 1 capsule (60 mg total) by mouth daily. 30 capsule 11   • Multiple Vitamin Oral Tab Take 1 tablet by mouth daily.      • ALPRAZolam 0.25 MG Oral Tab Take 1 tablet (0.25 mg total) by m tablet by mouth 2 (two) times daily. • Montelukast Sodium (SINGULAIR) 10 MG Oral Tab Take 1 tablet (10 mg total) by mouth nightly.  30 tablet 11   • VENTOLIN  (90 BASE) MCG/ACT Inhalation Aero Soln Inhale 2 puffs into the lungs every 6 (six) ho mammogram 06/11/2012   • Pain in joints    • Pain with bowel movements Years   • Pap smear for cervical cancer screening 7-3-2012    wnl   • Personal history of urinary (tract) infection    • Pneumonia due to organism    • PONV (postoperative nausea and vo SCREWS   • OTHER SURGICAL HISTORY  2001    esophagogastroduodenoscopy   • OTHER SURGICAL HISTORY      right thumb trigger finger release    • OTHER SURGICAL HISTORY      ganglion cyst removed left wrist    • OTHER SURGICAL HISTORY  10/30/2019    Cystoscopy of breath with exertion  CARDIOVASCULAR: denies chest pain on exertion  GI: denies abdominal pain,denies heartburn  : denies nocturia or changes in stream  MUSCULOSKELETAL: denies back pain  NEURO: denies headaches  PSYCHE: denies depression or anxiety

## 2020-07-04 LAB — SARS-COV-2 RNA RESP QL NAA+PROBE: NOT DETECTED

## 2020-07-05 ENCOUNTER — ANESTHESIA EVENT (OUTPATIENT)
Dept: SURGERY | Facility: HOSPITAL | Age: 45
DRG: 520 | End: 2020-07-05
Payer: MEDICAID

## 2020-07-05 NOTE — ANESTHESIA PREPROCEDURE EVALUATION
PRE-OP EVALUATION    Patient Name: Hiwot Bocanegra    Pre-op Diagnosis: DDD (degenerative disc disease), cervical [M50.30]  Hx of fusion of cervical spine [Z98.1]  Cervical myelopathy with cervical radiculopathy [M47.12]    Procedure(s):  CERVICAL 3-4, CE needed for Nausea., Disp: 20 tablet, Rfl: 0  DIAZEPAM 10 MG Oral Tab, TAKE 1 TABLET BY MOUTH EVERY 12 HOURS AS NEEDED FOR SPASMS, Disp: 60 tablet, Rfl: 1  Meth-Hyo-M Bl-Na Phos-Ph Sal (URIBEL) 118 MG Oral Cap, Take 1 tablet by mouth 4 (four) times daily as Procedure Laterality Date   • ABDOMINAL HYSTERECTOMY, BSO Bilateral 7/20/2015    Performed by Eleanor Acosta MD at [address]   • ANTERIOR CERVICAL FUSION BG & INST 2 LEVEL N/A 12/19/2018    Performed by Warren Danielle MD at [facility] MAIN OR   • Kyler [address] Cigarettes        Quit date: 1999        Years since quittin.4      Smokeless tobacco: Never Used    Alcohol use: Yes      Frequency: 4 or more times a week      Drinks per session: 1 or 2      Binge frequency: Less than monthly      Comment: homa

## 2020-07-06 ENCOUNTER — HOSPITAL ENCOUNTER (INPATIENT)
Facility: HOSPITAL | Age: 45
LOS: 3 days | Discharge: HOME | DRG: 520 | End: 2020-07-09
Attending: NEUROLOGICAL SURGERY | Admitting: NEUROLOGICAL SURGERY
Payer: MEDICAID

## 2020-07-06 ENCOUNTER — APPOINTMENT (OUTPATIENT)
Dept: GENERAL RADIOLOGY | Facility: HOSPITAL | Age: 45
DRG: 520 | End: 2020-07-06
Attending: NEUROLOGICAL SURGERY
Payer: MEDICAID

## 2020-07-06 ENCOUNTER — ANESTHESIA (OUTPATIENT)
Dept: SURGERY | Facility: HOSPITAL | Age: 45
DRG: 520 | End: 2020-07-06
Payer: MEDICAID

## 2020-07-06 DIAGNOSIS — M54.12 CERVICAL MYELOPATHY WITH CERVICAL RADICULOPATHY (HCC): Primary | ICD-10-CM

## 2020-07-06 DIAGNOSIS — G95.9 CERVICAL MYELOPATHY WITH CERVICAL RADICULOPATHY (HCC): Primary | ICD-10-CM

## 2020-07-06 DIAGNOSIS — M50.30 DDD (DEGENERATIVE DISC DISEASE), CERVICAL: ICD-10-CM

## 2020-07-06 DIAGNOSIS — F90.8 ATTENTION DEFICIT HYPERACTIVITY DISORDER (ADHD), OTHER TYPE: ICD-10-CM

## 2020-07-06 DIAGNOSIS — F41.9 ANXIETY: ICD-10-CM

## 2020-07-06 DIAGNOSIS — F32.0 CURRENT MILD EPISODE OF MAJOR DEPRESSIVE DISORDER, UNSPECIFIED WHETHER RECURRENT (HCC): ICD-10-CM

## 2020-07-06 DIAGNOSIS — Z98.1 HX OF FUSION OF CERVICAL SPINE: ICD-10-CM

## 2020-07-06 DIAGNOSIS — K58.9 IRRITABLE BOWEL SYNDROME WITHOUT DIARRHEA: ICD-10-CM

## 2020-07-06 LAB
GLUCOSE BLD-MCNC: 157 MG/DL (ref 70–99)
GLUCOSE BLD-MCNC: 160 MG/DL (ref 70–99)
GLUCOSE BLD-MCNC: 161 MG/DL (ref 70–99)

## 2020-07-06 PROCEDURE — 01N10ZZ RELEASE CERVICAL NERVE, OPEN APPROACH: ICD-10-PCS | Performed by: NEUROLOGICAL SURGERY

## 2020-07-06 PROCEDURE — 76000 FLUOROSCOPY <1 HR PHYS/QHP: CPT | Performed by: NEUROLOGICAL SURGERY

## 2020-07-06 PROCEDURE — 99253 IP/OBS CNSLTJ NEW/EST LOW 45: CPT | Performed by: HOSPITALIST

## 2020-07-06 PROCEDURE — 00NW0ZZ RELEASE CERVICAL SPINAL CORD, OPEN APPROACH: ICD-10-PCS | Performed by: NEUROLOGICAL SURGERY

## 2020-07-06 PROCEDURE — 0PH304Z INSERTION OF INTERNAL FIXATION DEVICE INTO CERVICAL VERTEBRA, OPEN APPROACH: ICD-10-PCS | Performed by: NEUROLOGICAL SURGERY

## 2020-07-06 RX ORDER — ALBUTEROL SULFATE 90 UG/1
2 AEROSOL, METERED RESPIRATORY (INHALATION) EVERY 4 HOURS PRN
Status: DISCONTINUED | OUTPATIENT
Start: 2020-07-06 | End: 2020-07-09

## 2020-07-06 RX ORDER — METHYLPHENIDATE HYDROCHLORIDE 20 MG/1
10 TABLET ORAL 2 TIMES DAILY
Status: ON HOLD | COMMUNITY
End: 2020-07-09

## 2020-07-06 RX ORDER — CYCLOBENZAPRINE HCL 10 MG
10 TABLET ORAL 3 TIMES DAILY PRN
Status: DISCONTINUED | OUTPATIENT
Start: 2020-07-06 | End: 2020-07-07

## 2020-07-06 RX ORDER — ONDANSETRON 2 MG/ML
INJECTION INTRAMUSCULAR; INTRAVENOUS AS NEEDED
Status: DISCONTINUED | OUTPATIENT
Start: 2020-07-06 | End: 2020-07-06 | Stop reason: SURG

## 2020-07-06 RX ORDER — DIAZEPAM 10 MG/1
10 TABLET ORAL EVERY 12 HOURS PRN
Status: DISCONTINUED | OUTPATIENT
Start: 2020-07-06 | End: 2020-07-09

## 2020-07-06 RX ORDER — HYDROMORPHONE HYDROCHLORIDE 1 MG/ML
0.4 INJECTION, SOLUTION INTRAMUSCULAR; INTRAVENOUS; SUBCUTANEOUS EVERY 5 MIN PRN
Status: DISCONTINUED | OUTPATIENT
Start: 2020-07-06 | End: 2020-07-06 | Stop reason: HOSPADM

## 2020-07-06 RX ORDER — ACETAMINOPHEN 500 MG
1000 TABLET ORAL ONCE
Status: DISCONTINUED | OUTPATIENT
Start: 2020-07-06 | End: 2020-07-06

## 2020-07-06 RX ORDER — DOCUSATE SODIUM 100 MG/1
100 CAPSULE, LIQUID FILLED ORAL 2 TIMES DAILY
Status: DISCONTINUED | OUTPATIENT
Start: 2020-07-06 | End: 2020-07-09

## 2020-07-06 RX ORDER — HYDROCODONE BITARTRATE AND ACETAMINOPHEN 10; 325 MG/1; MG/1
2 TABLET ORAL EVERY 4 HOURS PRN
Status: DISCONTINUED | OUTPATIENT
Start: 2020-07-06 | End: 2020-07-09

## 2020-07-06 RX ORDER — DEXAMETHASONE SODIUM PHOSPHATE 4 MG/ML
VIAL (ML) INJECTION AS NEEDED
Status: DISCONTINUED | OUTPATIENT
Start: 2020-07-06 | End: 2020-07-06 | Stop reason: SURG

## 2020-07-06 RX ORDER — MIDAZOLAM HYDROCHLORIDE 1 MG/ML
INJECTION INTRAMUSCULAR; INTRAVENOUS AS NEEDED
Status: DISCONTINUED | OUTPATIENT
Start: 2020-07-06 | End: 2020-07-06 | Stop reason: SURG

## 2020-07-06 RX ORDER — DIPHENHYDRAMINE HCL 25 MG
25 CAPSULE ORAL EVERY 4 HOURS PRN
Status: DISCONTINUED | OUTPATIENT
Start: 2020-07-06 | End: 2020-07-09

## 2020-07-06 RX ORDER — ACETAMINOPHEN 325 MG/1
650 TABLET ORAL EVERY 4 HOURS PRN
Status: DISCONTINUED | OUTPATIENT
Start: 2020-07-06 | End: 2020-07-09

## 2020-07-06 RX ORDER — NALOXONE HYDROCHLORIDE 0.4 MG/ML
80 INJECTION, SOLUTION INTRAMUSCULAR; INTRAVENOUS; SUBCUTANEOUS AS NEEDED
Status: DISCONTINUED | OUTPATIENT
Start: 2020-07-06 | End: 2020-07-06 | Stop reason: HOSPADM

## 2020-07-06 RX ORDER — CEFAZOLIN SODIUM/WATER 2 G/20 ML
2 SYRINGE (ML) INTRAVENOUS EVERY 8 HOURS
Status: COMPLETED | OUTPATIENT
Start: 2020-07-06 | End: 2020-07-07

## 2020-07-06 RX ORDER — DIPHENHYDRAMINE HYDROCHLORIDE 50 MG/ML
25 INJECTION INTRAMUSCULAR; INTRAVENOUS EVERY 4 HOURS PRN
Status: DISCONTINUED | OUTPATIENT
Start: 2020-07-06 | End: 2020-07-09

## 2020-07-06 RX ORDER — DEXAMETHASONE SODIUM PHOSPHATE 4 MG/ML
10 VIAL (ML) INJECTION ONCE
Status: COMPLETED | OUTPATIENT
Start: 2020-07-06 | End: 2020-07-06

## 2020-07-06 RX ORDER — SENNOSIDES 8.6 MG
17.2 TABLET ORAL NIGHTLY
Status: DISCONTINUED | OUTPATIENT
Start: 2020-07-06 | End: 2020-07-09

## 2020-07-06 RX ORDER — ROCURONIUM BROMIDE 10 MG/ML
INJECTION, SOLUTION INTRAVENOUS AS NEEDED
Status: DISCONTINUED | OUTPATIENT
Start: 2020-07-06 | End: 2020-07-06 | Stop reason: SURG

## 2020-07-06 RX ORDER — PRAVASTATIN SODIUM 10 MG
10 TABLET ORAL NIGHTLY
Status: DISCONTINUED | OUTPATIENT
Start: 2020-07-06 | End: 2020-07-09

## 2020-07-06 RX ORDER — INSULIN ASPART 100 [IU]/ML
INJECTION, SOLUTION INTRAVENOUS; SUBCUTANEOUS ONCE
Status: DISCONTINUED | OUTPATIENT
Start: 2020-07-06 | End: 2020-07-06 | Stop reason: HOSPADM

## 2020-07-06 RX ORDER — MIDAZOLAM HYDROCHLORIDE 1 MG/ML
INJECTION INTRAMUSCULAR; INTRAVENOUS
Status: COMPLETED
Start: 2020-07-06 | End: 2020-07-06

## 2020-07-06 RX ORDER — HYDROMORPHONE HYDROCHLORIDE 1 MG/ML
INJECTION, SOLUTION INTRAMUSCULAR; INTRAVENOUS; SUBCUTANEOUS
Status: COMPLETED
Start: 2020-07-06 | End: 2020-07-06

## 2020-07-06 RX ORDER — SODIUM CHLORIDE, SODIUM LACTATE, POTASSIUM CHLORIDE, CALCIUM CHLORIDE 600; 310; 30; 20 MG/100ML; MG/100ML; MG/100ML; MG/100ML
INJECTION, SOLUTION INTRAVENOUS CONTINUOUS
Status: DISCONTINUED | OUTPATIENT
Start: 2020-07-06 | End: 2020-07-06 | Stop reason: HOSPADM

## 2020-07-06 RX ORDER — BACITRACIN 50000 [USP'U]/1
INJECTION, POWDER, LYOPHILIZED, FOR SOLUTION INTRAMUSCULAR AS NEEDED
Status: DISCONTINUED | OUTPATIENT
Start: 2020-07-06 | End: 2020-07-06

## 2020-07-06 RX ORDER — HYDROMORPHONE HYDROCHLORIDE 1 MG/ML
0.2 INJECTION, SOLUTION INTRAMUSCULAR; INTRAVENOUS; SUBCUTANEOUS EVERY 2 HOUR PRN
Status: DISCONTINUED | OUTPATIENT
Start: 2020-07-06 | End: 2020-07-09

## 2020-07-06 RX ORDER — SCOLOPAMINE TRANSDERMAL SYSTEM 1 MG/1
1 PATCH, EXTENDED RELEASE TRANSDERMAL
Status: DISCONTINUED | OUTPATIENT
Start: 2020-07-06 | End: 2020-07-09 | Stop reason: HOSPADM

## 2020-07-06 RX ORDER — DEXAMETHASONE SODIUM PHOSPHATE 4 MG/ML
4 VIAL (ML) INJECTION 2 TIMES DAILY
Status: DISCONTINUED | OUTPATIENT
Start: 2020-07-06 | End: 2020-07-08

## 2020-07-06 RX ORDER — LABETALOL HYDROCHLORIDE 5 MG/ML
5 INJECTION, SOLUTION INTRAVENOUS EVERY 5 MIN PRN
Status: DISCONTINUED | OUTPATIENT
Start: 2020-07-06 | End: 2020-07-06 | Stop reason: HOSPADM

## 2020-07-06 RX ORDER — HYDROCODONE BITARTRATE AND ACETAMINOPHEN 5; 325 MG/1; MG/1
1 TABLET ORAL AS NEEDED
Status: DISCONTINUED | OUTPATIENT
Start: 2020-07-06 | End: 2020-07-06 | Stop reason: HOSPADM

## 2020-07-06 RX ORDER — DIPHENHYDRAMINE HYDROCHLORIDE 50 MG/ML
12.5 INJECTION INTRAMUSCULAR; INTRAVENOUS AS NEEDED
Status: DISCONTINUED | OUTPATIENT
Start: 2020-07-06 | End: 2020-07-06 | Stop reason: HOSPADM

## 2020-07-06 RX ORDER — HYDROMORPHONE HYDROCHLORIDE 1 MG/ML
0.4 INJECTION, SOLUTION INTRAMUSCULAR; INTRAVENOUS; SUBCUTANEOUS EVERY 2 HOUR PRN
Status: DISCONTINUED | OUTPATIENT
Start: 2020-07-06 | End: 2020-07-09

## 2020-07-06 RX ORDER — TOPIRAMATE 25 MG/1
50 TABLET ORAL 2 TIMES DAILY
COMMUNITY
End: 2020-07-27

## 2020-07-06 RX ORDER — ESMOLOL HYDROCHLORIDE 10 MG/ML
INJECTION INTRAVENOUS AS NEEDED
Status: DISCONTINUED | OUTPATIENT
Start: 2020-07-06 | End: 2020-07-06 | Stop reason: SURG

## 2020-07-06 RX ORDER — HYDROCODONE BITARTRATE AND ACETAMINOPHEN 10; 325 MG/1; MG/1
1 TABLET ORAL EVERY 4 HOURS PRN
Status: DISCONTINUED | OUTPATIENT
Start: 2020-07-06 | End: 2020-07-09

## 2020-07-06 RX ORDER — PROCHLORPERAZINE EDISYLATE 5 MG/ML
10 INJECTION INTRAMUSCULAR; INTRAVENOUS EVERY 6 HOURS PRN
Status: ACTIVE | OUTPATIENT
Start: 2020-07-06 | End: 2020-07-08

## 2020-07-06 RX ORDER — GLYCOPYRROLATE 0.2 MG/ML
INJECTION, SOLUTION INTRAMUSCULAR; INTRAVENOUS AS NEEDED
Status: DISCONTINUED | OUTPATIENT
Start: 2020-07-06 | End: 2020-07-06 | Stop reason: SURG

## 2020-07-06 RX ORDER — HYDROMORPHONE HYDROCHLORIDE 1 MG/ML
0.8 INJECTION, SOLUTION INTRAMUSCULAR; INTRAVENOUS; SUBCUTANEOUS EVERY 2 HOUR PRN
Status: DISCONTINUED | OUTPATIENT
Start: 2020-07-06 | End: 2020-07-09

## 2020-07-06 RX ORDER — ONDANSETRON 2 MG/ML
4 INJECTION INTRAMUSCULAR; INTRAVENOUS AS NEEDED
Status: DISCONTINUED | OUTPATIENT
Start: 2020-07-06 | End: 2020-07-06 | Stop reason: HOSPADM

## 2020-07-06 RX ORDER — SCOLOPAMINE TRANSDERMAL SYSTEM 1 MG/1
PATCH, EXTENDED RELEASE TRANSDERMAL
Status: COMPLETED
Start: 2020-07-06 | End: 2020-07-09

## 2020-07-06 RX ORDER — POLYETHYLENE GLYCOL 3350 17 G/17G
17 POWDER, FOR SOLUTION ORAL DAILY PRN
Status: DISCONTINUED | OUTPATIENT
Start: 2020-07-06 | End: 2020-07-09

## 2020-07-06 RX ORDER — HYDROCODONE BITARTRATE AND ACETAMINOPHEN 10; 325 MG/1; MG/1
1 TABLET ORAL 2 TIMES DAILY PRN
Status: DISCONTINUED | OUTPATIENT
Start: 2020-07-06 | End: 2020-07-06

## 2020-07-06 RX ORDER — TOPIRAMATE 25 MG/1
50 TABLET ORAL 2 TIMES DAILY
Status: DISCONTINUED | OUTPATIENT
Start: 2020-07-06 | End: 2020-07-09

## 2020-07-06 RX ORDER — BISACODYL 10 MG
10 SUPPOSITORY, RECTAL RECTAL
Status: DISCONTINUED | OUTPATIENT
Start: 2020-07-06 | End: 2020-07-09

## 2020-07-06 RX ORDER — CEFAZOLIN SODIUM/WATER 2 G/20 ML
2 SYRINGE (ML) INTRAVENOUS ONCE
Status: COMPLETED | OUTPATIENT
Start: 2020-07-06 | End: 2020-07-06

## 2020-07-06 RX ORDER — SODIUM CHLORIDE 9 MG/ML
INJECTION, SOLUTION INTRAVENOUS CONTINUOUS
Status: DISCONTINUED | OUTPATIENT
Start: 2020-07-06 | End: 2020-07-09

## 2020-07-06 RX ORDER — MONTELUKAST SODIUM 10 MG/1
10 TABLET ORAL NIGHTLY
Status: DISCONTINUED | OUTPATIENT
Start: 2020-07-06 | End: 2020-07-09

## 2020-07-06 RX ORDER — NEOSTIGMINE METHYLSULFATE 1 MG/ML
INJECTION INTRAVENOUS AS NEEDED
Status: DISCONTINUED | OUTPATIENT
Start: 2020-07-06 | End: 2020-07-06 | Stop reason: SURG

## 2020-07-06 RX ORDER — MIDAZOLAM HYDROCHLORIDE 1 MG/ML
1 INJECTION INTRAMUSCULAR; INTRAVENOUS EVERY 5 MIN PRN
Status: DISCONTINUED | OUTPATIENT
Start: 2020-07-06 | End: 2020-07-06 | Stop reason: HOSPADM

## 2020-07-06 RX ORDER — HYDROCODONE BITARTRATE AND ACETAMINOPHEN 5; 325 MG/1; MG/1
2 TABLET ORAL AS NEEDED
Status: DISCONTINUED | OUTPATIENT
Start: 2020-07-06 | End: 2020-07-06 | Stop reason: HOSPADM

## 2020-07-06 RX ORDER — DIAZEPAM 5 MG/1
5 TABLET ORAL EVERY 6 HOURS PRN
Status: DISCONTINUED | OUTPATIENT
Start: 2020-07-06 | End: 2020-07-09

## 2020-07-06 RX ORDER — ALPRAZOLAM 0.25 MG/1
0.25 TABLET ORAL 2 TIMES DAILY PRN
Status: DISCONTINUED | OUTPATIENT
Start: 2020-07-06 | End: 2020-07-09

## 2020-07-06 RX ORDER — ONDANSETRON 2 MG/ML
4 INJECTION INTRAMUSCULAR; INTRAVENOUS EVERY 4 HOURS PRN
Status: ACTIVE | OUTPATIENT
Start: 2020-07-06 | End: 2020-07-07

## 2020-07-06 RX ORDER — SODIUM PHOSPHATE, DIBASIC AND SODIUM PHOSPHATE, MONOBASIC 7; 19 G/133ML; G/133ML
1 ENEMA RECTAL ONCE AS NEEDED
Status: DISCONTINUED | OUTPATIENT
Start: 2020-07-06 | End: 2020-07-09

## 2020-07-06 RX ORDER — KETOROLAC TROMETHAMINE 30 MG/ML
INJECTION, SOLUTION INTRAMUSCULAR; INTRAVENOUS AS NEEDED
Status: DISCONTINUED | OUTPATIENT
Start: 2020-07-06 | End: 2020-07-06 | Stop reason: SURG

## 2020-07-06 RX ORDER — DICYCLOMINE HCL 20 MG
20 TABLET ORAL NIGHTLY
Status: DISCONTINUED | OUTPATIENT
Start: 2020-07-06 | End: 2020-07-09

## 2020-07-06 RX ORDER — METOCLOPRAMIDE HYDROCHLORIDE 5 MG/ML
10 INJECTION INTRAMUSCULAR; INTRAVENOUS EVERY 6 HOURS PRN
Status: DISCONTINUED | OUTPATIENT
Start: 2020-07-06 | End: 2020-07-06

## 2020-07-06 RX ORDER — DEXTROSE MONOHYDRATE 25 G/50ML
50 INJECTION, SOLUTION INTRAVENOUS
Status: DISCONTINUED | OUTPATIENT
Start: 2020-07-06 | End: 2020-07-06 | Stop reason: HOSPADM

## 2020-07-06 RX ORDER — PANTOPRAZOLE SODIUM 40 MG/1
40 TABLET, DELAYED RELEASE ORAL
Status: DISCONTINUED | OUTPATIENT
Start: 2020-07-07 | End: 2020-07-09

## 2020-07-06 RX ADMIN — ROCURONIUM BROMIDE 50 MG: 10 INJECTION, SOLUTION INTRAVENOUS at 07:53:00

## 2020-07-06 RX ADMIN — NEOSTIGMINE METHYLSULFATE 5 MG: 1 INJECTION INTRAVENOUS at 13:07:00

## 2020-07-06 RX ADMIN — DEXAMETHASONE SODIUM PHOSPHATE 10 MG: 4 MG/ML VIAL (ML) INJECTION at 08:45:00

## 2020-07-06 RX ADMIN — GLYCOPYRROLATE 0.6 MG: 0.2 INJECTION, SOLUTION INTRAMUSCULAR; INTRAVENOUS at 13:07:00

## 2020-07-06 RX ADMIN — CEFAZOLIN SODIUM/WATER 2 G: 2 G/20 ML SYRINGE (ML) INTRAVENOUS at 08:50:00

## 2020-07-06 RX ADMIN — MIDAZOLAM HYDROCHLORIDE 4 MG: 1 INJECTION INTRAMUSCULAR; INTRAVENOUS at 07:50:00

## 2020-07-06 RX ADMIN — ONDANSETRON 4 MG: 2 INJECTION INTRAMUSCULAR; INTRAVENOUS at 12:43:00

## 2020-07-06 RX ADMIN — ESMOLOL HYDROCHLORIDE 50 MG: 10 INJECTION INTRAVENOUS at 08:14:00

## 2020-07-06 RX ADMIN — KETOROLAC TROMETHAMINE 30 MG: 30 INJECTION, SOLUTION INTRAMUSCULAR; INTRAVENOUS at 12:43:00

## 2020-07-06 RX ADMIN — SODIUM CHLORIDE, SODIUM LACTATE, POTASSIUM CHLORIDE, CALCIUM CHLORIDE: 600; 310; 30; 20 INJECTION, SOLUTION INTRAVENOUS at 12:23:00

## 2020-07-06 RX ADMIN — SODIUM CHLORIDE, SODIUM LACTATE, POTASSIUM CHLORIDE, CALCIUM CHLORIDE: 600; 310; 30; 20 INJECTION, SOLUTION INTRAVENOUS at 13:03:00

## 2020-07-06 RX ADMIN — SODIUM CHLORIDE, SODIUM LACTATE, POTASSIUM CHLORIDE, CALCIUM CHLORIDE: 600; 310; 30; 20 INJECTION, SOLUTION INTRAVENOUS at 14:09:00

## 2020-07-06 NOTE — H&P
History & Physical Examination    Patient Name: Rosa Isela Solorzano  MRN: [id number]  CSN: [id number]  YOB: 1975    Diagnosis: DDD cervical, hx cervical spinal fusion, cervical myelopathy with cervical radiculopathy    Present Illness: Very pleasa daily as needed for Anxiety or Sleep., Disp: 15 tablet, Rfl: 0, 6/22/2020 at Unknown time  Citalopram Hydrobromide 40 MG Oral Tab, Take 1 tablet (40 mg total) by mouth daily. , Disp: 90 tablet, Rfl: 0, 7/6/2020 at 0300  Citalopram Hydrobromide 20 MG Oral Ta (FISH OIL) 1000 MG Oral Cap, Take 1,000 mg by mouth daily.   , Disp: , Rfl: , 6/29/2020  HYDROcodone-acetaminophen (NORCO)  MG Oral Tab, Take 1 tablet by mouth 2 (two) times daily as needed for Pain., Disp: 60 tablet, Rfl: 0, More than a month at [facility] Depression    • Diabetes ([facility] 75.)     Diet controlled; no meds   • Diarrhea, unspecified Occasionaly   • Dysesthesia    • Dyspareunia    • Easy bruising    • Endometriosis    • Essential hypertension    • Fatigue    • Flatulence/gas pain/belching 2weeks   • F INJECTION N/A 6/20/2018    Performed by Rodriguez Ram MD at [employer] [facility]   • CERVICAL FACET INJECTION Left 5/15/2019    Performed by Rodriguez Ram MD at [facility] ENDOSCOPY   • COLONOSCOPY N/A 2/6/2015    Performed by Vinod Alberts MD at [facility] ENDOSCOPY   • ENDOMETRI • Migraines Sister    • Migraines Son    • Diabetes Son    • Other Son         Multiple issues   • Other Daughter         Mvp, eds     Social History    Tobacco Use      Smoking status: Former Smoker        Packs/day: 0.00        Years: 10.00        Pack

## 2020-07-06 NOTE — BRIEF OP NOTE
Pre-Operative Diagnosis: DDD (degenerative disc disease), cervical [M50.30]  Hx of fusion of cervical spine [Z98.1]  Cervical myelopathy with cervical radiculopathy [M47.12]     Post-Operative Diagnosis: DDD (degenerative disc disease), cervical [M50.30]Hx

## 2020-07-06 NOTE — CONSULTS
[facility] Patient Status:  Inpatient    1975 MRN [id number]   [facility] 3SW-A Attending Wilbert Chaudhari MD   Hosp Day # 0 PCP Chyna Henderson DO     Reason for consult: med mgt    Requested by: mammogram 06/11/2012   • Pain in joints    • Pain with bowel movements Years   • Pap smear for cervical cancer screening 7-3-2012    wnl   • Personal history of urinary (tract) infection    • Pneumonia due to organism    • PONV (postoperative nausea and vo 6.  ALLOGRAFT, PLATE AND SCREWS   • OTHER SURGICAL HISTORY  2001    esophagogastroduodenoscopy   • OTHER SURGICAL HISTORY      right thumb trigger finger release    • OTHER SURGICAL HISTORY      ganglion cyst removed left wrist    • OTHER SURGICAL HISTORY current facility-administered medications on file prior to encounter. Acetaminophen (ACETAMINOPHEN EXTRA STRENGTH) 500 MG Oral Cap, Take 1,000 mg by mouth one time. , Disp: , Rfl:   topiramate 25 MG Oral Tab, Take 50 mg by mouth 2 (two) times daily. , Disp Disp: 40 capsule, Rfl: 1  VENTOLIN  (90 BASE) MCG/ACT Inhalation Aero Soln, Inhale 2 puffs into the lungs every 6 (six) hours as needed. , Disp: , Rfl: 1        Review of Systems:   A comprehensive 8 point review of systems was completed.     Pertin

## 2020-07-06 NOTE — TELEPHONE ENCOUNTER
Per PCP on 7/3/2020- Shanique Line: \"Pt is low-moderate risk for a moderate risk procedure. Cleared for surgery. RTC 1 mo or sooner if needed. \"

## 2020-07-06 NOTE — ANESTHESIA POSTPROCEDURE EVALUATION
[address] [facility] Patient Status:  Inpatient   Age/Gender 41year old female MRN [id number]   [facility] SURGERY Attending Marshall Agrawal MD   [facility] Day # 0 PCP Regina Conde DO       Anesthesia Post-op Note    Procedure(s)

## 2020-07-06 NOTE — ANESTHESIA PROCEDURE NOTES
Peripheral IV  Inserted by: London Lepe MD    Placement  Needle gauge: 4 Fr Powerwand. Laterality: right  Location: forearm  Local anesthetic: none  Site prep: alcohol  Placement technique: 20g PIV placed right forearm.  Changed over guidewire  to [address] [employer]

## 2020-07-06 NOTE — ANESTHESIA PROCEDURE NOTES
Arterial Line  Performed by: London Lepe MD  Authorized by: London Lepe MD     General Information and Staff    Procedure Start:   Anesthesiologist: London Lepe MD  Performed By:  Anesthesiologist  Patient Location:  OR  Indication: continuous bloo

## 2020-07-07 ENCOUNTER — APPOINTMENT (OUTPATIENT)
Dept: CT IMAGING | Facility: HOSPITAL | Age: 45
DRG: 520 | End: 2020-07-07
Attending: PHYSICIAN ASSISTANT
Payer: MEDICAID

## 2020-07-07 LAB
DEPRECATED RDW RBC AUTO: 43.3 FL (ref 35.1–46.3)
ERYTHROCYTE [DISTWIDTH] IN BLOOD BY AUTOMATED COUNT: 12.5 % (ref 11–15)
HCT VFR BLD AUTO: 28.4 % (ref 35–48)
HGB BLD-MCNC: 9.4 G/DL (ref 12–16)
MCH RBC QN AUTO: 31.3 PG (ref 26–34)
MCHC RBC AUTO-ENTMCNC: 33.1 G/DL (ref 31–37)
MCV RBC AUTO: 94.7 FL (ref 80–100)
PLATELET # BLD AUTO: 195 10(3)UL (ref 150–450)
RBC # BLD AUTO: 3 X10(6)UL (ref 3.8–5.3)
WBC # BLD AUTO: 10.5 X10(3) UL (ref 4–11)

## 2020-07-07 PROCEDURE — 99232 SBSQ HOSP IP/OBS MODERATE 35: CPT | Performed by: HOSPITALIST

## 2020-07-07 PROCEDURE — 72125 CT NECK SPINE W/O DYE: CPT | Performed by: PHYSICIAN ASSISTANT

## 2020-07-07 RX ORDER — DIPHENHYDRAMINE HCL 25 MG
25 CAPSULE ORAL EVERY 6 HOURS PRN
Status: DISCONTINUED | OUTPATIENT
Start: 2020-07-07 | End: 2020-07-09

## 2020-07-07 RX ORDER — CITALOPRAM 40 MG/1
40 TABLET ORAL DAILY
Qty: 90 TABLET | Refills: 0 | Status: SHIPPED | OUTPATIENT
Start: 2020-07-07 | End: 2020-10-13

## 2020-07-07 RX ORDER — DICYCLOMINE HCL 20 MG
20 TABLET ORAL
Qty: 120 TABLET | Refills: 0 | Status: SHIPPED | OUTPATIENT
Start: 2020-07-07 | End: 2020-10-20

## 2020-07-07 RX ORDER — BUPROPION HYDROCHLORIDE 150 MG/1
450 TABLET ORAL DAILY
Qty: 90 TABLET | Refills: 0 | Status: SHIPPED | OUTPATIENT
Start: 2020-07-07 | End: 2020-10-20

## 2020-07-07 RX ORDER — CYCLOBENZAPRINE HCL 10 MG
10 TABLET ORAL 3 TIMES DAILY
Status: DISCONTINUED | OUTPATIENT
Start: 2020-07-07 | End: 2020-07-09

## 2020-07-07 RX ORDER — METHYLPHENIDATE HYDROCHLORIDE 20 MG/1
20 TABLET ORAL 2 TIMES DAILY
Qty: 60 TABLET | Refills: 0 | Status: SHIPPED | OUTPATIENT
Start: 2020-07-07 | End: 2020-09-02

## 2020-07-07 RX ORDER — CITALOPRAM 20 MG/1
20 TABLET ORAL DAILY
Qty: 90 TABLET | Refills: 0 | Status: SHIPPED | OUTPATIENT
Start: 2020-07-07 | End: 2020-10-13

## 2020-07-07 RX ORDER — FLUTICASONE PROPIONATE 50 MCG
2 SPRAY, SUSPENSION (ML) NASAL NIGHTLY
Qty: 1 BOTTLE | Refills: 2 | Status: SHIPPED | OUTPATIENT
Start: 2020-07-07 | End: 2021-05-18

## 2020-07-07 NOTE — PAYOR COMM NOTE
--------------  ADMISSION REVIEW     Payor: Sixto Rosen #:  [id number]  Authorization Number: [id number]    Admit date: 7/6/20  Admit time: [facility] [facility]       Admitting Physician: Jonah Ordaz MD  Attending Physician: Intravenous Angi Le RN    7/6/2020 2119 Given 4 mg Intravenous Suha Munoz RN      diazepam (VALIUM) tab 5 mg     Date Action Dose Route User    7/6/2020 2322 Given 5 mg Oral Suha Munoz RN    7/6/2020 1641 Given 5 mg Oral Fogt, 1927 Given 0.8 mg Intravenous Padmaja Campos RN    7/6/2020 1721 Given 0.8 mg Intravenous Chelsi Leon RN      lactated ringers infusion     Date Action Dose Route User    7/6/2020 1303 New Bag (none) Intravenous Dilcia Garcia MD      lactated ringer topiramate (Topamax) tab 50 mg     Date Action Dose Route User    7/7/2020 0843 Given 50 mg Oral Elif Buckner RN    7/6/2020 2120 Given 50 mg Oral Danielle Palencia RN      buPROPion HCl ER (XL) (WELLBUTRIN XL) 24 hr tab 450 mg     Date Actio

## 2020-07-07 NOTE — PHYSICAL THERAPY NOTE
PHYSICAL THERAPY EVALUATION - INPATIENT     Room Number: 360/360-A  Evaluation Date: 7/7/2020  Type of Evaluation: Initial  Physician Order: PT Eval and Treat    Presenting Problem: Cervical myelopathy with radiculopathy  Reason for Therapy: Mobility ago   • Heavy menses    • Hematuria    • Hiatal hernia    • Hyperlipidemia    • IBS (irritable bowel syndrome)    • Irregular bowel habits    • Irregular menstrual cycle    • Itch of skin O    Occasionally on my legs, not due to dry skin   • Leaking of uri Performed by Karon Uribe MD at [facility] ENDOSCOPY   • ESOPHAGOGASTRODUODENOSCOPY (EGD) N/A 2/6/2015    Performed by Karon Uribe MD at [facility] ENDOSCOPY   • HYSTERECTOMY  7/20/2015   • HYSTEROSCOPY,ABLATION ENDOMETRIUM     • [address] [facility] is: WNL    Lower extremity strength is grossly graded 4/6    BALANCE  Static Sitting: Good  Dynamic Sitting: Good  Static Standing: Fair  Dynamic Standing: Fair -      NEUROLOGICAL FINDINGS; WNL with coordination B LE and UE           ACTIVITY TOLERANCE; F tolerated  Gait training  Posture  ROM  Transfer training    Patient End of Session: Up in chair;Needs met;Call light within reach;RN aware of session/findings;Bracing education provided; All patient questions and concerns addressed;SCDs in place    ASSESSM (Obs): Daily  Number of Visits to Meet Established Goals: 3      CURRENT GOALS    Goal #1 Patient is able to demonstrate supine - sit EOB @ level: modified independent     Goal #2 Patient is able to demonstrate transfers Sit to/from Stand at assistance lev

## 2020-07-07 NOTE — PROGRESS NOTES
[facility]  Neurosurgery Progress Note    Taniya Garibay Patient Status:  Inpatient    1975 MRN [id number]   [facility] 3SW-A Attending Mark Peterson MD   [facility] Day # 1 PCP Jolanta Rhodes DO     Chief Complaint:  Cervical myelo noted at the C3 through C5 levels which extend into the epidural space. Air noted posterior subcutaneous soft tissues as well as within the epidural   space. The cervical vertebral alignment demonstrates no subluxation.  Ligamentous injury cannot be exclu

## 2020-07-07 NOTE — OCCUPATIONAL THERAPY NOTE
OCCUPATIONAL THERAPY EVALUATION - INPATIENT     Room Number: 360/360-A  Evaluation Date: 7/7/2020  Type of Evaluation: Initial  Presenting Problem: (C3-6 laminoplasty with partial laminoplasty C2, C7)    Physician Order: IP Consult to Occupational Therapy screening 09/17/2011   • Loss of appetite    • Lump or mass in breast    • Malaise    • Malignant hyperthermia     patient's son at 3years of age - 2006   • Mastodynia    • Menses painful    • Migraines    • Nausea 2weeks   • Night sweats    • Other scree OF ADHESIONS     • VENKATESH LOCALIZATION WIRE 1 SITE RIGHT (IRI=[id number])     • OTHER Right 02/2018    ARTHROSCOPIC ACROMIOPLASTY LYSIS OF ADHESIONS RIGHT SHOULDER   • OTHER  12/19/2018    ANTERIOR DISCECTOMY AND FUSION.  CERVICAL 4-CERVICAL 5 AND CERVICAL 5-CERVIC improving    Communication: wfl    Behavioral/Emotional/Social: wfl    RANGE OF MOTION AND STRENGTH ASSESSMENT  Upper extremity ROM is within functional limits: AROM WFL    Upper extremity strength is within functional limits: L  4/5, R  5/5     weight of UE /reduce neck strain/pain, understanding voiced. Patient End of Session: Up in chair; With [facility] staff;Needs met;Call light within reach;RN aware of session/findings; All patient questions and concerns addressed;SCDs in place; Ice applied    ASSESSM Reacher;Long-handled sponge; Shower chair    PLAN  OT Treatment Plan: Energy conservation/work simplification techniques;ADL training;Functional transfer training;UE strengthening/ROM; Endurance training;Patient/Family education;Patient/Family training;Equip

## 2020-07-07 NOTE — PAYOR COMM NOTE
--------------  CONTINUED STAY REVIEW    Payor: Sixto Rosen #:  [id number]  Authorization Number: [id number]    Admit date: 7/6/20  Admit time: [facility]    Admitting Physician: Warren Danielle MD  Attending Physician GM/20ML premix IV syringe 2 g     Date Action Dose Route User    7/7/2020 0157 Given 2 g Intravenous Julio Abraham, RN      cyclobenzaprine (FLEXERIL) tab 10 mg     Date Action Dose Route User    7/7/2020 1136 Given 10 mg Oral Edwardo Schaumann, RN

## 2020-07-08 LAB
DEPRECATED RDW RBC AUTO: 45.2 FL (ref 35.1–46.3)
ERYTHROCYTE [DISTWIDTH] IN BLOOD BY AUTOMATED COUNT: 12.7 % (ref 11–15)
HCT VFR BLD AUTO: 27 % (ref 35–48)
HGB BLD-MCNC: 8.7 G/DL (ref 12–16)
MCH RBC QN AUTO: 31.2 PG (ref 26–34)
MCHC RBC AUTO-ENTMCNC: 32.2 G/DL (ref 31–37)
MCV RBC AUTO: 96.8 FL (ref 80–100)
PLATELET # BLD AUTO: 176 10(3)UL (ref 150–450)
RBC # BLD AUTO: 2.79 X10(6)UL (ref 3.8–5.3)
WBC # BLD AUTO: 9 X10(3) UL (ref 4–11)

## 2020-07-08 PROCEDURE — 99232 SBSQ HOSP IP/OBS MODERATE 35: CPT | Performed by: HOSPITALIST

## 2020-07-08 RX ORDER — NALOXONE HYDROCHLORIDE 4 MG/.1ML
4 SPRAY, METERED NASAL AS NEEDED
Qty: 1 KIT | Refills: 0 | Status: SHIPPED | OUTPATIENT
Start: 2020-07-08 | End: 2021-04-23

## 2020-07-08 RX ORDER — HYDROCODONE BITARTRATE AND ACETAMINOPHEN 10; 325 MG/1; MG/1
1 TABLET ORAL EVERY 4 HOURS PRN
Qty: 90 TABLET | Refills: 0 | Status: SHIPPED | OUTPATIENT
Start: 2020-07-08 | End: 2020-07-16

## 2020-07-08 RX ORDER — DIAZEPAM 10 MG/1
10 TABLET ORAL EVERY 8 HOURS PRN
Qty: 30 TABLET | Refills: 0 | Status: SHIPPED | OUTPATIENT
Start: 2020-07-08 | End: 2020-07-21

## 2020-07-08 RX ORDER — CYCLOBENZAPRINE HCL 10 MG
10 TABLET ORAL 3 TIMES DAILY PRN
Qty: 45 TABLET | Refills: 0 | Status: SHIPPED | OUTPATIENT
Start: 2020-07-08 | End: 2020-11-20

## 2020-07-08 NOTE — PROGRESS NOTES
SEDRICK drain removed at bedside. Tip of catheter present at time of removal.  Site approximated with mercy stitch. Pt tolerated well.

## 2020-07-08 NOTE — PROGRESS NOTES
[facility]  Progress Note     Radha Vigil Patient Status:  Inpatient    1975 MRN [id number]   [facility] 3SW-A Attending Ke Chaidez MD   [facility] Day # 2 PCP Houston Morales DO     Chief Complaint: Medical management    S: dexamethasone Sodium Phosphate  4 mg Intravenous BID   • buPROPion HCl ER (XL)  450 mg Oral Daily   • escitalopram  30 mg Oral Daily   • Dicyclomine HCl  20 mg Oral Nightly   • Pantoprazole Sodium  40 mg Oral QAM AC   • Pravastatin Sodium  10 mg Oral Night

## 2020-07-08 NOTE — PAYOR COMM NOTE
--------------  CONTINUED STAY REVIEW------REQUESTING ADDITIONAL DAY 7/8      Payor: Sixto Rosen #:  [id number]  Authorization Number: [id number]    Admit date: 7/6/20  Admit time: [facility]    Admitting Physician: Helga Griffin No results for input(s): GLU, BUN, CREATSERUM, GFRAA, GFRNAA, CA, ALB, NA, K, CL, CO2, ALKPHO, AST, ALT, BILT, TP in the last 168 hours.     CrCl cannot be calculated (Patient's most recent lab result is older than the maximum 7 days allowed. ).     No resu 7/8/2020 1754 [facility] 4 [facility]ous [facility]    7/7/2020 2004 Given 4 mg Intravenous Maddie Webster, RN      diazepam (VALIUM) tab 5 mg     Date Action Dose Route User    7/8/2020 1134 Given 5 mg Oral [facility]    7/8/2020 0510 Given 7/7/2020 2004 Given 17.2 mg Oral Palma Lennox, SAUL      topiramate (Topamax) tab 50 mg     Date Action Dose Route User    7/8/2020 9410 Given 50 mg Oral Stefany Johnston [facility]    7/7/2020 2005 Given 50 mg Oral Palma Lennox, SAUL      buPROPion HCl ER (XL

## 2020-07-08 NOTE — PROGRESS NOTES
[facility]  Neurosurgery Progress Note    Lizeth Wtason Patient Status:  Inpatient    1975 MRN [id number]   [facility] 3SW-A Attending Kimberly Thompson MD   [address] Day # 2 PCP Jose Rowan DO     Chief Complaint:  Cervical myelo

## 2020-07-08 NOTE — PHYSICAL THERAPY NOTE
PHYSICAL THERAPY TREATMENT NOTE - INPATIENT    Room Number: 360/360-A     Session: 1   Number of Visits to Meet Established Goals: 3    Presenting Problem: Cervical myelopathy with radiculopathy    Problem List  Principal Problem:    Cervical myelopathy w 7-3-2012    wnl   • Personal history of urinary (tract) infection    • Pneumonia due to organism    • PONV (postoperative nausea and vomiting)    • Problems with swallowing 29 years ago   • Sleep disturbance    • Stress    • Uncomfortable fullness after me right thumb trigger finger release    • OTHER SURGICAL HISTORY      ganglion cyst removed left wrist    • OTHER SURGICAL HISTORY  10/30/2019    Cystoscopy- Dr. Bozena Rg   • POSTERIOR CERVICAL LAMINECTOMY FORAMINOTOMY 5 LEV Bilateral 7/6/2020    Performed by CMS Modifier (G-Code): CJ    FUNCTIONAL ABILITY STATUS  Gait Assessment   Gait Assistance: Supervision  Distance (ft): 300  Assistive Device: Rolling walker  Pattern: (decreased fred and step length)  Stoop/Curb Assistance: Contact guard assist  Comme assist as needed )     PLAN  PT Treatment Plan: Bed mobility; Body mechanics; Endurance; Energy conservation;Patient education; Family education;Gait training;Neuromuscular re-educate;Range of motion;Strengthening;Stoop training;Stair training;Transfer trainin

## 2020-07-09 ENCOUNTER — TELEPHONE (OUTPATIENT)
Dept: SURGERY | Facility: CLINIC | Age: 45
End: 2020-07-09

## 2020-07-09 VITALS
HEIGHT: 65 IN | DIASTOLIC BLOOD PRESSURE: 62 MMHG | HEART RATE: 84 BPM | SYSTOLIC BLOOD PRESSURE: 101 MMHG | BODY MASS INDEX: 25.45 KG/M2 | TEMPERATURE: 98 F | OXYGEN SATURATION: 92 % | WEIGHT: 152.75 LBS | RESPIRATION RATE: 21 BRPM

## 2020-07-09 LAB
DEPRECATED RDW RBC AUTO: 46.6 FL (ref 35.1–46.3)
ERYTHROCYTE [DISTWIDTH] IN BLOOD BY AUTOMATED COUNT: 13.1 % (ref 11–15)
HCT VFR BLD AUTO: 27 % (ref 35–48)
HGB BLD-MCNC: 8.6 G/DL (ref 12–16)
MCH RBC QN AUTO: 31.7 PG (ref 26–34)
MCHC RBC AUTO-ENTMCNC: 31.9 G/DL (ref 31–37)
MCV RBC AUTO: 99.6 FL (ref 80–100)
PLATELET # BLD AUTO: 180 10(3)UL (ref 150–450)
RBC # BLD AUTO: 2.71 X10(6)UL (ref 3.8–5.3)
WBC # BLD AUTO: 8.5 X10(3) UL (ref 4–11)

## 2020-07-09 PROCEDURE — 99232 SBSQ HOSP IP/OBS MODERATE 35: CPT | Performed by: HOSPITALIST

## 2020-07-09 NOTE — PROGRESS NOTES
Reviewed swallowing precautions. Reviewed indications, side effects of pain medication/narcotics and constipation prevention.  Stressed importance of increased fluids/roughage in diet, continued use stool softeners along with laxatives and suppositories as n Sinusitis (Antibiotic Treatment)    The sinuses are air-filled spaces within the bones of the face. They connect to the inside of the nose. Sinusitis is an inflammation of the tissue lining the sinus cavity. Sinus inflammation can occur during a cold.  It · Do not use nasal rinses or irrigation during an acute sinus infection, unless told to by your health care provider. Rinsing may spread the infection to other sinuses.   · Use acetaminophen or ibuprofen to control pain, unless another pain medicine was pre

## 2020-07-09 NOTE — DISCHARGE SUMMARY
[facility]  Discharge Summary    Radha Vigil Patient Status:  Inpatient    1975 MRN [id number]   [facility] 3SW-A Attending Ke Chaidez MD   [facility] Day # 3 PCP Houston Morales DO     Date of Admission: 2020    Damien of Jose M Chaney in the office. She has further questions in regards to incision site and length. Presents with family.         Last Hx: 5/28/20  HISTORY OF PRESENT ILLNESS:Nadya DEXTER Mina is a 41year [place] 47  female here in follow up.  Tuesday woke with urinary incontinen HCl 4 MG/0.1ML Nasal Liquid  4 mg by Nasal route as needed. If patient remains unresponsive, repeat dose in other nostril 2-5 minutes after first dose.   Qty: 1 kit Refills: 0      CONTINUE these medications which have CHANGED    diazepam 10 MG Oral Tab  Ta 0  Associated Diagnoses:Irritable bowel syndrome without diarrhea    Methylphenidate HCl 20 MG Oral Tab  Take 1 tablet (20 mg total) by mouth 2 (two) times daily.   Qty: 60 tablet Refills: 0  Associated Diagnoses:Attention deficit hyperactivity disorder (AD Thalia Samuel MD [address] [facility] ([address])            [address] [facility] Dave  [address], [place] [address] [phone]

## 2020-07-09 NOTE — PROGRESS NOTES
[facility]  Neurosurgery Progress Note    Inglewood Cue Patient Status:  Inpatient    1975 MRN [id number]   [facility] 3SW-A Attending David Leblanc MD   [facility] Day # 3 PCP Leslee Bains DO     Chief Complaint:  Cervical myelo

## 2020-07-09 NOTE — PROGRESS NOTES
[facility]  Progress Note     Anjana Jeffers Patient Status:  Inpatient    1975 MRN [id number]   [facility] 3SW-A Attending Laly Love MD   [facility] Day # 3 PCP Shalonda Palm DO     Chief Complaint: Medical management    S: 100 mg Oral BID   • buPROPion HCl ER (XL)  450 mg Oral Daily   • escitalopram  30 mg Oral Daily   • Dicyclomine HCl  20 mg Oral Nightly   • Pantoprazole Sodium  40 mg Oral QAM AC   • Pravastatin Sodium  10 mg Oral Nightly   • melatonin  10 mg Oral Nightly

## 2020-07-09 NOTE — PLAN OF CARE
ALERT ,AWAKE, ORIENTED. COMFORTABLE ,READY TO GO HOME TODAY.  CALL LIGHT W/IN REACH TO CALL FOR ALL NEEDS AND ASSISTANCE
D/C INSTRUCTIONS GIVEN TO PT. Liz Model ALIGHMENT INSTRUCTED TO CALL MD FOR CONCERN
Drowsy, oriented x 4. VSS. On RA. Tele-NSR. Consistently rates pain high though very sleepy/resting comfortably. Alternating Dilaudid/Norco Q2 and muscle relaxants as requested. Worsened numbness to LUE post op (MD aware), started on Decadron.  [place] collar
PT WATCHING VIDEO POST CERV SX INSTRUCTIONS
Pain mod/severe. Controlled with scheduled/prn meds. Ice therapy to bilateral shoulders also helping. No difficulty swallowing. Dressings are dry and intact. SEDRICK drain with serosanguinous drainage. Aspen collar remains in place.  Ambulated in halls without d
Patient received appeared to be drowsy but alert and easily awaken when name called out. Patient with Aspen collar on, posterior neck with coverlet dressing on, dry and intact. SEDRICK drain to surgical site with serosanguineous drainage in small amount.   Cora Zee
Patient states pain 7-10/10. Taking prn/scheduled pain meds and muscle relaxers. Also using ice on her shoulders. Posterior neck incision clean dry and intact. Aspen collar in place. Ambulated around unit. No difficulty swallowing. Plan of care reviewed.  B
Problem: Impaired Activities of Daily Living  Goal: Achieve highest/safest level of independence in self care  Description  Interventions:  - Assess ability and encourage patient to participate in ADLs to maximize function  - Promote sitting position i
Problem: Impaired Functional Mobility  Goal: Achieve highest/safest level of mobility/gait  Description  Interventions:  - Assess patient's functional ability and stability  - Promote increasing activity/tolerance for mobility and gait  - Educate and eng
Problem: Impaired Functional Mobility  Goal: Achieve highest/safest level of mobility/gait  Description  Interventions:  - Assess patient's functional ability and stability  - Promote increasing activity/tolerance for mobility and gait  - Educate and eng
RECD ALERT ,AWAKE, ORIENTED. SEE MAR. NO RESP DISTRESS, UP WITH P.T. EARLIER DENIES DIFFICULTY SWALLOWING OR BREATHING ,ASPEN COLLAR IN PLACE.  CALL LIGHT W/IN REACH IS USE ENC X10 /HR WA. TO CALL FOR ALL NEEDS AND ASSISTANCE
S/p C3-C4,C4-C5,C5-C6 Laminoplasty,partial C2 & C7 Laminoplasty  Admitted via bed. Oriented to room. Passed swallow eval.  Diet ordered. Family member at bedside.
States  Numbness(had pre-op) & pain to LUE increased w/ no pain relief after Norco, Morphine, or Valium . Zach Bennett notified- see Decadron orders- 10 mg x 1 given now, will con't to monitor closely, ice packs placed to posterior neck.
Colonoscopy

## 2020-07-09 NOTE — TELEPHONE ENCOUNTER
Spoke with [address] who stated that Hydrocodone needs a prior authorization otherwise they will only give a 7 day supply:    HYDROcodone-acetaminophen (NORCO)  MG per tab 2 tablet 2 tablet Every 4 hours PRN 7/6/2020 1603 (none)   Route:   O

## 2020-07-09 NOTE — TELEPHONE ENCOUNTER
Called patient regarding the quantity of [place] allotted by her pharmacy and amount paid by Medicaid [facility]. Patient requesting PA to be completed as she would like the 90 tablet supply.       Routed to PA team for authorization, and to Rafael Senior for

## 2020-07-10 NOTE — PAYOR COMM NOTE
--------------  DISCHARGE REVIEW    Payor: Sixto Rosen #:  [id number]  Authorization Number: [id number]    Admit date: 7/6/20  Admit time:  0603  Discharge Date: 7/9/2020 12:42 PM     Admitting Physician: Art Andrews syndrome     Cervical myelopathy with cervical radiculopathy     History of migraine     Hypertension   DDD (degenerative disc disease), cervical [M50.30]Hx of fusion of cervical spine [B78. 1]Cervical myelopathy with cervical radiculopathy [M47.12]    Cons PACU phase, she reported increased tingling to her left hand. She had a post op CT cervical which was as expected. She had expected post-op pain. Her drain was removed on POD #2. She had improvement in her left hand symptoms.   She is expected to discha Melatonin 10 MG Oral Cap  Take 10 mg by mouth nightly. Calcium Carbonate-Vitamin D (CALCIUM 600 + D OR)  Take 1 tablet by mouth 2 (two) times daily. Montelukast Sodium (SINGULAIR) 10 MG Oral Tab  Take 1 tablet (10 mg total) by mouth nightly. tablet by mouth 4 (four) times daily as needed. Qty: 40 capsule Refills: 1    VENTOLIN  (90 BASE) MCG/ACT Inhalation Aero Soln  Inhale 2 puffs into the lungs every 6 (six) hours as needed.     Refills: 1    !! - Potential duplicate medications found

## 2020-07-10 NOTE — PAYOR COMM NOTE
--------------  DISCHARGE REVIEW------REQUESTING ADDITIONAL DAY 7/8      Payor: Sixto Rosen #:  [id number]  Authorization Number: [id number]    Admit date: 7/6/20  Admit time:  0603  Discharge Date: 7/9/2020 12:42 .0 176. 0         No results for input(s): GLU, BUN, CREATSERUM, GFRAA, GFRNAA, CA, ALB, NA, K, CL, CO2, ALKPHO, AST, ALT, BILT, TP in the last 168 hours.     CrCl cannot be calculated (Patient's most recent lab result is older than the maximum 7 day Discharge Summary    Vinod Narayanan Patient Status:  Inpatient    1975 MRN [id number]   [facility] 3SW-A Attending Susan Jenkins MD   [facility] Day # 3 PCP Jessica Mcneil DO     Date of Admission: 2020    Date of Discharge: Malachi Yanes History of Present Illness: Very pleasant patient presents today for surgical intervention with Dr. Donald Collier. Feeling well, ready to proceed with surgery. Patient received surgical discussion in the office.  She has further questions in regards to incision sit START taking these medications    cyclobenzaprine 10 MG Oral Tab  Take 1 tablet (10 mg total) by mouth 3 (three) times daily as needed for Muscle spasms. Qty: 45 tablet Refills: 0    Naloxone HCl 4 MG/0.1ML Nasal Liquid  4 mg by Nasal route as needed.  If 2 sprays by Nasal route nightly. Qty: 1 Bottle Refills: 2    Dicyclomine HCl 20 MG Oral Tab  Take 1 tablet (20 mg total) by mouth 4 (four) times daily before meals and nightly.   Qty: 120 tablet Refills: 0  Associated Diagnoses:Irritable bowel syndrome wit Jul 21, 2020 11:00 AM CDT Post Op Visit with JOSH Rodriguez [address] [facility] ([address] [facility])        Aug 20, 2020 11:30 AM CDT Post Op Visit with Shereen Hagen MD [address] [facility] El[place]

## 2020-07-10 NOTE — PAYOR COMM NOTE
--------------  DISCHARGE REVIEW    Payor: Sixto Rosen #:  [id number]  Authorization Number: [id number]    Admit date: 7/6/20  Admit time:  0603  Discharge Date: 7/9/2020 12:42 PM     Admitting Physician: Claudell Countess syndrome     Cervical myelopathy with cervical radiculopathy     History of migraine     Hypertension   DDD (degenerative disc disease), cervical [M50.30]Hx of fusion of cervical spine [J29. 1]Cervical myelopathy with cervical radiculopathy [M47.12]    Cons PACU phase, she reported increased tingling to her left hand. She had a post op CT cervical which was as expected. She had expected post-op pain. Her drain was removed on POD #2. She had improvement in her left hand symptoms.   She is expected to discha Melatonin 10 MG Oral Cap  Take 10 mg by mouth nightly. Calcium Carbonate-Vitamin D (CALCIUM 600 + D OR)  Take 1 tablet by mouth 2 (two) times daily. Montelukast Sodium (SINGULAIR) 10 MG Oral Tab  Take 1 tablet (10 mg total) by mouth nightly. tablet by mouth 4 (four) times daily as needed. Qty: 40 capsule Refills: 1    VENTOLIN  (90 BASE) MCG/ACT Inhalation Aero Soln  Inhale 2 puffs into the lungs every 6 (six) hours as needed.     Refills: 1    !! - Potential duplicate medications found

## 2020-07-10 NOTE — OCCUPATIONAL THERAPY NOTE
OCCUPATIONAL THERAPY TREATMENT NOTE - INPATIENT     Room Number: 360/360-A  Session: 1/2   Number of Visits to Meet Established Goals: 2    Presenting Problem: (C3-6 laminoplasty with partial laminoplasty C2, C7)    History related to current admission:  A patient's son at 4years of age - 2006   • Mastodynia    • Menses painful    • Migraines    • Nausea 2weeks   • Night sweats    • Other screening mammogram 06/11/2012   • Pain in joints    • Pain with bowel movements Years   • Pap smear for cervical can ACROMIOPLASTY LYSIS OF ADHESIONS RIGHT SHOULDER   • OTHER  12/19/2018    ANTERIOR DISCECTOMY AND FUSION. CERVICAL 4-CERVICAL 5 AND CERVICAL 5-CERVICAL 6.  ALLOGRAFT, PLATE AND SCREWS   • OTHER SURGICAL HISTORY  2001    esophagogastroduodenoscopy   • OTHER S by writer: droplet mask, gloves. Patient received in bed, demonstrated mod I for logroll to sit up. Patient dressed self at edge of bed with set up, supervision using hip and knee flexion. Toileting completed with supervision/mod I using RW.   Patient comp

## 2020-07-13 RX ORDER — HYDROCODONE BITARTRATE AND ACETAMINOPHEN 10; 325 MG/1; MG/1
1 TABLET ORAL EVERY 4 HOURS PRN
Qty: 90 TABLET | Refills: 0 | Status: CANCELLED | OUTPATIENT
Start: 2020-07-13

## 2020-07-13 NOTE — TELEPHONE ENCOUNTER
Refill denied. Pt was given script for 90 tabs last week when she was d/c from the hospital after surgery. That should be enough to get her to her 2 week appointment next week.

## 2020-07-13 NOTE — TELEPHONE ENCOUNTER
hydrocodone refill to be sent to [facility] on corner of [address] in [facility]. Patient informed of 48 hour refill policy excluding weekends and holidays. Informed patient only patient can  the prescription effective April 1, 2017.   Tiffany Lerner

## 2020-07-13 NOTE — TELEPHONE ENCOUNTER
Medication: Norco  Mg     Date of last refill: 7/8/2020 90 Tabs     [place]     Last office visit: 5/28/2020     Due back to clinic per last office note:  Post op     Date next office visit scheduled: 7/2

## 2020-07-14 NOTE — OPERATIVE REPORT
[facility]    OPERATIVE REPORT    Patient:  Johnny Blackwell;  YOB: 1975     CSN:  [id number]; Medical Record Number:  [id number]    Admission Date:  7/6/2020 Operation Date:  7/6/2020    . ..........................     Operating Physician at the base of the skull. A midline skin incision from the tip of the C2 to the tip of the C7 spinous processes was carried out a subperiosteal dissection exposed the spine to the medial portion of the facets but sparing the facet capsules.   The midline of the laminoplasty defect. The clamps were then expanded with the appropriate instrument. Finally the 10 mm screws were used to secure the clamps to the lamina.   The right side of the lateral gutter was inspected to be ensure that the bone had not canti

## 2020-07-15 ENCOUNTER — TELEPHONE (OUTPATIENT)
Dept: SURGERY | Facility: CLINIC | Age: 45
End: 2020-07-15

## 2020-07-15 ENCOUNTER — PATIENT OUTREACH (OUTPATIENT)
Dept: CASE MANAGEMENT | Age: 45
End: 2020-07-15

## 2020-07-15 DIAGNOSIS — G95.9 CERVICAL MYELOPATHY WITH CERVICAL RADICULOPATHY (HCC): ICD-10-CM

## 2020-07-15 DIAGNOSIS — Z98.1 HX OF FUSION OF CERVICAL SPINE: ICD-10-CM

## 2020-07-15 DIAGNOSIS — M50.30 DDD (DEGENERATIVE DISC DISEASE), CERVICAL: Primary | ICD-10-CM

## 2020-07-15 DIAGNOSIS — M54.12 CERVICAL MYELOPATHY WITH CERVICAL RADICULOPATHY (HCC): ICD-10-CM

## 2020-07-15 NOTE — PROGRESS NOTES
NCM attempted to call pt for condition update regarding ortho surgery, no answer after multiple rings. NCM to try again at a later time. Notified [employer] that appeal was never submitted because I needed pt to come sign form stating it was okay to send in appeal on her behalf. I submitted the appeal today and called and lm for pt to call insurance and see  If they will take a verbal approval from her.

## 2020-07-15 NOTE — TELEPHONE ENCOUNTER
Pt had sx on 7/6 and is wondering how long until she can drive?   She knows she can't drive while on the medication; but she's wondering how long this pain may last?  Pt is still in a lot of pain and is taking pain meds around the clock; she doesn't want to

## 2020-07-16 RX ORDER — HYDROCODONE BITARTRATE AND ACETAMINOPHEN 10; 325 MG/1; MG/1
1 TABLET ORAL EVERY 4 HOURS PRN
Qty: 90 TABLET | Refills: 0 | Status: SHIPPED | OUTPATIENT
Start: 2020-07-16 | End: 2020-08-01

## 2020-07-16 NOTE — TELEPHONE ENCOUNTER
Patient returned my call. She states she had been informed that she would have increased pain after surgery, she however was not entirely prepared for this.   Patient states pain is not increased since before surgery, she is just asking if this is normal.

## 2020-07-21 ENCOUNTER — OFFICE VISIT (OUTPATIENT)
Dept: SURGERY | Facility: CLINIC | Age: 45
End: 2020-07-21
Payer: MEDICAID

## 2020-07-21 VITALS — SYSTOLIC BLOOD PRESSURE: 115 MMHG | DIASTOLIC BLOOD PRESSURE: 72 MMHG | HEART RATE: 82 BPM

## 2020-07-21 DIAGNOSIS — G95.9 CERVICAL MYELOPATHY WITH CERVICAL RADICULOPATHY (HCC): ICD-10-CM

## 2020-07-21 DIAGNOSIS — M54.12 CERVICAL MYELOPATHY WITH CERVICAL RADICULOPATHY (HCC): ICD-10-CM

## 2020-07-21 DIAGNOSIS — Z98.1 HX OF FUSION OF CERVICAL SPINE: ICD-10-CM

## 2020-07-21 DIAGNOSIS — R26.9 NEUROLOGIC GAIT DYSFUNCTION: ICD-10-CM

## 2020-07-21 DIAGNOSIS — Z98.1 S/P CERVICAL SPINAL FUSION: ICD-10-CM

## 2020-07-21 DIAGNOSIS — R32 URINARY INCONTINENCE, UNSPECIFIED TYPE: ICD-10-CM

## 2020-07-21 DIAGNOSIS — M50.30 DDD (DEGENERATIVE DISC DISEASE), CERVICAL: Primary | ICD-10-CM

## 2020-07-21 DIAGNOSIS — M96.1 CERVICAL POST-LAMINECTOMY SYNDROME: ICD-10-CM

## 2020-07-21 PROCEDURE — 3074F SYST BP LT 130 MM HG: CPT | Performed by: PHYSICIAN ASSISTANT

## 2020-07-21 PROCEDURE — 99024 POSTOP FOLLOW-UP VISIT: CPT | Performed by: PHYSICIAN ASSISTANT

## 2020-07-21 PROCEDURE — 3078F DIAST BP <80 MM HG: CPT | Performed by: PHYSICIAN ASSISTANT

## 2020-07-21 RX ORDER — DIAZEPAM 10 MG/1
10 TABLET ORAL EVERY 12 HOURS PRN
Qty: 60 TABLET | Refills: 1 | Status: SHIPPED | OUTPATIENT
Start: 2020-07-21 | End: 2020-09-21

## 2020-07-21 NOTE — PROGRESS NOTES
Pt is here for post op CERVICAL 3-4, CERVICAL 4-5, CERVICAL 5-6 LAMINOPLASTY.  PARTIAL CERVICAL 2 AND CERVICAL 7 LAMINOPLASTY. 7/6/2020

## 2020-07-21 NOTE — PATIENT INSTRUCTIONS
Refill policies:    • Allow 2-3 business days for refills; controlled substances may take longer.   • Contact your pharmacy at least 5 days prior to running out of medication and have them send an electronic request or submit request through the “request re Depending on your insurance carrier, approval may take 3-10 days. It is highly recommended patients contact their insurance carrier directly to determine coverage.   If test is done without insurance authorization, patient may be responsible for the entire of cervical spine in 4 weeks and will discuss possible physical therapy

## 2020-07-21 NOTE — PROGRESS NOTES
[facility] Neurosurgery follow-up        HISTORY OF PRESENT Margie Watts is a 41year old RH  female here in follow up after having a C3-7 laminoplasty and partial C7 and C2 laminectomy 7/6/2020 Dr. Rosetta Cranker. She returns today in follow-up.   Her neck numbness and tingling in both arms. Her left arm pain is a 5/10 going into the middle finger. Is dropping things. She is having increasing back spasms and tightness which is 8/10.   Right buttock pain bilateral leg pain numbness and tingling in both legs in the arms and legs. Last hx 1/10/20  She is having increasing left arm pain. She is having increasing weakness in the hands numbness in the right foot increasing neck pain. She is having increasing difficulty urinating.   She states her right foot we follow-up. She still states she is having some trouble starting urination she is controlling herself. She has to bear down fairly hard to void she continues to have shaking in her legs and shaking in her hands. The steroids did not seem to help.   She is respiratory infections of unspecified site     • Anxiety state, unspecified     • Attention deficit disorder without mention of hyperactivity     • Back problem       cervical and lumbar   • Cauda equina syndrome without mention of neurogenic bladder     • removed left wrist   2005: TARSAL TUNNEL RELEASE      Comment: right foot  No date: TOTAL ABDOM HYSTERECTOMY     FAMILY HISTORY:  family history includes ADD in her father; BRCA gene + in her sister; Breast Cancer (age of onset: 54) in her mother; Cancer i [employer]   Right         5-        5         5-          5- 5 5 4+ 5 5   Left         5-        5         5-          5- 5 5 4+ 5 4+      Lower extremity strength:      Iliopsoas  Hamstrings   Quads    D-flexion P-flexion Eversion   Right       5 normal architecture changes consistent with a C4-6 ACDF no obvious modalities.     MRI of the cervical spine from 2/11/19 show artifact at C4-6 consistent with her surgery remaining levels of C2-3 C3-4 and C6-7 are intact with no acute disc herniation or pa 4 weeks  4. X-rays of cervical spine in 4 weeks and will discuss possible physical therapy    KRISTEN Triplett, M.S., PA-C  [facility]  [address], [address], [address]  [phone]

## 2020-07-23 ENCOUNTER — TELEPHONE (OUTPATIENT)
Dept: SURGERY | Facility: CLINIC | Age: 45
End: 2020-07-23

## 2020-07-23 NOTE — TELEPHONE ENCOUNTER
Pt called requesting letter clearing her for dental work be faxed to Christa Godoy @ [phone]; [facility] phone # [phone]; letter faxed, confirmation received

## 2020-07-27 DIAGNOSIS — E78.00 ELEVATED CHOLESTEROL: ICD-10-CM

## 2020-07-27 RX ORDER — TOPIRAMATE 25 MG/1
TABLET ORAL
Qty: 60 TABLET | Refills: 0 | Status: SHIPPED | OUTPATIENT
Start: 2020-07-27 | End: 2020-08-26

## 2020-07-27 RX ORDER — LOVASTATIN 10 MG/1
10 TABLET ORAL NIGHTLY
Qty: 30 TABLET | Refills: 0 | Status: SHIPPED | OUTPATIENT
Start: 2020-07-27 | End: 2020-09-02

## 2020-08-01 ENCOUNTER — PATIENT MESSAGE (OUTPATIENT)
Dept: SURGERY | Facility: CLINIC | Age: 45
End: 2020-08-01

## 2020-08-01 DIAGNOSIS — G95.9 CERVICAL MYELOPATHY WITH CERVICAL RADICULOPATHY (HCC): ICD-10-CM

## 2020-08-01 DIAGNOSIS — M50.30 DDD (DEGENERATIVE DISC DISEASE), CERVICAL: ICD-10-CM

## 2020-08-01 DIAGNOSIS — Z98.1 HX OF FUSION OF CERVICAL SPINE: ICD-10-CM

## 2020-08-01 DIAGNOSIS — M54.12 CERVICAL MYELOPATHY WITH CERVICAL RADICULOPATHY (HCC): ICD-10-CM

## 2020-08-01 RX ORDER — HYDROCODONE BITARTRATE AND ACETAMINOPHEN 10; 325 MG/1; MG/1
1 TABLET ORAL EVERY 4 HOURS PRN
Qty: 30 TABLET | Refills: 0 | Status: SHIPPED | OUTPATIENT
Start: 2020-08-01 | End: 2020-08-11

## 2020-08-03 NOTE — TELEPHONE ENCOUNTER
From: Genaro Manjarrez  To: JOSH Chung  Sent: 8/1/2020 9:45 AM CDT  Subject: Prescription Question    Hello. I’m not sure if my prescription refill went through. I need more   Hydrocodone. I have been taking two at a time, one doesn’t work.  I do

## 2020-08-03 NOTE — TELEPHONE ENCOUNTER
Patient underwent surgery on 7/6/20 with Dr. Dariusz England:  CERVICAL 3-4, CERVICAL 4-5, CERVICAL 5-6 LAMINOPLASTY. PARTIAL CERVICAL 2 AND CERVICAL 7 LAMINOPLASTY. Routed to providers.

## 2020-08-04 DIAGNOSIS — R11.0 NAUSEA: ICD-10-CM

## 2020-08-04 RX ORDER — ONDANSETRON 4 MG/1
4 TABLET, ORALLY DISINTEGRATING ORAL EVERY 8 HOURS PRN
Qty: 20 TABLET | Refills: 0 | Status: SHIPPED | OUTPATIENT
Start: 2020-08-04 | End: 2020-10-20

## 2020-08-11 RX ORDER — HYDROCODONE BITARTRATE AND ACETAMINOPHEN 10; 325 MG/1; MG/1
1 TABLET ORAL EVERY 4 HOURS PRN
Qty: 45 TABLET | Refills: 0 | Status: SHIPPED | OUTPATIENT
Start: 2020-08-11 | End: 2020-08-20

## 2020-08-11 NOTE — TELEPHONE ENCOUNTER
Medication: Norco  Mg     Date of last refill: 8/1/2020 30 Tabs     [place] Dispensed 9/7/1337 30 Tabs     Last office visit: 7/21/2020     Due back to clinic per last office note: 4 Weeks        Date next office v

## 2020-08-19 ENCOUNTER — HOSPITAL ENCOUNTER (OUTPATIENT)
Dept: GENERAL RADIOLOGY | Age: 45
Discharge: HOME | End: 2020-08-19
Attending: PHYSICIAN ASSISTANT
Payer: MEDICAID

## 2020-08-19 DIAGNOSIS — Z98.1 HX OF FUSION OF CERVICAL SPINE: ICD-10-CM

## 2020-08-19 DIAGNOSIS — M50.30 DDD (DEGENERATIVE DISC DISEASE), CERVICAL: ICD-10-CM

## 2020-08-19 PROCEDURE — 72040 X-RAY EXAM NECK SPINE 2-3 VW: CPT | Performed by: PHYSICIAN ASSISTANT

## 2020-08-20 ENCOUNTER — OFFICE VISIT (OUTPATIENT)
Dept: SURGERY | Facility: CLINIC | Age: 45
End: 2020-08-20
Payer: MEDICAID

## 2020-08-20 VITALS — SYSTOLIC BLOOD PRESSURE: 116 MMHG | HEART RATE: 110 BPM | DIASTOLIC BLOOD PRESSURE: 82 MMHG

## 2020-08-20 DIAGNOSIS — Z98.1 S/P CERVICAL SPINAL FUSION: ICD-10-CM

## 2020-08-20 DIAGNOSIS — M50.30 DDD (DEGENERATIVE DISC DISEASE), CERVICAL: Primary | ICD-10-CM

## 2020-08-20 DIAGNOSIS — Z98.1 HX OF FUSION OF CERVICAL SPINE: ICD-10-CM

## 2020-08-20 DIAGNOSIS — G95.9 CERVICAL MYELOPATHY WITH CERVICAL RADICULOPATHY (HCC): ICD-10-CM

## 2020-08-20 DIAGNOSIS — R26.9 NEUROLOGIC GAIT DYSFUNCTION: ICD-10-CM

## 2020-08-20 DIAGNOSIS — R32 URINARY INCONTINENCE, UNSPECIFIED TYPE: ICD-10-CM

## 2020-08-20 DIAGNOSIS — M54.12 CERVICAL MYELOPATHY WITH CERVICAL RADICULOPATHY (HCC): ICD-10-CM

## 2020-08-20 PROCEDURE — 99024 POSTOP FOLLOW-UP VISIT: CPT | Performed by: PHYSICIAN ASSISTANT

## 2020-08-20 PROCEDURE — 3074F SYST BP LT 130 MM HG: CPT | Performed by: PHYSICIAN ASSISTANT

## 2020-08-20 PROCEDURE — 3079F DIAST BP 80-89 MM HG: CPT | Performed by: PHYSICIAN ASSISTANT

## 2020-08-20 RX ORDER — HYDROCODONE BITARTRATE AND ACETAMINOPHEN 10; 325 MG/1; MG/1
1 TABLET ORAL EVERY 8 HOURS PRN
Qty: 90 TABLET | Refills: 0 | Status: SHIPPED | OUTPATIENT
Start: 2020-08-20 | End: 2020-09-21

## 2020-08-20 NOTE — PROGRESS NOTES
[facility] Neurosurgery follow-up        HISTORY OF PRESENT Alfreda Back is a 41year old RH  female here in follow up after having a C3-7 laminoplasty and partial C7 and C2 laminectomy 7/6/2020 Dr. Kiley Chirinos. Feeling better.  If she over does things she g consciousness. Since that time she is had increasing neck pain is a 6/10. Complains of left greater than right arm pain with numbness and tingling in both arms. Her left arm pain is a 5/10 going into the middle finger. Is dropping things.   She is havin using the Vista therapy collar which is helping but she is quite unsteady and if she is out of the neck brace she feels weakness in the arms and legs. Last hx 1/10/20  She is having increasing left arm pain.   She is having increasing weakness in the [place] legs she is tripping and falling. She is kicking furniture. She has had a return of her bladder spasms. Last hx:   Here in follow-up. She still states she is having some trouble starting urination she is controlling herself.   She has to bear down f Medical History:   Diagnosis Date   • Abnormal uterine bleeding     • Acute bronchitis     • Acute esophagitis     • Acute upper respiratory infections of unspecified site     • Anxiety state, unspecified     • Attention deficit disorder without mention of SURGICAL HISTORY      Comment: right thumb trigger finger release   No date: OTHER SURGICAL HISTORY      Comment: ganglion cyst removed left wrist   2005: TARSAL TUNNEL RELEASE      Comment: right foot  No date: TOTAL ABDOM HYSTERECTOMY     FAMILY HISTORY: Right         5-        5         5-          5 5 5 4+ 5 5-   Left         5-        5         5-          5- 5 5 4+ 5 4+      Lower extremity strength:      Iliopsoas  Hamstrings   Quads    D-flexion P-flexion Eversion   Right       5         5       5 architecture changes consistent with a C4-6 ACDF no obvious modalities.     MRI of the cervical spine from 2/11/19 show artifact at C4-6 consistent with her surgery remaining levels of C2-3 C3-4 and C6-7 are intact with no acute disc herniation or pathology -HEP    Dr Eun Patel evaluated the patient and is in agreement.       Divya Harmon M.S., PALISA MILLER [address], [address] [place] [address] [facility], [address] [place] [address] Rd  [phone]

## 2020-08-20 NOTE — PROGRESS NOTES
Pt is here for follow up. Imaging: XR CS obtained     Pt states she is better. Pt states she tried taking off collar but hurts when it moves. Pt states pain shooting down upper back. Pt states right arm has been numb at times. No issues with B/B.  Pt

## 2020-08-26 RX ORDER — TOPIRAMATE 25 MG/1
50 TABLET ORAL 2 TIMES DAILY
Qty: 60 TABLET | Refills: 0 | Status: SHIPPED | OUTPATIENT
Start: 2020-08-26 | End: 2020-09-25

## 2020-09-01 ENCOUNTER — PATIENT MESSAGE (OUTPATIENT)
Dept: SURGERY | Facility: CLINIC | Age: 45
End: 2020-09-01

## 2020-09-01 RX ORDER — METHYLPREDNISOLONE 4 MG/1
TABLET ORAL
Qty: 1 PACKAGE | Refills: 0 | Status: SHIPPED | OUTPATIENT
Start: 2020-09-01 | End: 2020-11-09 | Stop reason: ALTCHOICE

## 2020-09-01 NOTE — TELEPHONE ENCOUNTER
From: Rosa Isela Solorzano  To: JOSH Medina  Sent: 9/1/2020 2:48 PM CDT  Subject: Visit Follow-up Question    Hello,   I am not able to get into PT until the 19th of September. I woke up this morning with urine incontinence, while I was asleep.  The on

## 2020-09-01 NOTE — TELEPHONE ENCOUNTER
Patient returning my call. She states last night was the first episode of incontinence she has had since before her surgery.   Patient states she is also experiencing neck pain which she describes as located right in the middle and \"shoots\" straight down

## 2020-09-01 NOTE — TELEPHONE ENCOUNTER
Patient was called she had one episode of incontinence last night which was similar to her preop incontinence. Her neck pain can it comes and goes. She has been using a soft collar intermittently at night. Last night she did wear the soft collar.   She i

## 2020-09-02 ENCOUNTER — TELEPHONE (OUTPATIENT)
Dept: FAMILY MEDICINE CLINIC | Facility: CLINIC | Age: 45
End: 2020-09-02

## 2020-09-02 DIAGNOSIS — E78.00 ELEVATED CHOLESTEROL: ICD-10-CM

## 2020-09-02 RX ORDER — LOVASTATIN 10 MG/1
10 TABLET ORAL NIGHTLY
Qty: 30 TABLET | Refills: 0 | Status: SHIPPED | OUTPATIENT
Start: 2020-09-02 | End: 2020-09-25

## 2020-09-02 NOTE — PROGRESS NOTES
Virtual Telephone Check-In    Lacie Holden verbally consents to a Virtual/Telephone Check-In visit on 09/02/20. Patient has been referred to the [facility] website at [email or web address] to review the yearly Consent to Treat document.     Patient unders Refill: 0        Continue with current treatment plan.       Blaine Ends, DO      HPI:   Karin Cervantes is a 41year old female here to discuss ADD, mood disorder      Pt is having more neurologic issues after her second surgery and was placed on more Oral Tablet Therapy Pack As prescribed 1 Package 0   • topiramate 25 MG Oral Tab Take 2 tablets (50 mg total) by mouth 2 (two) times daily.  60 tablet 0   • HYDROcodone-acetaminophen  MG Oral Tab Take 1 tablet by mouth every 8 (eight) hours as needed (VITAMIN B12 OR) Take 1 tablet by mouth daily. • Melatonin 10 MG Oral Cap Take 10 mg by mouth nightly. • Calcium Carbonate-Vitamin D (CALCIUM 600 + D OR) Take 1 tablet by mouth 2 (two) times daily.        • Montelukast Sodium (SINGULAIR) 10 MG O • Menses painful    • Migraines    • Nausea 2weeks   • Night sweats    • Other screening mammogram 06/11/2012   • Pain in joints    • Pain with bowel movements Years   • Pap smear for cervical cancer screening 7-3-2012    [facility]l   • Personal history of urin DISCECTOMY AND FUSION. CERVICAL 4-CERVICAL 5 AND CERVICAL 5-CERVICAL 6.  ALLOGRAFT, PLATE AND SCREWS   • OTHER SURGICAL HISTORY  2001    esophagogastroduodenoscopy   • OTHER SURGICAL HISTORY      right thumb trigger finger release    • OTHER SURGICAL HISTOR OF SYSTEMS:   GENERAL: feels well otherwise  SKIN: denies any unusual skin lesions  EYES:denies blurred vision or double vision  HEENT: denies nasal congestion, sinus pain or ST  LUNGS: denies shortness of breath with exertion  CARDIOVASCULAR: denies chest

## 2020-09-11 ENCOUNTER — PATIENT MESSAGE (OUTPATIENT)
Dept: SURGERY | Facility: CLINIC | Age: 45
End: 2020-09-11

## 2020-09-11 DIAGNOSIS — M48.02 CERVICAL STENOSIS OF SPINAL CANAL: Primary | ICD-10-CM

## 2020-09-11 DIAGNOSIS — M96.1 CERVICAL POST-LAMINECTOMY SYNDROME: ICD-10-CM

## 2020-09-11 DIAGNOSIS — Z98.1 S/P CERVICAL SPINAL FUSION: ICD-10-CM

## 2020-09-11 NOTE — TELEPHONE ENCOUNTER
From: Tiff Avila  To: JOSH Edwards  Sent: 9/11/2020 2:20 PM CDT  Subject: Other    Hello,  I wanted to let you know that I am still have some urine incontinence, not as often. However the pain at the base of my neck is horrible.  goes down my

## 2020-09-11 NOTE — TELEPHONE ENCOUNTER
Honoriohart message from patient noted.      At [address] [place] [address] on 8/20/20 with Dr. Eun Paetl and JOSH Dickinson:    \"ASSESSMENT:  1.  C4-5 C5-6 ACDF 12/19/18 Dr. Eun Patel  2.  Occipital headaches resolved  3.  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  Upper and lower extremity weakness declined

## 2020-09-15 ENCOUNTER — TELEPHONE (OUTPATIENT)
Dept: FAMILY MEDICINE CLINIC | Facility: CLINIC | Age: 45
End: 2020-09-15

## 2020-09-18 ENCOUNTER — OFFICE VISIT (OUTPATIENT)
Dept: PHYSICAL THERAPY | Age: 45
End: 2020-09-18
Attending: NEUROLOGICAL SURGERY
Payer: MEDICAID

## 2020-09-18 PROCEDURE — 97530 THERAPEUTIC ACTIVITIES: CPT

## 2020-09-18 PROCEDURE — 97163 PT EVAL HIGH COMPLEX 45 MIN: CPT

## 2020-09-18 NOTE — PROGRESS NOTES
SPINE EVALUATION:   Referring Physician: Dr. Dariusz England   Diagnosis: DDD (degenerative disc disease), cervical M50.30   Hx of fusion of cervical spine Z98.1   Cervical myelopathy with cervical radiculopathy M47.12   Neurologic gait dysfunction R26.9   Urinary knee. She states she loses her balance frequently. Pt describes pain level current 6/10, at best 3/10, at worst 9/10.    Current functional limitations include all MRADLs, pt reports she sits in shower, looking up, looking down, grooming, hygiene, dress screening (7-3-2012), Personal history of urinary (tract) infection, Pneumonia due to organism, PONV (postoperative nausea and vomiting), Problems with swallowing (29 years ago), Sleep disturbance, Stress, Uncomfortable fullness after meals (2weeks), Unspe Signs and symptoms are consistent with diagnosis of s/p cervical spinal fusion with myelopathy. Pt's comorbidities and psychosocial issues may impact PT POC and pt progress.  Assessment limited given time constraints as pt arrives late, ongoing assessment length BLE, decreased arm swing and wide CARMEN. Balance: SLS R <1, L <1 secs    Today’s Treatment and Response:   Pt education was provided on exam findings, treatment diagnosis, treatment plan, expectations, and prognosis.  Pt was also provided recommendati (mid/low trap) to 4-/5 to promote improved upright posturing and decreased pain with ADLs like putting dishes away   · Pt will be independent and compliant with comprehensive HEP to maintain progress achieved in PT     Frequency / Duration: Patient will be

## 2020-09-21 DIAGNOSIS — M50.30 DDD (DEGENERATIVE DISC DISEASE), CERVICAL: ICD-10-CM

## 2020-09-21 DIAGNOSIS — M54.12 CERVICAL MYELOPATHY WITH CERVICAL RADICULOPATHY (HCC): ICD-10-CM

## 2020-09-21 DIAGNOSIS — G95.9 CERVICAL MYELOPATHY WITH CERVICAL RADICULOPATHY (HCC): ICD-10-CM

## 2020-09-21 DIAGNOSIS — Z98.1 HX OF FUSION OF CERVICAL SPINE: ICD-10-CM

## 2020-09-21 RX ORDER — HYDROCODONE BITARTRATE AND ACETAMINOPHEN 10; 325 MG/1; MG/1
1 TABLET ORAL EVERY 8 HOURS PRN
Qty: 90 TABLET | Refills: 0 | Status: SHIPPED | OUTPATIENT
Start: 2020-09-21 | End: 2020-10-15

## 2020-09-21 RX ORDER — DIAZEPAM 10 MG/1
TABLET ORAL
Qty: 60 TABLET | Refills: 1 | Status: SHIPPED | OUTPATIENT
Start: 2020-09-21 | End: 2020-11-20

## 2020-09-21 NOTE — TELEPHONE ENCOUNTER
Medication: hydrocodone-acetaminophen   Q8hr # 90/0     Date of last refill: 8-  [place] 5-    Last office visit: 8-  Due back to clinic per last office note:  8 weeks  Date next office visit s

## 2020-09-21 NOTE — PROGRESS NOTES
Dx: DDD (degenerative disc disease), cervical M50.30   Hx of fusion of cervical spine Z98.1   Cervical myelopathy with cervical radiculopathy M47.12   Neurologic gait dysfunction R26.9   Urinary incontinence, unspecified type R32   S/P cervical spinal fusi 4-/5 to promote improved upright posturing and decreased pain with ADLs like putting dishes away   · Pt will be independent and compliant with comprehensive HEP to maintain progress achieved in PT     Plan: Address soft tissue restrictions, ROM deficits, D

## 2020-09-21 NOTE — TELEPHONE ENCOUNTER
Medication: Diazepam 10 Mg     Date of last refill: 7/21/2020 60 Tabs 1 Refills     [place] Dispensed 4/17/4880 60 Tabs     Last office visit: 8/20/2020     Due back to clinic per last office note: 8 Weeks     Date next

## 2020-09-22 ENCOUNTER — TELEPHONE (OUTPATIENT)
Dept: PHYSICAL THERAPY | Age: 45
End: 2020-09-22

## 2020-09-22 ENCOUNTER — APPOINTMENT (OUTPATIENT)
Dept: PHYSICAL THERAPY | Age: 45
End: 2020-09-22
Attending: NEUROLOGICAL SURGERY
Payer: MEDICAID

## 2020-09-22 NOTE — TELEPHONE ENCOUNTER
Pt reports she fell letting her dogs out and tripped over a seat and fell on her rear end and butt and felt a tingling up her spine. She states she is currently laying down and that is improving pain some.  PT advises pt to contact MD office and inform them

## 2020-09-23 ENCOUNTER — OFFICE VISIT (OUTPATIENT)
Dept: PHYSICAL THERAPY | Age: 45
End: 2020-09-23
Attending: NEUROLOGICAL SURGERY
Payer: MEDICAID

## 2020-09-23 PROCEDURE — 97110 THERAPEUTIC EXERCISES: CPT

## 2020-09-23 PROCEDURE — 97112 NEUROMUSCULAR REEDUCATION: CPT

## 2020-09-23 PROCEDURE — 97140 MANUAL THERAPY 1/> REGIONS: CPT

## 2020-09-24 ENCOUNTER — APPOINTMENT (OUTPATIENT)
Dept: PHYSICAL THERAPY | Age: 45
End: 2020-09-24
Attending: INTERNAL MEDICINE
Payer: MEDICAID

## 2020-09-24 ENCOUNTER — TELEPHONE (OUTPATIENT)
Dept: PHYSICAL THERAPY | Age: 45
End: 2020-09-24

## 2020-09-25 DIAGNOSIS — F32.0 CURRENT MILD EPISODE OF MAJOR DEPRESSIVE DISORDER, UNSPECIFIED WHETHER RECURRENT (HCC): ICD-10-CM

## 2020-09-25 DIAGNOSIS — F41.9 ANXIETY: ICD-10-CM

## 2020-09-25 DIAGNOSIS — E78.00 ELEVATED CHOLESTEROL: ICD-10-CM

## 2020-09-25 RX ORDER — LOVASTATIN 10 MG/1
10 TABLET ORAL NIGHTLY
Qty: 30 TABLET | Refills: 0 | Status: SHIPPED | OUTPATIENT
Start: 2020-09-25 | End: 2020-10-22

## 2020-09-25 RX ORDER — METHYLPREDNISOLONE 4 MG/1
TABLET ORAL
Qty: 21 EACH | Refills: 0 | OUTPATIENT
Start: 2020-09-25

## 2020-09-25 RX ORDER — BUSPIRONE HYDROCHLORIDE 5 MG/1
5 TABLET ORAL 3 TIMES DAILY
Qty: 90 TABLET | Refills: 0 | Status: SHIPPED | OUTPATIENT
Start: 2020-09-25 | End: 2020-10-13

## 2020-09-25 RX ORDER — PREDNISONE 10 MG/1
10 TABLET ORAL DAILY
Qty: 30 TABLET | Refills: 0 | Status: SHIPPED | OUTPATIENT
Start: 2020-09-25 | End: 2020-11-09

## 2020-09-25 RX ORDER — TOPIRAMATE 25 MG/1
50 TABLET ORAL 2 TIMES DAILY
Qty: 60 TABLET | Refills: 0 | Status: SHIPPED | OUTPATIENT
Start: 2020-09-25 | End: 2020-10-12

## 2020-09-25 NOTE — TELEPHONE ENCOUNTER
Refill request for steroid 4 mg pack was sent over today from pharmacy. Refill for prednisone 10 mg (10 day supply) was just sent to pharmacy today by Rafael Gonzales.     Per Refill:  predniSONE 10 MG Oral Tablet (DELTASONE)    E-Prescribing Status: Rece

## 2020-09-25 NOTE — TELEPHONE ENCOUNTER
I sent prednisone to her pharmacy. I ordered a CT of her cervical spine.   A my chart message was sent to patient

## 2020-09-30 NOTE — PROGRESS NOTES
Virtual Telephone Check-In    Gilbert Troy verbally consents to a Virtual/Telephone Check-In visit on 09/30/20. Patient has been referred to the [facility] website at [email or web address] to review the yearly Consent to Treat document.     Patient unders does not listen when spoken to directly  4. often loses things necessary for tasks and activities  5. often forgetful in daily activities  6. often does not follow through on instructions and fails to finish tasks  7. often has difficulty organizing tasks Nausea. 20 tablet 0   • cyclobenzaprine 10 MG Oral Tab Take 1 tablet (10 mg total) by mouth 3 (three) times daily as needed for Muscle spasms. 45 tablet 0   • Naloxone HCl 4 MG/0.1ML Nasal Liquid 4 mg by Nasal route as needed.  If patient remains unresponsi distention 2weeks   • Abdominal pain 6 months   • Abnormal uterine bleeding    • Acute bronchitis    • Acute esophagitis    • Acute upper respiratory infections of unspecified site    • Anxiety state, unspecified    • Asthma    • Attention deficit disorder site    • Visual impairment     glasses   • Vomiting 1week   • Weight loss       Past Surgical History:   Procedure Laterality Date   • ABDOMINAL HYSTERECTOMY, BSO Bilateral 7/20/2015    Performed by Singh Barker MD at [facility] RELEASE  2005    right foot   • TONSILLECTOMY     • TOTAL ABDOM HYSTERECTOMY        Family History   Problem Relation Age of Onset   • Other (ADD[other]) Father    • Diabetes Father    • Hypertension Father    • Colon Polyps Father    • Breast Cancer [place] intact

## 2020-09-30 NOTE — PROGRESS NOTES
Virtual Telephone Check-In    Rosa Isela Solorzano verbally consents to a Virtual/Telephone Check-In visit on 09/30/20. Patient has been referred to the [facility] website at [email or web address] to review the yearly Consent to Treat document.     Patient unders total) by mouth daily. Dispense: 90 tablet; Refill: 0    2. Current mild episode of major depressive disorder, unspecified whether recurrent (HCC)    - Citalopram Hydrobromide 40 MG Oral Tab; Take 1 tablet (40 mg total) by mouth daily.   Dispense: 90 table activities quietly  3. often has difficulty awaiting turn  4. often on the go as if driven by a motor  5.  often blurts out answers before questions have been complete  6. often interrupts or intrudes on others     SYMPTOMS STARTED AT AGE 11     ADD ADVERS Dicyclomine HCl 20 MG Oral Tab Take 1 tablet (20 mg total) by mouth 4 (four) times daily before meals and nightly. 120 tablet 0   • Citalopram Hydrobromide 40 MG Oral Tab Take 1 tablet (40 mg total) by mouth daily.  90 tablet 0   • Citalopram Hydrobromide 2 without mention of neurogenic bladder    • Constipation Occasionaly   • Decorative tattoo    • Depression    • [facility]     Diet controlled; no meds   • Diarrhea, unspecified Occasionaly   • Dysesthesia    • Dyspareunia    • Easy bruising    • Endome INJECTION N/A 6/27/2018    Performed by Britany Pete MD at [address] [place] [address]   • CERVICAL EPIDURAL STEROID INJECTION N/A 6/20/2018    Performed by Britany Pete MD at [facility] MAIN OR   • CERVICAL FACET INJECTION Left 5/15/2019    Performed by Britany Pete MD at [facility] ENDO Diabetes Paternal Grandmother    • Cancer Sister         uterine   • BRCA gene + Sister    • Uterine Cancer Sister    • Bleeding Disorders Sister    • Cancer Paternal Cousin Female         cervical   • Cancer Sister         uterine   • Migraines Sister

## 2020-10-01 ENCOUNTER — OFFICE VISIT (OUTPATIENT)
Dept: PHYSICAL THERAPY | Age: 45
End: 2020-10-01
Attending: NEUROLOGICAL SURGERY
Payer: MEDICAID

## 2020-10-01 PROCEDURE — 97112 NEUROMUSCULAR REEDUCATION: CPT

## 2020-10-01 PROCEDURE — 97110 THERAPEUTIC EXERCISES: CPT

## 2020-10-01 PROCEDURE — 97140 MANUAL THERAPY 1/> REGIONS: CPT

## 2020-10-01 NOTE — PROGRESS NOTES
Dx: DDD (degenerative disc disease), cervical M50.30   Hx of fusion of cervical spine Z98.1   Cervical myelopathy with cervical radiculopathy M47.12   Neurologic gait dysfunction R26.9   Urinary incontinence, unspecified type R32   S/P cervical spinal fusi will have improved thoracic PA mobility to WNL to improve cervical ROM as well as promote upright posturing and decreased pain with dressing   · Pt will report decreased frequency of headaches to <6x/week  · Pt will demonstrate improved cervical intrinsic

## 2020-10-02 ENCOUNTER — APPOINTMENT (OUTPATIENT)
Dept: PHYSICAL THERAPY | Age: 45
End: 2020-10-02
Attending: NEUROLOGICAL SURGERY
Payer: MEDICAID

## 2020-10-06 ENCOUNTER — TELEPHONE (OUTPATIENT)
Dept: PHYSICAL THERAPY | Age: 45
End: 2020-10-06

## 2020-10-06 ENCOUNTER — APPOINTMENT (OUTPATIENT)
Dept: PHYSICAL THERAPY | Age: 45
End: 2020-10-06
Attending: NEUROLOGICAL SURGERY
Payer: MEDICAID

## 2020-10-07 ENCOUNTER — HOSPITAL ENCOUNTER (OUTPATIENT)
Dept: CT IMAGING | Facility: HOSPITAL | Age: 45
Discharge: HOME | End: 2020-10-07
Attending: PHYSICIAN ASSISTANT
Payer: MEDICAID

## 2020-10-07 DIAGNOSIS — Z98.1 S/P CERVICAL SPINAL FUSION: ICD-10-CM

## 2020-10-07 DIAGNOSIS — M96.1 CERVICAL POST-LAMINECTOMY SYNDROME: ICD-10-CM

## 2020-10-07 DIAGNOSIS — M48.02 CERVICAL STENOSIS OF SPINAL CANAL: ICD-10-CM

## 2020-10-07 PROCEDURE — 72125 CT NECK SPINE W/O DYE: CPT | Performed by: PHYSICIAN ASSISTANT

## 2020-10-08 ENCOUNTER — TELEPHONE (OUTPATIENT)
Dept: PHYSICAL THERAPY | Age: 45
End: 2020-10-08

## 2020-10-08 ENCOUNTER — PATIENT MESSAGE (OUTPATIENT)
Dept: SURGERY | Facility: CLINIC | Age: 45
End: 2020-10-08

## 2020-10-08 ENCOUNTER — TELEPHONE (OUTPATIENT)
Dept: SURGERY | Facility: CLINIC | Age: 45
End: 2020-10-08

## 2020-10-08 DIAGNOSIS — S12.8XXS: ICD-10-CM

## 2020-10-08 DIAGNOSIS — Z98.890 H/O EXCISION OF LAMINA OF CERVICAL VERTEBRA FOR DECOMPRESSION OF SPINAL CORD: ICD-10-CM

## 2020-10-08 DIAGNOSIS — Z98.1 HX OF FUSION OF CERVICAL SPINE: ICD-10-CM

## 2020-10-08 DIAGNOSIS — M54.12 CERVICAL MYELOPATHY WITH CERVICAL RADICULOPATHY (HCC): ICD-10-CM

## 2020-10-08 DIAGNOSIS — G95.9 CERVICAL MYELOPATHY WITH CERVICAL RADICULOPATHY (HCC): ICD-10-CM

## 2020-10-08 DIAGNOSIS — M50.30 DDD (DEGENERATIVE DISC DISEASE), CERVICAL: ICD-10-CM

## 2020-10-08 DIAGNOSIS — M48.02 CERVICAL STENOSIS OF SPINAL CANAL: Primary | ICD-10-CM

## 2020-10-08 NOTE — TELEPHONE ENCOUNTER
Patient mychart message forwarded to MARIS Redding [place], [place] for feedback regarding any limitations.

## 2020-10-08 NOTE — TELEPHONE ENCOUNTER
CT of the neck shows fracture of the head and to the right at C3 C4-C6. Left-sided instrumentation appears intact. This was discussed with Dr. Sara Marie. He would like an MRI of the cervical spine before her appointment 10/22.     My chart message sent to th

## 2020-10-08 NOTE — TELEPHONE ENCOUNTER
PT returns pt call regarding scheduling and new findings on CT. Per MD, hold therapy until further assessment/orders. PT cancels upcoming visits until MD apt. Pt in agreement.

## 2020-10-09 ENCOUNTER — APPOINTMENT (OUTPATIENT)
Dept: PHYSICAL THERAPY | Age: 45
End: 2020-10-09
Attending: NEUROLOGICAL SURGERY
Payer: MEDICAID

## 2020-10-12 ENCOUNTER — PATIENT MESSAGE (OUTPATIENT)
Dept: FAMILY MEDICINE CLINIC | Facility: CLINIC | Age: 45
End: 2020-10-12

## 2020-10-12 DIAGNOSIS — F90.8 ATTENTION DEFICIT HYPERACTIVITY DISORDER (ADHD), OTHER TYPE: ICD-10-CM

## 2020-10-13 DIAGNOSIS — F32.0 CURRENT MILD EPISODE OF MAJOR DEPRESSIVE DISORDER, UNSPECIFIED WHETHER RECURRENT (HCC): ICD-10-CM

## 2020-10-13 DIAGNOSIS — F41.9 ANXIETY: ICD-10-CM

## 2020-10-13 RX ORDER — CITALOPRAM 20 MG/1
20 TABLET ORAL DAILY
Qty: 90 TABLET | Refills: 0 | Status: SHIPPED | OUTPATIENT
Start: 2020-10-13 | End: 2021-06-16

## 2020-10-13 RX ORDER — CITALOPRAM 40 MG/1
40 TABLET ORAL DAILY
Qty: 90 TABLET | Refills: 0 | Status: SHIPPED | OUTPATIENT
Start: 2020-10-13 | End: 2020-10-22

## 2020-10-13 RX ORDER — TOPIRAMATE 25 MG/1
50 TABLET ORAL 2 TIMES DAILY
Qty: 60 TABLET | Refills: 0 | Status: SHIPPED | OUTPATIENT
Start: 2020-10-13 | End: 2021-03-08

## 2020-10-13 RX ORDER — METHYLPHENIDATE HYDROCHLORIDE 20 MG/1
20 TABLET ORAL 2 TIMES DAILY
Qty: 60 TABLET | Refills: 0 | Status: SHIPPED | OUTPATIENT
Start: 2020-10-13 | End: 2020-11-20

## 2020-10-13 RX ORDER — BUSPIRONE HYDROCHLORIDE 5 MG/1
5 TABLET ORAL 3 TIMES DAILY
Qty: 90 TABLET | Refills: 0 | Status: SHIPPED | OUTPATIENT
Start: 2020-10-13 | End: 2020-11-05

## 2020-10-13 NOTE — TELEPHONE ENCOUNTER
From: Mar Huertas  To: Malon Crigler, DO  Sent: 10/12/2020 4:49 PM CDT  Subject: Other    Hi,just wanted to know if you got my last message about the Ct scan?   Thank you,  Darreld Juan Manuel

## 2020-10-14 ENCOUNTER — TELEPHONE (OUTPATIENT)
Dept: FAMILY MEDICINE CLINIC | Facility: CLINIC | Age: 45
End: 2020-10-14

## 2020-10-14 RX ORDER — TOPIRAMATE 25 MG/1
50 TABLET ORAL 2 TIMES DAILY
Qty: 60 TABLET | Refills: 0 | Status: ON HOLD | OUTPATIENT
Start: 2020-10-14 | End: 2020-12-14

## 2020-10-15 ENCOUNTER — HOSPITAL ENCOUNTER (OUTPATIENT)
Dept: MRI IMAGING | Facility: HOSPITAL | Age: 45
Discharge: HOME | End: 2020-10-15
Attending: PHYSICIAN ASSISTANT
Payer: MEDICAID

## 2020-10-15 DIAGNOSIS — Z98.890 H/O EXCISION OF LAMINA OF CERVICAL VERTEBRA FOR DECOMPRESSION OF SPINAL CORD: ICD-10-CM

## 2020-10-15 DIAGNOSIS — S12.8XXS: ICD-10-CM

## 2020-10-15 DIAGNOSIS — M48.02 CERVICAL STENOSIS OF SPINAL CANAL: ICD-10-CM

## 2020-10-15 PROCEDURE — 72156 MRI NECK SPINE W/O & W/DYE: CPT | Performed by: PHYSICIAN ASSISTANT

## 2020-10-15 PROCEDURE — A9575 INJ GADOTERATE MEGLUMI 0.1ML: HCPCS | Performed by: PHYSICIAN ASSISTANT

## 2020-10-15 RX ORDER — HYDROCODONE BITARTRATE AND ACETAMINOPHEN 10; 325 MG/1; MG/1
1 TABLET ORAL EVERY 8 HOURS PRN
Qty: 90 TABLET | Refills: 0 | Status: SHIPPED | OUTPATIENT
Start: 2020-10-15 | End: 2020-11-20

## 2020-10-15 NOTE — TELEPHONE ENCOUNTER
Medication: Norco  (90 tablets)    Date of last refill: 09/22/20    [place]    Last office visit: 08/20/20    Due back to clinic per last office note:  8 weeks     Date next office visit scheduled:  10/22/

## 2020-10-16 ENCOUNTER — PATIENT MESSAGE (OUTPATIENT)
Dept: SURGERY | Facility: CLINIC | Age: 45
End: 2020-10-16

## 2020-10-16 ENCOUNTER — APPOINTMENT (OUTPATIENT)
Dept: PHYSICAL THERAPY | Age: 45
End: 2020-10-16
Attending: NEUROLOGICAL SURGERY
Payer: MEDICAID

## 2020-10-16 NOTE — TELEPHONE ENCOUNTER
Fax recd from [employer]. Requesting chart notes for symptoms (inattention symptoms and hyperactive-impulsive symptoms, where symptoms were present)    Include case # [id number] on cover sheet. Placed in [place] [address].

## 2020-10-16 NOTE — TELEPHONE ENCOUNTER
From: Daniel Cue  To: JOSH Art  Sent: 10/16/2020 12:32 PM CDT  Subject: Test Results Question    I see the MRI came back, however I do not fully understand the results. I see DR. Dariusz England on the 22nd, however I would hemant like to know whats

## 2020-10-16 NOTE — TELEPHONE ENCOUNTER
Pa initiated on cover my meds. Await decision. Called pt yo answer questions related to PA and ADHD.

## 2020-10-20 ENCOUNTER — APPOINTMENT (OUTPATIENT)
Dept: PHYSICAL THERAPY | Age: 45
End: 2020-10-20
Payer: MEDICAID

## 2020-10-20 DIAGNOSIS — F32.0 CURRENT MILD EPISODE OF MAJOR DEPRESSIVE DISORDER, UNSPECIFIED WHETHER RECURRENT (HCC): ICD-10-CM

## 2020-10-20 DIAGNOSIS — R11.0 NAUSEA: ICD-10-CM

## 2020-10-20 DIAGNOSIS — F41.9 ANXIETY: ICD-10-CM

## 2020-10-20 DIAGNOSIS — K58.9 IRRITABLE BOWEL SYNDROME WITHOUT DIARRHEA: ICD-10-CM

## 2020-10-20 RX ORDER — DICYCLOMINE HCL 20 MG
20 TABLET ORAL
Qty: 120 TABLET | Refills: 0 | Status: SHIPPED | OUTPATIENT
Start: 2020-10-20 | End: 2021-01-15

## 2020-10-20 RX ORDER — BUPROPION HYDROCHLORIDE 150 MG/1
TABLET ORAL
Qty: 90 TABLET | Refills: 0 | Status: SHIPPED | OUTPATIENT
Start: 2020-10-20 | End: 2020-11-05

## 2020-10-20 RX ORDER — ONDANSETRON 4 MG/1
4 TABLET, ORALLY DISINTEGRATING ORAL EVERY 8 HOURS PRN
Qty: 20 TABLET | Refills: 0 | Status: SHIPPED | OUTPATIENT
Start: 2020-10-20 | End: 2021-03-18

## 2020-10-21 ENCOUNTER — HOSPITAL ENCOUNTER (OUTPATIENT)
Dept: GENERAL RADIOLOGY | Facility: HOSPITAL | Age: 45
Discharge: HOME | End: 2020-10-21
Attending: PHYSICIAN ASSISTANT
Payer: MEDICAID

## 2020-10-21 ENCOUNTER — TELEPHONE (OUTPATIENT)
Dept: FAMILY MEDICINE CLINIC | Facility: CLINIC | Age: 45
End: 2020-10-21

## 2020-10-21 DIAGNOSIS — R32 URINARY INCONTINENCE, UNSPECIFIED TYPE: ICD-10-CM

## 2020-10-21 DIAGNOSIS — G95.9 CERVICAL MYELOPATHY WITH CERVICAL RADICULOPATHY (HCC): ICD-10-CM

## 2020-10-21 DIAGNOSIS — M50.30 DDD (DEGENERATIVE DISC DISEASE), CERVICAL: ICD-10-CM

## 2020-10-21 DIAGNOSIS — R26.9 NEUROLOGIC GAIT DYSFUNCTION: ICD-10-CM

## 2020-10-21 DIAGNOSIS — Z98.1 HX OF FUSION OF CERVICAL SPINE: ICD-10-CM

## 2020-10-21 DIAGNOSIS — M54.12 CERVICAL MYELOPATHY WITH CERVICAL RADICULOPATHY (HCC): ICD-10-CM

## 2020-10-21 PROCEDURE — 72040 X-RAY EXAM NECK SPINE 2-3 VW: CPT | Performed by: PHYSICIAN ASSISTANT

## 2020-10-22 ENCOUNTER — OFFICE VISIT (OUTPATIENT)
Dept: SURGERY | Facility: CLINIC | Age: 45
End: 2020-10-22
Payer: MEDICAID

## 2020-10-22 ENCOUNTER — TELEPHONE (OUTPATIENT)
Dept: PHYSICAL THERAPY | Age: 45
End: 2020-10-22

## 2020-10-22 VITALS — DIASTOLIC BLOOD PRESSURE: 80 MMHG | HEART RATE: 78 BPM | SYSTOLIC BLOOD PRESSURE: 104 MMHG

## 2020-10-22 DIAGNOSIS — Z98.890 H/O EXCISION OF LAMINA OF CERVICAL VERTEBRA FOR DECOMPRESSION OF SPINAL CORD: ICD-10-CM

## 2020-10-22 DIAGNOSIS — M48.02 CERVICAL STENOSIS OF SPINAL CANAL: Primary | ICD-10-CM

## 2020-10-22 DIAGNOSIS — E78.00 ELEVATED CHOLESTEROL: ICD-10-CM

## 2020-10-22 DIAGNOSIS — F32.0 CURRENT MILD EPISODE OF MAJOR DEPRESSIVE DISORDER, UNSPECIFIED WHETHER RECURRENT (HCC): ICD-10-CM

## 2020-10-22 DIAGNOSIS — S12.8XXS: ICD-10-CM

## 2020-10-22 DIAGNOSIS — F41.9 ANXIETY: ICD-10-CM

## 2020-10-22 PROCEDURE — 3079F DIAST BP 80-89 MM HG: CPT | Performed by: PHYSICIAN ASSISTANT

## 2020-10-22 PROCEDURE — 3074F SYST BP LT 130 MM HG: CPT | Performed by: PHYSICIAN ASSISTANT

## 2020-10-22 PROCEDURE — 99024 POSTOP FOLLOW-UP VISIT: CPT | Performed by: PHYSICIAN ASSISTANT

## 2020-10-22 RX ORDER — LOVASTATIN 10 MG/1
10 TABLET ORAL NIGHTLY
Qty: 30 TABLET | Refills: 0 | Status: SHIPPED | OUTPATIENT
Start: 2020-10-22 | End: 2020-11-05

## 2020-10-22 RX ORDER — CITALOPRAM 40 MG/1
40 TABLET ORAL DAILY
Qty: 90 TABLET | Refills: 0 | Status: SHIPPED | OUTPATIENT
Start: 2020-10-22 | End: 2020-11-05

## 2020-10-22 RX ORDER — AMOXICILLIN 500 MG/1
TABLET, FILM COATED ORAL
COMMUNITY
Start: 2020-10-14 | End: 2020-11-09

## 2020-10-22 NOTE — PATIENT INSTRUCTIONS
Refill policies:    • Allow 2-3 business days for refills; controlled substances may take longer.   • Contact your pharmacy at least 5 days prior to running out of medication and have them send an electronic request or submit request through the “request re Depending on your insurance carrier, approval may take 3-10 days. It is highly recommended patients contact their insurance carrier directly to determine coverage.   If test is done without insurance authorization, patient may be responsible for the entire consider C6-7 posterior fusion vs RF.  -FU Dr Jaylin Ortega after injection

## 2020-10-22 NOTE — PROGRESS NOTES
Patient is here for follow up of cervical laminectomy performed in July 2020. Hx fusion in 2018. She is in a lot of pain today, taking norco still. She gets numbness and has weakness in her legs. Some weakness in her left arm.   She states her \"fine m

## 2020-10-22 NOTE — TELEPHONE ENCOUNTER
Last ov 7/3/2020  Next ov 11/10/2020    Last refill lovastatin 9/25/2020  Last refill citalopram 10/13/2020

## 2020-10-22 NOTE — PROGRESS NOTES
[facility] ANTONIO Neurosurgery follow-up        HISTORY OF PRESENT [place] is a 41year old RH  female here in follow up after having a C3-6 laminoplasty and partial C7 and C2 laminectomy 7/6/2020 Dr. Fariha Bender. C/O pain lower cervical spine.  She is using vertigo. She denies double vision blurry vision loss of vision. She denies any changes in cognition    Last hx: 5/19/2020  Since her last visit she has had increasing symptoms.   She states she stumbled and fell hit her head on the headboard but did not s She denied any changes in her symptoms in her upper and lower extremities with the impact. But she did find later she got more unsteady with ambulation. She was unsteady in the shower she slipped and fell due to her leg numbness.   She did not lose consci to an 8/10 it goes down between her shoulder blades and somewhat lower than what it was prior to her surgery. It worse with increased activity. She continues to have ongoing lower back pain which is old. She feels like her legs are shaky.   She is gettin worse.  She continues to drop things. The heaviness is getting worse in the arms and legs the numbness seems to be getting worse as well. She having more trouble walking.   She notices her hands and arms seem to be more shaky.       PAST MEDICAL HISTORY: multiple  No date: [facility] NEEDLE LOCALIZATION W/ SPECIMEN 1 SITE RIG*  02/2018: OTHER Right      Comment: ARTHROSCOPIC ACROMIOPLASTY LYSIS OF ADHESIONS                RIGHT SHOULDER  2001: OTHER SURGICAL HISTORY      Comment: esophagogastroduodenoscopy  No da    SPINE  Tender C6-7 SP.  Incision healed    Upper extremity strength:        Deltoid    Triceps     Biceps        Wrist Extension  Finger extension Thumb Abduction  Thumb adduction Intrinsics   Right         5-        5         5          5 5 5- 5 5 scarring. No pathologic intrathecal enhancement is identified. There is a thin fluid collection posterior to the thecal   sac at the C4, C5, and C6 levels dorsally which may represent a seroma, hematoma, or pseudomeningocele.   This measures up to 2 mm in 2/3/2020 C4-6 fusion. Spondylosis and spurring at C2-3 and C3-4 with no significant central or foraminal stenosis. Myelogram was reviewed by Dr. Juve Edmondson on 2/6/2020. MRI of the cervical spine from 12/30/2019 shows surgical changes from C4-C6.   Minimal b cervical spine from 2/2017 shows loss of cervical lordosis with kyphosis at C4-5 central stenosis at C4-5 with slight flattening of the anterior cord. C5-6 stenosis with deformity of the cord. C6-7 stenosis.   Contrary to the report which reported no sten

## 2020-10-23 ENCOUNTER — APPOINTMENT (OUTPATIENT)
Dept: PHYSICAL THERAPY | Age: 45
End: 2020-10-23
Attending: PHYSICIAN ASSISTANT
Payer: MEDICAID

## 2020-10-27 ENCOUNTER — APPOINTMENT (OUTPATIENT)
Dept: PHYSICAL THERAPY | Age: 45
End: 2020-10-27
Payer: MEDICAID

## 2020-10-30 ENCOUNTER — APPOINTMENT (OUTPATIENT)
Dept: PHYSICAL THERAPY | Age: 45
End: 2020-10-30
Attending: NEUROLOGICAL SURGERY
Payer: MEDICAID

## 2020-10-30 ENCOUNTER — PATIENT MESSAGE (OUTPATIENT)
Dept: SURGERY | Facility: CLINIC | Age: 45
End: 2020-10-30

## 2020-10-31 ENCOUNTER — TELEPHONE (OUTPATIENT)
Dept: FAMILY MEDICINE CLINIC | Facility: CLINIC | Age: 45
End: 2020-10-31

## 2020-10-31 NOTE — TELEPHONE ENCOUNTER
[facility] [place] [facility] [place]  MG TABS. PUT IN FOLDER.

## 2020-11-02 ENCOUNTER — HOSPITAL ENCOUNTER (OUTPATIENT)
Dept: MAMMOGRAPHY | Facility: HOSPITAL | Age: 45
Discharge: HOME | End: 2020-11-02
Attending: FAMILY MEDICINE
Payer: MEDICAID

## 2020-11-02 DIAGNOSIS — Z12.31 ENCOUNTER FOR SCREENING MAMMOGRAM FOR HIGH-RISK PATIENT: ICD-10-CM

## 2020-11-02 PROCEDURE — 77063 BREAST TOMOSYNTHESIS BI: CPT | Performed by: FAMILY MEDICINE

## 2020-11-02 PROCEDURE — 77067 SCR MAMMO BI INCL CAD: CPT | Performed by: FAMILY MEDICINE

## 2020-11-02 RX ORDER — TOPIRAMATE 25 MG/1
TABLET ORAL
Qty: 60 TABLET | Refills: 0 | OUTPATIENT
Start: 2020-11-02

## 2020-11-02 NOTE — TELEPHONE ENCOUNTER
Patient was called her questions were answered. Tera Roberts She will see the pain service on Monday for discussion of injections Dr. Maryann Rudd is recommended a facet injection.     She really does not want to have additional surgery but she is having numbness down her

## 2020-11-02 NOTE — TELEPHONE ENCOUNTER
From: Brandy Dennisort  To: JOSH Arevalo  Sent: 10/30/2020 5:18 PM CDT  Subject: Visit Follow-up Question    Hello,  I have been thinking about our visit. I was wondering why we never discussed the crack,is it still seen?  On the MRI it says that th

## 2020-11-03 ENCOUNTER — APPOINTMENT (OUTPATIENT)
Dept: PHYSICAL THERAPY | Age: 45
End: 2020-11-03
Payer: MEDICAID

## 2020-11-05 ENCOUNTER — TELEMEDICINE (OUTPATIENT)
Dept: FAMILY MEDICINE CLINIC | Facility: CLINIC | Age: 45
End: 2020-11-05
Payer: MEDICAID

## 2020-11-05 DIAGNOSIS — Z13.820 SCREENING FOR OSTEOPOROSIS: ICD-10-CM

## 2020-11-05 DIAGNOSIS — E78.00 ELEVATED CHOLESTEROL: ICD-10-CM

## 2020-11-05 DIAGNOSIS — F32.0 CURRENT MILD EPISODE OF MAJOR DEPRESSIVE DISORDER, UNSPECIFIED WHETHER RECURRENT (HCC): ICD-10-CM

## 2020-11-05 DIAGNOSIS — F41.9 ANXIETY: ICD-10-CM

## 2020-11-05 DIAGNOSIS — M54.12 CERVICAL MYELOPATHY WITH CERVICAL RADICULOPATHY (HCC): Primary | ICD-10-CM

## 2020-11-05 DIAGNOSIS — Z00.00 GENERAL MEDICAL EXAM: ICD-10-CM

## 2020-11-05 DIAGNOSIS — G95.9 CERVICAL MYELOPATHY WITH CERVICAL RADICULOPATHY (HCC): Primary | ICD-10-CM

## 2020-11-05 DIAGNOSIS — Z78.0 ASYMPTOMATIC MENOPAUSE: ICD-10-CM

## 2020-11-05 PROCEDURE — 99214 OFFICE O/P EST MOD 30 MIN: CPT | Performed by: FAMILY MEDICINE

## 2020-11-05 RX ORDER — LOVASTATIN 10 MG/1
10 TABLET ORAL NIGHTLY
Qty: 90 TABLET | Refills: 0 | Status: SHIPPED | OUTPATIENT
Start: 2020-11-05 | End: 2020-12-28

## 2020-11-05 RX ORDER — BUPROPION HYDROCHLORIDE 150 MG/1
450 TABLET ORAL DAILY
Qty: 270 TABLET | Refills: 0 | Status: SHIPPED | OUTPATIENT
Start: 2020-11-05 | End: 2020-11-24

## 2020-11-05 RX ORDER — CITALOPRAM 40 MG/1
40 TABLET ORAL DAILY
Qty: 90 TABLET | Refills: 0 | Status: SHIPPED | OUTPATIENT
Start: 2020-11-05 | End: 2021-01-15

## 2020-11-05 NOTE — PROGRESS NOTES
This visit is conducted using Telemedicine with live, interactive video and audio.       Telehealth outside of [address] Verbal Consent   I conducted a telehealth visit with Lacie Holden today, 11/05/20, which was completed using two-way, real constipation and urinary retention   Pt has been taking diazepam - advised pt she cannot mix with xanax     Pt was more anxious with the buspar   Pt feels the xanax was helping   Dicussed xanax and diazepam cannot be mixed  Pt seeing a therapist   Very str methylPREDNISolone (MEDROL) 4 MG Oral Tablet Therapy Pack As prescribed 1 Package 0   • cyclobenzaprine 10 MG Oral Tab Take 1 tablet (10 mg total) by mouth 3 (three) times daily as needed for Muscle spasms.  45 tablet 0   • Naloxone HCl 4 MG/0.1ML Nasal Liq worse   • Bloating 2weeks   • Cauda equina syndrome without mention of neurogenic bladder    • Constipation Occasionaly   • Decorative tattoo    • Depression    • [facility] ([facility]     Diet controlled; no meds   • Diarrhea, unspecified Occasionaly   • Dysesth PATCH(BEDSIDE)     • CERVICAL EPIDURAL STEROID INJECTION N/A 6/27/2018    Performed by Reji Mensah MD at [facility]   • CERVICAL EPIDURAL STEROID INJECTION N/A 6/20/2018    Performed by Reji Mensah MD at [facility] MAIN OR   • CERVICAL FACET INJECTION Left 5/15/ Disorder Maternal Grandmother         CHF   • Stroke Maternal Grandfather    • Diabetes Paternal Grandmother    • Cancer Sister         uterine   • BRCA gene + Sister    • Uterine Cancer Sister    • Bleeding Disorders Sister    • Cancer Paternal Cousin Fem XR DEXA BONE DENSITOMETRY (CPT=77080); Future    3. Asymptomatic menopause    - XR DEXA BONE DENSITOMETRY (CPT=77080); Future    4. General medical exam    - FREE T4 (FREE THYROXINE); Future  - ASSAY, THYROID STIM HORMONE; Future  - LIPID PANEL;  Future  -

## 2020-11-06 ENCOUNTER — APPOINTMENT (OUTPATIENT)
Dept: PHYSICAL THERAPY | Age: 45
End: 2020-11-06
Payer: MEDICAID

## 2020-11-08 ENCOUNTER — PATIENT MESSAGE (OUTPATIENT)
Dept: SURGERY | Facility: CLINIC | Age: 45
End: 2020-11-08

## 2020-11-09 ENCOUNTER — TELEPHONE (OUTPATIENT)
Dept: SURGERY | Facility: CLINIC | Age: 45
End: 2020-11-09

## 2020-11-09 ENCOUNTER — OFFICE VISIT (OUTPATIENT)
Dept: PAIN CLINIC | Facility: CLINIC | Age: 45
End: 2020-11-09
Payer: MEDICAID

## 2020-11-09 VITALS
WEIGHT: 156 LBS | OXYGEN SATURATION: 97 % | BODY MASS INDEX: 25.99 KG/M2 | RESPIRATION RATE: 16 BRPM | HEIGHT: 65 IN | DIASTOLIC BLOOD PRESSURE: 76 MMHG | SYSTOLIC BLOOD PRESSURE: 118 MMHG | HEART RATE: 76 BPM

## 2020-11-09 DIAGNOSIS — M54.12 CERVICAL MYELOPATHY WITH CERVICAL RADICULOPATHY (HCC): Primary | ICD-10-CM

## 2020-11-09 DIAGNOSIS — Z98.1 HX OF FUSION OF CERVICAL SPINE: ICD-10-CM

## 2020-11-09 DIAGNOSIS — G95.9 CERVICAL MYELOPATHY WITH CERVICAL RADICULOPATHY (HCC): Primary | ICD-10-CM

## 2020-11-09 PROCEDURE — 99213 OFFICE O/P EST LOW 20 MIN: CPT | Performed by: ANESTHESIOLOGY

## 2020-11-09 PROCEDURE — 3074F SYST BP LT 130 MM HG: CPT | Performed by: ANESTHESIOLOGY

## 2020-11-09 PROCEDURE — 3008F BODY MASS INDEX DOCD: CPT | Performed by: ANESTHESIOLOGY

## 2020-11-09 PROCEDURE — 3078F DIAST BP <80 MM HG: CPT | Performed by: ANESTHESIOLOGY

## 2020-11-09 RX ORDER — GABAPENTIN 300 MG/1
300 CAPSULE ORAL 3 TIMES DAILY
Qty: 90 CAPSULE | Refills: 0 | Status: SHIPPED | OUTPATIENT
Start: 2020-11-09 | End: 2020-12-31

## 2020-11-09 NOTE — TELEPHONE ENCOUNTER
Pt saw Dr. Blanche Paul and was not given many options to help with pain. Pt would like to speak with a nurse, or Miguel Smoker, as soon as possible to discuss scheduling a surgery ASAP. Pt awaitng call back.

## 2020-11-09 NOTE — PROGRESS NOTES
Location of Pain: left arm,    Date Pain Began: 2016          Work Related:   No        Receiving Work Comp/Disability:   No    Numeric Rating Scale:  Pain at Present:  7

## 2020-11-09 NOTE — PROGRESS NOTES
Name: Kaylene Quinonez   : 1975   DOS: 2020     Pain Clinic Follow Up Visit:   Patient presents with:  Neck Pain      Kaylene Quinonez is a 41year old female with a history of anterior cervical decompression and fusion at C4-5, she 5–6 who pre total) by mouth 4 (four) times daily before meals and nightly. 120 tablet 0   • ondansetron 4 MG Oral Tablet Dispersible Take 1 tablet (4 mg total) by mouth every 8 (eight) hours as needed for Nausea.  20 tablet 0   • HYDROcodone-acetaminophen  MG Ora Soln Inhale 2 puffs into the lungs every 6 (six) hours as needed. 1   • Esomeprazole Magnesium (NEXIUM) 40 MG Oral Capsule Delayed Release Take 40 mg by mouth 2 (two) times daily.            EXAM:   /76 (BP Location: Right arm, Patient Position: Si offer gabapentin prescription which may improve some of her neuropathic pain complaints. Pain is  limiting functional status. Failed conservative treatment consisting of medications, activity modification, and  PT.   1.Risks of long term narcotic use d/w

## 2020-11-09 NOTE — TELEPHONE ENCOUNTER
MyChart message from patient noted, combined with previous message for continuity of messages for provider. Routed to providers.

## 2020-11-09 NOTE — TELEPHONE ENCOUNTER
From: Kris Mendez  To: JOSH Nelson  Sent: 11/8/2020 3:19 PM CST  Subject: Other    Hello,  sorry to bombard you with messages. Over the weekend, the pain has got a lot worse, in the middle.  I haven't been able to feel my left arm all weekend,

## 2020-11-10 ENCOUNTER — APPOINTMENT (OUTPATIENT)
Dept: PHYSICAL THERAPY | Age: 45
End: 2020-11-10
Payer: MEDICAID

## 2020-11-12 ENCOUNTER — VIRTUAL PHONE E/M (OUTPATIENT)
Dept: SURGERY | Facility: CLINIC | Age: 45
End: 2020-11-12
Payer: MEDICAID

## 2020-11-12 DIAGNOSIS — M48.02 CERVICAL STENOSIS OF SPINAL CANAL: Primary | ICD-10-CM

## 2020-11-12 DIAGNOSIS — M54.12 CERVICAL MYELOPATHY WITH CERVICAL RADICULOPATHY (HCC): ICD-10-CM

## 2020-11-12 DIAGNOSIS — G95.9 CERVICAL MYELOPATHY WITH CERVICAL RADICULOPATHY (HCC): ICD-10-CM

## 2020-11-12 DIAGNOSIS — M96.1 CERVICAL POST-LAMINECTOMY SYNDROME: ICD-10-CM

## 2020-11-12 PROCEDURE — 99213 OFFICE O/P EST LOW 20 MIN: CPT | Performed by: NEUROLOGICAL SURGERY

## 2020-11-12 NOTE — PROGRESS NOTES
Virtual Telephone Check-In    Mar Huertas verbally consents to a Virtual/Telephone Check-In visit on 11/12/20. Patient has been referred to the [facility] website at [email or web address] to review the yearly Consent to Treat document.     Patient unders evaluate and treat her complaints. Plan: I will see her for an in person visit and examination by adding her on after my neuro-oncology clinic on Tuesday, November 17. Abimbola Fuentes.  Juve Edmondson, [id number] [facility]  Neurological Surgery    Co-

## 2020-11-13 ENCOUNTER — APPOINTMENT (OUTPATIENT)
Dept: PHYSICAL THERAPY | Age: 45
End: 2020-11-13
Payer: MEDICAID

## 2020-11-13 NOTE — TELEPHONE ENCOUNTER
Per [employer] message with Zuly Nelson 11/06/20, patient is to schedule a tele visit with Dr. Ulices Buckley.   Please offer 11/19/20 at 9:30am for a phone visit, thank you

## 2020-11-13 NOTE — TELEPHONE ENCOUNTER
Pt states that Dr Ashu Deshpande told her to come into the office next week. He is 192% booked. Please advise     Pt currently has f/u appt scheduled with Dr Ashu Deshpande on 12/31/20 @ [id number].

## 2020-11-17 ENCOUNTER — TELEPHONE (OUTPATIENT)
Dept: SURGERY | Facility: CLINIC | Age: 45
End: 2020-11-17

## 2020-11-17 ENCOUNTER — APPOINTMENT (OUTPATIENT)
Dept: PHYSICAL THERAPY | Age: 45
End: 2020-11-17
Payer: MEDICAID

## 2020-11-17 ENCOUNTER — OFFICE VISIT (OUTPATIENT)
Dept: SURGERY | Facility: CLINIC | Age: 45
End: 2020-11-17
Payer: MEDICAID

## 2020-11-17 VITALS
HEART RATE: 72 BPM | HEIGHT: 65 IN | SYSTOLIC BLOOD PRESSURE: 122 MMHG | BODY MASS INDEX: 25.99 KG/M2 | DIASTOLIC BLOOD PRESSURE: 86 MMHG | WEIGHT: 156 LBS

## 2020-11-17 DIAGNOSIS — M51.04 THORACIC DISC DISORDER WITH MYELOPATHY: ICD-10-CM

## 2020-11-17 DIAGNOSIS — M54.12 CERVICAL RADICULOPATHY AT C7: Primary | ICD-10-CM

## 2020-11-17 DIAGNOSIS — R32 URINARY INCONTINENCE, UNSPECIFIED TYPE: ICD-10-CM

## 2020-11-17 DIAGNOSIS — R26.9 NEUROLOGIC GAIT DYSFUNCTION: ICD-10-CM

## 2020-11-17 PROCEDURE — 3008F BODY MASS INDEX DOCD: CPT | Performed by: NEUROLOGICAL SURGERY

## 2020-11-17 PROCEDURE — 3079F DIAST BP 80-89 MM HG: CPT | Performed by: NEUROLOGICAL SURGERY

## 2020-11-17 PROCEDURE — 3074F SYST BP LT 130 MM HG: CPT | Performed by: NEUROLOGICAL SURGERY

## 2020-11-17 PROCEDURE — 99213 OFFICE O/P EST LOW 20 MIN: CPT | Performed by: NEUROLOGICAL SURGERY

## 2020-11-17 NOTE — PROGRESS NOTES
Patient here for her 8-week follow-up and possible surgery discussion. States pain medications have not been improving her pain, but instead have been giving her side effects. Notes instance of left side of body going numb this past Saturday.  Patient fell

## 2020-11-17 NOTE — TELEPHONE ENCOUNTER
Per patient, Dr Beckwith Oar placed referral for patient to get C6-7 bilateral diagnostic facet block. Please call.

## 2020-11-17 NOTE — TELEPHONE ENCOUNTER
Pt scheduled for 11/17/20 @ 1pm with Dr Puckett Levels per converstation w/Angelita Woodard. No further needs at this time.

## 2020-11-18 NOTE — TELEPHONE ENCOUNTER
Dr Kym Lefort notes 11/17/20 not complete yet     MD Go Chaney, RN 20 hours ago (3:24 PM)     Will await note to determine if still needed    Message text

## 2020-11-20 ENCOUNTER — TELEPHONE (OUTPATIENT)
Dept: FAMILY MEDICINE CLINIC | Facility: CLINIC | Age: 45
End: 2020-11-20

## 2020-11-20 ENCOUNTER — PATIENT MESSAGE (OUTPATIENT)
Dept: PAIN CLINIC | Facility: CLINIC | Age: 45
End: 2020-11-20

## 2020-11-20 ENCOUNTER — TELEPHONE (OUTPATIENT)
Dept: SURGERY | Facility: CLINIC | Age: 45
End: 2020-11-20

## 2020-11-20 ENCOUNTER — APPOINTMENT (OUTPATIENT)
Dept: PHYSICAL THERAPY | Age: 45
End: 2020-11-20
Payer: MEDICAID

## 2020-11-20 DIAGNOSIS — F90.8 ATTENTION DEFICIT HYPERACTIVITY DISORDER (ADHD), OTHER TYPE: ICD-10-CM

## 2020-11-20 DIAGNOSIS — G95.9 CERVICAL MYELOPATHY WITH CERVICAL RADICULOPATHY (HCC): ICD-10-CM

## 2020-11-20 DIAGNOSIS — M54.12 CERVICAL MYELOPATHY WITH CERVICAL RADICULOPATHY (HCC): ICD-10-CM

## 2020-11-20 DIAGNOSIS — Z98.1 HX OF FUSION OF CERVICAL SPINE: ICD-10-CM

## 2020-11-20 DIAGNOSIS — M50.30 DDD (DEGENERATIVE DISC DISEASE), CERVICAL: ICD-10-CM

## 2020-11-20 RX ORDER — HYDROCODONE BITARTRATE AND ACETAMINOPHEN 10; 325 MG/1; MG/1
1 TABLET ORAL EVERY 6 HOURS PRN
Qty: 90 TABLET | Refills: 0 | Status: SHIPPED | OUTPATIENT
Start: 2020-11-20 | End: 2020-11-20

## 2020-11-20 RX ORDER — TIZANIDINE 4 MG/1
4 TABLET ORAL EVERY 6 HOURS PRN
Qty: 60 TABLET | Refills: 1 | Status: ON HOLD | OUTPATIENT
Start: 2020-11-20 | End: 2021-09-08

## 2020-11-20 RX ORDER — METHYLPHENIDATE HYDROCHLORIDE 20 MG/1
20 TABLET ORAL 2 TIMES DAILY
Qty: 60 TABLET | Refills: 0 | Status: SHIPPED | OUTPATIENT
Start: 2020-11-20 | End: 2020-12-09

## 2020-11-20 RX ORDER — DIAZEPAM 10 MG/1
10 TABLET ORAL EVERY 12 HOURS PRN
Qty: 60 TABLET | Refills: 1 | Status: SHIPPED | OUTPATIENT
Start: 2020-11-20 | End: 2020-11-20 | Stop reason: CLARIF

## 2020-11-20 NOTE — TELEPHONE ENCOUNTER
Called pharmacy to inquire as to why diazepam was denied in order to further assist the patient.      Spoke to Pharmacist  who stated it is too early for the refill, medication can not be filled until the 27th of November per her insurance    Spoke to [employer]

## 2020-11-20 NOTE — TELEPHONE ENCOUNTER
Pt calling states that her insurance is not covering the Buproprion. Insurance told her they sent us something. She is running out of med. Did we get the information from the insurance? [facility] and [address].     Can we call patient and bella

## 2020-11-20 NOTE — TELEPHONE ENCOUNTER
Medication:   hydrocodone-acetaminophen 10-325mg, diazepam 10mg    Date of last refill:   Hydrocodone-acetaminophen : 10/15/2020  Diazepam 10m2020    [place]   Hydrdocodone-acetaminophen : 10/19/2020

## 2020-11-23 ENCOUNTER — HOSPITAL ENCOUNTER (OUTPATIENT)
Dept: BONE DENSITY | Age: 45
Discharge: HOME | End: 2020-11-23
Attending: FAMILY MEDICINE
Payer: MEDICAID

## 2020-11-23 ENCOUNTER — HOSPITAL ENCOUNTER (OUTPATIENT)
Dept: GENERAL RADIOLOGY | Age: 45
Discharge: HOME | End: 2020-11-23
Attending: NEUROLOGICAL SURGERY
Payer: MEDICAID

## 2020-11-23 DIAGNOSIS — M54.12 CERVICAL RADICULOPATHY AT C7: ICD-10-CM

## 2020-11-23 DIAGNOSIS — Z13.820 SCREENING FOR OSTEOPOROSIS: ICD-10-CM

## 2020-11-23 DIAGNOSIS — R32 URINARY INCONTINENCE, UNSPECIFIED TYPE: ICD-10-CM

## 2020-11-23 DIAGNOSIS — M51.04 THORACIC DISC DISORDER WITH MYELOPATHY: ICD-10-CM

## 2020-11-23 DIAGNOSIS — Z78.0 ASYMPTOMATIC MENOPAUSE: ICD-10-CM

## 2020-11-23 PROCEDURE — 72082 X-RAY EXAM ENTIRE SPI 2/3 VW: CPT | Performed by: NEUROLOGICAL SURGERY

## 2020-11-23 PROCEDURE — 77080 DXA BONE DENSITY AXIAL: CPT | Performed by: FAMILY MEDICINE

## 2020-11-23 NOTE — TELEPHONE ENCOUNTER
Noted referral placed in Epic by Neurosurgery- Dr Flaca Winston to Pain management for below     Comments:  Slight kyphosis at C6-7 and tender on palpation.     C6-7 bilateral diagnostic facet block

## 2020-11-23 NOTE — TELEPHONE ENCOUNTER
Pt questioning if order for pain mgnt from OV 11/17/20 is good enough to proceed w/ inj or if  still needs to spk w/ . Pls advise.

## 2020-11-23 NOTE — TELEPHONE ENCOUNTER
Spoke with patient who stated that:    -she experienced bladder incontinence while ambulating in a store.  -she has numbness and tingling in her left arm.  -she is having pain shooting down the middle of her back, into shoulder and oftentimes radiates to l

## 2020-11-23 NOTE — TELEPHONE ENCOUNTER
We can do this but this was mostly diagnostic in nature.  Patient seems like she is going to have surgery regardless so not sure that there is any value

## 2020-11-24 ENCOUNTER — TELEPHONE (OUTPATIENT)
Dept: FAMILY MEDICINE CLINIC | Facility: CLINIC | Age: 45
End: 2020-11-24

## 2020-11-24 ENCOUNTER — TELEPHONE (OUTPATIENT)
Dept: NEUROLOGY | Facility: CLINIC | Age: 45
End: 2020-11-24

## 2020-11-24 ENCOUNTER — APPOINTMENT (OUTPATIENT)
Dept: PHYSICAL THERAPY | Age: 45
End: 2020-11-24
Payer: MEDICAID

## 2020-11-24 DIAGNOSIS — F32.0 CURRENT MILD EPISODE OF MAJOR DEPRESSIVE DISORDER, UNSPECIFIED WHETHER RECURRENT (HCC): ICD-10-CM

## 2020-11-24 RX ORDER — BUPROPION HYDROCHLORIDE 150 MG/1
450 TABLET ORAL DAILY
Qty: 90 TABLET | Refills: 0 | Status: SHIPPED | OUTPATIENT
Start: 2020-11-24 | End: 2021-01-18

## 2020-11-24 NOTE — TELEPHONE ENCOUNTER
PT HAS BEEN OUT OF THE BUPROPRION AND IS WAITING FOR A REFILL. [facility] PHARMACY TOLD HER BECAUSE SHE IS TAKING 3 OF THEM THEY ARE GIVING THEM A HARD TIME TO REFILL.       PT STATES SHE NEEDS THIS MED AND WONDERS IF DR Kayla Brambila CAN D

## 2020-11-24 NOTE — TELEPHONE ENCOUNTER
Understanding office notes for [phone] need to be completed. Patient saw Dr. Ghassan Paige independently.

## 2020-11-24 NOTE — TELEPHONE ENCOUNTER
Manfred Hutchinson  11/20/20 2:36 PM  Note     Pt calling states that her insurance is not covering the Buproprion.   Insurance told her they sent us something.     She is running out of med.     Did we get the information from the insurance?        Gisel

## 2020-11-24 NOTE — TELEPHONE ENCOUNTER
Will need completed office notes to obtain PA for MRI SPINE THORACIC (CPT=72146) that was ordered on 11/17/20.   Thank you

## 2020-11-25 ENCOUNTER — TELEPHONE (OUTPATIENT)
Dept: FAMILY MEDICINE CLINIC | Facility: CLINIC | Age: 45
End: 2020-11-25

## 2020-11-25 ENCOUNTER — PATIENT MESSAGE (OUTPATIENT)
Dept: FAMILY MEDICINE CLINIC | Facility: CLINIC | Age: 45
End: 2020-11-25

## 2020-11-25 NOTE — PROGRESS NOTES
Neurological Surgery Outpatient Clinic    Brandy Darin  11/17/2020    Diagnosis: Cervical thoracic pain, gait instability, postlaminectomy syndrome, urinary incontinence.     Chief complaint: Cervical thoracic junction pain, radicular pain in both arm posterior paracervical soft tissues and muscles with contrast.  Comes close to the back of the inferior aspect of the C6 body but there is no cord deformity or contact in the neutral position.         Assessment: Her major complaint is cervical thoracic faina

## 2020-11-27 NOTE — TELEPHONE ENCOUNTER
From: Jessiac Pinedo  To: Yonatan Rios DO  Sent: 11/25/2020 2:11 PM CST  Subject: Dany Hidalgo just put me on tizanidine and took me off of the diazepam. So I am now on nothing for anxiety.  They are also asking you to prescribe something t

## 2020-12-01 ENCOUNTER — TELEPHONE (OUTPATIENT)
Dept: SURGERY | Facility: CLINIC | Age: 45
End: 2020-12-01

## 2020-12-01 DIAGNOSIS — M54.12 CERVICAL RADICULOPATHY: Primary | ICD-10-CM

## 2020-12-01 RX ORDER — PREDNISONE 10 MG/1
10 TABLET ORAL DAILY
Qty: 30 TABLET | Refills: 0 | Status: SHIPPED | OUTPATIENT
Start: 2020-12-01 | End: 2020-12-09 | Stop reason: ALTCHOICE

## 2020-12-01 NOTE — TELEPHONE ENCOUNTER
Patient was called. She was relieved to hear that we have a surgical plan. She would like to try and do this soon as possible will shoot for December 14 which seems to be open. She has Norco it causes her to itch. She has tizanidine. And gabapentin.

## 2020-12-01 NOTE — TELEPHONE ENCOUNTER
Dr. Chet Munroe reviewed her CTs from postop and most recently in her MRI plan is a C3, 4, 5, 6, 7 laminectomy with removal of hardware from C3, 4, 5, 6. Segmental instrumentation C5, 6, 7. Posterior lateral fusion C5, 6, 7. Allo and local bone graft.       My

## 2020-12-02 NOTE — TELEPHONE ENCOUNTER
You are scheduled for  C3, 4, 5, 6, 7 laminectomy with removal of hardware from C3, 4, 5, 6. Segmental instrumentation C5, 6, 7. Posterior lateral fusion C5, 6, 7. Allo and local bone graft. on 12- with Dr. Mary Natarajan.     · You will need to contact the · Surgery is usually scheduled as 1-2 day admission. · The hospital will contact you 1-2 days before surgery with your arrival time. · You may need an Aspen cervical collar. · You may need an external bone growth stimulator.  If ordered for your surg · (Laundering/cleaning: Chlorhexidine gluconate skin cleansers will cause stains if used with chlorine releasing products. Rinse completely and use only non-chlorine detergents.)    · Post operative appointments are made 2 weeks and 6 weeks post-op.   You

## 2020-12-02 NOTE — TELEPHONE ENCOUNTER
Patient is scheduled for posterior laminectomy and fusion on 12- with Dr Elenita Buchanan.   Surgery order signed   Placed sx on surgery sheet  Placed on outlook calendar  [employer] message sent to patient with sx instructions  Faxed pre-op clearance request to [facility]

## 2020-12-04 RX ORDER — ACETAMINOPHEN 500 MG
1000 TABLET ORAL ONCE
Status: CANCELLED | OUTPATIENT
Start: 2020-12-04 | End: 2020-12-04

## 2020-12-04 NOTE — TELEPHONE ENCOUNTER
Spoke to Taz flannery for [facility] to discuss instrumentation for sx. Order placed with \"[facility]\". Given sx date and time and pt info.

## 2020-12-04 NOTE — TELEPHONE ENCOUNTER
Started PA via [employer] for C3-7 LAMINECTOMY WITH REMOVAL OF HARDWARE CERVICAL 3,4,5,6. SEGMENTAL INSTRUMENTATION BILATERALLY CERVICAL 5,6,7. POSTERIOR LATERAL FUSION BILATERALLY CERVICAL 5,6,7.  ALLOGRAFT AND LOCAL BONE GRAFT ([phone], [phone]

## 2020-12-04 NOTE — TELEPHONE ENCOUNTER
DME orders received. Faxed to [facility] and [facility] with face sheet office notes and insurance cards.

## 2020-12-09 ENCOUNTER — EKG ENCOUNTER (OUTPATIENT)
Dept: LAB | Age: 45
End: 2020-12-09
Attending: FAMILY MEDICINE
Payer: MEDICAID

## 2020-12-09 ENCOUNTER — LAB ENCOUNTER (OUTPATIENT)
Dept: LAB | Age: 45
End: 2020-12-09
Attending: FAMILY MEDICINE
Payer: MEDICAID

## 2020-12-09 ENCOUNTER — OFFICE VISIT (OUTPATIENT)
Dept: FAMILY MEDICINE CLINIC | Facility: CLINIC | Age: 45
End: 2020-12-09
Payer: MEDICAID

## 2020-12-09 VITALS
DIASTOLIC BLOOD PRESSURE: 70 MMHG | WEIGHT: 161 LBS | SYSTOLIC BLOOD PRESSURE: 106 MMHG | BODY MASS INDEX: 26.82 KG/M2 | OXYGEN SATURATION: 98 % | RESPIRATION RATE: 16 BRPM | HEIGHT: 65 IN | TEMPERATURE: 98 F | HEART RATE: 80 BPM

## 2020-12-09 DIAGNOSIS — F41.9 ANXIETY: ICD-10-CM

## 2020-12-09 DIAGNOSIS — J45.20 MILD INTERMITTENT ASTHMA WITHOUT COMPLICATION: ICD-10-CM

## 2020-12-09 DIAGNOSIS — M54.12 CERVICAL RADICULOPATHY: ICD-10-CM

## 2020-12-09 DIAGNOSIS — Z01.818 PREOPERATIVE EXAMINATION: ICD-10-CM

## 2020-12-09 DIAGNOSIS — R73.09 ELEVATED GLUCOSE: ICD-10-CM

## 2020-12-09 DIAGNOSIS — F90.8 ATTENTION DEFICIT HYPERACTIVITY DISORDER (ADHD), OTHER TYPE: ICD-10-CM

## 2020-12-09 DIAGNOSIS — G43.109 MIGRAINE WITH AURA AND WITHOUT STATUS MIGRAINOSUS, NOT INTRACTABLE: ICD-10-CM

## 2020-12-09 DIAGNOSIS — Z01.818 PREOPERATIVE EXAMINATION: Primary | ICD-10-CM

## 2020-12-09 DIAGNOSIS — F32.0 CURRENT MILD EPISODE OF MAJOR DEPRESSIVE DISORDER, UNSPECIFIED WHETHER RECURRENT (HCC): ICD-10-CM

## 2020-12-09 DIAGNOSIS — Z00.00 GENERAL MEDICAL EXAM: ICD-10-CM

## 2020-12-09 DIAGNOSIS — E78.00 ELEVATED CHOLESTEROL: ICD-10-CM

## 2020-12-09 DIAGNOSIS — M54.12 CERVICAL RADICULITIS: ICD-10-CM

## 2020-12-09 PROCEDURE — 85610 PROTHROMBIN TIME: CPT

## 2020-12-09 PROCEDURE — 86901 BLOOD TYPING SEROLOGIC RH(D): CPT

## 2020-12-09 PROCEDURE — 83036 HEMOGLOBIN GLYCOSYLATED A1C: CPT

## 2020-12-09 PROCEDURE — 3074F SYST BP LT 130 MM HG: CPT | Performed by: FAMILY MEDICINE

## 2020-12-09 PROCEDURE — 36415 COLL VENOUS BLD VENIPUNCTURE: CPT

## 2020-12-09 PROCEDURE — 93005 ELECTROCARDIOGRAM TRACING: CPT

## 2020-12-09 PROCEDURE — 82306 VITAMIN D 25 HYDROXY: CPT

## 2020-12-09 PROCEDURE — 86850 RBC ANTIBODY SCREEN: CPT

## 2020-12-09 PROCEDURE — 3078F DIAST BP <80 MM HG: CPT | Performed by: FAMILY MEDICINE

## 2020-12-09 PROCEDURE — 80053 COMPREHEN METABOLIC PANEL: CPT

## 2020-12-09 PROCEDURE — 84443 ASSAY THYROID STIM HORMONE: CPT

## 2020-12-09 PROCEDURE — 87081 CULTURE SCREEN ONLY: CPT

## 2020-12-09 PROCEDURE — 80061 LIPID PANEL: CPT

## 2020-12-09 PROCEDURE — 84439 ASSAY OF FREE THYROXINE: CPT

## 2020-12-09 PROCEDURE — 93010 ELECTROCARDIOGRAM REPORT: CPT | Performed by: INTERNAL MEDICINE

## 2020-12-09 PROCEDURE — 3008F BODY MASS INDEX DOCD: CPT | Performed by: FAMILY MEDICINE

## 2020-12-09 PROCEDURE — 86900 BLOOD TYPING SEROLOGIC ABO: CPT

## 2020-12-09 PROCEDURE — 82607 VITAMIN B-12: CPT

## 2020-12-09 PROCEDURE — 99243 OFF/OP CNSLTJ NEW/EST LOW 30: CPT | Performed by: FAMILY MEDICINE

## 2020-12-09 PROCEDURE — 85025 COMPLETE CBC W/AUTO DIFF WBC: CPT

## 2020-12-09 PROCEDURE — 85730 THROMBOPLASTIN TIME PARTIAL: CPT

## 2020-12-09 RX ORDER — METHYLPHENIDATE HYDROCHLORIDE 20 MG/1
20 TABLET ORAL 2 TIMES DAILY
Qty: 60 TABLET | Refills: 0 | Status: SHIPPED | OUTPATIENT
Start: 2020-12-09 | End: 2020-12-29

## 2020-12-09 RX ORDER — ALBUTEROL SULFATE 90 UG/1
2 AEROSOL, METERED RESPIRATORY (INHALATION) EVERY 6 HOURS PRN
Qty: 3 INHALER | Refills: 0 | Status: SHIPPED | OUTPATIENT
Start: 2020-12-09 | End: 2021-10-21

## 2020-12-09 NOTE — PROGRESS NOTES
Rosa Isela Solorzano is a 41year old female who presents for preop clearance for   CERVICAL 3,4,5,6,7 LAMINECTOMY WITH REMOVAL OF HARDWARE CERVICAL 3,4,5,6. SEGMENTAL INSTRUMENTATION BILATERALLY CERVICAL 5,6,7.  POSTERIOR LATERAL FUSION BILATERALLY CERVICAL 5, Medication Sig Dispense Refill   • buPROPion HCl ER, XL, 150 MG Oral Tablet 24 Hr Take 3 tablets (450 mg total) by mouth daily. 90 tablet 0   • Methylphenidate HCl 20 MG Oral Tab Take 1 tablet (20 mg total) by mouth 2 (two) times daily.  60 tablet 0   • g Soln Inhale 2 puffs into the lungs every 6 (six) hours as needed. 1   • Esomeprazole Magnesium (NEXIUM) 40 MG Oral Capsule Delayed Release Take 40 mg by mouth 2 (two) times daily.      • tiZANidine HCl 4 MG Oral Tab Take 1 tablet (4 mg total) by mouth ev 4years of age - 2006   • Mastodynia    • Menses painful    • Migraines    • Nausea 2weeks   • Night sweats    • Other screening mammogram 06/11/2012   • Pain in joints    • Pain with bowel movements Years   • Pap smear for cervical cancer screening 7-3-20 ACROMIOPLASTY LYSIS OF ADHESIONS RIGHT SHOULDER   • OTHER  12/19/2018    ANTERIOR DISCECTOMY AND FUSION. CERVICAL 4-CERVICAL 5 AND CERVICAL 5-CERVICAL 6.  ALLOGRAFT, PLATE AND SCREWS   • OTHER SURGICAL HISTORY  2001    esophagogastroduodenoscopy   • OTHER S Diet: watches calories closely     REVIEW OF SYSTEMS:   GENERAL: feels well otherwise  SKIN: denies any unusual skin lesions  EYES:denies blurred vision or double vision  HEENT: denies nasal congestion, sinus pain or ST  LUNGS: denies shortness of breath w METABOLIC PANEL (14); Future  - PTT, ACTIVATED; Future  - MSSA AND MRSA CULTURE SCREEN; Future  - EKG 12-LEAD; Future    3.  Current mild episode of major depressive disorder, unspecified whether recurrent (HCC)    - PROTHROMBIN TIME (PT); Future  - CBC WIT

## 2020-12-09 NOTE — TELEPHONE ENCOUNTER
Prior authorization request completed for: C3-7 LAMINECTOMY WITH REMOVAL OF HARDWARE CERVICAL 3,4,5,6. SEGMENTAL INSTRUMENTATION BILATERALLY CERVICAL 5,6,7. POSTERIOR LATERAL FUSION BILATERALLY CERVICAL 5,6,7.  ALLOGRAFT AND LOCAL BONE GRAFT  Authorization

## 2020-12-11 ENCOUNTER — TELEPHONE (OUTPATIENT)
Dept: SURGERY | Facility: CLINIC | Age: 45
End: 2020-12-11

## 2020-12-11 ENCOUNTER — LAB ENCOUNTER (OUTPATIENT)
Dept: LAB | Facility: HOSPITAL | Age: 45
End: 2020-12-11
Attending: NEUROLOGICAL SURGERY
Payer: MEDICAID

## 2020-12-11 DIAGNOSIS — M54.12 CERVICAL RADICULITIS: ICD-10-CM

## 2020-12-11 DIAGNOSIS — M48.02 CERVICAL STENOSIS OF SPINAL CANAL: Primary | ICD-10-CM

## 2020-12-13 ENCOUNTER — ANESTHESIA EVENT (OUTPATIENT)
Dept: SURGERY | Facility: HOSPITAL | Age: 45
DRG: 473 | End: 2020-12-13
Payer: MEDICAID

## 2020-12-14 ENCOUNTER — HOSPITAL ENCOUNTER (INPATIENT)
Facility: HOSPITAL | Age: 45
LOS: 3 days | Discharge: HOME | DRG: 473 | End: 2020-12-17
Attending: NEUROLOGICAL SURGERY | Admitting: NEUROLOGICAL SURGERY
Payer: MEDICAID

## 2020-12-14 ENCOUNTER — APPOINTMENT (OUTPATIENT)
Dept: GENERAL RADIOLOGY | Facility: HOSPITAL | Age: 45
DRG: 473 | End: 2020-12-14
Attending: NEUROLOGICAL SURGERY
Payer: MEDICAID

## 2020-12-14 ENCOUNTER — APPOINTMENT (OUTPATIENT)
Dept: MRI IMAGING | Facility: HOSPITAL | Age: 45
DRG: 473 | End: 2020-12-14
Attending: NEUROLOGICAL SURGERY
Payer: MEDICAID

## 2020-12-14 ENCOUNTER — ANESTHESIA (OUTPATIENT)
Dept: SURGERY | Facility: HOSPITAL | Age: 45
DRG: 473 | End: 2020-12-14
Payer: MEDICAID

## 2020-12-14 DIAGNOSIS — M54.12 CERVICAL RADICULOPATHY: ICD-10-CM

## 2020-12-14 DIAGNOSIS — M54.12 CERVICAL RADICULITIS: Primary | ICD-10-CM

## 2020-12-14 PROCEDURE — 3008F BODY MASS INDEX DOCD: CPT | Performed by: STUDENT IN AN ORGANIZED HEALTH CARE EDUCATION/TRAINING PROGRAM

## 2020-12-14 PROCEDURE — 76000 FLUOROSCOPY <1 HR PHYS/QHP: CPT | Performed by: NEUROLOGICAL SURGERY

## 2020-12-14 PROCEDURE — 99252 IP/OBS CONSLTJ NEW/EST SF 35: CPT | Performed by: STUDENT IN AN ORGANIZED HEALTH CARE EDUCATION/TRAINING PROGRAM

## 2020-12-14 PROCEDURE — 0RP104Z REMOVAL OF INTERNAL FIXATION DEVICE FROM CERVICAL VERTEBRAL JOINT, OPEN APPROACH: ICD-10-PCS | Performed by: NEUROLOGICAL SURGERY

## 2020-12-14 PROCEDURE — 0RG2071 FUSION OF 2 OR MORE CERVICAL VERTEBRAL JOINTS WITH AUTOLOGOUS TISSUE SUBSTITUTE, POSTERIOR APPROACH, POSTERIOR COLUMN, OPEN APPROACH: ICD-10-PCS | Performed by: NEUROLOGICAL SURGERY

## 2020-12-14 PROCEDURE — 76942 ECHO GUIDE FOR BIOPSY: CPT | Performed by: ANESTHESIOLOGY

## 2020-12-14 PROCEDURE — 99291 CRITICAL CARE FIRST HOUR: CPT | Performed by: HOSPITALIST

## 2020-12-14 PROCEDURE — 70551 MRI BRAIN STEM W/O DYE: CPT | Performed by: NEUROLOGICAL SURGERY

## 2020-12-14 PROCEDURE — 01N10ZZ RELEASE CERVICAL NERVE, OPEN APPROACH: ICD-10-PCS | Performed by: NEUROLOGICAL SURGERY

## 2020-12-14 PROCEDURE — 72141 MRI NECK SPINE W/O DYE: CPT | Performed by: NEUROLOGICAL SURGERY

## 2020-12-14 PROCEDURE — 3074F SYST BP LT 130 MM HG: CPT | Performed by: STUDENT IN AN ORGANIZED HEALTH CARE EDUCATION/TRAINING PROGRAM

## 2020-12-14 PROCEDURE — 3078F DIAST BP <80 MM HG: CPT | Performed by: STUDENT IN AN ORGANIZED HEALTH CARE EDUCATION/TRAINING PROGRAM

## 2020-12-14 DEVICE — SCREW BONE MESA 90D MINI 3.5MM: Type: IMPLANTABLE DEVICE | Site: SPINE CERVICAL | Status: FUNCTIONAL

## 2020-12-14 DEVICE — IMPLANTABLE DEVICE: Type: IMPLANTABLE DEVICE | Site: SPINE CERVICAL | Status: FUNCTIONAL

## 2020-12-14 DEVICE — PATCH DURAL DURAMATRIX 1X3: Type: IMPLANTABLE DEVICE | Site: SPINE CERVICAL | Status: FUNCTIONAL

## 2020-12-14 RX ORDER — DIPHENHYDRAMINE HYDROCHLORIDE 50 MG/ML
25 INJECTION INTRAMUSCULAR; INTRAVENOUS EVERY 4 HOURS PRN
Status: DISCONTINUED | OUTPATIENT
Start: 2020-12-14 | End: 2020-12-17

## 2020-12-14 RX ORDER — TIZANIDINE 2 MG/1
TABLET ORAL EVERY 6 HOURS PRN
Status: DISCONTINUED | OUTPATIENT
Start: 2020-12-14 | End: 2020-12-17

## 2020-12-14 RX ORDER — ROCURONIUM BROMIDE 10 MG/ML
INJECTION, SOLUTION INTRAVENOUS AS NEEDED
Status: DISCONTINUED | OUTPATIENT
Start: 2020-12-14 | End: 2020-12-14 | Stop reason: SURG

## 2020-12-14 RX ORDER — INSULIN ASPART 100 [IU]/ML
INJECTION, SOLUTION INTRAVENOUS; SUBCUTANEOUS ONCE
Status: DISCONTINUED | OUTPATIENT
Start: 2020-12-14 | End: 2020-12-14 | Stop reason: HOSPADM

## 2020-12-14 RX ORDER — LIDOCAINE HYDROCHLORIDE 10 MG/ML
INJECTION, SOLUTION EPIDURAL; INFILTRATION; INTRACAUDAL; PERINEURAL AS NEEDED
Status: DISCONTINUED | OUTPATIENT
Start: 2020-12-14 | End: 2020-12-14 | Stop reason: SURG

## 2020-12-14 RX ORDER — GLYCOPYRROLATE 0.2 MG/ML
INJECTION, SOLUTION INTRAMUSCULAR; INTRAVENOUS AS NEEDED
Status: DISCONTINUED | OUTPATIENT
Start: 2020-12-14 | End: 2020-12-14 | Stop reason: SURG

## 2020-12-14 RX ORDER — ESCITALOPRAM OXALATE 10 MG/1
10 TABLET ORAL NIGHTLY
Status: DISCONTINUED | OUTPATIENT
Start: 2020-12-14 | End: 2020-12-14

## 2020-12-14 RX ORDER — HYDROMORPHONE HYDROCHLORIDE 1 MG/ML
0.8 INJECTION, SOLUTION INTRAMUSCULAR; INTRAVENOUS; SUBCUTANEOUS EVERY 2 HOUR PRN
Status: DISCONTINUED | OUTPATIENT
Start: 2020-12-14 | End: 2020-12-17

## 2020-12-14 RX ORDER — GABAPENTIN 300 MG/1
300 CAPSULE ORAL 3 TIMES DAILY
Status: DISCONTINUED | OUTPATIENT
Start: 2020-12-14 | End: 2020-12-17

## 2020-12-14 RX ORDER — DEXAMETHASONE SODIUM PHOSPHATE 10 MG/ML
4 INJECTION, SOLUTION INTRAMUSCULAR; INTRAVENOUS EVERY 6 HOURS
Status: COMPLETED | OUTPATIENT
Start: 2020-12-14 | End: 2020-12-15

## 2020-12-14 RX ORDER — HYDROMORPHONE HYDROCHLORIDE 1 MG/ML
0.4 INJECTION, SOLUTION INTRAMUSCULAR; INTRAVENOUS; SUBCUTANEOUS EVERY 5 MIN PRN
Status: DISCONTINUED | OUTPATIENT
Start: 2020-12-14 | End: 2020-12-14 | Stop reason: HOSPADM

## 2020-12-14 RX ORDER — DIAZEPAM 5 MG/1
TABLET ORAL EVERY 6 HOURS PRN
Status: DISCONTINUED | OUTPATIENT
Start: 2020-12-14 | End: 2020-12-17

## 2020-12-14 RX ORDER — DICYCLOMINE HCL 20 MG
20 TABLET ORAL
Status: DISCONTINUED | OUTPATIENT
Start: 2020-12-14 | End: 2020-12-17

## 2020-12-14 RX ORDER — DIPHENHYDRAMINE HCL 25 MG
25 CAPSULE ORAL EVERY 4 HOURS PRN
Status: DISCONTINUED | OUTPATIENT
Start: 2020-12-14 | End: 2020-12-17

## 2020-12-14 RX ORDER — ESCITALOPRAM OXALATE 10 MG/1
10 TABLET ORAL DAILY
Refills: 0 | Status: DISCONTINUED | OUTPATIENT
Start: 2020-12-14 | End: 2020-12-14

## 2020-12-14 RX ORDER — SODIUM PHOSPHATE, DIBASIC AND SODIUM PHOSPHATE, MONOBASIC 7; 19 G/133ML; G/133ML
1 ENEMA RECTAL ONCE AS NEEDED
Status: DISCONTINUED | OUTPATIENT
Start: 2020-12-14 | End: 2020-12-17

## 2020-12-14 RX ORDER — DEXTROSE MONOHYDRATE 25 G/50ML
50 INJECTION, SOLUTION INTRAVENOUS
Status: DISCONTINUED | OUTPATIENT
Start: 2020-12-14 | End: 2020-12-14 | Stop reason: HOSPADM

## 2020-12-14 RX ORDER — SENNOSIDES 8.6 MG
17.2 TABLET ORAL NIGHTLY
Status: DISCONTINUED | OUTPATIENT
Start: 2020-12-14 | End: 2020-12-17

## 2020-12-14 RX ORDER — POLYETHYLENE GLYCOL 3350 17 G/17G
17 POWDER, FOR SOLUTION ORAL DAILY PRN
Status: DISCONTINUED | OUTPATIENT
Start: 2020-12-14 | End: 2020-12-17

## 2020-12-14 RX ORDER — DOCUSATE SODIUM 100 MG/1
100 CAPSULE, LIQUID FILLED ORAL 2 TIMES DAILY
Status: DISCONTINUED | OUTPATIENT
Start: 2020-12-14 | End: 2020-12-17

## 2020-12-14 RX ORDER — CEFAZOLIN SODIUM/WATER 2 G/20 ML
2 SYRINGE (ML) INTRAVENOUS ONCE
Status: COMPLETED | OUTPATIENT
Start: 2020-12-14 | End: 2020-12-14

## 2020-12-14 RX ORDER — NALOXONE HYDROCHLORIDE 0.4 MG/ML
80 INJECTION, SOLUTION INTRAMUSCULAR; INTRAVENOUS; SUBCUTANEOUS AS NEEDED
Status: DISCONTINUED | OUTPATIENT
Start: 2020-12-14 | End: 2020-12-14 | Stop reason: HOSPADM

## 2020-12-14 RX ORDER — CEFAZOLIN SODIUM/WATER 2 G/20 ML
2 SYRINGE (ML) INTRAVENOUS EVERY 8 HOURS
Status: COMPLETED | OUTPATIENT
Start: 2020-12-14 | End: 2020-12-15

## 2020-12-14 RX ORDER — BUPIVACAINE HYDROCHLORIDE AND EPINEPHRINE 5; 5 MG/ML; UG/ML
INJECTION, SOLUTION EPIDURAL; INTRACAUDAL; PERINEURAL AS NEEDED
Status: DISCONTINUED | OUTPATIENT
Start: 2020-12-14 | End: 2020-12-14 | Stop reason: HOSPADM

## 2020-12-14 RX ORDER — SODIUM CHLORIDE 9 MG/ML
INJECTION, SOLUTION INTRAVENOUS CONTINUOUS PRN
Status: DISCONTINUED | OUTPATIENT
Start: 2020-12-14 | End: 2020-12-14 | Stop reason: SURG

## 2020-12-14 RX ORDER — SODIUM CHLORIDE 0.9 % (FLUSH) 0.9 %
SYRINGE (ML) INJECTION AS NEEDED
Status: DISCONTINUED | OUTPATIENT
Start: 2020-12-14 | End: 2020-12-14 | Stop reason: HOSPADM

## 2020-12-14 RX ORDER — HYDROMORPHONE HYDROCHLORIDE 1 MG/ML
INJECTION, SOLUTION INTRAMUSCULAR; INTRAVENOUS; SUBCUTANEOUS AS NEEDED
Status: DISCONTINUED | OUTPATIENT
Start: 2020-12-14 | End: 2020-12-14 | Stop reason: SURG

## 2020-12-14 RX ORDER — HYDROMORPHONE HYDROCHLORIDE 1 MG/ML
0.2 INJECTION, SOLUTION INTRAMUSCULAR; INTRAVENOUS; SUBCUTANEOUS EVERY 2 HOUR PRN
Status: DISCONTINUED | OUTPATIENT
Start: 2020-12-14 | End: 2020-12-17

## 2020-12-14 RX ORDER — OXYCODONE HYDROCHLORIDE 5 MG/1
TABLET ORAL EVERY 4 HOURS PRN
Status: DISCONTINUED | OUTPATIENT
Start: 2020-12-14 | End: 2020-12-17

## 2020-12-14 RX ORDER — NEOSTIGMINE METHYLSULFATE 1 MG/ML
INJECTION INTRAVENOUS AS NEEDED
Status: DISCONTINUED | OUTPATIENT
Start: 2020-12-14 | End: 2020-12-14 | Stop reason: SURG

## 2020-12-14 RX ORDER — SODIUM CHLORIDE 9 MG/ML
INJECTION, SOLUTION INTRAVENOUS CONTINUOUS
Status: DISCONTINUED | OUTPATIENT
Start: 2020-12-14 | End: 2020-12-17

## 2020-12-14 RX ORDER — ALBUTEROL SULFATE 90 UG/1
2 AEROSOL, METERED RESPIRATORY (INHALATION) EVERY 6 HOURS PRN
Status: DISCONTINUED | OUTPATIENT
Start: 2020-12-14 | End: 2020-12-17

## 2020-12-14 RX ORDER — ONDANSETRON 2 MG/ML
4 INJECTION INTRAMUSCULAR; INTRAVENOUS EVERY 4 HOURS PRN
Status: ACTIVE | OUTPATIENT
Start: 2020-12-14 | End: 2020-12-15

## 2020-12-14 RX ORDER — HYDROMORPHONE HYDROCHLORIDE 1 MG/ML
INJECTION, SOLUTION INTRAMUSCULAR; INTRAVENOUS; SUBCUTANEOUS
Status: COMPLETED
Start: 2020-12-14 | End: 2020-12-14

## 2020-12-14 RX ORDER — BISACODYL 10 MG
10 SUPPOSITORY, RECTAL RECTAL
Status: DISCONTINUED | OUTPATIENT
Start: 2020-12-14 | End: 2020-12-17

## 2020-12-14 RX ORDER — PROCHLORPERAZINE EDISYLATE 5 MG/ML
10 INJECTION INTRAMUSCULAR; INTRAVENOUS EVERY 6 HOURS PRN
Status: ACTIVE | OUTPATIENT
Start: 2020-12-14 | End: 2020-12-16

## 2020-12-14 RX ORDER — SODIUM CHLORIDE, SODIUM LACTATE, POTASSIUM CHLORIDE, CALCIUM CHLORIDE 600; 310; 30; 20 MG/100ML; MG/100ML; MG/100ML; MG/100ML
INJECTION, SOLUTION INTRAVENOUS CONTINUOUS
Status: DISCONTINUED | OUTPATIENT
Start: 2020-12-14 | End: 2020-12-14 | Stop reason: HOSPADM

## 2020-12-14 RX ORDER — HYDROMORPHONE HYDROCHLORIDE 1 MG/ML
0.4 INJECTION, SOLUTION INTRAMUSCULAR; INTRAVENOUS; SUBCUTANEOUS EVERY 2 HOUR PRN
Status: DISCONTINUED | OUTPATIENT
Start: 2020-12-14 | End: 2020-12-17

## 2020-12-14 RX ORDER — ESMOLOL HYDROCHLORIDE 10 MG/ML
INJECTION INTRAVENOUS AS NEEDED
Status: DISCONTINUED | OUTPATIENT
Start: 2020-12-14 | End: 2020-12-14 | Stop reason: SURG

## 2020-12-14 RX ORDER — OXYCODONE HCL 10 MG/1
10 TABLET, FILM COATED, EXTENDED RELEASE ORAL EVERY 12 HOURS PRN
Status: DISCONTINUED | OUTPATIENT
Start: 2020-12-14 | End: 2020-12-17

## 2020-12-14 RX ADMIN — ESMOLOL HYDROCHLORIDE 10 MG: 10 INJECTION INTRAVENOUS at 13:52:00

## 2020-12-14 RX ADMIN — NEOSTIGMINE METHYLSULFATE 3 MG: 1 INJECTION INTRAVENOUS at 14:23:00

## 2020-12-14 RX ADMIN — ROCURONIUM BROMIDE 50 MG: 10 INJECTION, SOLUTION INTRAVENOUS at 07:54:00

## 2020-12-14 RX ADMIN — GLYCOPYRROLATE 0.4 MG: 0.2 INJECTION, SOLUTION INTRAMUSCULAR; INTRAVENOUS at 14:23:00

## 2020-12-14 RX ADMIN — CEFAZOLIN SODIUM/WATER 2 G: 2 G/20 ML SYRINGE (ML) INTRAVENOUS at 13:05:00

## 2020-12-14 RX ADMIN — ROCURONIUM BROMIDE 50 MG: 10 INJECTION, SOLUTION INTRAVENOUS at 08:23:00

## 2020-12-14 RX ADMIN — ESMOLOL HYDROCHLORIDE 10 MG: 10 INJECTION INTRAVENOUS at 14:26:00

## 2020-12-14 RX ADMIN — ROCURONIUM BROMIDE 20 MG: 10 INJECTION, SOLUTION INTRAVENOUS at 12:31:00

## 2020-12-14 RX ADMIN — ESMOLOL HYDROCHLORIDE 10 MG: 10 INJECTION INTRAVENOUS at 14:09:00

## 2020-12-14 RX ADMIN — HYDROMORPHONE HYDROCHLORIDE 0.4 MG: 1 INJECTION, SOLUTION INTRAMUSCULAR; INTRAVENOUS; SUBCUTANEOUS at 13:40:00

## 2020-12-14 RX ADMIN — SODIUM CHLORIDE, SODIUM LACTATE, POTASSIUM CHLORIDE, CALCIUM CHLORIDE: 600; 310; 30; 20 INJECTION, SOLUTION INTRAVENOUS at 10:11:00

## 2020-12-14 RX ADMIN — ESMOLOL HYDROCHLORIDE 10 MG: 10 INJECTION INTRAVENOUS at 14:19:00

## 2020-12-14 RX ADMIN — ROCURONIUM BROMIDE 20 MG: 10 INJECTION, SOLUTION INTRAVENOUS at 09:59:00

## 2020-12-14 RX ADMIN — ROCURONIUM BROMIDE 30 MG: 10 INJECTION, SOLUTION INTRAVENOUS at 09:02:00

## 2020-12-14 RX ADMIN — HYDROMORPHONE HYDROCHLORIDE 0.4 MG: 1 INJECTION, SOLUTION INTRAMUSCULAR; INTRAVENOUS; SUBCUTANEOUS at 14:24:00

## 2020-12-14 RX ADMIN — LIDOCAINE HYDROCHLORIDE 25 MG: 10 INJECTION, SOLUTION EPIDURAL; INFILTRATION; INTRACAUDAL; PERINEURAL at 07:54:00

## 2020-12-14 RX ADMIN — CEFAZOLIN SODIUM/WATER 2 G: 2 G/20 ML SYRINGE (ML) INTRAVENOUS at 09:05:00

## 2020-12-14 RX ADMIN — ESMOLOL HYDROCHLORIDE 10 MG: 10 INJECTION INTRAVENOUS at 13:39:00

## 2020-12-14 RX ADMIN — HYDROMORPHONE HYDROCHLORIDE 0.2 MG: 1 INJECTION, SOLUTION INTRAMUSCULAR; INTRAVENOUS; SUBCUTANEOUS at 14:04:00

## 2020-12-14 RX ADMIN — SODIUM CHLORIDE, SODIUM LACTATE, POTASSIUM CHLORIDE, CALCIUM CHLORIDE: 600; 310; 30; 20 INJECTION, SOLUTION INTRAVENOUS at 14:24:00

## 2020-12-14 RX ADMIN — SODIUM CHLORIDE: 9 INJECTION, SOLUTION INTRAVENOUS at 14:49:00

## 2020-12-14 RX ADMIN — SODIUM CHLORIDE: 9 INJECTION, SOLUTION INTRAVENOUS at 08:10:00

## 2020-12-14 NOTE — ANESTHESIA PROCEDURE NOTES
Arterial Line  Performed by: Kitty Blankenship DO  Authorized by: Kitty Blankenship DO     General Information and Staff    Procedure Start:  12/14/2020 8:00 AM  Procedure End:  12/14/2020 8:08 AM  Anesthesiologist:  Kitty Blankenship DO  Performed By:  Anesthesiologist

## 2020-12-14 NOTE — CONSULTS
[facility]  [address] Patient Status:  Inpatient    1975 MRN [id number]   [facility] 3SW-A Attending Bobby Hartman MD   Hosp Day # 0 PCP Marcelo Lopez DO     Reason for consult: Medical Mgt    Requested Pain in joints    • Pain with bowel movements Years   • Pap smear for cervical cancer screening 7-3-2012    wnl   • Personal history of urinary (tract) infection    • Pneumonia due to organism    • PONV (postoperative nausea and vomiting)    • Problems wit AND CERVICAL 5-CERVICAL 6.  ALLOGRAFT, PLATE AND SCREWS   • OTHER SURGICAL HISTORY  2001    esophagogastroduodenoscopy   • OTHER SURGICAL HISTORY      right thumb trigger finger release    • OTHER SURGICAL HISTORY      ganglion cyst removed left wrist    • COMMENTS)    Comment:Sensitive skin, milk,dust mites  Quinolones              UNKNOWN  Norco [Hydrocodone-*    RASH    Medications:  No current facility-administered medications on file prior to encounter.      •  Acetaminophen (ACETAMINOPHEN EXTRA STRENGTH 40 MG Oral Capsule Delayed Release, Take 40 mg by mouth 2 (two) times daily. , Disp: , Rfl:     •  gabapentin 300 MG Oral Cap, Take 1 capsule (300 mg total) by mouth 3 (three) times daily. , Disp: 90 capsule, Rfl: 0    •  ondansetron 4 MG Oral Tablet Dispers CREATSERUM 1.04*   GFRAA 76   GFRNAA 66   CA 9.9   ALB 4.6      K 3.5      CO2 28.0   ALKPHO 74   AST 22   ALT 50   BILT 0.5   TP 8.0       Recent Labs   Lab 12/09/20  1029   PTP 12.9   INR 0.94       No results for input(s): TROP, CK in the

## 2020-12-14 NOTE — ANESTHESIA PREPROCEDURE EVALUATION
PRE-OP EVALUATION    Patient Name: Michelle Plunkett    Pre-op Diagnosis: DDD (degenerative disc disease), cervical [M50.30]  Hx of fusion of cervical spine [Z98.1]  Cervical myelopathy with cervical radiculopathy [M47.12]    Procedure(s):  CERVICAL 3-4, CE EVERY 6 HOURS) BEFORE MEALS AND AT NIGHT AS NEEDED, Disp: 120 tablet, Rfl: 0  ondansetron 4 MG Oral Tablet Dispersible, Take 1 tablet (4 mg total) by mouth every 8 (eight) hours as needed for Nausea., Disp: 20 tablet, Rfl: 0  DIAZEPAM 10 MG Oral Tab, TAKE Endo/Other      (+) diabetes                            Pulmonary      (+) asthma                     Neuro/Psych  Comment: Cervical radiculopathy    (+) depression                              Past Surgical History:   P 7/6/2020    Performed by Brennen Rowland MD at [facility] MAIN OR   • SHOULDER ARTHROSCOPY Right 2/13/2018    Performed by Bibi Jimenez MD at [facility] MAIN OR   • [address]  2005    right foot   • TONSILLECTOMY     • TOT Specifically, the common intrinsic risks of a general anesthetic were discussed including reasonable postoperative pain expectations for the procedure, nausea/vomiting, dental damage, sore throat and adverse reactions related to medications administered.  Q

## 2020-12-14 NOTE — H&P
History & Physical Examination    Patient Name: Anjana Jeffers  MRN: [id number]  CSN: [id number]  YOB: 1975    Diagnosis: Cervical radiculopathy     Present Illness: Pleasant 40 y/o female presents today for surgical intervention with Dr. Brannon Dk (six) hours as needed. , Disp: 60 tablet, Rfl: 1, Past Month at Unknown time    •  Citalopram Hydrobromide 40 MG Oral Tab, Take 1 tablet (40 mg total) by mouth daily. , Disp: 90 tablet, Rfl: 0, 12/14/2020 at Unknown time    •  Lovastatin 10 MG Oral Tab, Take Methylphenidate HCl 20 MG Oral Tab, Take 1 tablet (20 mg total) by mouth 2 (two) times daily. , Disp: 60 tablet, Rfl: 0, 12/12/2020    •  Albuterol Sulfate HFA (VENTOLIN HFA) 108 (90 Base) MCG/ACT Inhalation Aero Soln, Inhale 2 puffs into the lungs every 6 Acute esophagitis    • Acute upper respiratory infections of unspecified site    • Anxiety state, unspecified    • Asthma    • Attention deficit disorder without mention of hyperactivity    • Back problem     cervical and lumbar   • Bad breath     Started Procedure Laterality Date   • ABDOMINAL HYSTERECTOMY, BSO Bilateral 7/20/2015    Performed by Esau Tompkins MD at [address] [facility] [address]   • ANTERIOR CERVICAL FUSION BG & INST 2 LEVEL N/A 12/19/2018    Performed by Dontae Howell MD at [facility] MAIN OR   • Kyler 1850 ABDOM HYSTERECTOMY       Family History   Problem Relation Age of Onset   • Diabetes Father    • Hypertension Father    • Colon Polyps Father    • Other (ADD) Father    • Breast Cancer Mother 54   • Diabetes Mother    • Stroke Mother    • Other (Other) Mot

## 2020-12-14 NOTE — ANESTHESIA PROCEDURE NOTES
Airway  Urgency: elective    Airway not difficult    General Information and Staff    Patient location during procedure: OR  Anesthesiologist: Keith Camacho DO  Performed: anesthesiologist     Indications and Patient Condition  Indications for airway managem

## 2020-12-14 NOTE — ANESTHESIA PROCEDURE NOTES
Peripheral IV  Inserted by: Jaswinder Hein DO    Placement  Needle size: 18 G  Laterality: right  Location: hand  Local anesthetic: none  Site prep: Betadine  Technique: anatomical landmarks

## 2020-12-14 NOTE — ANESTHESIA POSTPROCEDURE EVALUATION
[address] [facility] [place] Patient Status:  Inpatient   Age/Gender 41year old female MRN [id number]   [facility] SURGERY Attending Jonah Ordaz MD   [facility] Day # 0 PCP Meme Mcdonald DO       Anesthesia Post-op Note    Procedure(s)

## 2020-12-14 NOTE — PLAN OF CARE
Very drowsy, drifts off to sleep during conversation. Dermabond and soft collar to surgical site. Jess contacted for [place] collar, to be delivered this evening. Denies numbness/tingling to all extremities. IS teaching done.  SCDs and tera hose on bilateral

## 2020-12-14 NOTE — BRIEF OP NOTE
Pre-Operative Diagnosis: Cervical radiculopathy [M54.12]     Post-Operative Diagnosis: Cervical radiculopathy [M54.12]      Procedure Performed:   Procedure(s):  POSTERIOR CERVICAL 3,4,5,6,7 LAMINECTOMY; REMOVAL OF LAMINOPLASTY HARDWARE CERVICAL 3,4,5,6; S

## 2020-12-15 ENCOUNTER — APPOINTMENT (OUTPATIENT)
Dept: GENERAL RADIOLOGY | Facility: HOSPITAL | Age: 45
DRG: 473 | End: 2020-12-15
Attending: PHYSICIAN ASSISTANT
Payer: MEDICAID

## 2020-12-15 PROCEDURE — 72050 X-RAY EXAM NECK SPINE 4/5VWS: CPT | Performed by: PHYSICIAN ASSISTANT

## 2020-12-15 PROCEDURE — 99232 SBSQ HOSP IP/OBS MODERATE 35: CPT | Performed by: HOSPITALIST

## 2020-12-15 RX ORDER — PRAVASTATIN SODIUM 10 MG
10 TABLET ORAL NIGHTLY
Refills: 0 | Status: DISCONTINUED | OUTPATIENT
Start: 2020-12-15 | End: 2020-12-17

## 2020-12-15 RX ORDER — MONTELUKAST SODIUM 10 MG/1
10 TABLET ORAL NIGHTLY
Status: DISCONTINUED | OUTPATIENT
Start: 2020-12-15 | End: 2020-12-17

## 2020-12-15 NOTE — PLAN OF CARE
Oxycodone given for severe pain. Dermabond to surgical site. [place] collar on and aligned. Denies numbness/tingling to all extremities. Still has right sided weakness that is improving and right groin pain - neurosx aware.  SEDRICK drain to gravity with small quin

## 2020-12-15 NOTE — PHYSICAL THERAPY NOTE
PHYSICAL THERAPY EVALUATION - INPATIENT     Room Number: 358/358-A  Evaluation Date: 12/15/2020  Type of Evaluation: Initial  Physician Order: PT Eval and Treat    Presenting Problem: s/p posterior C3-7 lami, VICKIE, posterior lateral fusion C5-7 12/14/ Loss of appetite    • Lump or mass in breast    • Malaise    • Malignant hyperthermia     patient's son at 4years of age - 2006   • Mastodynia    • Menses painful    • Migraines    • Nausea 2weeks   • Night sweats    • Other screening mammogram 06/11/2012 • LYSIS OF ADHESIONS     • VENKATESH LOCALIZATION WIRE 1 SITE RIGHT (CPT=19281)      in her 20's   • OTHER Right 02/2018    ARTHROSCOPIC ACROMIOPLASTY LYSIS OF ADHESIONS RIGHT SHOULDER   • OTHER  12/19/2018    ANTERIOR DISCECTOMY AND FUSION.  CERVICAL 4-CERVICA good awareness of safety precautions    RANGE OF MOTION AND STRENGTH ASSESSMENT  Upper extremity ROM and strength are within functional limits pain with elevation of right shldr    Lower extremity ROM is within functional limits     Lower extremity strengt ankle pumps for DVT prevention. Pt demonstrating right LE weakness, and reporting pain right UE, utilized rw for ambulation this session(hopefully to wean prior to discharge).   Pt stood to rw with cga, gait training with rw with emphasis on upright postu patient is demonstrating a 47% degree of impairment in mobility. Research supports that patients with this level of impairment may benefit from home with supervision.   .  Based on this evaluation, patient's clinical presentation is stable and overall the e

## 2020-12-15 NOTE — PROGRESS NOTES
[facility]  Neurosurgery Progress Note    Johnny Blackwell Patient Status:  Inpatient    1975 MRN [id number]   [facility] 3SW-A Attending Betty Brady MD   [facility] Day # 1 PCP Nicole Rivera DO     Chief Complaint:  Cervical radic MRI of the cervical spine, there has   been revision of the posterior fusion with C5 through C7 pedicle screws now present. A surgical drain is present posteriorly at the laminectomy site with trace surrounding fluid most likely postsurgical seroma.   No e drain   PT/OT consults  Cervical XRs as ordered  Consult pain management for recommendations for pain control  Medical management per hospitalist  DVT prophylaxis- SCDs MARIO [facility]  12/15/2020, 9:08 AM

## 2020-12-15 NOTE — PLAN OF CARE
Patient alert and oriented x 4 but slightly drowsy. Vital signs stable. On 2L O2 via nasal cannula. Castano in place. Pain controlled with IV pain medication. Dermabond to surgical site - C/D/I. Aspen collar in place. SCDs and tera hose on bilaterally.  IVF in

## 2020-12-15 NOTE — PROGRESS NOTES
Acute Pain Service    POD#1 s/p POSTERIOR CERVICAL 3,4,5,6,7 LAMINECTOMY; REMOVAL OF LAMINOPLASTY HARDWARE CERVICAL 3,4,5,6; SEGMENTAL INSTRUMENTATION BILATERALLY CERVICAL 5, C6, C7; POSTERIOR LATERAL FUSION BILATERALLY CERVICAL 5, C6, C7; LOCAL AUTOGRAFT,

## 2020-12-15 NOTE — PROGRESS NOTES
[facility]  Progress Note     Shanta Proud Patient Status:  Inpatient    1975 MRN [id number]   [facility] 3SW-A Attending Wilbert Chaudhari MD   [facility] Day # 1 PCP Chyna Henderson DO     Chief Complaint: right sided weakness escitalopram  30 mg Oral Nightly     ASSESSMENT / PLAN:   1. Cervical disc disease s/p laminectomy   1. Pain control  2. IS use  2. IBS- Continue bentyl   3. Depression- Continue wellbutrin  4. Acute R sided weakness suspect 2/2 swelling.  MRI brain and C s

## 2020-12-15 NOTE — PROGRESS NOTES
[facility]  RAPID RESPONSE NOTE     Sabrina Reasons Patient Status:  Inpatient    1975 MRN [id number]   [facility] 3SW-A Attending Sahara Levin MD   Hosp Day # 0 PCP Jaskaran Tripp DO     Reason for RRT: RRT called for wors

## 2020-12-15 NOTE — PROGRESS NOTES
Reviewed collar use and swallowing precautions. Guidebook provided. Reviewed indications, side effects of pain medication/narcotics and constipation prevention.  Stressed importance of increased fluids/roughage in diet, continued use stool softeners along w

## 2020-12-16 PROCEDURE — 99232 SBSQ HOSP IP/OBS MODERATE 35: CPT | Performed by: INTERNAL MEDICINE

## 2020-12-16 NOTE — PROGRESS NOTES
[facility]  Progress Note     Taniya Garibay Patient Status:  Inpatient    1975 MRN [id number]   [facility] 3SW-A Attending Mark Peterson MD   [address] Day # 2 PCP Jolanta Rhodes DO     Chief Complaint: right sided weakness mg Oral Nightly   • escitalopram  30 mg Oral Nightly     ASSESSMENT / PLAN:   1. Cervical disc disease s/p laminectomy   1. Pain control  2. IS use  3. Per NS  4. SEDRICK drain removed  2. RLQ pAIN  1. ? IBS  2. UA contaminated  3. IBS- Continue bentyl   4.  Dep

## 2020-12-16 NOTE — OCCUPATIONAL THERAPY NOTE
OCCUPATIONAL THERAPY TREATMENT NOTE - INPATIENT     Room Number: 358/358-A  Session: 1   Number of Visits to Meet Established Goals: 1    Presenting Problem: s/p C3-7 post laminectomy and C5-7 post lateral fusion on 12/14       History related to current a urination    • Frequent UTI    • Headache disorder    • Heartburn 29 years ago   • Heavy menses    • Hematuria    • Hiatal hernia    • Hyperlipidemia    • IBS (irritable bowel syndrome)    • Irregular bowel habits    • Irregular menstrual cycle    • Itch o ABLATION      2004   • ESOPHAGOGASTRODUODENOSCOPY (EGD) N/A 5/1/2015    Performed by Gordo Clifton MD at [address] [place] [address] ENDOSCOPY   • ESOPHAGOGASTRODUODENOSCOPY (EGD) N/A 2/6/2015    Performed by Gordo Clifton MD at [address] [place] [address] ENDOSCOPY   • HYSTERECTOMY  7/20/2015   • H currently need…  -   Putting on and taking off regular lower body clothing?: A Little  -   Bathing (including washing, rinsing, drying)?: A Little  -   Toileting, which includes using toilet, bedpan or urinal? : None  -   Putting on and taking off regular and coordination; functional mobility training; self-care training; caregiver instruction; and home exercise program instruction. Pt has made functional progress in the areas of: toileting, dressing, functional mobility and B UE strengthening.  Pt will be d

## 2020-12-16 NOTE — PHYSICAL THERAPY NOTE
PHYSICAL THERAPY TREATMENT NOTE - INPATIENT    Room Number: 358/358-A     Session: 1   Number of Visits to Meet Established Goals: 3    Presenting Problem: s/p posterior C3-7 lami, VICKIE, posterior lateral fusion C5-7 12/14/20  History related to current ad hyperthermia     patient's son at 4years of age - 2006   • Mastodynia    • Menses painful    • Migraines    • Nausea 2weeks   • Night sweats    • Other screening mammogram 06/11/2012   • Pain in joints    • Pain with bowel movements Years   • Pap smear fo in her 19's   • OTHER Right 02/2018    ARTHROSCOPIC ACROMIOPLASTY LYSIS OF ADHESIONS RIGHT SHOULDER   • OTHER  12/19/2018    ANTERIOR DISCECTOMY AND FUSION. CERVICAL 4-CERVICAL 5 AND CERVICAL 5-CERVICAL 6.  ALLOGRAFT, PLATE AND SCREWS   • OTHER SURGICA Moving from lying on back to sitting on the side of the bed?: A Little   How much help from another person does the patient currently need. ..   -   Moving to and from a bed to a chair (including a wheelchair)?: A Little   -   Need to walk in hospital room Research supports that patients with this level of impairment may benefit from home with supportive spouse. DISCHARGE RECOMMENDATIONS  PT Discharge Recommendations: Home(with family)     PLAN  PT Treatment Plan: Endurance; Energy conservation;Patient e

## 2020-12-16 NOTE — PAYOR COMM NOTE
--------------  CONTINUED STAY REVIEW    Payor: Sixto Rosen #:  [id number]  Authorization Number: Jose M Rivas    Admit date: 12/14/20  Admit time: 7553    Admitting Physician: Olivia Malagon MD  Attending Angelicai 12/16/2020  4:05 AM   Pt states she feels like she has to void but is unable. Bladder dkwt=694 cc. Straight cath per protocol=850 cc clear yellow urine.  Bladder scan= 55cc                  MEDICATIONS ADMINISTERED IN LAST 1 DAY:  diazepam (VALIUM) tab 5-10 mg Oral Boni Salvador RN      PEG [address] [facility]) powder packet 17 g     Date Action Dose Route User    12/16/2020 0823 Given 17 g Oral Henry Su RN      Pravastatin Sodium (PRAVACHOL) tab 10 mg     Date Action Dose Route User    12/15/2020

## 2020-12-16 NOTE — PROGRESS NOTES
Acute Pain Service     POD#2 s/p POSTERIOR CERVICAL 3,4,5,6,7 LAMINECTOMY; REMOVAL OF LAMINOPLASTY HARDWARE CERVICAL 3,4,5,6; SEGMENTAL INSTRUMENTATION BILATERALLY CERVICAL 5, C6, C7; POSTERIOR LATERAL FUSION BILATERALLY CERVICAL 5, C6, C7; LOCAL AUTOGRAFT

## 2020-12-16 NOTE — OCCUPATIONAL THERAPY NOTE
OCCUPATIONAL THERAPY EVALUATION - INPATIENT     Room Number: 358/358-A  Evaluation Date: 12/15/2020  Type of Evaluation: Initial  Presenting Problem: s/p C3-7 post laminectomy and C5-7 post lateral fusion on 12/14     Physician Order: IP Consult to [address] Essential hypertension    • Fatigue    • Flatulence/gas pain/belching 2weeks   • Food intolerance    • Frequent urination    • Frequent UTI    • Headache disorder    • Heartburn 29 years ago   • Heavy menses    • Hematuria    • Hiatal hernia    • Hyperlipi [facility] ENDOSCOPY   • COLONOSCOPY N/A 2/6/2015    Performed by Nemo Bunch MD at [facility] ENDOSCOPY   • ENDOMETRIAL ABLATION      2004   • ESOPHAGOGASTRODUODENOSCOPY (EGD) N/A 5/1/2015    Performed by Nemo Bunch MD at [facility] ENDOSCOPY   • [address] SUBJECTIVE   \"I found out that I'm going to be a grandma. \"     Patient self-stated goal is to go home      OBJECTIVE  Precautions: Spine;Cervical brace;Drain(s)(Aspen )  Fall Risk: Standard fall risk    WEIGHT BEARING RESTRICTION  Weight Bearing Re assistance with min verbal/visual/tactile cues for safety with RW use and hand placement. Pt performed functional mobility with supervision assistance and RW x approx 10ft>bathroom.  Pt performed a toilet T/F with supervision assist and toileting including No comorbidities nor modifications of tasks    Clinical Decision Making LOW - Analysis of occupational profile, problem-focused assessments, limited treatment options    Overall Complexity LOW     OT Discharge Recommendations: Home  OT Device Recommendatio

## 2020-12-16 NOTE — PROGRESS NOTES
[facility]  Neurosurgery Progress Note    Rosa Isela Solorzano Patient Status:  Inpatient    1975 MRN [id number]   [facility] 3SW-A Attending Benson Kaur MD   [facility] Day # 2 PCP Warden Donna DO     Chief Complaint:  Cervical radic been interval postsurgical changes with posterior fusion of C5 through C7. Stable anterior cervical fusion of C4-C6. Degenerative changes with uncovertebral and facet hypertrophy. Surgical drain is noted. No acute fractures are noted.

## 2020-12-16 NOTE — PROGRESS NOTES
Pt states she feels like she has to void but is unable. Bladder fapi=099 cc. Straight cath per protocol=850 cc clear yellow urine. Bladder scan= 55cc.  DTV 9:40 am.

## 2020-12-16 NOTE — PLAN OF CARE
OOB with min assist.  Velta Cooler removed by surgery this am.  Surgical site C/D/I. C/O pain to shoulders and RLQ abdo. Dr. Elier Stokes and spine service aware. Pt voided 400 this am.  Currently states she feels she can't empty bladder. Bladder scan to be done.

## 2020-12-16 NOTE — PLAN OF CARE
Pt a/o x4. RA//IS. SCD/ankle pumps. Minimal assistance with gait belt and walker. VSS. Tele:NSR. Pain controlled with IV and PO medication. Pt states numbness on R side of body comes and goes but is improving.  She also states she has some pain in the RL

## 2020-12-17 ENCOUNTER — TELEPHONE (OUTPATIENT)
Dept: FAMILY MEDICINE CLINIC | Facility: CLINIC | Age: 45
End: 2020-12-17

## 2020-12-17 VITALS
OXYGEN SATURATION: 94 % | DIASTOLIC BLOOD PRESSURE: 67 MMHG | TEMPERATURE: 98 F | SYSTOLIC BLOOD PRESSURE: 119 MMHG | HEIGHT: 65 IN | HEART RATE: 103 BPM | BODY MASS INDEX: 27.56 KG/M2 | RESPIRATION RATE: 25 BRPM | WEIGHT: 165.38 LBS

## 2020-12-17 PROCEDURE — 99232 SBSQ HOSP IP/OBS MODERATE 35: CPT | Performed by: INTERNAL MEDICINE

## 2020-12-17 RX ORDER — DIAZEPAM 5 MG/1
TABLET ORAL EVERY 6 HOURS PRN
Qty: 60 TABLET | Refills: 0 | Status: SHIPPED | OUTPATIENT
Start: 2020-12-17 | End: 2020-12-17

## 2020-12-17 RX ORDER — OXYCODONE HYDROCHLORIDE 5 MG/1
TABLET ORAL EVERY 4 HOURS PRN
Qty: 60 TABLET | Refills: 0 | Status: SHIPPED | OUTPATIENT
Start: 2020-12-17 | End: 2020-12-31

## 2020-12-17 RX ORDER — OXYCODONE HYDROCHLORIDE 5 MG/1
TABLET ORAL EVERY 4 HOURS PRN
Qty: 40 TABLET | Refills: 0 | Status: SHIPPED | OUTPATIENT
Start: 2020-12-17 | End: 2020-12-17

## 2020-12-17 RX ORDER — DIAZEPAM 5 MG/1
TABLET ORAL EVERY 6 HOURS PRN
Qty: 60 TABLET | Refills: 0 | Status: SHIPPED | OUTPATIENT
Start: 2020-12-17 | End: 2020-12-31

## 2020-12-17 RX ORDER — OXYCODONE HCL 10 MG/1
10 TABLET, FILM COATED, EXTENDED RELEASE ORAL EVERY 12 HOURS PRN
Qty: 20 TABLET | Refills: 0 | Status: SHIPPED | OUTPATIENT
Start: 2020-12-17 | End: 2020-12-17

## 2020-12-17 NOTE — TELEPHONE ENCOUNTER
Called and explained to pt that hospitalist is her provider at this time while she's inpatient and her care is up to the hospital team right now.   She also said she has been having pain in her lower abdomen and that she has asked for an ultrasound and that

## 2020-12-17 NOTE — PROGRESS NOTES
[facility]  Progress Note     Reggie Adkins Patient Status:  Inpatient    1975 MRN [id number]   [facility] 3SW-A Attending Warren Danielle MD   [facility] Day # 3 PCP Dior Saleem DO     Chief Complaint: right sided weakness melatonin  10 mg Oral Nightly   • buPROPion HCl ER (XL)  450 mg Oral Nightly   • escitalopram  30 mg Oral Nightly     ASSESSMENT / PLAN:   1. Cervical disc disease s/p laminectomy   1. Pain control  2. IS use  3. Per NS  4. SEDRICK drain removed  2.  RLQ pAIN  1

## 2020-12-17 NOTE — CONSULTS
[facility] Urology   Consultation Note    [facility] Patient Status:  Inpatient    1975 MRN [id number]   [facility] 3SW-A Attending Dash Ragsdale MD   [facility] Day # 3 PCP Yonatan Rios DO     Reason for Consultation:  P Fatigue    • Flatulence/gas pain/belching 2weeks   • Food intolerance    • Frequent urination    • Frequent UTI    • Headache disorder    • Heartburn 29 years ago   • Heavy menses    • Hematuria    • Hiatal hernia    • Hyperlipidemia    • IBS (irritable diony Performed by Elisabeth Shen MD at [facility] ENDOSCOPY   • ENDOMETRIAL ABLATION      2004   • ESOPHAGOGASTRODUODENOSCOPY (EGD) N/A 5/1/2015    Performed by Elisabeth Shen MD at [facility] ENDOSCOPY   • ESOPHAGOGASTRODUODENOSCOPY (EGD) N/A 2/6/2015    Performed by Vita Garrison Multiple issues   • Other (Other) Daughter         True Ibrahima      reports that she quit smoking about 21 years ago. Her smoking use included cigarettes. She smoked 0.00 packs per day for 10.00 years.  She has never used smokeless tobacco. She reports current 5-10 mg, 5-10 mg, Oral, Q6H PRN  •  benzocaine-menthol (CEPACOL (SUGAR-FREE)) 1 lozenge, 1 lozenge, Buccal, Q15 Min PRN  •  oxyCODONE HCl (OXY-IR) cap/tab 5-10 mg, 5-10 mg, Oral, Q4H PRN  •  oxyCODONE HCl ER (OXYCONTIN) 12 hr tab 10 mg, 10 mg, Oral, Q12H P Gastroesophageal reflux disease with esophagitis     ADD (attention deficit disorder)     RUQ abdominal pain     Mild intermittent asthma without complication     Adhesive capsulitis of right shoulder     Chronic right shoulder pain     Elevated cholestero

## 2020-12-17 NOTE — PLAN OF CARE
Pain managed fairly well on PO medications. Coverlet to posterior neck old drain site with moderate amount of serosanguinous drainage. Asp[place] collar on and aligned. Reports occasional numbness/decreased sensation to R side that is improving.  Still has right

## 2020-12-17 NOTE — PLAN OF CARE
Pt a/o x4. RA(2L PRN)//IS. SCD/ankle pumps. Standby assistance with gait belt and walker. R sided weakness improving pt equally strong bilaterally. VSS. Tele:NSR. Pain controlled with  PO medication.  She is passing gas but no bowel movement since 12/14 w

## 2020-12-17 NOTE — TELEPHONE ENCOUNTER
Patient is still in hospital after pelvic surgery. She is receiving oxygen as her level drops into the 70's and she was advised to contact her primary to see if she wants her to go home with oxygen.   (?)

## 2020-12-17 NOTE — PROGRESS NOTES
[facility]  Neurosurgery Progress Note    Brandy Webster Patient Status:  Inpatient    1975 MRN [id number]   [facility] 3SW-A Attending Gilson Ramsey MD   [facility] Day # 3 PCP Summer Banda DO     Chief Complaint:  Cervical radic

## 2020-12-17 NOTE — DISCHARGE SUMMARY
[facility]  Discharge Summary    Johnny Blackwell Patient Status:  Inpatient    1975 MRN [id number]   [facility] 3SW-A Attending No att. providers found   Hosp Day # 3 PCP Nicole Rivera DO     Date of Admission: 2020    Date have no further questions in regards to surgery and surgical discussion, which she received with Dr. Cammie Wilkins in prior visits. She is ready to proceed with surgery, she feels well today.  No significant changes since the last office visit, except increased danielle up appointments and pain medications as ordered.        Complications: No significant complications    Discharge Condition: Stable    Disposition: Home or Self Care    Discharge Medications: Discharge Medication List as of 12/17/2020  1:57 PM    CONTINUE th (VITAMIN B12 OR)  Take 1 tablet by mouth daily. , Historical    Melatonin 10 MG Oral Cap  Take 10 mg by mouth nightly.  , Historical    Calcium Carbonate-Vitamin D (CALCIUM 600 + D OR)  Take 1 tablet by mouth 2 (two) times daily.   , Historical    [place] [address] ([address])            [address] [facility] Group Dave  [address], [address] [facility] [phone]           Alessio

## 2020-12-17 NOTE — PHYSICAL THERAPY NOTE
PHYSICAL THERAPY TREATMENT NOTE - INPATIENT    Room Number: 358/358-A     Session: 2  Number of Visits to Meet Established Goals: 3    Presenting Problem: s/p posterior C3-7 lami, VIKCIE, posterior lateral fusion C5-7 12/14/20  History related to current adm hyperthermia     patient's son at 4years of age - 2006   • Mastodynia    • Menses painful    • Migraines    • Nausea 2weeks   • Night sweats    • Other screening mammogram 06/11/2012   • Pain in joints    • Pain with bowel movements Years   • Pap smear fo in her 19's   • OTHER Right 02/2018    ARTHROSCOPIC ACROMIOPLASTY LYSIS OF ADHESIONS RIGHT SHOULDER   • OTHER  12/19/2018    ANTERIOR DISCECTOMY AND FUSION. CERVICAL 4-CERVICAL 5 AND CERVICAL 5-CERVICAL 6.  ALLOGRAFT, PLATE AND SCREWS   • OTHER SURGICA much help from another person does the patient currently need. ..   -   Moving to and from a bed to a chair (including a wheelchair)?: None   -   Need to walk in hospital room?: None   -   Climbing 3-5 steps with a railing?: A Little       AM-PAC Score:  Ra impairment may benefit from home with supportive spouse. DISCHARGE RECOMMENDATIONS  PT Discharge Recommendations: Home(with family)     PLAN  PT Treatment Plan: Endurance; Energy conservation;Patient education;Gait training;Stoop training;Transfer vito

## 2020-12-17 NOTE — PAYOR COMM NOTE
--------------  CONTINUED STAY REVIEW    Payor: Sixto Rosen #:  [id number]  Authorization Number: Damien Morales    Admit date: 12/14/20  Admit time: 7813    Admitting Physician: Adolph Howard MD  Attending Physici Dose Route User    12/17/2020 0337 Given 10 mg Oral Shadia Bahena RN    12/16/2020 2023 Given 10 mg Oral Shadia Bahena RN    12/16/2020 1422 Given 5 mg Oral Sarahy Patel RN      Dicyclomine HCl (BENTYL) tab 20 mg     Date Action Dose Route User

## 2020-12-18 NOTE — PAYOR COMM NOTE
--------------  DISCHARGE REVIEW    Payor: Sixto Rosen #:  [id number]  Authorization Number: Naaman Primrose    Admit date: 12/14/20  Admit time:  0521  Discharge Date: 12/17/2020  3:01 PM     Admitting Physician: Maryann Rudd Hx of fusion of cervical spine     Cervical radiculitis     Dyslipidemia     Anemia   Cervical radiculopathy [M54.12]    Consultations: Orders Placed This Encounter      Consult to Hospitalist      Consult to Respiratory Care      IP Consult to Pain Manage off, getting worse. Cannot stand on one leg. Looking down increases numbness and tingling. Sleeping is painful.     Brief Summary of Hospital Course: Pt presented for procedure as planned. Her surgery was noted to be without complication.   She was admitte Tab  Take 1 tablet (10 mg total) by mouth nightly.  AT BEDTIME, Normal, Disp-90 tablet, R-0    Dicyclomine HCl 20 MG Oral Tab  Take 1 tablet (20 mg total) by mouth 4 (four) times daily before meals and nightly., Normal, Disp-120 tablet, R-0    topiramate 25 minutes after first dose., Normal, Disp-1 kit, R-0    Meth-Hyo-M Bl-Na Phos-Ph Sal (URIBEL) 118 MG Oral Cap  Take 1 tablet by mouth 4 (four) times daily as needed., Normal, Disp-40 capsule, R-1    !! - Potential duplicate medications found.  Please discuss

## 2020-12-20 NOTE — OPERATIVE REPORT
[facility]    OPERATIVE REPORT    Patient:  Reggie Adkins;  YOB: 1975     CSN:  [id number]; Medical Record Number:  [id number]    Admission Date:  12/14/2020 Operation Date:  12/14/2020    . ..........................     Operating Physi positioned to increase the cervical lordosis at the C6-7 area of worsening kyphosis. The posterior cervical area including her previous incision and a area slightly distal to this in the midline were prepped and draped in a sterile fashion.     The procedu autograft and allograft mixed with blood was then placed over the lateral aspect of the facet joints lateral to and underneath the rods from C5-C7. A cottonoid was placed over the bone and it was tamped down into a firm lateral fusion bilaterally.     Hilaria

## 2020-12-21 ENCOUNTER — TELEPHONE (OUTPATIENT)
Dept: SURGERY | Facility: CLINIC | Age: 45
End: 2020-12-21

## 2020-12-21 DIAGNOSIS — Z98.1 S/P CERVICAL SPINAL FUSION: Primary | ICD-10-CM

## 2020-12-21 NOTE — TELEPHONE ENCOUNTER
Fredrick Larios returned call. He received the BGS order on 12-18 and it is going through the PA process. He will reach out to the patient to update her.

## 2020-12-21 NOTE — TELEPHONE ENCOUNTER
Patient called stating she has not received her bone growth stim yet. Called and left message for Юлия flannery. Awaiting a call back.

## 2020-12-28 ENCOUNTER — PATIENT MESSAGE (OUTPATIENT)
Dept: FAMILY MEDICINE CLINIC | Facility: CLINIC | Age: 45
End: 2020-12-28

## 2020-12-28 ENCOUNTER — TELEPHONE (OUTPATIENT)
Dept: SURGERY | Facility: CLINIC | Age: 45
End: 2020-12-28

## 2020-12-28 DIAGNOSIS — E78.00 ELEVATED CHOLESTEROL: ICD-10-CM

## 2020-12-28 DIAGNOSIS — F90.8 ATTENTION DEFICIT HYPERACTIVITY DISORDER (ADHD), OTHER TYPE: ICD-10-CM

## 2020-12-28 RX ORDER — LOVASTATIN 10 MG/1
10 TABLET ORAL NIGHTLY
Qty: 90 TABLET | Refills: 0 | Status: SHIPPED | OUTPATIENT
Start: 2020-12-28 | End: 2021-01-18

## 2020-12-28 NOTE — TELEPHONE ENCOUNTER
IKOR METERINGchapo message sent to pt. She does not need x-rays for this appointment, but will order them at that appointment to be done for her 6 week visit.

## 2020-12-29 ENCOUNTER — TELEPHONE (OUTPATIENT)
Dept: FAMILY MEDICINE CLINIC | Facility: CLINIC | Age: 45
End: 2020-12-29

## 2020-12-29 RX ORDER — METHYLPHENIDATE HYDROCHLORIDE 20 MG/1
20 TABLET ORAL 2 TIMES DAILY
Qty: 60 TABLET | Refills: 0 | Status: SHIPPED | OUTPATIENT
Start: 2020-12-29 | End: 2020-12-30

## 2020-12-29 NOTE — TELEPHONE ENCOUNTER
From: Gilbert Troy  To: Meme Mcdonald,   Sent: 12/28/2020 12:41 PM CST  Subject: Prescription Question    Hello,   [facility] put my methylphenidate back on the shelf before I could pick it up before my surgery.  Could you cancel that one and send a new

## 2020-12-30 ENCOUNTER — TELEPHONE (OUTPATIENT)
Dept: SURGERY | Facility: CLINIC | Age: 45
End: 2020-12-30

## 2020-12-30 ENCOUNTER — TELEPHONE (OUTPATIENT)
Dept: FAMILY MEDICINE CLINIC | Facility: CLINIC | Age: 45
End: 2020-12-30

## 2020-12-30 DIAGNOSIS — F90.8 ATTENTION DEFICIT HYPERACTIVITY DISORDER (ADHD), OTHER TYPE: ICD-10-CM

## 2020-12-30 RX ORDER — METHYLPHENIDATE HYDROCHLORIDE 20 MG/1
20 TABLET ORAL 2 TIMES DAILY
Qty: 60 TABLET | Refills: 0 | Status: SHIPPED | OUTPATIENT
Start: 2020-12-30 | End: 2021-03-02

## 2020-12-30 NOTE — TELEPHONE ENCOUNTER
Patient had surgery 12/14/2020. Patient has an appointment tomorrow 12/31/20 with Michelle Riojas. Patient stated these symptoms started last night. States pain is mainly in her shoulder blade.      I did advise patient to be evaluated in the ER for the pain w

## 2020-12-30 NOTE — TELEPHONE ENCOUNTER
Cancelled methylphenidate at the [facility]. Tried to call in to [facility] - needs to be electronic since schedule II  Pended. Please send to new pharm if agreeable. Thank you.

## 2020-12-31 ENCOUNTER — TELEPHONE (OUTPATIENT)
Dept: SURGERY | Facility: CLINIC | Age: 45
End: 2020-12-31

## 2020-12-31 ENCOUNTER — OFFICE VISIT (OUTPATIENT)
Dept: SURGERY | Facility: CLINIC | Age: 45
End: 2020-12-31
Payer: MEDICAID

## 2020-12-31 VITALS
HEART RATE: 84 BPM | DIASTOLIC BLOOD PRESSURE: 80 MMHG | WEIGHT: 165 LBS | BODY MASS INDEX: 27.49 KG/M2 | HEIGHT: 65 IN | SYSTOLIC BLOOD PRESSURE: 116 MMHG

## 2020-12-31 DIAGNOSIS — Z98.1 S/P CERVICAL SPINAL FUSION: Primary | ICD-10-CM

## 2020-12-31 DIAGNOSIS — M54.12 CERVICAL RADICULOPATHY: ICD-10-CM

## 2020-12-31 DIAGNOSIS — M48.02 CERVICAL STENOSIS OF SPINAL CANAL: ICD-10-CM

## 2020-12-31 PROCEDURE — 3074F SYST BP LT 130 MM HG: CPT | Performed by: PHYSICIAN ASSISTANT

## 2020-12-31 PROCEDURE — 3079F DIAST BP 80-89 MM HG: CPT | Performed by: PHYSICIAN ASSISTANT

## 2020-12-31 PROCEDURE — 3008F BODY MASS INDEX DOCD: CPT | Performed by: PHYSICIAN ASSISTANT

## 2020-12-31 PROCEDURE — 99024 POSTOP FOLLOW-UP VISIT: CPT | Performed by: PHYSICIAN ASSISTANT

## 2020-12-31 RX ORDER — OXYCODONE HYDROCHLORIDE 10 MG/1
10 TABLET ORAL EVERY 4 HOURS PRN
Qty: 90 TABLET | Refills: 0 | Status: SHIPPED | OUTPATIENT
Start: 2020-12-31 | End: 2021-01-28

## 2020-12-31 RX ORDER — DIAZEPAM 10 MG/1
10 TABLET ORAL EVERY 6 HOURS PRN
Qty: 90 TABLET | Refills: 1 | Status: SHIPPED | OUTPATIENT
Start: 2020-12-31 | End: 2021-01-28

## 2020-12-31 RX ORDER — CEPHALEXIN 250 MG/1
250 CAPSULE ORAL 4 TIMES DAILY
COMMUNITY
Start: 2020-12-30 | End: 2021-01-04

## 2020-12-31 NOTE — PATIENT INSTRUCTIONS
PLAN:  -Aspen multipost cervical collar dispensed  -Follow-up 4 weeks  - XR CS 4 weeks  -Oyxcodone and valium refilled  -BGS  -handicap placard completed

## 2020-12-31 NOTE — TELEPHONE ENCOUNTER
Received order from MARIS Taylor for vista multipost collar for pt. Faxed to [facility] with office notes, face sheet and insurance cards. Confirmation received. Copy made and sent for scanning.

## 2020-12-31 NOTE — PROGRESS NOTES
Pain in left shoulder going into back  Started 12/29  Reached to tighten collar got a sharp pain and then after that felt like she couldn't take a deep breath.     Not able to sleep well    Pain 12/10 if taking a deep breath  9/10 just normal breathing    M
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
## 2021-01-04 NOTE — PROGRESS NOTES
Received fax from [facility] requesting Prior Authorization for Oxycodone 10mg. Referral placed and routed to PA team for processing.

## 2021-01-11 NOTE — PROGRESS NOTES
HPI:   Osmin Loaiza is a 41year old female here to follow up post op     Pt s/p cervical fusion   Pain not well managed   Not in PT   Pt woke up today with numbness in both arms for 30 min and some incontinence   Wearing neck brace   Called by by Emmanuel Meyer lungs every 6 (six) hours as needed. 3 Inhaler 0   • buPROPion HCl ER, XL, 150 MG Oral Tablet 24 Hr Take 3 tablets (450 mg total) by mouth daily.  90 tablet 0   • tiZANidine HCl 4 MG Oral Tab Take 1 tablet (4 mg total) by mouth every 6 (six) hours as needed respiratory infections of unspecified site    • Anxiety state, unspecified    • Asthma    • Attention deficit disorder without mention of hyperactivity    • Back problem     cervical and lumbar   • Bad breath     Started when stomach pain got worse   • Blo ABDOMINAL HYSTERECTOMY, BSO Bilateral 7/20/2015    Performed by Haleigh Alamo MD at [address]   • [address] BG & INST 2 LEVEL N/A 12/19/2018    Performed by Dash Ragsdale MD at [facility]   • BLOOD PATCH(BEDSI [facility] MAIN OR   • SHOULDER ARTHROSCOPY Right 2/13/2018    Performed by Lucy Woodard MD at [facility] MAIN OR   • [address]  2005    right foot   • TONSILLECTOMY     • TOTAL ABDOM HYSTERECTOMY        Family History   Pro 170 lb (77.1 kg)   LMP 01/01/2004   SpO2 98%   BMI 28.29 kg/m²   Body mass index is 28.29 kg/m².    GENERAL: alert and oriented X 3, well developed, well nourished,in no apparent distress  CARDIO: RRR without murmur  LUNGS: clear to auscultation  NECK: supp

## 2021-01-12 ENCOUNTER — TELEPHONE (OUTPATIENT)
Dept: SURGERY | Facility: CLINIC | Age: 46
End: 2021-01-12

## 2021-01-12 ENCOUNTER — OFFICE VISIT (OUTPATIENT)
Dept: FAMILY MEDICINE CLINIC | Facility: CLINIC | Age: 46
End: 2021-01-12
Payer: MEDICAID

## 2021-01-12 VITALS
HEIGHT: 65 IN | BODY MASS INDEX: 28.32 KG/M2 | DIASTOLIC BLOOD PRESSURE: 80 MMHG | SYSTOLIC BLOOD PRESSURE: 124 MMHG | RESPIRATION RATE: 18 BRPM | OXYGEN SATURATION: 98 % | HEART RATE: 110 BPM | TEMPERATURE: 97 F | WEIGHT: 170 LBS

## 2021-01-12 DIAGNOSIS — F39 MOOD DISORDER (HCC): ICD-10-CM

## 2021-01-12 DIAGNOSIS — G89.29 OTHER CHRONIC PAIN: Primary | ICD-10-CM

## 2021-01-12 DIAGNOSIS — J45.20 MILD INTERMITTENT ASTHMA WITHOUT COMPLICATION: ICD-10-CM

## 2021-01-12 DIAGNOSIS — N39.498 OTHER URINARY INCONTINENCE: ICD-10-CM

## 2021-01-12 PROCEDURE — 3074F SYST BP LT 130 MM HG: CPT | Performed by: FAMILY MEDICINE

## 2021-01-12 PROCEDURE — 3079F DIAST BP 80-89 MM HG: CPT | Performed by: FAMILY MEDICINE

## 2021-01-12 PROCEDURE — 3008F BODY MASS INDEX DOCD: CPT | Performed by: FAMILY MEDICINE

## 2021-01-12 PROCEDURE — 99215 OFFICE O/P EST HI 40 MIN: CPT | Performed by: FAMILY MEDICINE

## 2021-01-12 NOTE — TELEPHONE ENCOUNTER
Sounds good, let's have her continue to monitor.  If any changes, please let myself or Pawel Viera PA-C know and we'll schedule her to be seen sooner

## 2021-01-12 NOTE — TELEPHONE ENCOUNTER
Spoke to patient to relay message below. She states this morning her entire arms and hands were n/t. Discussed possibility of PT, she is following a urologist who will address those issues. Patient will keep her f/u on 1-28.

## 2021-01-12 NOTE — TELEPHONE ENCOUNTER
Pt calling stating both arms are going numb and she's frequently urinating, please call back; please note, pt has appt w/PCP today @ 1pm

## 2021-01-12 NOTE — TELEPHONE ENCOUNTER
Patient had posterior cervical fusion on 12- with Dr. Lance Blizzard. Called patient. She woke up this morning with numbness and tinglilng in both arms. She states she had this before surgery. Since surgery n/t has resolved until this morning.   She has

## 2021-01-12 NOTE — TELEPHONE ENCOUNTER
Which fingers were experiencing N/T? I also agree, it could be due to positioning with sleeping. I recommend she continue to monitor. Urinary leakage with coughing could be due to stress incontinence.  Once further along from surgery, we can discuss

## 2021-01-13 ENCOUNTER — PATIENT OUTREACH (OUTPATIENT)
Dept: CASE MANAGEMENT | Age: 46
End: 2021-01-13

## 2021-01-15 DIAGNOSIS — F41.9 ANXIETY: ICD-10-CM

## 2021-01-15 DIAGNOSIS — K58.9 IRRITABLE BOWEL SYNDROME WITHOUT DIARRHEA: ICD-10-CM

## 2021-01-15 DIAGNOSIS — F32.0 CURRENT MILD EPISODE OF MAJOR DEPRESSIVE DISORDER, UNSPECIFIED WHETHER RECURRENT (HCC): ICD-10-CM

## 2021-01-15 RX ORDER — CITALOPRAM 40 MG/1
40 TABLET ORAL DAILY
Qty: 90 TABLET | Refills: 0 | Status: SHIPPED | OUTPATIENT
Start: 2021-01-15 | End: 2021-07-27

## 2021-01-15 RX ORDER — DICYCLOMINE HCL 20 MG
20 TABLET ORAL
Qty: 120 TABLET | Refills: 0 | Status: ON HOLD | OUTPATIENT
Start: 2021-01-15 | End: 2021-09-08

## 2021-01-18 DIAGNOSIS — F32.0 CURRENT MILD EPISODE OF MAJOR DEPRESSIVE DISORDER, UNSPECIFIED WHETHER RECURRENT (HCC): ICD-10-CM

## 2021-01-18 DIAGNOSIS — E78.00 ELEVATED CHOLESTEROL: ICD-10-CM

## 2021-01-18 RX ORDER — LOVASTATIN 10 MG/1
10 TABLET ORAL NIGHTLY
Qty: 90 TABLET | Refills: 0 | Status: SHIPPED | OUTPATIENT
Start: 2021-01-18 | End: 2021-04-23

## 2021-01-18 RX ORDER — BUPROPION HYDROCHLORIDE 150 MG/1
450 TABLET ORAL DAILY
Qty: 90 TABLET | Refills: 0 | Status: SHIPPED | OUTPATIENT
Start: 2021-01-18 | End: 2021-02-22

## 2021-01-27 ENCOUNTER — TELEPHONE (OUTPATIENT)
Dept: SURGERY | Facility: CLINIC | Age: 46
End: 2021-01-27

## 2021-01-27 ENCOUNTER — HOSPITAL ENCOUNTER (OUTPATIENT)
Dept: GENERAL RADIOLOGY | Age: 46
Discharge: HOME | End: 2021-01-27
Attending: PHYSICIAN ASSISTANT
Payer: MEDICAID

## 2021-01-27 DIAGNOSIS — M48.02 CERVICAL STENOSIS OF SPINAL CANAL: ICD-10-CM

## 2021-01-27 DIAGNOSIS — M54.12 CERVICAL RADICULOPATHY: ICD-10-CM

## 2021-01-27 DIAGNOSIS — Z98.1 S/P CERVICAL SPINAL FUSION: ICD-10-CM

## 2021-01-27 PROCEDURE — 72040 X-RAY EXAM NECK SPINE 2-3 VW: CPT | Performed by: PHYSICIAN ASSISTANT

## 2021-01-27 NOTE — TELEPHONE ENCOUNTER
Called to inform pt she should come in to her office visit. Dr will review results and decide if he needs to place a CT order and instruct pt on how to proceed.     Nothing further

## 2021-01-27 NOTE — TELEPHONE ENCOUNTER
pt had xray today and according to results she should get a CT scan, pt wants to know how to proceed since she has an appt tomorrow at 2 pm

## 2021-01-28 ENCOUNTER — OFFICE VISIT (OUTPATIENT)
Dept: SURGERY | Facility: CLINIC | Age: 46
End: 2021-01-28
Payer: MEDICAID

## 2021-01-28 VITALS
WEIGHT: 157 LBS | HEIGHT: 65 IN | SYSTOLIC BLOOD PRESSURE: 106 MMHG | HEART RATE: 62 BPM | BODY MASS INDEX: 26.16 KG/M2 | DIASTOLIC BLOOD PRESSURE: 78 MMHG

## 2021-01-28 DIAGNOSIS — Z98.1 S/P CERVICAL SPINAL FUSION: Primary | ICD-10-CM

## 2021-01-28 DIAGNOSIS — M54.12 CERVICAL RADICULOPATHY: ICD-10-CM

## 2021-01-28 DIAGNOSIS — M48.02 CERVICAL STENOSIS OF SPINAL CANAL: ICD-10-CM

## 2021-01-28 DIAGNOSIS — M50.30 DDD (DEGENERATIVE DISC DISEASE), CERVICAL: ICD-10-CM

## 2021-01-28 PROCEDURE — 3008F BODY MASS INDEX DOCD: CPT | Performed by: PHYSICIAN ASSISTANT

## 2021-01-28 PROCEDURE — 3078F DIAST BP <80 MM HG: CPT | Performed by: PHYSICIAN ASSISTANT

## 2021-01-28 PROCEDURE — 3074F SYST BP LT 130 MM HG: CPT | Performed by: PHYSICIAN ASSISTANT

## 2021-01-28 PROCEDURE — 99024 POSTOP FOLLOW-UP VISIT: CPT | Performed by: PHYSICIAN ASSISTANT

## 2021-01-28 RX ORDER — OXYCODONE HYDROCHLORIDE 10 MG/1
10 TABLET ORAL EVERY 4 HOURS PRN
Qty: 90 TABLET | Refills: 0 | Status: SHIPPED | OUTPATIENT
Start: 2021-01-28 | End: 2021-02-22

## 2021-01-28 RX ORDER — DIAZEPAM 10 MG/1
10 TABLET ORAL EVERY 6 HOURS PRN
Qty: 90 TABLET | Refills: 1 | Status: SHIPPED | OUTPATIENT
Start: 2021-01-28 | End: 2021-02-22

## 2021-01-28 NOTE — PATIENT INSTRUCTIONS
Refill policies:    • Allow 2-3 business days for refills; controlled substances may take longer.   • Contact your pharmacy at least 5 days prior to running out of medication and have them send an electronic request or submit request through the “request re Depending on your insurance carrier, approval may take 3-10 days. It is highly recommended patients contact their insurance carrier directly to determine coverage.   If test is done without insurance authorization, patient may be responsible for the entire valium refill  -BGS  -handicap placard completed at last apt

## 2021-01-28 NOTE — PROGRESS NOTES
Patient here for 6-week postop follow-up sx 12/14/2020. Pain worse to lower part of neck, rated 8/10. Has shoulder pain, comes and goes.

## 2021-02-01 ENCOUNTER — TELEPHONE (OUTPATIENT)
Dept: SURGERY | Facility: CLINIC | Age: 46
End: 2021-02-01

## 2021-02-01 NOTE — TELEPHONE ENCOUNTER
Agree with MARCO ANTONIO nursing. Patient should be evaluated by urgent care/ED if severe. Her rib pain is likely unrelated to her recent cervical surgery, given TE report provided by nursing.

## 2021-02-01 NOTE — TELEPHONE ENCOUNTER
S:  Pt was told by her PCP to call our office regarding rib pain. B: Pt had 3 level posterior cervical fusion on 12/14/21.   This weekend she was looking under her bed (laying flat on the floor on her stomach) When she reached out she felt a pop in her r

## 2021-02-01 NOTE — TELEPHONE ENCOUNTER
Pt states her dog was under her bed this saturday and she went to look under her bed laying flat on her stomach and heard a \"pop\" on R side rib. She is having pain under right breast and downward.  She called her PCP, PCP told her to call Tomas Juan

## 2021-02-03 ENCOUNTER — OFFICE VISIT (OUTPATIENT)
Dept: FAMILY MEDICINE CLINIC | Facility: CLINIC | Age: 46
End: 2021-02-03
Payer: MEDICAID

## 2021-02-03 ENCOUNTER — HOSPITAL ENCOUNTER (OUTPATIENT)
Dept: GENERAL RADIOLOGY | Age: 46
Discharge: HOME | End: 2021-02-03
Attending: FAMILY MEDICINE
Payer: MEDICAID

## 2021-02-03 ENCOUNTER — TELEPHONE (OUTPATIENT)
Dept: FAMILY MEDICINE CLINIC | Facility: CLINIC | Age: 46
End: 2021-02-03

## 2021-02-03 VITALS
OXYGEN SATURATION: 96 % | HEART RATE: 105 BPM | BODY MASS INDEX: 26.16 KG/M2 | WEIGHT: 157 LBS | HEIGHT: 65 IN | DIASTOLIC BLOOD PRESSURE: 60 MMHG | SYSTOLIC BLOOD PRESSURE: 96 MMHG | RESPIRATION RATE: 16 BRPM

## 2021-02-03 DIAGNOSIS — R07.81 RIB PAIN ON RIGHT SIDE: Primary | ICD-10-CM

## 2021-02-03 DIAGNOSIS — R07.81 RIB PAIN ON RIGHT SIDE: ICD-10-CM

## 2021-02-03 PROCEDURE — 99213 OFFICE O/P EST LOW 20 MIN: CPT | Performed by: FAMILY MEDICINE

## 2021-02-03 PROCEDURE — 3074F SYST BP LT 130 MM HG: CPT | Performed by: FAMILY MEDICINE

## 2021-02-03 PROCEDURE — 3078F DIAST BP <80 MM HG: CPT | Performed by: FAMILY MEDICINE

## 2021-02-03 PROCEDURE — 71101 X-RAY EXAM UNILAT RIBS/CHEST: CPT | Performed by: FAMILY MEDICINE

## 2021-02-03 PROCEDURE — 3008F BODY MASS INDEX DOCD: CPT | Performed by: FAMILY MEDICINE

## 2021-02-03 NOTE — PROGRESS NOTES
HPI:   Tayo Zhang is a 41year old female here with abdominal concerns     Pt was laying on the ground on Saturday and felt a pop in the RUQ quadrant when getting up   At rest 2/10   With movement 20/10 sharp   Pt denies a bulge   No N/V  Pain with d Fluticasone Propionate 50 MCG/ACT Nasal Suspension 2 sprays by Nasal route nightly. 1 Bottle 2   • Multiple Vitamin Oral Tab Take 1 tablet by mouth daily. • Cyanocobalamin (VITAMIN B12 OR) Take 1 tablet by mouth daily.        • Melatonin 10 MG Oral Cap ago   • Heavy menses    • Hematuria    • Hiatal hernia    • Hyperlipidemia    • IBS (irritable bowel syndrome)    • Irregular bowel habits    • Irregular menstrual cycle    • Itch of skin O    Occasionally on my legs, not due to dry skin   • Leaking of uri MD at [facility] ENDOSCOPY   • ESOPHAGOGASTRODUODENOSCOPY (EGD) N/A 2/6/2015    Performed by Kriss Beck MD at [facility] ENDOSCOPY   • HYSTERECTOMY  7/20/2015   • HYSTEROSCOPY,ABLATION ENDOMETRIUM     • [address]  07/2020   • [address] [place] [address] cervical   • Migraines Son    • Diabetes Son    • Other (Other) Son         Multiple issues   • Other (Other) Daughter         Mvp, eds      Social History:   Social History    Tobacco Use      Smoking status: Former Smoker        Packs/day: 0.00        Clara Tanner month or sooner if needed.

## 2021-02-08 ENCOUNTER — TELEPHONE (OUTPATIENT)
Dept: SURGERY | Facility: CLINIC | Age: 46
End: 2021-02-08

## 2021-02-08 DIAGNOSIS — Z98.1 S/P CERVICAL SPINAL FUSION: Primary | ICD-10-CM

## 2021-02-08 DIAGNOSIS — M54.2 CERVICAL PAIN: ICD-10-CM

## 2021-02-08 NOTE — TELEPHONE ENCOUNTER
Pt calling for CT order, states one hasn't been ordered though it was recommended during LOV on 01/28/21    Notes from 01/28/21:  PLAN:  -Aspen multipost cervical collar x 2 weeks then wean  -Follow-up 2 weeks  - CT cervical  -Oyxcodone and valium refill

## 2021-02-16 ENCOUNTER — HOSPITAL ENCOUNTER (OUTPATIENT)
Dept: CT IMAGING | Facility: HOSPITAL | Age: 46
Discharge: HOME | End: 2021-02-16
Attending: PHYSICIAN ASSISTANT
Payer: MEDICAID

## 2021-02-16 ENCOUNTER — TELEPHONE (OUTPATIENT)
Dept: NEUROLOGY | Facility: CLINIC | Age: 46
End: 2021-02-16

## 2021-02-16 DIAGNOSIS — M54.2 CERVICAL PAIN: ICD-10-CM

## 2021-02-16 DIAGNOSIS — Z98.1 S/P CERVICAL SPINAL FUSION: Primary | ICD-10-CM

## 2021-02-16 DIAGNOSIS — Z98.1 S/P CERVICAL SPINAL FUSION: ICD-10-CM

## 2021-02-16 PROCEDURE — 72125 CT NECK SPINE W/O DYE: CPT | Performed by: PHYSICIAN ASSISTANT

## 2021-02-16 NOTE — TELEPHONE ENCOUNTER
Spoke with Orlando Chaudhari wanting to Verify pt's Ct Cervical Spine order.  Orlando Chaudhari states that the CT performed after fusion is usually with out contrast but order state \"contrast only\"    Verified with JOSH Jenkins that the order is incorrect and needs to be with ou

## 2021-02-16 NOTE — TELEPHONE ENCOUNTER
As new order for Ct was place, please cancel order dated 02/08/21 for   CT SPINE CERVICAL(CONTRAST ONLY) ([id number]=[id number])  Reilly Koch   (MRN [id number])  Order Barcode    Click to print Order Cover Sheet for scanning           Reprint Original Order    CT Expires      2/8/2021 (Approximate) 2/8/2022             Collection Information    Specimen ID: [id number]       Resulting Agency: [id number]       Order Provider Info        Office phone Pager E-mail   Ordering User Arthur Atwood -- -- --   Authorizing Provi

## 2021-02-22 ENCOUNTER — OFFICE VISIT (OUTPATIENT)
Dept: SURGERY | Facility: CLINIC | Age: 46
End: 2021-02-22
Payer: MEDICAID

## 2021-02-22 VITALS
SYSTOLIC BLOOD PRESSURE: 102 MMHG | BODY MASS INDEX: 26.16 KG/M2 | DIASTOLIC BLOOD PRESSURE: 70 MMHG | HEIGHT: 65 IN | HEART RATE: 86 BPM | WEIGHT: 157 LBS

## 2021-02-22 DIAGNOSIS — S12.601S CLOSED NONDISPLACED FRACTURE OF SEVENTH CERVICAL VERTEBRA, UNSPECIFIED FRACTURE MORPHOLOGY, SEQUELA: ICD-10-CM

## 2021-02-22 DIAGNOSIS — M85.88 OSTEOPENIA OF SPINE: ICD-10-CM

## 2021-02-22 DIAGNOSIS — M54.2 CERVICAL PAIN: ICD-10-CM

## 2021-02-22 DIAGNOSIS — F32.0 CURRENT MILD EPISODE OF MAJOR DEPRESSIVE DISORDER, UNSPECIFIED WHETHER RECURRENT (HCC): ICD-10-CM

## 2021-02-22 DIAGNOSIS — Z98.1 S/P CERVICAL SPINAL FUSION: Primary | ICD-10-CM

## 2021-02-22 PROCEDURE — 3008F BODY MASS INDEX DOCD: CPT | Performed by: PHYSICIAN ASSISTANT

## 2021-02-22 PROCEDURE — 99024 POSTOP FOLLOW-UP VISIT: CPT | Performed by: PHYSICIAN ASSISTANT

## 2021-02-22 PROCEDURE — 3074F SYST BP LT 130 MM HG: CPT | Performed by: PHYSICIAN ASSISTANT

## 2021-02-22 PROCEDURE — 3078F DIAST BP <80 MM HG: CPT | Performed by: PHYSICIAN ASSISTANT

## 2021-02-22 RX ORDER — BUPROPION HYDROCHLORIDE 150 MG/1
450 TABLET ORAL DAILY
Qty: 90 TABLET | Refills: 0 | Status: SHIPPED | OUTPATIENT
Start: 2021-02-22 | End: 2021-04-01

## 2021-02-22 RX ORDER — OXYCODONE HYDROCHLORIDE 10 MG/1
10 TABLET ORAL EVERY 4 HOURS PRN
Qty: 90 TABLET | Refills: 0 | Status: SHIPPED | OUTPATIENT
Start: 2021-02-22 | End: 2021-04-01

## 2021-02-22 RX ORDER — DIAZEPAM 10 MG/1
10 TABLET ORAL EVERY 6 HOURS PRN
Qty: 90 TABLET | Refills: 1 | Status: SHIPPED | OUTPATIENT
Start: 2021-02-22 | End: 2021-07-06

## 2021-02-22 NOTE — PATIENT INSTRUCTIONS
Refill policies:    • Allow 2-3 business days for refills; controlled substances may take longer.   • Contact your pharmacy at least 5 days prior to running out of medication and have them send an electronic request or submit request through the “request re Depending on your insurance carrier, approval may take 3-10 days. It is highly recommended patients contact their insurance carrier directly to determine coverage.   If test is done without insurance authorization, patient may be responsible for the entire of the cervical spine in 8 weeks  -If she continues to have right rib pain that is not improving will get a CT through the ribs in 8 weeks
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
## 2021-02-22 NOTE — PROGRESS NOTES
Feeling sore  Leaving collar on all the time  Pain and/or discomfort depends on the day and what she does.   Pain 7/10 currently

## 2021-02-25 ENCOUNTER — TELEPHONE (OUTPATIENT)
Dept: SURGERY | Facility: CLINIC | Age: 46
End: 2021-02-25

## 2021-02-25 DIAGNOSIS — S12.601S CLOSED NONDISPLACED FRACTURE OF SEVENTH CERVICAL VERTEBRA, UNSPECIFIED FRACTURE MORPHOLOGY, SEQUELA: ICD-10-CM

## 2021-02-25 DIAGNOSIS — M85.88 OSTEOPENIA OF SPINE: ICD-10-CM

## 2021-02-25 DIAGNOSIS — Z98.1 S/P CERVICAL SPINAL FUSION: Primary | ICD-10-CM

## 2021-02-25 DIAGNOSIS — M54.2 CERVICAL PAIN: ICD-10-CM

## 2021-02-25 NOTE — TELEPHONE ENCOUNTER
Received fax from [facility] [address] requesting Prior Authorization for Oxycodone 10mg. Referral placed and routed to PA team for processing.

## 2021-02-26 ENCOUNTER — TELEPHONE (OUTPATIENT)
Dept: SURGERY | Facility: CLINIC | Age: 46
End: 2021-02-26

## 2021-02-26 NOTE — TELEPHONE ENCOUNTER
It is no rush she can see her in May or see if she can see Sonu josé PA  Otherwise she can see a different endocrinologist.  My chart message sent to patient

## 2021-02-26 NOTE — TELEPHONE ENCOUNTER
Pt referred to Dr Sangeetha Auguste in endo; this provider not available until May, pt asking if she can see another provider with sooner availability or if she should wait for Dr Sangeetha Auguste, please call back or send [facility] w/provider feedback

## 2021-03-02 DIAGNOSIS — F90.8 ATTENTION DEFICIT HYPERACTIVITY DISORDER (ADHD), OTHER TYPE: ICD-10-CM

## 2021-03-02 RX ORDER — METHYLPHENIDATE HYDROCHLORIDE 20 MG/1
20 TABLET ORAL 2 TIMES DAILY
Qty: 60 TABLET | Refills: 0 | Status: SHIPPED | OUTPATIENT
Start: 2021-03-02 | End: 2021-06-09

## 2021-03-03 ENCOUNTER — TELEPHONE (OUTPATIENT)
Dept: FAMILY MEDICINE CLINIC | Facility: CLINIC | Age: 46
End: 2021-03-03

## 2021-03-03 NOTE — TELEPHONE ENCOUNTER
Spoke with pharmacist Tej:  Made him aware of LE message. He states the diazepam and oxycodone are high dose therapies and are opposing treatment to adderall. Advised a different doctor prescribes the diazepam and oxycodone. LE as fyi only.

## 2021-03-03 NOTE — TELEPHONE ENCOUNTER
Methylphenidate HCl 20 MG Oral Tab is opposing therapies to:    diazepam 10 MG Oral Tab   OxyCODONE HCl IR 10 MG Oral Tab

## 2021-03-09 RX ORDER — TOPIRAMATE 25 MG/1
50 TABLET ORAL 2 TIMES DAILY
Qty: 60 TABLET | Refills: 0 | Status: SHIPPED | OUTPATIENT
Start: 2021-03-09 | End: 2021-10-21

## 2021-03-18 DIAGNOSIS — J45.20 MILD INTERMITTENT ASTHMA WITHOUT COMPLICATION: ICD-10-CM

## 2021-03-18 DIAGNOSIS — R11.0 NAUSEA: ICD-10-CM

## 2021-03-18 RX ORDER — MONTELUKAST SODIUM 10 MG/1
10 TABLET ORAL NIGHTLY
Qty: 30 TABLET | Refills: 11 | Status: ON HOLD | OUTPATIENT
Start: 2021-03-18 | End: 2021-09-07

## 2021-03-18 RX ORDER — ONDANSETRON 4 MG/1
4 TABLET, ORALLY DISINTEGRATING ORAL EVERY 8 HOURS PRN
Qty: 20 TABLET | Refills: 0 | Status: SHIPPED | OUTPATIENT
Start: 2021-03-18 | End: 2021-08-23

## 2021-03-25 ENCOUNTER — TELEPHONE (OUTPATIENT)
Dept: SURGERY | Facility: CLINIC | Age: 46
End: 2021-03-25

## 2021-03-25 NOTE — TELEPHONE ENCOUNTER
[facility] calling to speak to [facility] and states it is urgent.

## 2021-03-25 NOTE — TELEPHONE ENCOUNTER
Spoke with [facility] ED  Suzanne Hoang who stated that Dr. Monika Art, ED physician would like to Dr. Flaca Winston regarding patient condition. Call forwarded to provider.

## 2021-03-30 ENCOUNTER — OFFICE VISIT (OUTPATIENT)
Dept: SURGERY | Facility: CLINIC | Age: 46
End: 2021-03-30
Payer: MEDICAID

## 2021-03-30 VITALS
SYSTOLIC BLOOD PRESSURE: 124 MMHG | HEIGHT: 65 IN | DIASTOLIC BLOOD PRESSURE: 70 MMHG | WEIGHT: 160 LBS | BODY MASS INDEX: 26.66 KG/M2

## 2021-03-30 DIAGNOSIS — Z98.1 S/P CERVICAL SPINAL FUSION: Primary | ICD-10-CM

## 2021-03-30 DIAGNOSIS — M54.2 CERVICAL PAIN: ICD-10-CM

## 2021-03-30 PROCEDURE — 3078F DIAST BP <80 MM HG: CPT | Performed by: PHYSICIAN ASSISTANT

## 2021-03-30 PROCEDURE — 3008F BODY MASS INDEX DOCD: CPT | Performed by: PHYSICIAN ASSISTANT

## 2021-03-30 PROCEDURE — 99213 OFFICE O/P EST LOW 20 MIN: CPT | Performed by: PHYSICIAN ASSISTANT

## 2021-03-30 PROCEDURE — 3074F SYST BP LT 130 MM HG: CPT | Performed by: PHYSICIAN ASSISTANT

## 2021-03-30 NOTE — PROGRESS NOTES
Was in [facility] ER in [place] 3/25  Was in shower and was washing her hair and felt a pop  Had some swelling on left side next to incision, could feel from the outside  Couple inches, long by and inch or so wide, was painful, and still is now      CT was done

## 2021-04-01 DIAGNOSIS — M54.2 CERVICAL PAIN: ICD-10-CM

## 2021-04-01 DIAGNOSIS — Z98.1 S/P CERVICAL SPINAL FUSION: Primary | ICD-10-CM

## 2021-04-01 DIAGNOSIS — F32.0 CURRENT MILD EPISODE OF MAJOR DEPRESSIVE DISORDER, UNSPECIFIED WHETHER RECURRENT (HCC): ICD-10-CM

## 2021-04-01 RX ORDER — OXYCODONE HYDROCHLORIDE 10 MG/1
10 TABLET ORAL EVERY 4 HOURS PRN
Qty: 90 TABLET | Refills: 0 | Status: SHIPPED | OUTPATIENT
Start: 2021-04-01 | End: 2021-05-18

## 2021-04-01 RX ORDER — BUPROPION HYDROCHLORIDE 150 MG/1
450 TABLET ORAL DAILY
Qty: 90 TABLET | Refills: 0 | Status: SHIPPED | OUTPATIENT
Start: 2021-04-01 | End: 2021-06-01

## 2021-04-01 NOTE — TELEPHONE ENCOUNTER
No request rec'd from pharmacy. LOV 2/3/21  Last Rx 2/22/21 #90 + 0  (takes 3 daily)    Pended. Please send refill if agreeable. Thank you.

## 2021-04-01 NOTE — TELEPHONE ENCOUNTER
Pt has been reaching out to her pharmacy asking for refills on Bupropion . [facility] to her  that they have also reached out to us many times, which I do not see. Pt is out of mends Her and her  are having the same issue with the same medication.

## 2021-04-01 NOTE — TELEPHONE ENCOUNTER
Medication: Oxycodone HCl IR 10mg, Q4H PRN, 90#/0    Date of last refill: 02/22/21  [place] 04/37/65    Last office visit: 03/30/21  Due back to clinic per last office note: 3 weeks  Date next office visit scheduled:  04

## 2021-04-05 ENCOUNTER — TELEPHONE (OUTPATIENT)
Dept: NEUROLOGY | Facility: CLINIC | Age: 46
End: 2021-04-05

## 2021-04-05 NOTE — TELEPHONE ENCOUNTER
CT SPINE CERVICAL (CPT=72125) Denial reason:    Based on [employer] Spine Imaging Guidelines Section(s): SP 3.1 Neck (Cervical Spine) Pain without and with Neurological Features (Including Stenosis) and Preface to the Imaging Guidelines, section Preface-3 Cl

## 2021-04-06 ENCOUNTER — PATIENT MESSAGE (OUTPATIENT)
Dept: SURGERY | Facility: CLINIC | Age: 46
End: 2021-04-06

## 2021-04-06 NOTE — TELEPHONE ENCOUNTER
Received message from Physician Assistant team that Peer to peer may be arranged. Called Corky PF:[phone]    Peer to peer arranged via Miss Josette Hahn.      JOSH Mccoy to conduct P2P today, 4/6/21 at 3:45 pm with Dr. Zeke Crowley

## 2021-04-06 NOTE — TELEPHONE ENCOUNTER
From: Kaylene Quinonez  To: JOSH Walters  Sent: 4/6/2021 2:29 PM CDT  Subject: Non-Urgent Medical Question    Hello,  For the last three days I wake up and the right shoulder area, closer to scar, really hurts.    Can we do the CT scan before I see

## 2021-04-06 NOTE — TELEPHONE ENCOUNTER
MyChart message from patient noted. Nimo MORENO, to perform peer to peer today. Awaiting response from provider with P2P results prior to reaching out to patient.

## 2021-04-06 NOTE — TELEPHONE ENCOUNTER
In alternate TE, received notification from provider that CT cervical spine has been approved. [employer] message sent to patient with information.

## 2021-04-06 NOTE — TELEPHONE ENCOUNTER
Imaging approval notification received. [employer] message sent to patient in alternate TE. Routed to PA team for notification.

## 2021-04-09 ENCOUNTER — IMMUNIZATION (OUTPATIENT)
Dept: LAB | Age: 46
End: 2021-04-09
Attending: HOSPITALIST
Payer: MEDICAID

## 2021-04-09 DIAGNOSIS — Z23 NEED FOR VACCINATION: Primary | ICD-10-CM

## 2021-04-09 PROCEDURE — 0001A SARSCOV2 VAC 30MCG/0.3ML IM: CPT

## 2021-04-19 ENCOUNTER — PATIENT MESSAGE (OUTPATIENT)
Dept: SURGERY | Facility: CLINIC | Age: 46
End: 2021-04-19

## 2021-04-19 ENCOUNTER — HOSPITAL ENCOUNTER (OUTPATIENT)
Dept: CT IMAGING | Facility: HOSPITAL | Age: 46
Discharge: HOME | End: 2021-04-19
Attending: PHYSICIAN ASSISTANT
Payer: MEDICAID

## 2021-04-19 DIAGNOSIS — Z98.1 S/P CERVICAL SPINAL FUSION: ICD-10-CM

## 2021-04-19 DIAGNOSIS — S12.601S CLOSED NONDISPLACED FRACTURE OF SEVENTH CERVICAL VERTEBRA, UNSPECIFIED FRACTURE MORPHOLOGY, SEQUELA: ICD-10-CM

## 2021-04-19 DIAGNOSIS — M54.2 CERVICAL PAIN: ICD-10-CM

## 2021-04-19 PROCEDURE — 72125 CT NECK SPINE W/O DYE: CPT | Performed by: PHYSICIAN ASSISTANT

## 2021-04-20 NOTE — TELEPHONE ENCOUNTER
From: Emeka Sanders  To: Wanetta Najjar, PA  Sent: 4/19/2021 5:30 PM CDT  Subject: Test Results Question    Hi,  Got the CT and the results already. Not 100% sure what it all means, but doesn’t sound good.    My thinking was if this CT was worse or not

## 2021-04-20 NOTE — TELEPHONE ENCOUNTER
Pt has appointment in 3 days.  sent to pt informing he Hadley Peters will review imaging with Dr. Liam Chang in the office on Thursday and discuss plan with her on Friday.

## 2021-04-23 ENCOUNTER — OFFICE VISIT (OUTPATIENT)
Dept: SURGERY | Facility: CLINIC | Age: 46
End: 2021-04-23
Payer: MEDICAID

## 2021-04-23 VITALS
BODY MASS INDEX: 26.66 KG/M2 | SYSTOLIC BLOOD PRESSURE: 140 MMHG | WEIGHT: 160 LBS | DIASTOLIC BLOOD PRESSURE: 80 MMHG | HEIGHT: 65 IN

## 2021-04-23 DIAGNOSIS — M54.2 CERVICAL PAIN: ICD-10-CM

## 2021-04-23 DIAGNOSIS — Z98.1 S/P CERVICAL SPINAL FUSION: Primary | ICD-10-CM

## 2021-04-23 DIAGNOSIS — E78.00 ELEVATED CHOLESTEROL: ICD-10-CM

## 2021-04-23 PROCEDURE — 3079F DIAST BP 80-89 MM HG: CPT | Performed by: PHYSICIAN ASSISTANT

## 2021-04-23 PROCEDURE — 99214 OFFICE O/P EST MOD 30 MIN: CPT | Performed by: PHYSICIAN ASSISTANT

## 2021-04-23 PROCEDURE — 3077F SYST BP >= 140 MM HG: CPT | Performed by: PHYSICIAN ASSISTANT

## 2021-04-23 PROCEDURE — 3008F BODY MASS INDEX DOCD: CPT | Performed by: PHYSICIAN ASSISTANT

## 2021-04-23 RX ORDER — LOVASTATIN 10 MG/1
10 TABLET ORAL NIGHTLY
Qty: 90 TABLET | Refills: 0 | Status: SHIPPED | OUTPATIENT
Start: 2021-04-23

## 2021-04-23 NOTE — PATIENT INSTRUCTIONS
Refill policies:    • Allow 2-3 business days for refills; controlled substances may take longer.   • Contact your pharmacy at least 5 days prior to running out of medication and have them send an electronic request or submit request through the “request re Depending on your insurance carrier, approval may take 3-10 days. It is highly recommended patients contact their insurance carrier directly to determine coverage.   If test is done without insurance authorization, patient may be responsible for the entire and wean out of it  -Follow-up 3 in 3 months  -Call for refill of meds if needed  -Call with any neurological changes  -Continue with treatment for osteopenia and bone growth stimulator

## 2021-04-23 NOTE — PROGRESS NOTES
[facility] Neurosurgery follow-up        HISTORY OF PRESENT Kassandra Calhoun is a 41year old RH  female s/p C3-7 laminectomy and C5-7 PSF 12/20/2020 Dr. Eun Patel. She states her neck pain continues to be an 8/10.   It seemed to be better before she had t most distal ribs right around her bra line in the front under her right breast.    Her cervical pain is 6 to an 8/10 her left shoulder pain is about a 4/10 her strength is improving in her arms her lower extremity strength is improved she still little unst woke with urinary incontinence. Saw urology Dr Tino Shepard for bladder 2019 and did not find any cause. Using cane. Last hx: 5/22/2020  here in follow up after MRI and XR cervical spine. She continues to have cervical pain is a 6 to an 8/10.   It is at the upp 2020.  She comes in today for evaluation. She does have a spinal headache. She states is dependent when she is up is worse when she lays down its better she thinks it might be getting worse over the last couple of days.   She is pushing fluids and caffein She had difficulty sleeping. She has mild back pain and spasms she is getting increasing occipital headaches. She still getting leg cramping. She is have urinary urgency she is tripping and she is unsteady she did break her toe because of a misstep. several days. She was taking Norco 5 mg tablets 2 every 6 hours. She was taking 5 mg of Valium as well. She denies any arm pain she has had occasional numbness in her hands. She feels like her strength is much improved.   Her urinary urgency is resolved Iliopsoas  Hamstrings   Quads    D-flexion P-flexion Eversion   Right       5         5       5         5 5    Left       5         5       5         5 5         DIAGNOSTIC DATA:   EMG 2/27/2020 no radiculopathy    EMG of the upper extremities from 5/19/17 C4-6 ACDF. PSF  C5-7. Dr Nikita Archuleta reviewed report and films    XR CS 10/21/2020  FINDINGS:      There is evidence of an anterior hardware fusion from C4-C6 and posterior hardware fusion from C5-C7.   Surgical hardware appears stable without postoperative compl cervical fusion extend from the C4 through C6 level,  stable. Vertebral body height and disc spaces are stable when compared to most recent CT of the cervical spine dated 7/7/2020. No osseous encroachment on the canal.  No neural foraminal narrowing.   Po C2-3 C3-4 and C6-7 are intact with no acute disc herniation or pathology      MRI cervical spine with flexion-extension shows slight anterior cord pressure at C3-4 C4-5 C5-6    X-ray of the cervical spine 5/3/18 shows loss of cervical lordosis with a kypho any neurological changes  -Continue with treatment for osteopenia and bone growth stimulator      T.  Patsy Hunter M.S., BENJY MILLER [address], [facility] Jorge Luis Garvey, [address]  [phone]

## 2021-04-23 NOTE — PROGRESS NOTES
Had CT scan on Monday  Feeling sore and tired  Ever since CT and having collar off, feels more sore on the Left side, not sure if that's related  Normally has brace on all the time except for showering    Has some pain down the left side into the shoulder

## 2021-05-14 NOTE — ANESTHESIA PROCEDURE NOTES
Airway  Urgency: elective      General Information and Staff    Patient location during procedure: OR  Anesthesiologist: Tracy Michel MD  Performed: anesthesiologist     Indications and Patient Condition  Indications for airway management: anesthesia  Sed Medication refill:    Medication Name:   losartan (COZAAR) 50 MG tablet 30 tablet 0 3/26/2021     Sig: Take 1 tablet by mouth once daily      Medication last refilled: 03/26/2021    Provider: Destiny Tiwari NP    Pharmacy: [facility]    Last office visit: 12/23/2020    Follow up: 6 month    Last lab related to medication: 06/22/2020    Upcoming appointments: 06/30/2021    Refill outcome: Filled per protocol

## 2021-05-17 ENCOUNTER — PATIENT MESSAGE (OUTPATIENT)
Dept: SURGERY | Facility: CLINIC | Age: 46
End: 2021-05-17

## 2021-05-17 DIAGNOSIS — M54.2 CERVICAL PAIN: ICD-10-CM

## 2021-05-17 DIAGNOSIS — Z98.1 S/P CERVICAL SPINAL FUSION: ICD-10-CM

## 2021-05-17 NOTE — TELEPHONE ENCOUNTER
From: Junie Mcghee  To: JOSH Meehan  Sent: 5/17/2021 1:24 PM CDT  Subject: Other    Hello, wanted to let you know I fell this morning, hit my head on the corner or a bearded dragon, basically big fish tank, there was blood.  They took me to [facility]

## 2021-05-18 ENCOUNTER — LAB ENCOUNTER (OUTPATIENT)
Dept: LAB | Age: 46
End: 2021-05-18
Attending: FAMILY MEDICINE
Payer: MEDICAID

## 2021-05-18 ENCOUNTER — OFFICE VISIT (OUTPATIENT)
Dept: FAMILY MEDICINE CLINIC | Facility: CLINIC | Age: 46
End: 2021-05-18
Payer: MEDICAID

## 2021-05-18 VITALS
TEMPERATURE: 98 F | BODY MASS INDEX: 26.99 KG/M2 | DIASTOLIC BLOOD PRESSURE: 84 MMHG | HEART RATE: 78 BPM | HEIGHT: 65 IN | RESPIRATION RATE: 18 BRPM | SYSTOLIC BLOOD PRESSURE: 142 MMHG | WEIGHT: 162 LBS | OXYGEN SATURATION: 99 %

## 2021-05-18 DIAGNOSIS — F32.A ANXIETY AND DEPRESSION: ICD-10-CM

## 2021-05-18 DIAGNOSIS — R55 SYNCOPE, UNSPECIFIED SYNCOPE TYPE: ICD-10-CM

## 2021-05-18 DIAGNOSIS — R73.9 HYPERGLYCEMIA: ICD-10-CM

## 2021-05-18 DIAGNOSIS — J34.89 SINUS PAIN: ICD-10-CM

## 2021-05-18 DIAGNOSIS — R55 SYNCOPE, UNSPECIFIED SYNCOPE TYPE: Primary | ICD-10-CM

## 2021-05-18 DIAGNOSIS — F41.9 ANXIETY AND DEPRESSION: ICD-10-CM

## 2021-05-18 DIAGNOSIS — E04.1 THYROID NODULE: ICD-10-CM

## 2021-05-18 DIAGNOSIS — F39 MOOD DISORDER (HCC): ICD-10-CM

## 2021-05-18 PROCEDURE — 3077F SYST BP >= 140 MM HG: CPT | Performed by: FAMILY MEDICINE

## 2021-05-18 PROCEDURE — 83036 HEMOGLOBIN GLYCOSYLATED A1C: CPT

## 2021-05-18 PROCEDURE — 99214 OFFICE O/P EST MOD 30 MIN: CPT | Performed by: FAMILY MEDICINE

## 2021-05-18 PROCEDURE — 3008F BODY MASS INDEX DOCD: CPT | Performed by: FAMILY MEDICINE

## 2021-05-18 PROCEDURE — 83735 ASSAY OF MAGNESIUM: CPT

## 2021-05-18 PROCEDURE — 3079F DIAST BP 80-89 MM HG: CPT | Performed by: FAMILY MEDICINE

## 2021-05-18 PROCEDURE — 80053 COMPREHEN METABOLIC PANEL: CPT

## 2021-05-18 PROCEDURE — 84443 ASSAY THYROID STIM HORMONE: CPT

## 2021-05-18 PROCEDURE — 36415 COLL VENOUS BLD VENIPUNCTURE: CPT

## 2021-05-18 PROCEDURE — 84439 ASSAY OF FREE THYROXINE: CPT

## 2021-05-18 PROCEDURE — 82728 ASSAY OF FERRITIN: CPT

## 2021-05-18 RX ORDER — AMOXICILLIN AND CLAVULANATE POTASSIUM 875; 125 MG/1; MG/1
1 TABLET, FILM COATED ORAL 2 TIMES DAILY
Qty: 20 TABLET | Refills: 0 | Status: SHIPPED | OUTPATIENT
Start: 2021-05-18 | End: 2021-05-28

## 2021-05-18 RX ORDER — OXYCODONE HYDROCHLORIDE 10 MG/1
10 TABLET ORAL EVERY 4 HOURS PRN
Qty: 90 TABLET | Refills: 0 | Status: SHIPPED | OUTPATIENT
Start: 2021-05-18 | End: 2021-06-21

## 2021-05-18 RX ORDER — ALPRAZOLAM 0.25 MG/1
0.25 TABLET ORAL NIGHTLY PRN
Qty: 30 TABLET | Refills: 0 | Status: SHIPPED | OUTPATIENT
Start: 2021-05-18 | End: 2021-12-14

## 2021-05-18 RX ORDER — FLUTICASONE PROPIONATE 50 MCG
2 SPRAY, SUSPENSION (ML) NASAL NIGHTLY
Qty: 1 BOTTLE | Refills: 0 | Status: ON HOLD | OUTPATIENT
Start: 2021-05-18 | End: 2021-09-08

## 2021-05-18 RX ORDER — LAMOTRIGINE 25 MG/1
25 TABLET ORAL DAILY
Qty: 30 TABLET | Refills: 0 | Status: SHIPPED | OUTPATIENT
Start: 2021-05-18 | End: 2021-07-07

## 2021-05-18 NOTE — PROGRESS NOTES
HPI:   Osmin Loaiza is a 40year old female here to discuss syncope mood disorder    Pt feels the meds are not working well   Pt is still in a neck collar - fracture is still healing / will need to remain in collar for 3 more months   Pt fell yeste mg total) by mouth 4 (four) times daily before meals and nightly. 120 tablet 0   • Citalopram Hydrobromide 40 MG Oral Tab Take 1 tablet (40 mg total) by mouth daily.  90 tablet 0   • Acetaminophen (ACETAMINOPHEN EXTRA STRENGTH) 500 MG Oral Cap Take 1,000 mg Occasionaly   • Dysesthesia    • Dyspareunia    • Easy bruising    • Endometriosis    • Essential hypertension    • Fatigue    • Flatulence/gas pain/belching 2weeks   • Food intolerance    • Frequent urination    • Frequent UTI    • Headache disorder    • ACROMIOPLASTY LYSIS OF ADHESIONS RIGHT SHOULDER   • OTHER  12/19/2018    ANTERIOR DISCECTOMY AND FUSION. CERVICAL 4-CERVICAL 5 AND CERVICAL 5-CERVICAL 6.  ALLOGRAFT, PLATE AND SCREWS   • OTHER SURGICAL HISTORY  2001    esophagogastroduodenoscopy   • OTHER S pain on exertion  MUSCULOSKELETAL: denies back pain  NEURO:+headaches  HEMATOLOGIC: denies hx of anemia  ENDOCRINE: + thyroid history  ALL/ASTHMA: + asthma    EXAM:   /84   Pulse 78   Temp 98.1 °F (36.7 °C) (Temporal)   Resp 18   Ht 5' 5\" (1.651 m) 50 MCG/ACT Nasal Suspension; 2 sprays by Nasal route nightly. Dispense: 1 Bottle; Refill: 0      Questions answered and patient indicates understanding of these issues and agrees to the plan. Follow up in 2 weeks or sooner if needed .

## 2021-05-18 NOTE — TELEPHONE ENCOUNTER
Pt calling again to speak with RN re: her fall yesterday/head injury. Pt states she can get CT disc/report if needed. Pt requesting refill on oxycodone she got at hospital and also if she needs appt to see Livia Hernandez. Please call pt to advise.

## 2021-05-18 NOTE — TELEPHONE ENCOUNTER
Out of bed, dizzy, fell hit head on a fish tank. Headache, neck pain. 8/10, wearing collar. [address] [facility] CT of head an neck. C6 and C7     S: patient called stating she fell yesterday.     B: patient is s/p C3-7 laminectomies and cervical C5-C7 fusion performed o

## 2021-05-19 ENCOUNTER — TELEPHONE (OUTPATIENT)
Dept: SURGERY | Facility: CLINIC | Age: 46
End: 2021-05-19

## 2021-05-19 NOTE — TELEPHONE ENCOUNTER
Pt called again for follow up care from fall on 5/17/21. Pt asking if she should use a sotft collar. Advised pt RN assessed and routed to provider to advise if any further intervention needed. See Patient Message on 5/17/21.     Call pt to advise

## 2021-05-19 NOTE — TELEPHONE ENCOUNTER
Per OV note on 5-14 \"PLAN:  -Aspen multipost cervical collar for the next 8 weeks to 3 months if she has decreasing pain she can try and wean out of it  -Follow-up 3 in 3 months  -Call for refill of meds if needed  -Call with any neurological changes  -Co

## 2021-05-21 ENCOUNTER — PATIENT MESSAGE (OUTPATIENT)
Dept: FAMILY MEDICINE CLINIC | Facility: CLINIC | Age: 46
End: 2021-05-21

## 2021-06-01 DIAGNOSIS — F32.0 CURRENT MILD EPISODE OF MAJOR DEPRESSIVE DISORDER, UNSPECIFIED WHETHER RECURRENT (HCC): ICD-10-CM

## 2021-06-01 RX ORDER — BUPROPION HYDROCHLORIDE 150 MG/1
TABLET ORAL
Qty: 90 TABLET | Refills: 0 | Status: SHIPPED | OUTPATIENT
Start: 2021-06-01 | End: 2021-07-23

## 2021-06-01 NOTE — TELEPHONE ENCOUNTER
Next Appt:    With [facility] Nadeen Cody DO)  06/21/2021 at 2:30 PM     5-18-21    LR 4-1-21

## 2021-06-07 ENCOUNTER — OFFICE VISIT (OUTPATIENT)
Dept: SURGERY | Facility: CLINIC | Age: 46
End: 2021-06-07
Payer: MEDICAID

## 2021-06-07 VITALS — HEART RATE: 80 BPM | SYSTOLIC BLOOD PRESSURE: 140 MMHG | DIASTOLIC BLOOD PRESSURE: 80 MMHG

## 2021-06-07 DIAGNOSIS — Z98.1 S/P CERVICAL SPINAL FUSION: Primary | ICD-10-CM

## 2021-06-07 DIAGNOSIS — M85.88 OSTEOPENIA OF SPINE: ICD-10-CM

## 2021-06-07 DIAGNOSIS — S12.601S CLOSED NONDISPLACED FRACTURE OF SEVENTH CERVICAL VERTEBRA, UNSPECIFIED FRACTURE MORPHOLOGY, SEQUELA: ICD-10-CM

## 2021-06-07 DIAGNOSIS — M54.2 CERVICAL PAIN: ICD-10-CM

## 2021-06-07 PROCEDURE — 3079F DIAST BP 80-89 MM HG: CPT | Performed by: PHYSICIAN ASSISTANT

## 2021-06-07 PROCEDURE — 99214 OFFICE O/P EST MOD 30 MIN: CPT | Performed by: PHYSICIAN ASSISTANT

## 2021-06-07 PROCEDURE — 3077F SYST BP >= 140 MM HG: CPT | Performed by: PHYSICIAN ASSISTANT

## 2021-06-07 NOTE — PROGRESS NOTES
Pt here for follow up after fall    Pain 5/10 with out pain medication     Located left side lower neck, numbness and tingling in left arm when pt waking up, notes some weakness in left hand
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
## 2021-06-08 ENCOUNTER — TELEPHONE (OUTPATIENT)
Dept: SURGERY | Facility: CLINIC | Age: 46
End: 2021-06-08

## 2021-06-08 NOTE — TELEPHONE ENCOUNTER
DME order for vista therapy multipost collar received.   Patient face sheet, insurance information, and LOV notes printed and faxed along with order to [address] [place] [address] at fax number:     [phone]

## 2021-06-09 DIAGNOSIS — F90.8 ATTENTION DEFICIT HYPERACTIVITY DISORDER (ADHD), OTHER TYPE: ICD-10-CM

## 2021-06-09 RX ORDER — METHYLPHENIDATE HYDROCHLORIDE 20 MG/1
20 TABLET ORAL 2 TIMES DAILY
Qty: 60 TABLET | Refills: 0 | Status: SHIPPED | OUTPATIENT
Start: 2021-06-09 | End: 2021-07-27

## 2021-06-16 DIAGNOSIS — F32.0 CURRENT MILD EPISODE OF MAJOR DEPRESSIVE DISORDER, UNSPECIFIED WHETHER RECURRENT (HCC): ICD-10-CM

## 2021-06-16 DIAGNOSIS — F41.9 ANXIETY: ICD-10-CM

## 2021-06-16 RX ORDER — CITALOPRAM 20 MG/1
20 TABLET ORAL DAILY
Qty: 90 TABLET | Refills: 0 | Status: SHIPPED | OUTPATIENT
Start: 2021-06-16 | End: 2021-08-03

## 2021-06-16 NOTE — TELEPHONE ENCOUNTER
Pt seen, evaluated, and discharged per provider.    Rx Request  Citalopram Hydrobromide 20 MG Oral Tab    Disp:    90                R: 0    Associated Dx:  Anxiety, Current mild episode of major depressive disorder, unspecified whether recurrent    Last Refilled: 10/13/2020    Last Visit: 05/18/2021

## 2021-06-18 NOTE — TELEPHONE ENCOUNTER
Outcome   Deniedon February 27   Details of this decision are provided on the physician outcome notice which has been faxed to the number on file.

## 2021-06-21 DIAGNOSIS — M54.2 CERVICAL PAIN: ICD-10-CM

## 2021-06-21 DIAGNOSIS — Z98.1 S/P CERVICAL SPINAL FUSION: ICD-10-CM

## 2021-06-21 RX ORDER — OXYCODONE HYDROCHLORIDE 10 MG/1
10 TABLET ORAL EVERY 4 HOURS PRN
Qty: 90 TABLET | Refills: 0 | Status: SHIPPED | OUTPATIENT
Start: 2021-06-21 | End: 2021-08-03

## 2021-06-21 NOTE — TELEPHONE ENCOUNTER
Medication: Oxycodone HCl IR 10mg, Q4H PRN, 90#/0    Date of last refill: 05/18/21 (#90/0)  [place] 09[place]84[place]    Last office visit: 06/07/21  Due back to clinic per last office note:  After CT cervical  Date next office v

## 2021-06-29 ENCOUNTER — PATIENT MESSAGE (OUTPATIENT)
Dept: SURGERY | Facility: CLINIC | Age: 46
End: 2021-06-29

## 2021-06-29 DIAGNOSIS — Z98.1 S/P CERVICAL SPINAL FUSION: ICD-10-CM

## 2021-06-29 DIAGNOSIS — Z91.81 HISTORY OF RECENT FALL: ICD-10-CM

## 2021-06-29 DIAGNOSIS — R20.0 HAND NUMBNESS: ICD-10-CM

## 2021-06-29 DIAGNOSIS — M54.2 NECK PAIN: Primary | ICD-10-CM

## 2021-06-29 DIAGNOSIS — R29.898 UPPER EXTREMITY WEAKNESS: ICD-10-CM

## 2021-06-30 NOTE — TELEPHONE ENCOUNTER
From: Jessica Pinedo  To: Arley Sylvester [place]  Sent: 6/29/2021 9:52 PM CDT  Subject: Other    Hi. I have been doing a little more, and I wake up with pain in my shoulders and numbness. Should we get a new CT or just wait.  I know the last one has been

## 2021-07-02 ENCOUNTER — OFFICE VISIT (OUTPATIENT)
Dept: SURGERY | Facility: CLINIC | Age: 46
End: 2021-07-02
Payer: MEDICAID

## 2021-07-02 ENCOUNTER — TELEPHONE (OUTPATIENT)
Dept: SURGERY | Facility: CLINIC | Age: 46
End: 2021-07-02

## 2021-07-02 VITALS
SYSTOLIC BLOOD PRESSURE: 116 MMHG | BODY MASS INDEX: 26.99 KG/M2 | HEIGHT: 65 IN | DIASTOLIC BLOOD PRESSURE: 86 MMHG | WEIGHT: 162 LBS

## 2021-07-02 DIAGNOSIS — Z98.1 S/P CERVICAL SPINAL FUSION: Primary | ICD-10-CM

## 2021-07-02 DIAGNOSIS — S12.9XXS PSEUDOARTHROSIS OF CERVICAL SPINE, SEQUELA: ICD-10-CM

## 2021-07-02 DIAGNOSIS — R29.898 UPPER EXTREMITY WEAKNESS: ICD-10-CM

## 2021-07-02 DIAGNOSIS — M54.2 CERVICAL PAIN: ICD-10-CM

## 2021-07-02 PROCEDURE — 99214 OFFICE O/P EST MOD 30 MIN: CPT | Performed by: PHYSICIAN ASSISTANT

## 2021-07-02 PROCEDURE — 3074F SYST BP LT 130 MM HG: CPT | Performed by: PHYSICIAN ASSISTANT

## 2021-07-02 PROCEDURE — 3079F DIAST BP 80-89 MM HG: CPT | Performed by: PHYSICIAN ASSISTANT

## 2021-07-02 PROCEDURE — 3008F BODY MASS INDEX DOCD: CPT | Performed by: PHYSICIAN ASSISTANT

## 2021-07-02 NOTE — PROGRESS NOTES
Things are not going so well  Still having a lot of pain  Shoulders are hurting and arms are going numb again, not all the time.   Does have some pain in the shoulder blade area    Having HA's again

## 2021-07-02 NOTE — TELEPHONE ENCOUNTER
Pt brought disc to appt - CT cervical spine and CT of brain. Downloaded to [facility]. Returned disc to patient.

## 2021-07-02 NOTE — PROGRESS NOTES
[facility] Neurosurgery follow-up        HISTORY OF PRESENT Brie Yang is a 41year old RH  female s/p C3-7 laminectomy and C5-7 PSF 12/20/2020 Dr. Elenita Buchanan. She returns today with ongoing right-sided terrible posterior neck pain.   She still gettin bowel or bladder incontinence.   3/25/2021 she was in the shower felt a pop on the left lower neck had swelling around the incision she went to [facility] CT was obtained she has been in her cervical collar she states her neck pain ranges from 4 to a 7/10 now it motor worse. Stumbling. Using cane. Urinary incontinence is on and off, getting worse. Cannot stand on one leg. Looking down increases numbness and tingling. Sleeping is painful. Last hx:  Feeling better. If she over does things she gets shaking back. that time she is had increasing neck pain is a 6/10. Complains of left greater than right arm pain with numbness and tingling in both arms. Her left arm pain is a 5/10 going into the middle finger. Is dropping things.   She is having increasing back spas collar which is helping but she is quite unsteady and if she is out of the neck brace she feels weakness in the arms and legs. Last hx 1/10/20  She is having increasing left arm pain.   She is having increasing weakness in the hands numbness in the right falling. She is kicking furniture. She has had a return of her bladder spasms. Last hx:   Here in follow-up. She still states she is having some trouble starting urination she is controlling herself.   She has to bear down fairly hard to void she co Patient is in no acute distress. HEENT:  Normocephalic, atraumatic    SKIN: Warm, dry, no rashes. Posterior cervical incision healed. Tender over lower CS screws     NEUROLOGICAL:  This patient is alert and orientated x 3. Speech fluent.  Comprehension C7 on the right greater than the left. Screws are well-placed and she getting a fusion at C5-6    CT of the cervical spine 2/16/2021  FINDINGS:       Mild straightening of the normal cervical lordosis.   Anterior fixation plate and screws with fusion at th susceptibility artifact which limits the assessment of the surrounding structures. This hardware is incompletely   characterized secondary to MR technique. There is extensive enhancement throughout the laminoplasty region which likely represents scarring. does have loss of CSF reserve at the posterior aspect of C2. This is minimally changed from her last MRI. She has acquired stenosis at C3-4 with limited CSF reserve anterior and posteriorly. Mild stenosis at C6-7.     Cervical myelogram from 2/3/2020 C4- flexion and extension imaging were not performed. CT of the abdomen shows mild spondylosis L1-L5. Moderate spondylosis at L5-S1 with an intact pars.   No significant stenosis     Chest x-ray shows a mild thoracic scoliosis     MRI of the cervical spine

## 2021-07-06 RX ORDER — DIAZEPAM 10 MG/1
TABLET ORAL
Qty: 90 TABLET | Refills: 0 | Status: ON HOLD | OUTPATIENT
Start: 2021-07-06 | End: 2021-09-10

## 2021-07-06 NOTE — TELEPHONE ENCOUNTER
Medication: Diazepam    Date of last refill: 2/22/21 (#90/1)  [place]      Last office visit: 7/2/21  Due back to clinic per last office note:   Follow-up after obtaining a CT of the cervical spine, x-rays of the cervical

## 2021-07-07 DIAGNOSIS — F39 MOOD DISORDER (HCC): ICD-10-CM

## 2021-07-07 RX ORDER — LAMOTRIGINE 25 MG/1
25 TABLET ORAL DAILY
Qty: 30 TABLET | Refills: 0 | Status: SHIPPED | OUTPATIENT
Start: 2021-07-07 | End: 2021-08-03

## 2021-07-16 ENCOUNTER — HOSPITAL ENCOUNTER (OUTPATIENT)
Dept: GENERAL RADIOLOGY | Facility: HOSPITAL | Age: 46
Discharge: HOME | End: 2021-07-16
Attending: PHYSICIAN ASSISTANT
Payer: MEDICAID

## 2021-07-16 ENCOUNTER — HOSPITAL ENCOUNTER (OUTPATIENT)
Dept: MRI IMAGING | Facility: HOSPITAL | Age: 46
Discharge: HOME | End: 2021-07-16
Attending: PHYSICIAN ASSISTANT
Payer: MEDICAID

## 2021-07-16 ENCOUNTER — HOSPITAL ENCOUNTER (OUTPATIENT)
Dept: CT IMAGING | Facility: HOSPITAL | Age: 46
Discharge: HOME | End: 2021-07-16
Attending: PHYSICIAN ASSISTANT
Payer: MEDICAID

## 2021-07-16 DIAGNOSIS — R29.898 UPPER EXTREMITY WEAKNESS: ICD-10-CM

## 2021-07-16 DIAGNOSIS — M54.2 CERVICAL PAIN: ICD-10-CM

## 2021-07-16 DIAGNOSIS — Z98.1 S/P CERVICAL SPINAL FUSION: ICD-10-CM

## 2021-07-16 DIAGNOSIS — Z91.81 HISTORY OF RECENT FALL: ICD-10-CM

## 2021-07-16 DIAGNOSIS — M54.2 NECK PAIN: ICD-10-CM

## 2021-07-16 DIAGNOSIS — R20.0 HAND NUMBNESS: ICD-10-CM

## 2021-07-16 PROCEDURE — 72125 CT NECK SPINE W/O DYE: CPT | Performed by: PHYSICIAN ASSISTANT

## 2021-07-16 PROCEDURE — A9575 INJ GADOTERATE MEGLUMI 0.1ML: HCPCS | Performed by: PHYSICIAN ASSISTANT

## 2021-07-16 PROCEDURE — 72050 X-RAY EXAM NECK SPINE 4/5VWS: CPT | Performed by: PHYSICIAN ASSISTANT

## 2021-07-16 PROCEDURE — 72156 MRI NECK SPINE W/O & W/DYE: CPT | Performed by: PHYSICIAN ASSISTANT

## 2021-07-19 ENCOUNTER — TELEPHONE (OUTPATIENT)
Dept: SURGERY | Facility: CLINIC | Age: 46
End: 2021-07-19

## 2021-07-19 NOTE — TELEPHONE ENCOUNTER
We know that she is getting progressively worse. She has an appointment July 29 with Dr. Donald Collier    She can go to the emergency room for more urgent evaluation.     We can forward this for Dr. Donald Collier to review

## 2021-07-19 NOTE — TELEPHONE ENCOUNTER
Spoke with patient who stated that:    -she had completed imaging as ordered on 7/16/21and has a symptom update:  -she has awoken and found that the bed was wet nearly every day since last Tues. , 7/13.    -she is having pain in the shoulders that is persis

## 2021-07-19 NOTE — TELEPHONE ENCOUNTER
Provider feedback noted. Called patient and [facility]       Patient stated that she understood the information, and informed [facility] ANTON[facility] Nursing that she is anticipating taking part in the delivery of a grandchild on or aroun

## 2021-07-19 NOTE — TELEPHONE ENCOUNTER
Pt has been having accidents, waking up wet everyday for past week. She also feels unsteady when walking with cane.   Pls advise

## 2021-07-21 DIAGNOSIS — F32.0 CURRENT MILD EPISODE OF MAJOR DEPRESSIVE DISORDER, UNSPECIFIED WHETHER RECURRENT (HCC): ICD-10-CM

## 2021-07-21 NOTE — TELEPHONE ENCOUNTER
Rx Request  BUPROPION HCL ER, XL, 150 MG Oral Tablet 24 Hr    Disp:         90           R: 0    Associated Dx: Depressive Disorder    Last Refilled: 06/01/2021    Last Visit:     Protocol Passed?  Keira Whitmore  ]       No[  ]

## 2021-07-23 RX ORDER — BUPROPION HYDROCHLORIDE 150 MG/1
TABLET ORAL
Qty: 90 TABLET | Refills: 0 | Status: SHIPPED | OUTPATIENT
Start: 2021-07-23 | End: 2021-08-03

## 2021-07-27 DIAGNOSIS — F90.8 ATTENTION DEFICIT HYPERACTIVITY DISORDER (ADHD), OTHER TYPE: ICD-10-CM

## 2021-07-27 DIAGNOSIS — F41.9 ANXIETY: ICD-10-CM

## 2021-07-27 DIAGNOSIS — F32.0 CURRENT MILD EPISODE OF MAJOR DEPRESSIVE DISORDER, UNSPECIFIED WHETHER RECURRENT (HCC): ICD-10-CM

## 2021-07-28 RX ORDER — METHYLPHENIDATE HYDROCHLORIDE 20 MG/1
20 TABLET ORAL 2 TIMES DAILY
Qty: 60 TABLET | Refills: 0 | Status: SHIPPED | OUTPATIENT
Start: 2021-07-28 | End: 2021-08-03

## 2021-07-28 RX ORDER — CITALOPRAM 40 MG/1
40 TABLET ORAL DAILY
Qty: 90 TABLET | Refills: 0 | Status: SHIPPED | OUTPATIENT
Start: 2021-07-28 | End: 2021-08-03

## 2021-07-29 ENCOUNTER — LAB ENCOUNTER (OUTPATIENT)
Dept: LAB | Age: 46
End: 2021-07-29
Attending: FAMILY MEDICINE
Payer: MEDICAID

## 2021-07-29 ENCOUNTER — OFFICE VISIT (OUTPATIENT)
Dept: SURGERY | Facility: CLINIC | Age: 46
End: 2021-07-29
Payer: MEDICAID

## 2021-07-29 ENCOUNTER — TELEPHONE (OUTPATIENT)
Dept: SURGERY | Facility: CLINIC | Age: 46
End: 2021-07-29

## 2021-07-29 VITALS
WEIGHT: 162 LBS | HEIGHT: 65 IN | BODY MASS INDEX: 26.99 KG/M2 | DIASTOLIC BLOOD PRESSURE: 60 MMHG | OXYGEN SATURATION: 98 % | RESPIRATION RATE: 14 BRPM | HEART RATE: 87 BPM | SYSTOLIC BLOOD PRESSURE: 106 MMHG

## 2021-07-29 DIAGNOSIS — S12.9XXS PSEUDOARTHROSIS OF CERVICAL SPINE, SEQUELA: ICD-10-CM

## 2021-07-29 DIAGNOSIS — M54.12 CERVICAL RADICULOPATHY: ICD-10-CM

## 2021-07-29 DIAGNOSIS — R29.898 UPPER EXTREMITY WEAKNESS: ICD-10-CM

## 2021-07-29 DIAGNOSIS — M48.02 CERVICAL STENOSIS OF SPINAL CANAL: ICD-10-CM

## 2021-07-29 DIAGNOSIS — D50.9 IRON DEFICIENCY ANEMIA, UNSPECIFIED IRON DEFICIENCY ANEMIA TYPE: ICD-10-CM

## 2021-07-29 DIAGNOSIS — Z98.1 S/P CERVICAL SPINAL FUSION: Primary | ICD-10-CM

## 2021-07-29 DIAGNOSIS — M54.2 NECK PAIN: ICD-10-CM

## 2021-07-29 DIAGNOSIS — R73.9 HYPERGLYCEMIA: ICD-10-CM

## 2021-07-29 DIAGNOSIS — M54.2 CERVICAL PAIN: ICD-10-CM

## 2021-07-29 LAB
BASOPHILS # BLD AUTO: 0.04 X10(3) UL (ref 0–0.2)
BASOPHILS NFR BLD AUTO: 0.6 %
DEPRECATED HBV CORE AB SER IA-ACNC: 8.5 NG/ML
EOSINOPHIL # BLD AUTO: 0.14 X10(3) UL (ref 0–0.7)
EOSINOPHIL NFR BLD AUTO: 2.1 %
ERYTHROCYTE [DISTWIDTH] IN BLOOD BY AUTOMATED COUNT: 15.5 %
EST. AVERAGE GLUCOSE BLD GHB EST-MCNC: 126 MG/DL (ref 68–126)
HBA1C MFR BLD HPLC: 6 % (ref ?–5.7)
HCT VFR BLD AUTO: 39.4 %
HGB BLD-MCNC: 12.2 G/DL
IMM GRANULOCYTES # BLD AUTO: 0.02 X10(3) UL (ref 0–1)
IMM GRANULOCYTES NFR BLD: 0.3 %
LYMPHOCYTES # BLD AUTO: 2.75 X10(3) UL (ref 1–4)
LYMPHOCYTES NFR BLD AUTO: 40.7 %
MCH RBC QN AUTO: 27.4 PG (ref 26–34)
MCHC RBC AUTO-ENTMCNC: 31 G/DL (ref 31–37)
MCV RBC AUTO: 88.5 FL
MONOCYTES # BLD AUTO: 0.73 X10(3) UL (ref 0.1–1)
MONOCYTES NFR BLD AUTO: 10.8 %
NEUTROPHILS # BLD AUTO: 3.07 X10 (3) UL (ref 1.5–7.7)
NEUTROPHILS # BLD AUTO: 3.07 X10(3) UL (ref 1.5–7.7)
NEUTROPHILS NFR BLD AUTO: 45.5 %
PLATELET # BLD AUTO: 269 10(3)UL (ref 150–450)
RBC # BLD AUTO: 4.45 X10(6)UL
WBC # BLD AUTO: 6.8 X10(3) UL (ref 4–11)

## 2021-07-29 PROCEDURE — 3078F DIAST BP <80 MM HG: CPT | Performed by: PHYSICIAN ASSISTANT

## 2021-07-29 PROCEDURE — 85025 COMPLETE CBC W/AUTO DIFF WBC: CPT

## 2021-07-29 PROCEDURE — 99214 OFFICE O/P EST MOD 30 MIN: CPT | Performed by: PHYSICIAN ASSISTANT

## 2021-07-29 PROCEDURE — 3008F BODY MASS INDEX DOCD: CPT | Performed by: PHYSICIAN ASSISTANT

## 2021-07-29 PROCEDURE — 3044F HG A1C LEVEL LT 7.0%: CPT | Performed by: FAMILY MEDICINE

## 2021-07-29 PROCEDURE — 36415 COLL VENOUS BLD VENIPUNCTURE: CPT

## 2021-07-29 PROCEDURE — 83036 HEMOGLOBIN GLYCOSYLATED A1C: CPT

## 2021-07-29 PROCEDURE — 82728 ASSAY OF FERRITIN: CPT

## 2021-07-29 PROCEDURE — 3074F SYST BP LT 130 MM HG: CPT | Performed by: PHYSICIAN ASSISTANT

## 2021-07-29 NOTE — PROGRESS NOTES
[facility] Neurosurgery follow-up        HISTORY OF PRESENT Jordyn Holly is a 41year old RH  female s/p C3-7 laminectomy and C5-7 PSF 12/20/2020 Dr. Wendi Bryant. Right>left scapular pain.      Last hx  She returns today with ongoing right-sided terrible pos pain no arm pain no numbness no bowel or bladder incontinence.   3/25/2021 she was in the shower felt a pop on the left lower neck had swelling around the incision she went to [facility] CT was obtained she has been in her cervical collar she states her neck danielle collar.  weaker in PT. Fine motor worse. Stumbling. Using cane. Urinary incontinence is on and off, getting worse. Cannot stand on one leg. Looking down increases numbness and tingling. Sleeping is painful. Last hx:  Feeling better.  If she over metcalf loss of consciousness. Since that time she is had increasing neck pain is a 6/10. Complains of left greater than right arm pain with numbness and tingling in both arms. Her left arm pain is a 5/10 going into the middle finger. Is dropping things.   She She is using the Vista therapy collar which is helping but she is quite unsteady and if she is out of the neck brace she feels weakness in the arms and legs. Last hx 1/10/20  She is having increasing left arm pain.   She is having increasing weakness in in the legs she is tripping and falling. She is kicking furniture. She has had a return of her bladder spasms. Last hx:   Here in follow-up. She still states she is having some trouble starting urination she is controlling herself.   She has to bear SYSTEMS:  Negative     PHYSICAL EXAMINATION:  GENERAL:  Patient is in no acute distress. HEENT:  Normocephalic, atraumatic    SKIN: Warm, dry, no rashes. Posterior cervical incision healed.  Tender over lower CS screws     NEUROLOGICAL:  This patient is a the left. Screws are well-placed and she getting a fusion at C5-6    CT of the cervical spine 2/16/2021  FINDINGS:       Mild straightening of the normal cervical lordosis.   Anterior fixation plate and screws with fusion at the C4-C6 levels is again noted which limits the assessment of the surrounding structures. This hardware is incompletely   characterized secondary to MR technique. There is extensive enhancement throughout the laminoplasty region which likely represents scarring.   No pathologic intrathe reserve at the posterior aspect of C2. This is minimally changed from her last MRI. She has acquired stenosis at C3-4 with limited CSF reserve anterior and posteriorly. Mild stenosis at C6-7. Cervical myelogram from 2/3/2020 C4-6 fusion.   Spondylosis imaging were not performed. CT of the abdomen shows mild spondylosis L1-L5. Moderate spondylosis at L5-S1 with an intact pars.   No significant stenosis     Chest x-ray shows a mild thoracic scoliosis     MRI of the cervical spine from 2/2017 shows loss

## 2021-07-29 NOTE — TELEPHONE ENCOUNTER
You are scheduled for ANTERIOR CERVICAL DISCEMTOMY AND FUSION CERVICAL 6- CERVICAL 7. PLATE REMOVAL FROM PREVIOUS FUSION. ALLOGRAFT AND ALL REQUIRED INSTRUMENTATION on 8/23/21 with .     · You will need to contact the Pre-admission department at [phone] drink any other liquids (including water) before your surgery. Do not chew gum or eat candies before surgery. Take 1000 mg of Tylenol (Acetaminophen) 4 hours before your scheduled surgery time, take this with your scheduled Gatorade.     · Our office will register at:  [phone]. Please park in the [facility], check in at registration and meet by the fish tank in the [place] for your escort to class on the Ortho Spine unit.      If unable to attend, class is available online at [place] [id number]

## 2021-07-29 NOTE — TELEPHONE ENCOUNTER
Patient is scheduled for ANTERIOR CERVICAL DISCEMTOMY AND FUSION CERVICAL 6- CERVICAL 7. PLATE REMOVAL FROM PREVIOUS FUSION. ALLOGRAFT AND ALL REQUIRED INSTRUMENTATION on 8/23/21 with Dr Elenita Buchanan.     Y  form completed  Y Surgery order signed   Abiel Adame

## 2021-07-30 ENCOUNTER — TELEPHONE (OUTPATIENT)
Dept: FAMILY MEDICINE CLINIC | Facility: CLINIC | Age: 46
End: 2021-07-30

## 2021-07-30 RX ORDER — ACETAMINOPHEN 500 MG
1000 TABLET ORAL ONCE
Status: CANCELLED | OUTPATIENT
Start: 2021-07-30 | End: 2021-07-30

## 2021-07-30 NOTE — TELEPHONE ENCOUNTER
LAURELO and [employer] order placed, printed and placed in T.  JOSH Taylor bin for review and signature

## 2021-08-03 ENCOUNTER — OFFICE VISIT (OUTPATIENT)
Dept: FAMILY MEDICINE CLINIC | Facility: CLINIC | Age: 46
End: 2021-08-03
Payer: MEDICAID

## 2021-08-03 VITALS
BODY MASS INDEX: 26.33 KG/M2 | WEIGHT: 158 LBS | RESPIRATION RATE: 16 BRPM | OXYGEN SATURATION: 98 % | SYSTOLIC BLOOD PRESSURE: 110 MMHG | DIASTOLIC BLOOD PRESSURE: 70 MMHG | HEIGHT: 65 IN | HEART RATE: 97 BPM

## 2021-08-03 DIAGNOSIS — F39 MOOD DISORDER (HCC): ICD-10-CM

## 2021-08-03 DIAGNOSIS — F32.0 CURRENT MILD EPISODE OF MAJOR DEPRESSIVE DISORDER, UNSPECIFIED WHETHER RECURRENT (HCC): ICD-10-CM

## 2021-08-03 DIAGNOSIS — M54.2 CERVICAL PAIN: ICD-10-CM

## 2021-08-03 DIAGNOSIS — F41.9 ANXIETY: ICD-10-CM

## 2021-08-03 DIAGNOSIS — F90.8 ATTENTION DEFICIT HYPERACTIVITY DISORDER (ADHD), OTHER TYPE: ICD-10-CM

## 2021-08-03 DIAGNOSIS — Z98.1 S/P CERVICAL SPINAL FUSION: ICD-10-CM

## 2021-08-03 PROCEDURE — 99214 OFFICE O/P EST MOD 30 MIN: CPT | Performed by: FAMILY MEDICINE

## 2021-08-03 PROCEDURE — 3008F BODY MASS INDEX DOCD: CPT | Performed by: FAMILY MEDICINE

## 2021-08-03 PROCEDURE — 3078F DIAST BP <80 MM HG: CPT | Performed by: FAMILY MEDICINE

## 2021-08-03 PROCEDURE — 3074F SYST BP LT 130 MM HG: CPT | Performed by: FAMILY MEDICINE

## 2021-08-03 RX ORDER — CITALOPRAM 20 MG/1
20 TABLET ORAL DAILY
Qty: 90 TABLET | Refills: 0 | Status: SHIPPED | OUTPATIENT
Start: 2021-08-03 | End: 2021-10-11

## 2021-08-03 RX ORDER — METHYLPHENIDATE HYDROCHLORIDE 20 MG/1
20 TABLET ORAL 2 TIMES DAILY
Qty: 60 TABLET | Refills: 0 | Status: SHIPPED | OUTPATIENT
Start: 2021-08-03 | End: 2021-10-30

## 2021-08-03 RX ORDER — LAMOTRIGINE 25 MG/1
50 TABLET ORAL DAILY
Qty: 60 TABLET | Refills: 0 | Status: SHIPPED | OUTPATIENT
Start: 2021-08-03 | End: 2021-08-31

## 2021-08-03 RX ORDER — FERROUS SULFATE TAB EC 324 MG (65 MG FE EQUIVALENT) 324 (65 FE) MG
1 TABLET DELAYED RESPONSE ORAL DAILY
COMMUNITY

## 2021-08-03 RX ORDER — CITALOPRAM 40 MG/1
40 TABLET ORAL DAILY
Qty: 90 TABLET | Refills: 0 | Status: SHIPPED | OUTPATIENT
Start: 2021-08-03 | End: 2021-11-08

## 2021-08-03 RX ORDER — BUPROPION HYDROCHLORIDE 150 MG/1
450 TABLET ORAL DAILY
Qty: 90 TABLET | Refills: 0 | Status: SHIPPED | OUTPATIENT
Start: 2021-08-03

## 2021-08-03 NOTE — PROGRESS NOTES
HPI:   Vinod Narayanan is a 40year old female here to discuss syncope mood disorder    Pt was seen in May for syncope but did not complete the work up   Pt will need another c-spine surgery soon     BP has been better   Pt restarted ritalin due to low AT BEDTIME 90 tablet 0   • Montelukast Sodium (SINGULAIR) 10 MG Oral Tab Take 1 tablet (10 mg total) by mouth nightly. 30 tablet 11   • ondansetron 4 MG Oral Tablet Dispersible Take 1 tablet (4 mg total) by mouth every 8 (eight) hours as needed for Nausea. • Attention deficit disorder without mention of hyperactivity    • Back problem     cervical and lumbar   • Bad breath     Started when stomach pain got worse   • Bloating 2weeks   • Cauda equina syndrome without mention of neurogenic bladder    • Consti COLONOSCOPY;  Surgeon: Roxana Jimenez MD;  Location: [facility] ENDOSCOPY   • ENDOMETRIAL ABLATION      2004   • HYSTERECTOMY  7/20/2015   • HYSTEROSCOPY,ABLATION ENDOMETRIUM     • LAMINECTOMY,CERVICAL  07/2020   • LAPAROSCOPY,DIAGNOSTIC  1991    multiple   • LY Comment: occasionally    Drug use: No    Occ: . : . Children: 3.    Exercise: minimal.  Diet: watches minimally     REVIEW OF SYSTEMS:   GENERAL: feels well otherwise  SKIN: denies any unusual skin lesions  EYES:denies blurred vision or double vision Refill: 0  - buPROPion 150 MG Oral Tablet 24 Hr; Take 3 tablets (450 mg total) by mouth daily. Dispense: 90 tablet; Refill: 0    3. Attention deficit hyperactivity disorder (ADHD), other type  cpm   - Methylphenidate HCl 20 MG Oral Tab;  Take 1 tablet (20

## 2021-08-04 ENCOUNTER — TELEPHONE (OUTPATIENT)
Dept: FAMILY MEDICINE CLINIC | Facility: CLINIC | Age: 46
End: 2021-08-04

## 2021-08-04 NOTE — TELEPHONE ENCOUNTER
Medication: Oxycodone    Date of last refill: 6/21/21 (#90/0)  [place]      Last office visit: 7/29/21  Due back to clinic per last office note:  Sx scheduled  Date next office visit scheduled:    Future Appointments   D

## 2021-08-05 ENCOUNTER — TELEPHONE (OUTPATIENT)
Dept: FAMILY MEDICINE CLINIC | Facility: CLINIC | Age: 46
End: 2021-08-05

## 2021-08-05 RX ORDER — OXYCODONE HYDROCHLORIDE 10 MG/1
10 TABLET ORAL EVERY 4 HOURS PRN
Qty: 90 TABLET | Refills: 0 | Status: ON HOLD | OUTPATIENT
Start: 2021-08-05 | End: 2021-09-10

## 2021-08-05 NOTE — TELEPHONE ENCOUNTER
Pt has had orders since may / she will need to change surgical date unless neurosurgery feels it cannot wait   Pt needs full evaluation done prior to her 3rd c-spine surgery to ensure she will not fall and injure surgical site

## 2021-08-05 NOTE — TELEPHONE ENCOUNTER
Pt is having surgery 08/23/21    Pt states she called to schedule the appointments that dr. Maricarmen Matos wanted her too but they are not able to do them before surgery    She states she can get the echo done on the 24th after her surgery and the rest of the appointments and tests can not be done until September     Please advise

## 2021-08-05 NOTE — TELEPHONE ENCOUNTER
Spoke to Mary at [facility]. She said that they have earlier availability for the echo; pt may have taken what worked with her availability? Mary is going to call pt to reschedule her ECHO for before 8/18/21. As far as EEG, Mary said that earliest availability is 9/2/21 and that if this order is changed to STAT, that will not get pt in any earlier. I told Mary I will let Dr Paco Ramirez know. Please advise. Pt should have ECHO done prior to surgery, but not EEG.

## 2021-08-05 NOTE — TELEPHONE ENCOUNTER
Pt has pre-op with LE on 8/16/21    Dr. Geraldine Gongora, you had ordered an EEG, echo, and labs for pt at 8/3/21 OV. Is it ok for these to be done post sx?

## 2021-08-06 ENCOUNTER — TELEPHONE (OUTPATIENT)
Dept: SURGERY | Facility: CLINIC | Age: 46
End: 2021-08-06

## 2021-08-06 NOTE — TELEPHONE ENCOUNTER
Pt would like to speak to a nurse re: presurgical clearance. Pt is frustrated with PCP wanting an EEG and would like Bj Funez or a nurse to explain that her fall was due to her back/legs and not with her brain.   Pls call pt to discuss/advise

## 2021-08-06 NOTE — TELEPHONE ENCOUNTER
Pt has Echo scheduled 8/11/21,   However EEG won't be able to be done till 9/2/21 if ordered STAT      Surgery is 8/23/21  preop clearance with LE is 8/16/21    Routed message to neurosurgery- is pt able to postpone surgery?      Per LE:  she will need to change surgical date unless neurosurgery feels it cannot wait   Pt needs full evaluation done prior to her 3rd c-spine surgery to ensure she will not fall and injure surgical site

## 2021-08-07 NOTE — TELEPHONE ENCOUNTER
Carson Pickens will have to do what her PCP is asking. Her PCP is giving clearance and feels she needs a work up prior to giving it.      Curtistine Sever can ask Steff Chowdhury if he will override the need for PCP clearance and EEG

## 2021-08-09 NOTE — TELEPHONE ENCOUNTER
Pt's sx date and time have changed to 9/8/21 at 0730    Y Place case change request   Y Document new sx date in [facility] TE  Y Update outlook date/time  Y Update sx scheduling sheet  Y Update Prior auth team  Y Update flag in nursing pool  Y Call pt to inform

## 2021-08-09 NOTE — TELEPHONE ENCOUNTER
Received DME orders from [facility]. Faxed to Trent Norton and MIA with face sheet insurance card and office note.

## 2021-08-09 NOTE — TELEPHONE ENCOUNTER
Per Dr. Guille Shelton 8/5/21    \"Pt has had orders since may / she will need to change surgical date unless neurosurgery feels it cannot wait   Pt needs full evaluation done prior to her 3rd c-spine surgery to ensure she will not fall and injure surgical site\"

## 2021-08-09 NOTE — TELEPHONE ENCOUNTER
Received call back from patient and explained that she will need to proceed with EEG and rational. Instructed pt to reach out to PCP to complete. Rescheduling surgical date to 9/8/21 to give time to complete pre-op testing.  Will complete in \"Schedule Surg

## 2021-08-11 ENCOUNTER — HOSPITAL ENCOUNTER (OUTPATIENT)
Dept: CV DIAGNOSTICS | Facility: HOSPITAL | Age: 46
Discharge: HOME | End: 2021-08-11
Attending: FAMILY MEDICINE
Payer: MEDICAID

## 2021-08-11 DIAGNOSIS — R55 SYNCOPE, UNSPECIFIED SYNCOPE TYPE: ICD-10-CM

## 2021-08-11 PROCEDURE — 93306 TTE W/DOPPLER COMPLETE: CPT | Performed by: FAMILY MEDICINE

## 2021-08-16 ENCOUNTER — OFFICE VISIT (OUTPATIENT)
Dept: FAMILY MEDICINE CLINIC | Facility: CLINIC | Age: 46
End: 2021-08-16
Payer: MEDICAID

## 2021-08-16 VITALS
HEART RATE: 107 BPM | BODY MASS INDEX: 26.33 KG/M2 | HEIGHT: 65 IN | SYSTOLIC BLOOD PRESSURE: 102 MMHG | OXYGEN SATURATION: 98 % | DIASTOLIC BLOOD PRESSURE: 70 MMHG | WEIGHT: 158 LBS | RESPIRATION RATE: 18 BRPM

## 2021-08-16 DIAGNOSIS — F32.0 CURRENT MILD EPISODE OF MAJOR DEPRESSIVE DISORDER, UNSPECIFIED WHETHER RECURRENT (HCC): ICD-10-CM

## 2021-08-16 DIAGNOSIS — F39 MOOD DISORDER (HCC): ICD-10-CM

## 2021-08-16 DIAGNOSIS — F90.8 ATTENTION DEFICIT HYPERACTIVITY DISORDER (ADHD), OTHER TYPE: ICD-10-CM

## 2021-08-16 DIAGNOSIS — R55 SYNCOPE, UNSPECIFIED SYNCOPE TYPE: ICD-10-CM

## 2021-08-16 DIAGNOSIS — F41.9 ANXIETY: ICD-10-CM

## 2021-08-16 DIAGNOSIS — G43.109 MIGRAINE WITH AURA AND WITHOUT STATUS MIGRAINOSUS, NOT INTRACTABLE: ICD-10-CM

## 2021-08-16 DIAGNOSIS — M54.12 CERVICAL MYELOPATHY WITH CERVICAL RADICULOPATHY (HCC): ICD-10-CM

## 2021-08-16 DIAGNOSIS — G95.9 CERVICAL MYELOPATHY WITH CERVICAL RADICULOPATHY (HCC): ICD-10-CM

## 2021-08-16 DIAGNOSIS — Z01.818 PREOP EXAMINATION: Primary | ICD-10-CM

## 2021-08-16 DIAGNOSIS — J45.20 MILD INTERMITTENT ASTHMA WITHOUT COMPLICATION: ICD-10-CM

## 2021-08-16 PROCEDURE — 99243 OFF/OP CNSLTJ NEW/EST LOW 30: CPT | Performed by: FAMILY MEDICINE

## 2021-08-16 PROCEDURE — 3008F BODY MASS INDEX DOCD: CPT | Performed by: FAMILY MEDICINE

## 2021-08-16 PROCEDURE — 3074F SYST BP LT 130 MM HG: CPT | Performed by: FAMILY MEDICINE

## 2021-08-16 PROCEDURE — 3078F DIAST BP <80 MM HG: CPT | Performed by: FAMILY MEDICINE

## 2021-08-16 NOTE — TELEPHONE ENCOUNTER
Dr. Guille Shelton the order in chart for an EKG placed today however pt just had an Echo.   Notes talk about EEG, just to clarify is order supposed to be an EEG?

## 2021-08-16 NOTE — TELEPHONE ENCOUNTER
Consulted with Dr. Terrel Nyhan who is accepting of EEG being performed 9/7/21 and requesting order be modified to STAT read so we may proceed and not push surgery out further. Order placed by pt PCP Dr. Kenny Narayanan.   Routed to Dr. Kenny Narayanan to request order modificati

## 2021-08-16 NOTE — PROGRESS NOTES
Brandy Webster is a 40year old female who presents for preop clearance for   ANTERIOR CERVICAL DISCEMTOMY AND FUSION CERVICAL 6- CERVICAL 7, PLATE   REMOVAL FROM Brenita Goldberg Dr. Oleta Frame reqesting clearance Medication Sig Dispense Refill   • oxyCODONE 10 MG Oral Tab Take 1 tablet (10 mg total) by mouth every 4 (four) hours as needed for Pain. 90 tablet 0   • Ferrous Sulfate 324 (65 Fe) MG Oral Tab EC Take by mouth.      • citalopram hydrobromide 20 MG Oral T differently: Take 25 mg by mouth daily.  ) 60 tablet 0   • Dicyclomine HCl 20 MG Oral Tab Take 1 tablet (20 mg total) by mouth 4 (four) times daily before meals and nightly.  (Patient taking differently: Take 20 mg by mouth daily.  ) 120 tablet 0   • Albute cholesterol    • Hyperlipidemia    • IBS (irritable bowel syndrome)    • Irregular bowel habits    • Irregular menstrual cycle    • Itch of skin O    Occasionally on my legs, not due to dry skin   • Leaking of urine    • Lipid screening 09/17/2011   • Loss SURGICAL HISTORY      ganglion cyst removed left wrist    • OTHER SURGICAL HISTORY  10/30/2019    Cystoscopy- Dr. Salazar [facility]   [facility]    right foot   • TONSILLECTOMY     • TOTAL ABDOM HYSTERECTOMY        Famil 102/70   Pulse 107   Resp 18   Ht 5' 5\" (1.651 m)   Wt 158 lb (71.7 kg)   LMP 01/01/2004   SpO2 98%   BMI 26.29 kg/m²   Body mass index is 26.29 kg/m².    GENERAL: well developed, well nourished,in no apparent distress  SKIN: no rashes,no suspicious lesion Future    5. Attention deficit hyperactivity disorder (ADHD), other type    - CBC WITH DIFFERENTIAL WITH PLATELET; Future  - COMP METABOLIC PANEL (14); Future  - PTT, ACTIVATED;  Future  - PROTHROMBIN TIME (PT); Future  - MSSA AND MRSA CULTURE SCREEN; Futur

## 2021-08-16 NOTE — TELEPHONE ENCOUNTER
Pt calling stating that she can not have EEG performed until 9/7/21 and the concern is that this will not result in time to proceed with sx on 9/8/21. EEG ordered by PCP pre-op to ensure she will not fall and injure surgical site.      Informed pt that we w

## 2021-08-16 NOTE — TELEPHONE ENCOUNTER
Prior Authorization for inpatient surgery initiated with [employer]  Requesting coverage for:ACDF C6-7, plate removal from previous fusion  Date of Service: 9/8/2021   Inpatient days requested:  CPT codes: 15620,89037,20206 with [id number], [id number] has to g

## 2021-08-19 NOTE — TELEPHONE ENCOUNTER
Prior authorization request completed for: ACDF C6-7 plate removal  Authorization #[id number]  Authorization dates: 9/8/21  CPT codes approved: 39947,56838,[id number] 70122P3 no PA with Corky -given to [employer]  Number of visits/dates of service

## 2021-08-23 DIAGNOSIS — R11.0 NAUSEA: ICD-10-CM

## 2021-08-23 RX ORDER — ONDANSETRON 4 MG/1
4 TABLET, ORALLY DISINTEGRATING ORAL EVERY 8 HOURS PRN
Qty: 20 TABLET | Refills: 0 | Status: SHIPPED | OUTPATIENT
Start: 2021-08-23 | End: 2021-12-14

## 2021-08-25 ENCOUNTER — LAB ENCOUNTER (OUTPATIENT)
Dept: LAB | Facility: HOSPITAL | Age: 46
End: 2021-08-25
Attending: FAMILY MEDICINE
Payer: MEDICAID

## 2021-08-25 DIAGNOSIS — F32.0 CURRENT MILD EPISODE OF MAJOR DEPRESSIVE DISORDER, UNSPECIFIED WHETHER RECURRENT (HCC): ICD-10-CM

## 2021-08-25 DIAGNOSIS — J45.20 MILD INTERMITTENT ASTHMA WITHOUT COMPLICATION: ICD-10-CM

## 2021-08-25 DIAGNOSIS — F39 MOOD DISORDER (HCC): ICD-10-CM

## 2021-08-25 DIAGNOSIS — M54.12 CERVICAL MYELOPATHY WITH CERVICAL RADICULOPATHY (HCC): ICD-10-CM

## 2021-08-25 DIAGNOSIS — F41.9 ANXIETY: ICD-10-CM

## 2021-08-25 DIAGNOSIS — R55 SYNCOPE, UNSPECIFIED SYNCOPE TYPE: ICD-10-CM

## 2021-08-25 DIAGNOSIS — F90.8 ATTENTION DEFICIT HYPERACTIVITY DISORDER (ADHD), OTHER TYPE: ICD-10-CM

## 2021-08-25 DIAGNOSIS — G43.109 MIGRAINE WITH AURA AND WITHOUT STATUS MIGRAINOSUS, NOT INTRACTABLE: ICD-10-CM

## 2021-08-25 DIAGNOSIS — G95.9 CERVICAL MYELOPATHY WITH CERVICAL RADICULOPATHY (HCC): ICD-10-CM

## 2021-08-25 DIAGNOSIS — R30.0 DYSURIA: ICD-10-CM

## 2021-08-25 DIAGNOSIS — Z01.818 PREOP EXAMINATION: ICD-10-CM

## 2021-08-25 DIAGNOSIS — M54.12 CERVICAL RADICULOPATHY: ICD-10-CM

## 2021-08-25 LAB
ALBUMIN SERPL-MCNC: 4 G/DL (ref 3.4–5)
ALBUMIN/GLOB SERPL: 1.1 {RATIO} (ref 1–2)
ALP LIVER SERPL-CCNC: 92 U/L
ALT SERPL-CCNC: 38 U/L
ANION GAP SERPL CALC-SCNC: 5 MMOL/L (ref 0–18)
ANTIBODY SCREEN: NEGATIVE
APTT PPP: 27.6 SECONDS (ref 25.4–36.1)
APTT PPP: 28.1 SECONDS (ref 25.4–36.1)
AST SERPL-CCNC: 16 U/L (ref 15–37)
ATRIAL RATE: 79 BPM
BASOPHILS # BLD AUTO: 0.04 X10(3) UL (ref 0–0.2)
BASOPHILS NFR BLD AUTO: 0.9 %
BILIRUB SERPL-MCNC: 0.3 MG/DL (ref 0.1–2)
BILIRUB UR QL STRIP.AUTO: NEGATIVE
BUN BLD-MCNC: 12 MG/DL (ref 7–18)
CALCIUM BLD-MCNC: 10.1 MG/DL (ref 8.5–10.1)
CHLORIDE SERPL-SCNC: 105 MMOL/L (ref 98–112)
CLARITY UR REFRACT.AUTO: CLEAR
CO2 SERPL-SCNC: 29 MMOL/L (ref 21–32)
COLOR UR AUTO: YELLOW
CREAT BLD-MCNC: 1.16 MG/DL
EOSINOPHIL # BLD AUTO: 0.09 X10(3) UL (ref 0–0.7)
EOSINOPHIL NFR BLD AUTO: 1.9 %
ERYTHROCYTE [DISTWIDTH] IN BLOOD BY AUTOMATED COUNT: 15.3 %
GLOBULIN PLAS-MCNC: 3.6 G/DL (ref 2.8–4.4)
GLUCOSE BLD-MCNC: 100 MG/DL (ref 70–99)
GLUCOSE UR STRIP.AUTO-MCNC: NEGATIVE MG/DL
HCT VFR BLD AUTO: 40.6 %
HGB BLD-MCNC: 13.1 G/DL
HYALINE CASTS #/AREA URNS AUTO: PRESENT /LPF
IMM GRANULOCYTES # BLD AUTO: 0.01 X10(3) UL (ref 0–1)
IMM GRANULOCYTES NFR BLD: 0.2 %
INR BLD: 0.93 (ref 0.89–1.11)
INR BLD: 0.97 (ref 0.89–1.11)
KETONES UR STRIP.AUTO-MCNC: NEGATIVE MG/DL
LYMPHOCYTES # BLD AUTO: 1.62 X10(3) UL (ref 1–4)
LYMPHOCYTES NFR BLD AUTO: 34.7 %
M PROTEIN MFR SERPL ELPH: 7.6 G/DL (ref 6.4–8.2)
MCH RBC QN AUTO: 28.1 PG (ref 26–34)
MCHC RBC AUTO-ENTMCNC: 32.3 G/DL (ref 31–37)
MCV RBC AUTO: 87.1 FL
MONOCYTES # BLD AUTO: 0.49 X10(3) UL (ref 0.1–1)
MONOCYTES NFR BLD AUTO: 10.5 %
NEUTROPHILS # BLD AUTO: 2.42 X10 (3) UL (ref 1.5–7.7)
NEUTROPHILS # BLD AUTO: 2.42 X10(3) UL (ref 1.5–7.7)
NEUTROPHILS NFR BLD AUTO: 51.8 %
NITRITE UR QL STRIP.AUTO: NEGATIVE
OSMOLALITY SERPL CALC.SUM OF ELEC: 288 MOSM/KG (ref 275–295)
P AXIS: 52 DEGREES
P-R INTERVAL: 136 MS
PH UR STRIP.AUTO: 6 [PH] (ref 5–8)
PLATELET # BLD AUTO: 285 10(3)UL (ref 150–450)
POTASSIUM SERPL-SCNC: 4.6 MMOL/L (ref 3.5–5.1)
PROT UR STRIP.AUTO-MCNC: NEGATIVE MG/DL
PSA SERPL DL<=0.01 NG/ML-MCNC: 12.7 SECONDS (ref 12.2–14.5)
PSA SERPL DL<=0.01 NG/ML-MCNC: 13.1 SECONDS (ref 12.2–14.5)
Q-T INTERVAL: 396 MS
QRS DURATION: 78 MS
QTC CALCULATION (BEZET): 454 MS
R AXIS: 61 DEGREES
RBC # BLD AUTO: 4.66 X10(6)UL
RH BLOOD TYPE: POSITIVE
SODIUM SERPL-SCNC: 139 MMOL/L (ref 136–145)
SP GR UR STRIP.AUTO: 1.01 (ref 1–1.03)
T AXIS: 52 DEGREES
UROBILINOGEN UR STRIP.AUTO-MCNC: <2 MG/DL
VENTRICULAR RATE: 79 BPM
WBC # BLD AUTO: 4.7 X10(3) UL (ref 4–11)

## 2021-08-25 PROCEDURE — 87081 CULTURE SCREEN ONLY: CPT

## 2021-08-25 PROCEDURE — 85025 COMPLETE CBC W/AUTO DIFF WBC: CPT

## 2021-08-25 PROCEDURE — 80053 COMPREHEN METABOLIC PANEL: CPT

## 2021-08-25 PROCEDURE — 81001 URINALYSIS AUTO W/SCOPE: CPT

## 2021-08-25 PROCEDURE — 85730 THROMBOPLASTIN TIME PARTIAL: CPT

## 2021-08-25 PROCEDURE — 93010 ELECTROCARDIOGRAM REPORT: CPT | Performed by: INTERNAL MEDICINE

## 2021-08-25 PROCEDURE — 86900 BLOOD TYPING SEROLOGIC ABO: CPT

## 2021-08-25 PROCEDURE — 85610 PROTHROMBIN TIME: CPT

## 2021-08-25 PROCEDURE — 36415 COLL VENOUS BLD VENIPUNCTURE: CPT

## 2021-08-25 PROCEDURE — 86850 RBC ANTIBODY SCREEN: CPT

## 2021-08-25 PROCEDURE — 86901 BLOOD TYPING SEROLOGIC RH(D): CPT

## 2021-08-25 PROCEDURE — 93005 ELECTROCARDIOGRAM TRACING: CPT

## 2021-08-30 DIAGNOSIS — F39 MOOD DISORDER (HCC): ICD-10-CM

## 2021-08-31 RX ORDER — LAMOTRIGINE 25 MG/1
TABLET ORAL
Qty: 60 TABLET | Refills: 0 | Status: ON HOLD | OUTPATIENT
Start: 2021-08-31 | End: 2021-09-08

## 2021-09-03 NOTE — TELEPHONE ENCOUNTER
Per pt PCP Dr. Reddy Castorena 8/16/21    \"  ASSESSMENT AND PLAN:   Junie Mcghee is a 40year old female who presents for preop clearance.      1.  Preoperative examination  Cleared for surgery PENDING RESULTS   Stop ritalin 3 days before surgery   Hold a (PT); Future  - MSSA AND MRSA CULTURE SCREEN; Future  - TYPE AND SCREEN; Future  - EKG 12-LEAD; Future     8. Mood disorder ([facility] [address]     - CBC WITH DIFFERENTIAL WITH PLATELET; Future  - COMP METABOLIC PANEL (14); Future  - PTT, ACTIVATED;  Future  - PROTHROMBI

## 2021-09-05 ENCOUNTER — LAB ENCOUNTER (OUTPATIENT)
Dept: LAB | Facility: HOSPITAL | Age: 46
End: 2021-09-05
Attending: NEUROLOGICAL SURGERY
Payer: MEDICAID

## 2021-09-05 ENCOUNTER — LAB ENCOUNTER (OUTPATIENT)
Dept: LAB | Facility: HOSPITAL | Age: 46
End: 2021-09-05
Attending: FAMILY MEDICINE
Payer: MEDICAID

## 2021-09-05 DIAGNOSIS — R30.0 DYSURIA: ICD-10-CM

## 2021-09-05 DIAGNOSIS — Z01.818 PRE-OP TESTING: ICD-10-CM

## 2021-09-05 DIAGNOSIS — M54.12 CERVICAL RADICULOPATHY: ICD-10-CM

## 2021-09-05 DIAGNOSIS — R94.4 DECREASED GFR: ICD-10-CM

## 2021-09-05 LAB
ALBUMIN SERPL-MCNC: 3.8 G/DL (ref 3.4–5)
ALBUMIN/GLOB SERPL: 1 {RATIO} (ref 1–2)
ALP LIVER SERPL-CCNC: 106 U/L
ALT SERPL-CCNC: 69 U/L
ANION GAP SERPL CALC-SCNC: 7 MMOL/L (ref 0–18)
AST SERPL-CCNC: 28 U/L (ref 15–37)
BILIRUB SERPL-MCNC: 0.3 MG/DL (ref 0.1–2)
BUN BLD-MCNC: 12 MG/DL (ref 7–18)
CALCIUM BLD-MCNC: 9.8 MG/DL (ref 8.5–10.1)
CHLORIDE SERPL-SCNC: 107 MMOL/L (ref 98–112)
CO2 SERPL-SCNC: 27 MMOL/L (ref 21–32)
CREAT BLD-MCNC: 0.88 MG/DL
GLOBULIN PLAS-MCNC: 3.9 G/DL (ref 2.8–4.4)
GLUCOSE BLD-MCNC: 103 MG/DL (ref 70–99)
M PROTEIN MFR SERPL ELPH: 7.7 G/DL (ref 6.4–8.2)
OSMOLALITY SERPL CALC.SUM OF ELEC: 292 MOSM/KG (ref 275–295)
PATIENT FASTING Y/N/NP: YES
POTASSIUM SERPL-SCNC: 4.7 MMOL/L (ref 3.5–5.1)
SODIUM SERPL-SCNC: 141 MMOL/L (ref 136–145)

## 2021-09-05 PROCEDURE — 80053 COMPREHEN METABOLIC PANEL: CPT

## 2021-09-05 PROCEDURE — 36415 COLL VENOUS BLD VENIPUNCTURE: CPT

## 2021-09-05 PROCEDURE — 81015 MICROSCOPIC EXAM OF URINE: CPT

## 2021-09-06 LAB — SARS-COV-2 RNA RESP QL NAA+PROBE: NOT DETECTED

## 2021-09-07 ENCOUNTER — NURSE ONLY (OUTPATIENT)
Dept: ELECTROPHYSIOLOGY | Facility: HOSPITAL | Age: 46
End: 2021-09-07
Attending: FAMILY MEDICINE
Payer: MEDICAID

## 2021-09-07 DIAGNOSIS — R55 SYNCOPE, UNSPECIFIED SYNCOPE TYPE: ICD-10-CM

## 2021-09-07 DIAGNOSIS — J45.20 MILD INTERMITTENT ASTHMA WITHOUT COMPLICATION: ICD-10-CM

## 2021-09-07 PROCEDURE — 95819 EEG AWAKE AND ASLEEP: CPT | Performed by: OTHER

## 2021-09-07 NOTE — PROCEDURES
[facility], [address] [place] [address]      PATIENT'S NAME: Glomike Monday   ATTENDING PHYSICIAN: Houston Morales D.O.   PATIENT ACCOUNT #: [de-identified] LOCATION: [facility]   MEDICAL RECORD #: [id number] DATE OF BIRTH: [birth year]

## 2021-09-08 ENCOUNTER — ANESTHESIA (OUTPATIENT)
Dept: SURGERY | Facility: HOSPITAL | Age: 46
DRG: 472 | End: 2021-09-08
Payer: MEDICAID

## 2021-09-08 ENCOUNTER — HOSPITAL ENCOUNTER (INPATIENT)
Facility: HOSPITAL | Age: 46
LOS: 2 days | Discharge: HOME | DRG: 472 | End: 2021-09-10
Attending: NEUROLOGICAL SURGERY | Admitting: NEUROLOGICAL SURGERY
Payer: MEDICAID

## 2021-09-08 ENCOUNTER — ANESTHESIA EVENT (OUTPATIENT)
Dept: SURGERY | Facility: HOSPITAL | Age: 46
DRG: 472 | End: 2021-09-08
Payer: MEDICAID

## 2021-09-08 ENCOUNTER — APPOINTMENT (OUTPATIENT)
Dept: GENERAL RADIOLOGY | Facility: HOSPITAL | Age: 46
DRG: 472 | End: 2021-09-08
Attending: NEUROLOGICAL SURGERY
Payer: MEDICAID

## 2021-09-08 ENCOUNTER — APPOINTMENT (OUTPATIENT)
Dept: GENERAL RADIOLOGY | Facility: HOSPITAL | Age: 46
DRG: 472 | End: 2021-09-08
Attending: NURSE PRACTITIONER
Payer: MEDICAID

## 2021-09-08 DIAGNOSIS — M54.12 CERVICAL RADICULOPATHY: Primary | ICD-10-CM

## 2021-09-08 DIAGNOSIS — Z98.1 S/P CERVICAL SPINAL FUSION: ICD-10-CM

## 2021-09-08 DIAGNOSIS — M54.2 CERVICAL PAIN: ICD-10-CM

## 2021-09-08 DIAGNOSIS — J34.89 SINUS PAIN: ICD-10-CM

## 2021-09-08 DIAGNOSIS — K58.9 IRRITABLE BOWEL SYNDROME WITHOUT DIARRHEA: ICD-10-CM

## 2021-09-08 DIAGNOSIS — S12.9XXS PSEUDOARTHROSIS OF CERVICAL SPINE, SEQUELA: ICD-10-CM

## 2021-09-08 DIAGNOSIS — M48.02 CERVICAL STENOSIS OF SPINAL CANAL: ICD-10-CM

## 2021-09-08 DIAGNOSIS — R29.898 UPPER EXTREMITY WEAKNESS: ICD-10-CM

## 2021-09-08 DIAGNOSIS — M54.2 NECK PAIN: ICD-10-CM

## 2021-09-08 DIAGNOSIS — Z01.818 PRE-OP TESTING: ICD-10-CM

## 2021-09-08 LAB — GLUCOSE BLD-MCNC: 127 MG/DL (ref 70–99)

## 2021-09-08 PROCEDURE — 0RB30ZZ EXCISION OF CERVICAL VERTEBRAL DISC, OPEN APPROACH: ICD-10-PCS | Performed by: NEUROLOGICAL SURGERY

## 2021-09-08 PROCEDURE — 0RP104Z REMOVAL OF INTERNAL FIXATION DEVICE FROM CERVICAL VERTEBRAL JOINT, OPEN APPROACH: ICD-10-PCS | Performed by: NEUROLOGICAL SURGERY

## 2021-09-08 PROCEDURE — 3074F SYST BP LT 130 MM HG: CPT | Performed by: HOSPITALIST

## 2021-09-08 PROCEDURE — 76000 FLUOROSCOPY <1 HR PHYS/QHP: CPT | Performed by: NEUROLOGICAL SURGERY

## 2021-09-08 PROCEDURE — 3078F DIAST BP <80 MM HG: CPT | Performed by: HOSPITALIST

## 2021-09-08 PROCEDURE — 0RG10J1 FUSION OF CERVICAL VERTEBRAL JOINT WITH SYNTHETIC SUBSTITUTE, POSTERIOR APPROACH, POSTERIOR COLUMN, OPEN APPROACH: ICD-10-PCS | Performed by: NEUROLOGICAL SURGERY

## 2021-09-08 PROCEDURE — 3008F BODY MASS INDEX DOCD: CPT | Performed by: HOSPITALIST

## 2021-09-08 PROCEDURE — 99233 SBSQ HOSP IP/OBS HIGH 50: CPT | Performed by: HOSPITALIST

## 2021-09-08 DEVICE — BLOCK 7610305 MSTRGRFT MATRIX EXT 5CC
Type: IMPLANTABLE DEVICE | Site: SPINE CERVICAL | Status: FUNCTIONAL
Brand: MASTERGRAFT® MATRIX EXT

## 2021-09-08 DEVICE — SCREW G6624011 NOP S-D 4.0MM X 11MM
Type: IMPLANTABLE DEVICE | Site: SPINE CERVICAL | Status: FUNCTIONAL
Brand: DIVERGENCE® ANTERIOR CERVICAL FUSION SYSTEM

## 2021-09-08 RX ORDER — ONDANSETRON 2 MG/ML
4 INJECTION INTRAMUSCULAR; INTRAVENOUS EVERY 4 HOURS PRN
Status: ACTIVE | OUTPATIENT
Start: 2021-09-08 | End: 2021-09-09

## 2021-09-08 RX ORDER — POLYETHYLENE GLYCOL 3350 17 G/17G
17 POWDER, FOR SOLUTION ORAL DAILY PRN
Status: DISCONTINUED | OUTPATIENT
Start: 2021-09-08 | End: 2021-09-10

## 2021-09-08 RX ORDER — SENNOSIDES 8.6 MG
17.2 TABLET ORAL NIGHTLY
Status: DISCONTINUED | OUTPATIENT
Start: 2021-09-08 | End: 2021-09-10

## 2021-09-08 RX ORDER — LAMOTRIGINE 25 MG/1
50 TABLET ORAL DAILY
Status: DISCONTINUED | OUTPATIENT
Start: 2021-09-09 | End: 2021-09-10

## 2021-09-08 RX ORDER — MONTELUKAST SODIUM 10 MG/1
10 TABLET ORAL NIGHTLY
Status: DISCONTINUED | OUTPATIENT
Start: 2021-09-08 | End: 2021-09-10

## 2021-09-08 RX ORDER — SODIUM CHLORIDE, SODIUM LACTATE, POTASSIUM CHLORIDE, CALCIUM CHLORIDE 600; 310; 30; 20 MG/100ML; MG/100ML; MG/100ML; MG/100ML
INJECTION, SOLUTION INTRAVENOUS CONTINUOUS
Status: DISCONTINUED | OUTPATIENT
Start: 2021-09-08 | End: 2021-09-10

## 2021-09-08 RX ORDER — LAMOTRIGINE 25 MG/1
50 TABLET ORAL DAILY
COMMUNITY
End: 2021-10-04

## 2021-09-08 RX ORDER — FLUTICASONE PROPIONATE 50 MCG
SPRAY, SUSPENSION (ML) NASAL
Qty: 16 G | Refills: 0 | Status: SHIPPED | OUTPATIENT
Start: 2021-09-08 | End: 2021-09-13

## 2021-09-08 RX ORDER — HYDROMORPHONE HYDROCHLORIDE 1 MG/ML
0.4 INJECTION, SOLUTION INTRAMUSCULAR; INTRAVENOUS; SUBCUTANEOUS EVERY 2 HOUR PRN
Status: DISCONTINUED | OUTPATIENT
Start: 2021-09-08 | End: 2021-09-09

## 2021-09-08 RX ORDER — SODIUM CHLORIDE, SODIUM LACTATE, POTASSIUM CHLORIDE, CALCIUM CHLORIDE 600; 310; 30; 20 MG/100ML; MG/100ML; MG/100ML; MG/100ML
INJECTION, SOLUTION INTRAVENOUS CONTINUOUS PRN
Status: DISCONTINUED | OUTPATIENT
Start: 2021-09-08 | End: 2021-09-08 | Stop reason: SURG

## 2021-09-08 RX ORDER — ACETAMINOPHEN 500 MG
1000 TABLET ORAL ONCE
Status: ON HOLD | COMMUNITY
End: 2021-09-10

## 2021-09-08 RX ORDER — SODIUM CHLORIDE, SODIUM LACTATE, POTASSIUM CHLORIDE, CALCIUM CHLORIDE 600; 310; 30; 20 MG/100ML; MG/100ML; MG/100ML; MG/100ML
INJECTION, SOLUTION INTRAVENOUS CONTINUOUS
Status: DISCONTINUED | OUTPATIENT
Start: 2021-09-08 | End: 2021-09-08 | Stop reason: HOSPADM

## 2021-09-08 RX ORDER — SODIUM PHOSPHATE, DIBASIC AND SODIUM PHOSPHATE, MONOBASIC 7; 19 G/133ML; G/133ML
1 ENEMA RECTAL ONCE AS NEEDED
Status: DISCONTINUED | OUTPATIENT
Start: 2021-09-08 | End: 2021-09-10

## 2021-09-08 RX ORDER — DICYCLOMINE HCL 20 MG
TABLET ORAL
Qty: 120 TABLET | Refills: 0 | Status: SHIPPED | OUTPATIENT
Start: 2021-09-08 | End: 2021-10-07

## 2021-09-08 RX ORDER — ESCITALOPRAM OXALATE 10 MG/1
10 TABLET ORAL DAILY
Status: DISCONTINUED | OUTPATIENT
Start: 2021-09-08 | End: 2021-09-08 | Stop reason: SDUPTHER

## 2021-09-08 RX ORDER — MELATONIN
325 DAILY
Status: DISCONTINUED | OUTPATIENT
Start: 2021-09-09 | End: 2021-09-10

## 2021-09-08 RX ORDER — OXYCODONE HYDROCHLORIDE 10 MG/1
10 TABLET ORAL EVERY 4 HOURS PRN
Status: DISCONTINUED | OUTPATIENT
Start: 2021-09-08 | End: 2021-09-09

## 2021-09-08 RX ORDER — DIPHENHYDRAMINE HYDROCHLORIDE 50 MG/ML
12.5 INJECTION INTRAMUSCULAR; INTRAVENOUS AS NEEDED
Status: DISCONTINUED | OUTPATIENT
Start: 2021-09-08 | End: 2021-09-08 | Stop reason: HOSPADM

## 2021-09-08 RX ORDER — METHYLPHENIDATE HYDROCHLORIDE 10 MG/1
20 TABLET ORAL
Status: DISCONTINUED | OUTPATIENT
Start: 2021-09-09 | End: 2021-09-10

## 2021-09-08 RX ORDER — NEOSTIGMINE METHYLSULFATE 1 MG/ML
INJECTION INTRAVENOUS AS NEEDED
Status: DISCONTINUED | OUTPATIENT
Start: 2021-09-08 | End: 2021-09-08 | Stop reason: SURG

## 2021-09-08 RX ORDER — HYDROMORPHONE HYDROCHLORIDE 1 MG/ML
INJECTION, SOLUTION INTRAMUSCULAR; INTRAVENOUS; SUBCUTANEOUS
Status: COMPLETED
Start: 2021-09-08 | End: 2021-09-08

## 2021-09-08 RX ORDER — ZOLPIDEM TARTRATE 5 MG/1
5 TABLET ORAL NIGHTLY PRN
Status: DISCONTINUED | OUTPATIENT
Start: 2021-09-08 | End: 2021-09-10

## 2021-09-08 RX ORDER — ROCURONIUM BROMIDE 10 MG/ML
INJECTION, SOLUTION INTRAVENOUS AS NEEDED
Status: DISCONTINUED | OUTPATIENT
Start: 2021-09-08 | End: 2021-09-08 | Stop reason: SURG

## 2021-09-08 RX ORDER — CYCLOBENZAPRINE HCL 5 MG
5 TABLET ORAL EVERY 6 HOURS PRN
Status: DISCONTINUED | OUTPATIENT
Start: 2021-09-08 | End: 2021-09-09

## 2021-09-08 RX ORDER — DIAZEPAM 10 MG/1
10 TABLET ORAL EVERY 6 HOURS PRN
Status: DISCONTINUED | OUTPATIENT
Start: 2021-09-08 | End: 2021-09-09

## 2021-09-08 RX ORDER — HYDROMORPHONE HYDROCHLORIDE 1 MG/ML
0.8 INJECTION, SOLUTION INTRAMUSCULAR; INTRAVENOUS; SUBCUTANEOUS EVERY 2 HOUR PRN
Status: DISCONTINUED | OUTPATIENT
Start: 2021-09-08 | End: 2021-09-09

## 2021-09-08 RX ORDER — KETAMINE HYDROCHLORIDE 50 MG/ML
INJECTION, SOLUTION, CONCENTRATE INTRAMUSCULAR; INTRAVENOUS AS NEEDED
Status: DISCONTINUED | OUTPATIENT
Start: 2021-09-08 | End: 2021-09-08 | Stop reason: SURG

## 2021-09-08 RX ORDER — PROCHLORPERAZINE EDISYLATE 5 MG/ML
10 INJECTION INTRAMUSCULAR; INTRAVENOUS EVERY 6 HOURS PRN
Status: ACTIVE | OUTPATIENT
Start: 2021-09-08 | End: 2021-09-10

## 2021-09-08 RX ORDER — DOCUSATE SODIUM 100 MG/1
100 CAPSULE, LIQUID FILLED ORAL 2 TIMES DAILY
Status: DISCONTINUED | OUTPATIENT
Start: 2021-09-08 | End: 2021-09-10

## 2021-09-08 RX ORDER — HYDROMORPHONE HYDROCHLORIDE 1 MG/ML
0.2 INJECTION, SOLUTION INTRAMUSCULAR; INTRAVENOUS; SUBCUTANEOUS EVERY 2 HOUR PRN
Status: DISCONTINUED | OUTPATIENT
Start: 2021-09-08 | End: 2021-09-09

## 2021-09-08 RX ORDER — GLYCOPYRROLATE 0.2 MG/ML
INJECTION, SOLUTION INTRAMUSCULAR; INTRAVENOUS AS NEEDED
Status: DISCONTINUED | OUTPATIENT
Start: 2021-09-08 | End: 2021-09-08 | Stop reason: SURG

## 2021-09-08 RX ORDER — ALPRAZOLAM 0.25 MG/1
0.25 TABLET ORAL NIGHTLY PRN
Status: DISCONTINUED | OUTPATIENT
Start: 2021-09-08 | End: 2021-09-09

## 2021-09-08 RX ORDER — DEXTROSE MONOHYDRATE 25 G/50ML
50 INJECTION, SOLUTION INTRAVENOUS
Status: DISCONTINUED | OUTPATIENT
Start: 2021-09-08 | End: 2021-09-08 | Stop reason: HOSPADM

## 2021-09-08 RX ORDER — SODIUM CHLORIDE 9 MG/ML
INJECTION, SOLUTION INTRAVENOUS CONTINUOUS
Status: DISCONTINUED | OUTPATIENT
Start: 2021-09-08 | End: 2021-09-10

## 2021-09-08 RX ORDER — BISACODYL 10 MG
10 SUPPOSITORY, RECTAL RECTAL
Status: DISCONTINUED | OUTPATIENT
Start: 2021-09-08 | End: 2021-09-10

## 2021-09-08 RX ORDER — CEFAZOLIN SODIUM/WATER 2 G/20 ML
2 SYRINGE (ML) INTRAVENOUS ONCE
Status: COMPLETED | OUTPATIENT
Start: 2021-09-08 | End: 2021-09-08

## 2021-09-08 RX ORDER — FLUTICASONE PROPIONATE 50 MCG
2 SPRAY, SUSPENSION (ML) NASAL NIGHTLY
Status: DISCONTINUED | OUTPATIENT
Start: 2021-09-08 | End: 2021-09-10

## 2021-09-08 RX ORDER — ONDANSETRON 2 MG/ML
4 INJECTION INTRAMUSCULAR; INTRAVENOUS AS NEEDED
Status: DISCONTINUED | OUTPATIENT
Start: 2021-09-08 | End: 2021-09-08 | Stop reason: HOSPADM

## 2021-09-08 RX ORDER — DIPHENHYDRAMINE HYDROCHLORIDE 50 MG/ML
25 INJECTION INTRAMUSCULAR; INTRAVENOUS EVERY 4 HOURS PRN
Status: DISCONTINUED | OUTPATIENT
Start: 2021-09-08 | End: 2021-09-09

## 2021-09-08 RX ORDER — TOPIRAMATE 25 MG/1
TABLET ORAL
Qty: 540 TABLET | Refills: 0 | OUTPATIENT
Start: 2021-09-08

## 2021-09-08 RX ORDER — CEFAZOLIN SODIUM/WATER 2 G/20 ML
2 SYRINGE (ML) INTRAVENOUS EVERY 8 HOURS
Status: COMPLETED | OUTPATIENT
Start: 2021-09-08 | End: 2021-09-09

## 2021-09-08 RX ORDER — NALOXONE HYDROCHLORIDE 0.4 MG/ML
80 INJECTION, SOLUTION INTRAMUSCULAR; INTRAVENOUS; SUBCUTANEOUS AS NEEDED
Status: DISCONTINUED | OUTPATIENT
Start: 2021-09-08 | End: 2021-09-08 | Stop reason: HOSPADM

## 2021-09-08 RX ORDER — ESCITALOPRAM OXALATE 20 MG/1
20 TABLET ORAL DAILY
Status: DISCONTINUED | OUTPATIENT
Start: 2021-09-08 | End: 2021-09-08

## 2021-09-08 RX ORDER — MONTELUKAST SODIUM 10 MG/1
10 TABLET ORAL NIGHTLY
Qty: 30 TABLET | Refills: 11 | Status: SHIPPED | OUTPATIENT
Start: 2021-09-08

## 2021-09-08 RX ORDER — VANCOMYCIN HYDROCHLORIDE 1 G/20ML
INJECTION, POWDER, LYOPHILIZED, FOR SOLUTION INTRAVENOUS AS NEEDED
Status: DISCONTINUED | OUTPATIENT
Start: 2021-09-08 | End: 2021-09-08 | Stop reason: HOSPADM

## 2021-09-08 RX ORDER — DOXEPIN HYDROCHLORIDE 50 MG/1
1 CAPSULE ORAL DAILY
Status: DISCONTINUED | OUTPATIENT
Start: 2021-09-09 | End: 2021-09-10

## 2021-09-08 RX ORDER — ALBUTEROL SULFATE 90 UG/1
2 AEROSOL, METERED RESPIRATORY (INHALATION) EVERY 6 HOURS PRN
Status: DISCONTINUED | OUTPATIENT
Start: 2021-09-08 | End: 2021-09-10

## 2021-09-08 RX ORDER — OXYCODONE HYDROCHLORIDE 5 MG/1
5 TABLET ORAL EVERY 4 HOURS PRN
Status: DISCONTINUED | OUTPATIENT
Start: 2021-09-08 | End: 2021-09-09

## 2021-09-08 RX ORDER — ACETAMINOPHEN 325 MG/1
650 TABLET ORAL EVERY 4 HOURS PRN
Status: DISCONTINUED | OUTPATIENT
Start: 2021-09-08 | End: 2021-09-09

## 2021-09-08 RX ORDER — PRAVASTATIN SODIUM 10 MG
10 TABLET ORAL NIGHTLY
Status: DISCONTINUED | OUTPATIENT
Start: 2021-09-08 | End: 2021-09-10

## 2021-09-08 RX ORDER — ONDANSETRON 2 MG/ML
INJECTION INTRAMUSCULAR; INTRAVENOUS AS NEEDED
Status: DISCONTINUED | OUTPATIENT
Start: 2021-09-08 | End: 2021-09-08 | Stop reason: SURG

## 2021-09-08 RX ORDER — CALCIUM CARBONATE/VITAMIN D3 250-3.125
TABLET ORAL 2 TIMES DAILY
Status: DISCONTINUED | OUTPATIENT
Start: 2021-09-08 | End: 2021-09-10

## 2021-09-08 RX ORDER — MIDAZOLAM HYDROCHLORIDE 1 MG/ML
1 INJECTION INTRAMUSCULAR; INTRAVENOUS EVERY 5 MIN PRN
Status: DISCONTINUED | OUTPATIENT
Start: 2021-09-08 | End: 2021-09-08 | Stop reason: HOSPADM

## 2021-09-08 RX ORDER — DEXAMETHASONE SODIUM PHOSPHATE 4 MG/ML
VIAL (ML) INJECTION AS NEEDED
Status: DISCONTINUED | OUTPATIENT
Start: 2021-09-08 | End: 2021-09-08 | Stop reason: SURG

## 2021-09-08 RX ORDER — DICYCLOMINE HCL 20 MG
20 TABLET ORAL
Status: DISCONTINUED | OUTPATIENT
Start: 2021-09-08 | End: 2021-09-10

## 2021-09-08 RX ORDER — HYDROMORPHONE HYDROCHLORIDE 1 MG/ML
0.4 INJECTION, SOLUTION INTRAMUSCULAR; INTRAVENOUS; SUBCUTANEOUS EVERY 5 MIN PRN
Status: DISCONTINUED | OUTPATIENT
Start: 2021-09-08 | End: 2021-09-08 | Stop reason: HOSPADM

## 2021-09-08 RX ORDER — UBIDECARENONE 75 MG
100 CAPSULE ORAL DAILY
Status: DISCONTINUED | OUTPATIENT
Start: 2021-09-09 | End: 2021-09-10

## 2021-09-08 RX ORDER — ONDANSETRON 4 MG/1
4 TABLET, ORALLY DISINTEGRATING ORAL EVERY 8 HOURS PRN
Status: DISCONTINUED | OUTPATIENT
Start: 2021-09-08 | End: 2021-09-10

## 2021-09-08 RX ORDER — LIDOCAINE HYDROCHLORIDE 10 MG/ML
INJECTION, SOLUTION EPIDURAL; INFILTRATION; INTRACAUDAL; PERINEURAL AS NEEDED
Status: DISCONTINUED | OUTPATIENT
Start: 2021-09-08 | End: 2021-09-08 | Stop reason: SURG

## 2021-09-08 RX ORDER — TOPIRAMATE 25 MG/1
50 TABLET ORAL 2 TIMES DAILY
Status: DISCONTINUED | OUTPATIENT
Start: 2021-09-08 | End: 2021-09-10

## 2021-09-08 RX ORDER — MIDAZOLAM HYDROCHLORIDE 1 MG/ML
INJECTION INTRAMUSCULAR; INTRAVENOUS AS NEEDED
Status: DISCONTINUED | OUTPATIENT
Start: 2021-09-08 | End: 2021-09-08 | Stop reason: SURG

## 2021-09-08 RX ORDER — DIPHENHYDRAMINE HCL 25 MG
25 CAPSULE ORAL EVERY 4 HOURS PRN
Status: DISCONTINUED | OUTPATIENT
Start: 2021-09-08 | End: 2021-09-09

## 2021-09-08 RX ADMIN — KETAMINE HYDROCHLORIDE 25 MG: 50 INJECTION, SOLUTION, CONCENTRATE INTRAMUSCULAR; INTRAVENOUS at 08:36:00

## 2021-09-08 RX ADMIN — GLYCOPYRROLATE 0.3 MG: 0.2 INJECTION, SOLUTION INTRAMUSCULAR; INTRAVENOUS at 11:48:00

## 2021-09-08 RX ADMIN — ONDANSETRON 4 MG: 2 INJECTION INTRAMUSCULAR; INTRAVENOUS at 11:42:00

## 2021-09-08 RX ADMIN — ROCURONIUM BROMIDE 20 MG: 10 INJECTION, SOLUTION INTRAVENOUS at 10:29:00

## 2021-09-08 RX ADMIN — SODIUM CHLORIDE, SODIUM LACTATE, POTASSIUM CHLORIDE, CALCIUM CHLORIDE: 600; 310; 30; 20 INJECTION, SOLUTION INTRAVENOUS at 08:10:00

## 2021-09-08 RX ADMIN — SODIUM CHLORIDE, SODIUM LACTATE, POTASSIUM CHLORIDE, CALCIUM CHLORIDE: 600; 310; 30; 20 INJECTION, SOLUTION INTRAVENOUS at 07:58:00

## 2021-09-08 RX ADMIN — CEFAZOLIN SODIUM/WATER 2 G: 2 G/20 ML SYRINGE (ML) INTRAVENOUS at 08:18:00

## 2021-09-08 RX ADMIN — KETAMINE HYDROCHLORIDE 25 MG: 50 INJECTION, SOLUTION, CONCENTRATE INTRAMUSCULAR; INTRAVENOUS at 10:55:00

## 2021-09-08 RX ADMIN — NEOSTIGMINE METHYLSULFATE 4 MG: 1 INJECTION INTRAVENOUS at 11:49:00

## 2021-09-08 RX ADMIN — MIDAZOLAM HYDROCHLORIDE 2 MG: 1 INJECTION INTRAMUSCULAR; INTRAVENOUS at 08:00:00

## 2021-09-08 RX ADMIN — ROCURONIUM BROMIDE 50 MG: 10 INJECTION, SOLUTION INTRAVENOUS at 08:03:00

## 2021-09-08 RX ADMIN — SODIUM CHLORIDE, SODIUM LACTATE, POTASSIUM CHLORIDE, CALCIUM CHLORIDE: 600; 310; 30; 20 INJECTION, SOLUTION INTRAVENOUS at 11:00:00

## 2021-09-08 RX ADMIN — ROCURONIUM BROMIDE 20 MG: 10 INJECTION, SOLUTION INTRAVENOUS at 08:56:00

## 2021-09-08 RX ADMIN — LIDOCAINE HYDROCHLORIDE 50 MG: 10 INJECTION, SOLUTION EPIDURAL; INFILTRATION; INTRACAUDAL; PERINEURAL at 08:01:00

## 2021-09-08 RX ADMIN — DEXAMETHASONE SODIUM PHOSPHATE 8 MG: 4 MG/ML VIAL (ML) INJECTION at 09:08:00

## 2021-09-08 RX ADMIN — SODIUM CHLORIDE, SODIUM LACTATE, POTASSIUM CHLORIDE, CALCIUM CHLORIDE: 600; 310; 30; 20 INJECTION, SOLUTION INTRAVENOUS at 12:09:00

## 2021-09-08 NOTE — PROGRESS NOTES
This does not seem to be a culture reflex - please check why this was done  Future Appointments  9/24/2021  10:30 AM   JOSH Olivia           [facility]  10/21/2021 2:00 PM    MD TED Steele           [facility]

## 2021-09-08 NOTE — ANESTHESIA PREPROCEDURE EVALUATION
PRE-OP EVALUATION    Patient Name: Jetta Blizzard    Admit Diagnosis: S/P cervical spinal fusion [Z98.1]  Pseudoarthrosis of cervical spine, sequela [S12. 9XXS]  Cervical pain [M54.2]  Upper extremity weakness [R29.898]  Neck pain [M54.2]  Cervical stenos tablet (20 mg total) by mouth daily. , Disp: 90 tablet, Rfl: 0, 9/8/2021 at 0330  Methylphenidate HCl 20 MG Oral Tab, Take 1 tablet (20 mg total) by mouth 2 (two) times daily. , Disp: 60 tablet, Rfl: 0, 9/7/2021 at 1300  citalopram hydrobromide 40 MG Oral Ta a month at Unknown time  Insulin Pen Needle (BD PEN NEEDLE YANDY U/F) 32G X 4 MM Does not apply Misc, Inject 1 each into the skin daily. , Disp: 100 each, Rfl: 3  Multiple Vitamin Oral Tab, Take 1 tablet by mouth daily. , Disp: , Rfl: , 7/2/2021  Cyanocobalam • HYSTEROSCOPY,ABLATION ENDOMETRIUM     • LAMINECTOMY,CERVICAL  07/2020   • LAPAROSCOPY,DIAGNOSTIC  1991    multiple   • LYSIS OF ADHESIONS     • VENKATESH LOCALIZATION WIRE 1 SITE RIGHT (CPT=19281)      in her 20's   • OTHER Right 02/2018    ARTHROSCOPIC ACRO dentition, with several teeth appearing vulnerable. None loose per patient. Pulmonary    Pulmonary exam normal.                 Other findings            ASA: 2   Plan: general  NPO status verified and patient meets guidelines.     Post-procedure

## 2021-09-08 NOTE — ANESTHESIA POSTPROCEDURE EVALUATION
[facility] Patient Status:  Inpatient   Age/Gender 40year old female MRN [id number]   [facility] SURGERY Attending Trever Vera MD   [address] Day # 0 PCP Jon Malcolm DO       Anesthesia Post-op Note    ANTERIOR CER

## 2021-09-08 NOTE — OPERATIVE REPORT
[facility]    OPERATIVE REPORT    Patient:  Gilbert Troy;  YOB: 1975     CSN:  [id number]; Medical Record Number:  [id number]    Admission Date:  9/8/2021 Operation Date:  9/8/2021    . ..........................     Operating Physician with appropriate physiological monitoring, sequential compression boots, and Castano catheter all utilized on the operative table. Careful shaving of her shoulders and positioning of C-arm fluoroscopy was carried out.   The anterior cervical region was prepp purchase despite this the device felt snug and. A 4.0 x 13 mm self drilling screw was placed hole left side of the device with a good angle available and very acceptable final fluoroscopic position with good bone purchase.   Some of the master graft matrix

## 2021-09-08 NOTE — CONSULTS
[facility]  [address] Patient Status:  Outpatient in a Bed    1975 MRN [id number]   [facility] 3SW-A Attending Peter Uriostegui MD   [address] Day # 1 PCP Josephine Way DO     Reason for consult: Medical managem hyperthermia     patient's son at 4years of age - 2006   • Mastodynia    • Menses painful    • Migraines    • Nausea 2weeks   • Night sweats    • Other screening mammogram 06/11/2012   • Pain in joints    • Pain with bowel movements Years   • Pap smear fo about 22 years ago. Her smoking use included cigarettes. She smoked 0.00 packs per day for 10.00 years. She has never used smokeless tobacco. She reports current alcohol use. She reports that she does not use drugs.     Family History:   Family History   Pr as needed. , Disp: 30 tablet, Rfl: 0  Lovastatin 10 MG Oral Tab, Take 1 tablet (10 mg total) by mouth nightly.  AT BEDTIME, Disp: 90 tablet, Rfl: 0  Montelukast Sodium (SINGULAIR) 10 MG Oral Tab, Take 1 tablet (10 mg total) by mouth nightly., Disp: 30 tablet sounds. No rebound, guarding or organomegaly. Neurologic: No focal neurological deficits. CNII-XII grossly intact. Musculoskeletal: Moves all extremities. Extremities: No edema or cyanosis. Integument: No rashes or lesions.    Psychiatric: Appropriate m with patient at bedside.     Lashell Ruiz, DO  9/8/2021

## 2021-09-08 NOTE — ANESTHESIA PROCEDURE NOTES
Peripheral IV  Date/Time: 9/8/2021 8:09 AM  Inserted by:  Taya Stanton MD    Placement  Needle size: 20 G  Laterality: right  Location: hand  Local anesthetic: none  Site prep: alcohol  Technique: anatomical landmarks  Attempts: 1

## 2021-09-08 NOTE — ANESTHESIA PROCEDURE NOTES
Airway  Date/Time: 9/8/2021 8:03 AM  Urgency: elective      General Information and Staff    Patient location during procedure: OR  Anesthesiologist: Jose L Lin MD  Performed: anesthesiologist     Indications and Patient Condition  Indications

## 2021-09-08 NOTE — TELEPHONE ENCOUNTER
Denied Rx for Topiramate 25mg     Patient needs follow up appointment for further refills on medication. Sent patient message via [employer] to schedule follow up appointment for refills.

## 2021-09-08 NOTE — PLAN OF CARE
Pt A & O x4, on RA. /IS. Teds/SCDs. Regular diet. Pt denies numbness/tingling BUE. Aspen collar on. Coverlet dressing CDI. PT/OT daniel jain. IVF. Eyad. Last BM 9/8. Family at bedside. Will continue to monitor.

## 2021-09-09 ENCOUNTER — APPOINTMENT (OUTPATIENT)
Dept: GENERAL RADIOLOGY | Facility: HOSPITAL | Age: 46
DRG: 472 | End: 2021-09-09
Attending: NURSE PRACTITIONER
Payer: MEDICAID

## 2021-09-09 LAB
ATRIAL RATE: 84 BPM
HCT VFR BLD AUTO: 29.9 %
HGB BLD-MCNC: 9.6 G/DL
P AXIS: -6 DEGREES
P-R INTERVAL: 124 MS
Q-T INTERVAL: 390 MS
QRS DURATION: 72 MS
QTC CALCULATION (BEZET): 460 MS
R AXIS: 57 DEGREES
T AXIS: 66 DEGREES
VENTRICULAR RATE: 84 BPM

## 2021-09-09 PROCEDURE — 72040 X-RAY EXAM NECK SPINE 2-3 VW: CPT | Performed by: NURSE PRACTITIONER

## 2021-09-09 PROCEDURE — 99232 SBSQ HOSP IP/OBS MODERATE 35: CPT | Performed by: HOSPITALIST

## 2021-09-09 RX ORDER — DIAZEPAM 2 MG/1
2 TABLET ORAL EVERY 6 HOURS
Status: DISCONTINUED | OUTPATIENT
Start: 2021-09-09 | End: 2021-09-10

## 2021-09-09 RX ORDER — CYCLOBENZAPRINE HCL 5 MG
5 TABLET ORAL EVERY 6 HOURS
Status: DISCONTINUED | OUTPATIENT
Start: 2021-09-09 | End: 2021-09-10

## 2021-09-09 RX ORDER — OXYCODONE HYDROCHLORIDE 10 MG/1
10 TABLET ORAL EVERY 4 HOURS
Status: DISCONTINUED | OUTPATIENT
Start: 2021-09-09 | End: 2021-09-10

## 2021-09-09 RX ORDER — ALPRAZOLAM 0.25 MG/1
0.25 TABLET ORAL NIGHTLY
Status: DISCONTINUED | OUTPATIENT
Start: 2021-09-09 | End: 2021-09-10

## 2021-09-09 RX ORDER — HYDROMORPHONE HYDROCHLORIDE 1 MG/ML
0.4 INJECTION, SOLUTION INTRAMUSCULAR; INTRAVENOUS; SUBCUTANEOUS
Status: DISCONTINUED | OUTPATIENT
Start: 2021-09-09 | End: 2021-09-10

## 2021-09-09 NOTE — OCCUPATIONAL THERAPY NOTE
OCCUPATIONAL THERAPY EVALUATION - INPATIENT     Room Number: 352/352-A  Evaluation Date: 9/9/2021  Type of Evaluation: Initial  Presenting Problem: s/p C6-7 ACDF 9/8/21    Physician Order: Peyman Lara Consult to Occupational Therapy  Reason for Therapy: ADL/IADL Dy Irregular bowel habits    • Irregular menstrual cycle    • Itch of skin O    Occasionally on my legs, not due to dry skin   • Leaking of urine    • Lipid screening 09/17/2011   • Loss of appetite    • Lump or mass in breast    • Malaise    • Malignant hype wrist    • OTHER SURGICAL HISTORY  10/30/2019    Cystoscopy- Dr. Jerrell Aguilar   • [address]  2005    right foot   • TONSILLECTOMY     • TOTAL ABDOM HYSTERECTOMY         OCCUPATIONAL PROFILE    HOME SITUATION  Type of Home: formally tested due to severe pain with movement, at least 3/5    COORDINATION  Gross Motor    [facility]    Fine Motor    WFL with increased time     ADDITIONAL TESTS                                    NEUROLOGICAL FINDINGS  Neurological Findings: None chair via RW and CGA; sitting via CGA with safety cues for hand placement, brief intro to collar management and importance of maintaining skin integrity with good verbal understanding, patient reports familiar as has been wearing collar at home pre-op, rey nor modifications of tasks    Clinical Decision Making LOW - Analysis of occupational profile, problem-focused assessments, limited treatment options    Overall Complexity LOW     OT Discharge Recommendations: Home; Intermittent Supervision  OT Device Recom

## 2021-09-09 NOTE — PROGRESS NOTES
[facility]  Neurosurgery Progress Note    Mina Alexander Patient Status:  Outpatient in a Bed    1975 MRN [id number]   [facility] 3SW-A Attending Emi Cervantes MD   [address] Day # 1 PCP Uriel Ingram DO     Chief Complaint:  No c

## 2021-09-09 NOTE — PROGRESS NOTES
Pain Service    39 yo POD#1 s/p C6-7 ACDF 9/8/21  Spoke with RN, pain is controlled now that medications that were ordered are scheduled. Consult not needed.     Bj Lancaster, RN

## 2021-09-09 NOTE — PROGRESS NOTES
Reviewed swallowing precautions and collar use. Reviewed indications, side effects of pain medication/narcotics and constipation prevention.  Stressed importance of increased fluids/roughage in diet, continued use stool softeners along with laxatives and vanegas

## 2021-09-09 NOTE — PLAN OF CARE
A/O VSS on room air. Patient denies numbness and tingling. Taking oxy and IV dilaudid for pain. Aspen collar on and aligned. Coverlet dressing to anterior neck clean dry and intact. PT to see tomorrow. Plan of care reviewed. Bed alarm on.  Call light within

## 2021-09-09 NOTE — PROGRESS NOTES
[facility]  Progress Note     Paralee Aleksandar Patient Status:  Outpatient in a Bed    1975 MRN [id number]   [facility] 3SW-A Attending Velia Palma MD   Hosp Day # 1 PCP Malon Crigler, DO     Chief Complaint: Medical manage Pro-Calcitonin  No results for input(s): PCT in the last 168 hours. Cardiac  No results for input(s): TROP, PBNP in the last 168 hours. Creatinine Kinase  No results for input(s): CK in the last 168 hours.     Inflammatory Markers  No results fo discontinue isolation: yes     Will the patient be referred to TCC on discharge?: no  Estimated date of discharge: tbd  Discharge is dependent on: clinical improvement  At this point Ms. Tip Chin is expected to be discharge to: home    Plan of care discussed

## 2021-09-09 NOTE — PHYSICAL THERAPY NOTE
PHYSICAL THERAPY EVALUATION - INPATIENT     Room Number: 352/352-A  Evaluation Date: 9/9/2021  Type of Evaluation: Initial  Physician Order: PT Eval and Treat    Presenting Problem: s/p C6-7 ACDF 9/8/21  Reason for Therapy: Mobility Dysfunction and D of appetite    • Lump or mass in breast    • Malaise    • Malignant hyperthermia     patient's son at 3years of age - 2006   • Mastodynia    • Menses painful    • Migraines    • Nausea 2weeks   • Night sweats    • Other screening mammogram 06/11/2012   • HYSTERECTOMY         HOME SITUATION  Type of Home: House   Home Layout: One level  Stairs to Enter : 1     Stairs to Bedroom: 0       Lives With: Spouse (two adult sons)  Drives: No (not since July of last year)  Patient Owned Equipment:  (quad cane, rw) Need to walk in hospital room?: A Little   -   Climbing 3-5 steps with a railing?: A Little       AM-PAC Score:  Raw Score: 18   Approx Degree of Impairment: 46.58%   Standardized Score (AM-PAC Scale): 43.63   CMS Modifier (G-Code): CK    FUNCTIONAL ABILI baseline and would benefit from skilled inpatient PT to address the above deficits to assist patient in returning to prior to level of function.     Pt educated in spine precautions, Aspen collar management, bed mobility via log roll technique, proper body

## 2021-09-09 NOTE — PROGRESS NOTES
09/09/21 1527   Over the last 2 weeks, how often have you been bothered by any of the following problems?    Little interest or pleasure in doing things 3  (Pt reports things she used to like feel like a \"chore\")   Feeling down, depressed, or hopeless Ajay Hutchins MD   Hosp Day # 1 PCP DO KARINE James(subjective) Met with pt regarding PHQ-9 assessment. Upon admission, pt completed PHQ-4 assessment, resulting score was a 12 due to pt reporting increased anxiety and depression symptoms recently. outpatient therapy and psychiatry.        DUSTIN Hartmann  9/9/2021  3:31 PM

## 2021-09-09 NOTE — PLAN OF CARE
A&O x 4, denies N/T to KARLIE, states pain to Rt shoulder 10/10, Dilaudid 0.4 mg x 1, pain consult ordered per Dr. Rosetta Cranker, seen by Erika MARTIN , see scheduled po pain meds, explained meds & anesthesiologist to see later today, Pradeep Curran in place,new padding in

## 2021-09-10 VITALS
OXYGEN SATURATION: 96 % | TEMPERATURE: 99 F | RESPIRATION RATE: 18 BRPM | DIASTOLIC BLOOD PRESSURE: 72 MMHG | WEIGHT: 157 LBS | HEIGHT: 65 IN | HEART RATE: 84 BPM | BODY MASS INDEX: 26.16 KG/M2 | SYSTOLIC BLOOD PRESSURE: 109 MMHG

## 2021-09-10 PROCEDURE — 99232 SBSQ HOSP IP/OBS MODERATE 35: CPT | Performed by: HOSPITALIST

## 2021-09-10 RX ORDER — NALOXONE HYDROCHLORIDE 4 MG/.1ML
4 SPRAY, METERED NASAL AS NEEDED
Qty: 1 KIT | Refills: 0 | Status: SHIPPED | OUTPATIENT
Start: 2021-09-10

## 2021-09-10 RX ORDER — OXYCODONE HYDROCHLORIDE 10 MG/1
10 TABLET ORAL EVERY 4 HOURS PRN
Qty: 90 TABLET | Refills: 0 | Status: SHIPPED | OUTPATIENT
Start: 2021-09-10 | End: 2021-09-24

## 2021-09-10 RX ORDER — CYCLOBENZAPRINE HCL 5 MG
5 TABLET ORAL EVERY 6 HOURS PRN
Qty: 45 TABLET | Refills: 0 | Status: SHIPPED | OUTPATIENT
Start: 2021-09-10 | End: 2021-09-27

## 2021-09-10 RX ORDER — DIAZEPAM 2 MG/1
2 TABLET ORAL EVERY 6 HOURS PRN
Qty: 60 TABLET | Refills: 0 | Status: SHIPPED | OUTPATIENT
Start: 2021-09-10

## 2021-09-10 NOTE — PLAN OF CARE
A/O VSS on room air. Patient denies numbness and tingling. Taking scheduled pain meds. No IV dilaudid given this shift. Aspen collar on and aligned. Coverlet dressing to anterior neck clean dry and intact. PT to see tomorrow. Plan of care reviewed.  Bed ala

## 2021-09-10 NOTE — DISCHARGE SUMMARY
[facility]  Discharge Summary    Gilbert Troy Patient Status:  Inpatient    1975 MRN [id number]   [facility] 3SW-A Attending No att. providers found   Hosp Day # 2 PCP Meme Mcdonald DO     Date of Admission: 2021    Date of Pseudoarthrosis of cervical spine    Procedures: ANTERIOR CERVICAL DISCECTOMY AND FUSION CERVICAL 6- CERVICAL 7 WITH INTERBODY DEVICE, REMOVAL OF SURGICAL SCREW RIGHT C6     History of Present Illness: Previous laminoplasty which was eventually revised to Tab  TAKE 1 TABLET(20 MG) BY MOUTH FOUR TIMES DAILY BEFORE MEALS AND AT NIGHT, Normal, Disp-120 tablet, R-0    lamoTRIgine 25 MG Oral Tab  Take 50 mg by mouth daily. , Historical    ondansetron 4 MG Oral Tablet Dispersible  Take 1 tablet (4 mg total) by kai by mouth 4 (four) times daily as needed., Normal, Disp-40 capsule, R-1    Cyanocobalamin (VITAMIN B12 OR)  Take 1 tablet by mouth daily.   , Historical    Melatonin 10 MG Oral Cap  Take 10 mg by mouth nightly.  , Historical    Calcium Carbonate-Vitamin D (C

## 2021-09-10 NOTE — PROGRESS NOTES
Reviewed collar use and swallowing precautions again. Reviewed prevention of constipation side effect  from narcotics. Teachback done again on ankle pumps and incentive spriometry use.  Reviewed incision care, showering and when to contact surgeon for [place]

## 2021-09-10 NOTE — PLAN OF CARE
[id number] Romana Bennett for discharge. Valium held this afternoon for sleepiness. BP WNL after 3  Checks.  Pt clear for dc home

## 2021-09-10 NOTE — PROGRESS NOTES
[facility]  Progress Note     Maliksamy Avila Patient Status:  Outpatient in a Bed    1975 MRN [id number]   [facility] 3SW-A Attending Adele Vasquez MD   [address] Day # 2 PCP Ruma Saavedra DO     Chief Complaint: Medical manage COVID19 Not Detected 07/03/2020       Pro-Calcitonin  No results for input(s): PCT in the last 168 hours. Cardiac  No results for input(s): TROP, PBNP in the last 168 hours. Creatinine Kinase  No results for input(s): CK in the last 168 hours.     Inf Okay for discharge if blood pressure remains stable by this afternoon.     Quality:  · DVT Prophylaxis: SCD's  · CODE status: Full  · Castano: N/A  · Central line: N/A  · If COVID testing is negative, may discontinue isolation: yes     Will the patient be ref

## 2021-09-10 NOTE — OCCUPATIONAL THERAPY NOTE
OCCUPATIONAL THERAPY TREATMENT NOTE - INPATIENT     Room Number: 352/352-A  Session: 1   Number of Visits to Meet Established Goals: 2    Presenting Problem: s/p C6-7 ACDF 9/8/21    History related to current admission: Patient is 40year old female admitt skin   • Leaking of urine    • Lipid screening 09/17/2011   • Loss of appetite    • Lump or mass in breast    • Malaise    • Malignant hyperthermia     patient's son at 4years of age - 2006   • Mastodynia    • Menses painful    • Migraines    • Nausea 2we RELEASE  2005    right foot   • TONSILLECTOMY     • TOTAL ABDOM HYSTERECTOMY         SUBJECTIVE  \"I'm a little sore. \"    Patient self-stated goal is to go home.     OBJECTIVE  Precautions: Spine;Cervical brace;Bed/chair alarm    WEIGHT BEARING RESTRICTION Tolerates functional mobility to/from bathroom and transfers to chair with supervision. Pt able to achieve cross leg sitting to don and doff kevin socks and cedricko's adequate standing balance with RW when simulating pants management and toileting tasks.  Disc supervision     ADDITIONAL GOALS  Patient will recall all spinal precautions and incorporate into ADLs  Patient will don/doff collar with supervision     ------------------------------------------------------------------------------------------------------

## 2021-09-10 NOTE — PHYSICAL THERAPY NOTE
Attempted to see Pt this PM - RN aware of attempt. Pt up walking at anna with use of personal RW. Pt denied further mobility concerns, and awaiting her son's arrival for transport to home. Will sign off at this time.

## 2021-09-10 NOTE — PROGRESS NOTES
[facility]  Neurosurgery Progress Note    Osmin Loaiza Patient Status:  Inpatient    1975 MRN [id number]   [facility] 3SW-A Attending Trever Vera MD   [facility] Day # 2 PCP Jon Malcolm DO     Chief Complaint:  No chief compl services  Pain medications as ordered      MARIO Alcocer [facility]  9/10/2021, 7:55 AM      Pt seen and examined with np  Case discussed  Doing better  Had some bp issues  Ok to discharge if medical issues cleared

## 2021-09-10 NOTE — PLAN OF CARE
A&o x 4, BP this am =89/57, states slightly lightheaded, seen by Dr. Knowles Farm -0.9 NS bolus given x 1, will recheck bp in 1 hr, states pain to Rt shoulder , see prn & scheduled pain meds, Aspen Collar in place- padding replaced yesterday, updated on plan

## 2021-09-11 DIAGNOSIS — J34.89 SINUS PAIN: ICD-10-CM

## 2021-09-13 RX ORDER — FLUTICASONE PROPIONATE 50 MCG
SPRAY, SUSPENSION (ML) NASAL
Qty: 16 G | Refills: 0 | Status: SHIPPED | OUTPATIENT
Start: 2021-09-13

## 2021-09-17 ENCOUNTER — TELEPHONE (OUTPATIENT)
Dept: SURGERY | Facility: CLINIC | Age: 46
End: 2021-09-17

## 2021-09-17 NOTE — TELEPHONE ENCOUNTER
S:  Pt calling in due to concern with some symptoms    B: Pt had an ACDF on 9/8/21 with Dr. Elenita Buchanan      A:  Pt reports she is having a tightness sensation in her throat, this is new and started today.     Informed her this can be normal, due to manipulation

## 2021-09-17 NOTE — TELEPHONE ENCOUNTER
Patient calling to speak with Nurse. Patient is PO SX 9/8/21. Patient states sing surgery it feels \"like something is constantly stuck in her throat\" and she is coughing a lot. Patient also states her chest hurts.

## 2021-09-20 ENCOUNTER — PATIENT MESSAGE (OUTPATIENT)
Dept: SURGERY | Facility: CLINIC | Age: 46
End: 2021-09-20

## 2021-09-21 NOTE — TELEPHONE ENCOUNTER
From: Daniel Valdes  To: JOSH Art  Sent: 9/20/2021 5:37 PM CDT  Subject: Test    Hi. Am I supposed to have any imaging done before my appointment on Friday?   Thank you,  Lashon Styles  [phone]

## 2021-09-23 ENCOUNTER — PATIENT MESSAGE (OUTPATIENT)
Dept: SURGERY | Facility: CLINIC | Age: 46
End: 2021-09-23

## 2021-09-24 ENCOUNTER — PATIENT MESSAGE (OUTPATIENT)
Dept: SURGERY | Facility: CLINIC | Age: 46
End: 2021-09-24

## 2021-09-24 RX ORDER — OXYCODONE HYDROCHLORIDE 10 MG/1
10 TABLET ORAL EVERY 4 HOURS PRN
Qty: 90 TABLET | Refills: 0 | Status: SHIPPED | OUTPATIENT
Start: 2021-09-24 | End: 2021-10-21

## 2021-09-24 NOTE — TELEPHONE ENCOUNTER
Spoke to [employer] It is very important the patient comes in today for her 2 week post op. The visit must be in person. Alva Joel can see her at any time today as long as she can come in.       Called patient and left voicemail stressing the importance o

## 2021-09-24 NOTE — TELEPHONE ENCOUNTER
Medication:   oxyCODONE 10 MG Oral Tab 90 tablet 0 9/10/2021    Sig:   Take 1 tablet (10 mg total) by mouth every 4 (four) hours as needed for Pain.      Route:   Oral     PRN Reason(s):   Pain     Earliest Fill Date:   9/10/2021         Date of last refill

## 2021-09-24 NOTE — TELEPHONE ENCOUNTER
From: Anjana Jeffers  To: Rafael Riley  Sent: 9/23/2021 11:05 PM CDT  Subject: Appointment today    [facility]. I’m supposed to see dr Miguel Miller today at 10:30 for my 2 week post op, I do not have a working car, we are down to one at the moment.  Can w

## 2021-09-24 NOTE — TELEPHONE ENCOUNTER
Patient returned call, states there is no way she can get to an appointment today. Patient has been scheduled for 3:30 on Monday 9/27.

## 2021-09-24 NOTE — TELEPHONE ENCOUNTER
From: Kris Mendez  To: JOSH Nelson  Sent: 9/24/2021 2:11 PM CDT  Subject: Follow up- please for Dr May Heller,  I’m sorry I missed our appointment today, 5 people one car. But I am coming Monday.  I do have a question is is normal that I

## 2021-09-24 NOTE — TELEPHONE ENCOUNTER
Called and left a message for the patient on her voicemail.   If she needs anything she can call before close of visit today    Otherwise we will see her at her scheduled appointment on Monday

## 2021-09-27 ENCOUNTER — OFFICE VISIT (OUTPATIENT)
Dept: SURGERY | Facility: CLINIC | Age: 46
End: 2021-09-27
Payer: MEDICAID

## 2021-09-27 VITALS — HEART RATE: 80 BPM | SYSTOLIC BLOOD PRESSURE: 102 MMHG | DIASTOLIC BLOOD PRESSURE: 72 MMHG

## 2021-09-27 DIAGNOSIS — M54.2 CERVICAL PAIN: ICD-10-CM

## 2021-09-27 DIAGNOSIS — Z98.1 S/P CERVICAL SPINAL FUSION: Primary | ICD-10-CM

## 2021-09-27 PROCEDURE — 3078F DIAST BP <80 MM HG: CPT | Performed by: PHYSICIAN ASSISTANT

## 2021-09-27 PROCEDURE — 99024 POSTOP FOLLOW-UP VISIT: CPT | Performed by: PHYSICIAN ASSISTANT

## 2021-09-27 PROCEDURE — 3074F SYST BP LT 130 MM HG: CPT | Performed by: PHYSICIAN ASSISTANT

## 2021-09-27 RX ORDER — CYCLOBENZAPRINE HCL 5 MG
5 TABLET ORAL EVERY 6 HOURS PRN
Qty: 90 TABLET | Refills: 1 | Status: SHIPPED | OUTPATIENT
Start: 2021-09-27 | End: 2021-12-07

## 2021-09-27 NOTE — PROGRESS NOTES
[facility] follow-up        HISTORY OF PRESENT Scott Bragg is a 40year old RH  female returns in follow-up. She is doing very well her voice is a little scratchy swallowing is slightly decreased she is taking mostly soft things.   She i week.  She denies any arm pain. She occasionally gets some numbness and tingling. She still walks with a cane. She is shaky if she walks more than 5 or 10 minutes she has had some bladder incontinence. She is continue use the bone growth stimulator.   Fany Boyd shortness of breath chest pain     PHYSICAL EXAMINATION:  GENERAL:  Patient is in no acute distress. HEENT:  Normocephalic, atraumatic    SKIN: Warm, dry, no rashes. Posterior cervical incision healed.  Tender over lower CS screws     NEUROLOGICAL:  This cervical spine 3/25/2021 shows lucency around the screws at C7 on the right greater than the left.   Screws are well-placed and she getting a fusion at C5-6    CT of the cervical spine 2/16/2021  FINDINGS:       Mild straightening of the normal cervical maite

## 2021-09-27 NOTE — PATIENT INSTRUCTIONS
Refill policies:    • Allow 2-3 business days for refills; controlled substances may take longer.   • Contact your pharmacy at least 5 days prior to running out of medication and have them send an electronic request or submit request through the “request re Depending on your insurance carrier, approval may take 3-10 days. It is highly recommended patients contact their insurance carrier directly to determine coverage.   If test is done without insurance authorization, patient may be responsible for the entire stimulator  -X-rays cervical spine in 4 weeks

## 2021-09-27 NOTE — PROGRESS NOTES
Patient here for follow up appointment. Patient states she is having issues with pain 10/10 from neck, radiating to arms and middle back. Patient taking cyclobenzaprine as ordered Q 6 hours, and oxycodone Q 4 hours.   Patient requesting refill on cycloben

## 2021-09-29 RX ORDER — CYCLOBENZAPRINE HCL 5 MG
TABLET ORAL
Qty: 45 TABLET | Refills: 0 | OUTPATIENT
Start: 2021-09-29

## 2021-09-30 ENCOUNTER — TELEPHONE (OUTPATIENT)
Dept: FAMILY MEDICINE CLINIC | Facility: CLINIC | Age: 46
End: 2021-09-30

## 2021-09-30 NOTE — TELEPHONE ENCOUNTER
Pt requesting to be added to Dr. Debbi Kothari schedule for hosp fu - she's still having some heart racing/anxiety

## 2021-10-04 ENCOUNTER — OFFICE VISIT (OUTPATIENT)
Dept: FAMILY MEDICINE CLINIC | Facility: CLINIC | Age: 46
End: 2021-10-04
Payer: MEDICAID

## 2021-10-04 VITALS
DIASTOLIC BLOOD PRESSURE: 86 MMHG | WEIGHT: 161 LBS | HEIGHT: 65 IN | HEART RATE: 99 BPM | SYSTOLIC BLOOD PRESSURE: 114 MMHG | RESPIRATION RATE: 16 BRPM | OXYGEN SATURATION: 98 % | BODY MASS INDEX: 26.82 KG/M2

## 2021-10-04 DIAGNOSIS — R13.10 DYSPHAGIA, UNSPECIFIED TYPE: Primary | ICD-10-CM

## 2021-10-04 DIAGNOSIS — G47.09 OTHER INSOMNIA: ICD-10-CM

## 2021-10-04 DIAGNOSIS — R06.02 SOB (SHORTNESS OF BREATH): ICD-10-CM

## 2021-10-04 DIAGNOSIS — Z98.890 POST-OPERATIVE STATE: ICD-10-CM

## 2021-10-04 DIAGNOSIS — R00.2 PALPITATIONS: ICD-10-CM

## 2021-10-04 PROCEDURE — 3079F DIAST BP 80-89 MM HG: CPT | Performed by: FAMILY MEDICINE

## 2021-10-04 PROCEDURE — 90686 IIV4 VACC NO PRSV 0.5 ML IM: CPT | Performed by: FAMILY MEDICINE

## 2021-10-04 PROCEDURE — 3008F BODY MASS INDEX DOCD: CPT | Performed by: FAMILY MEDICINE

## 2021-10-04 PROCEDURE — 3074F SYST BP LT 130 MM HG: CPT | Performed by: FAMILY MEDICINE

## 2021-10-04 PROCEDURE — 99215 OFFICE O/P EST HI 40 MIN: CPT | Performed by: FAMILY MEDICINE

## 2021-10-04 PROCEDURE — 90471 IMMUNIZATION ADMIN: CPT | Performed by: FAMILY MEDICINE

## 2021-10-04 RX ORDER — SUCRALFATE ORAL 1 G/10ML
1 SUSPENSION ORAL
Qty: 1200 ML | Refills: 0 | Status: SHIPPED | OUTPATIENT
Start: 2021-10-04 | End: 2021-10-30

## 2021-10-04 RX ORDER — LAMOTRIGINE 25 MG/1
TABLET ORAL
Qty: 60 TABLET | Refills: 0 | Status: SHIPPED | OUTPATIENT
Start: 2021-10-04 | End: 2021-10-30

## 2021-10-04 NOTE — TELEPHONE ENCOUNTER
Next Appt:    With 7 [facility] Georgi Gillis DO)  10/04/2021 at 11:45 AM     8-16-21     LR

## 2021-10-04 NOTE — PROGRESS NOTES
HPI:   Genaro Manjarrez is a 40year old female here to discuss  Palpitations and dysphagia and insomnia     9/8 surgery - was noted to have low bp and high HR in the hospital   Pt has had periods of elevated HR with little to no activity - 140's lasting as needed for Pain.  1 or 2 tabs for severe pain 90 tablet 0   • FLUTICASONE PROPIONATE 50 MCG/ACT Nasal Suspension SHAKE LIQUID AND USE 2 SPRAYS IN EACH NOSTRIL EVERY NIGHT 16 g 0   • diazePAM 2 MG Oral Tab Take 1 tablet (2 mg total) by mouth every 6 (six) respiratory infections of unspecified site    • Anxiety state, unspecified    • Asthma    • Attention deficit disorder without mention of hyperactivity    • Back problem     cervical and lumbar   • Bad breath     Started when stomach pain got worse   • Blo Laterality Date   • APPENDECTOMY  1991   • BLOOD PATCH(BEDSIDE)     • COLONOSCOPY N/A 2/6/2015    Procedure: COLONOSCOPY;  Surgeon: Lavern Anaya MD;  Location: [address] [place] [address] ENDOSCOPY   • ENDOMETRIAL ABLATION      2004   • HYSTERECTOMY  7/20/2015   • HYSTEROSCOPY 22.6      Smokeless tobacco: Never Used    Vaping Use      Vaping Use: Never used    Alcohol use: Yes      Comment: occasionally    Drug use: No    Occ: . : . Children: 3.    Exercise: minimal.  Diet: watches minimally     REVIEW OF SYSTEMS:   GENERA (14)  - SARS-COV-2 BY PCR (ALINITY); Future    4. Other insomnia  ambien at a later date     11. Post-operative state  - D-DIMER    Time spent 45 min   Questions answered and patient indicates understanding of these issues and agrees to the plan.   Follow up

## 2021-10-05 ENCOUNTER — PATIENT OUTREACH (OUTPATIENT)
Dept: CASE MANAGEMENT | Age: 46
End: 2021-10-05

## 2021-10-07 DIAGNOSIS — K58.9 IRRITABLE BOWEL SYNDROME WITHOUT DIARRHEA: ICD-10-CM

## 2021-10-07 RX ORDER — DICYCLOMINE HCL 20 MG
TABLET ORAL
Qty: 120 TABLET | Refills: 0 | Status: SHIPPED | OUTPATIENT
Start: 2021-10-07

## 2021-10-08 DIAGNOSIS — F41.9 ANXIETY: ICD-10-CM

## 2021-10-08 DIAGNOSIS — F32.0 CURRENT MILD EPISODE OF MAJOR DEPRESSIVE DISORDER, UNSPECIFIED WHETHER RECURRENT (HCC): ICD-10-CM

## 2021-10-11 RX ORDER — CITALOPRAM 20 MG/1
TABLET ORAL
Qty: 90 TABLET | Refills: 0 | Status: SHIPPED | OUTPATIENT
Start: 2021-10-11

## 2021-10-15 ENCOUNTER — HOSPITAL ENCOUNTER (OUTPATIENT)
Dept: CV DIAGNOSTICS | Facility: HOSPITAL | Age: 46
Discharge: HOME | End: 2021-10-15
Attending: FAMILY MEDICINE
Payer: MEDICAID

## 2021-10-15 DIAGNOSIS — R00.2 PALPITATIONS: ICD-10-CM

## 2021-10-15 PROCEDURE — 93227 XTRNL ECG REC<48 HR R&I: CPT | Performed by: FAMILY MEDICINE

## 2021-10-15 PROCEDURE — 93225 XTRNL ECG REC<48 HRS REC: CPT | Performed by: FAMILY MEDICINE

## 2021-10-15 PROCEDURE — 93226 XTRNL ECG REC<48 HR SCAN A/R: CPT | Performed by: FAMILY MEDICINE

## 2021-10-18 ENCOUNTER — TELEPHONE (OUTPATIENT)
Dept: SURGERY | Facility: CLINIC | Age: 46
End: 2021-10-18

## 2021-10-18 ENCOUNTER — HOSPITAL ENCOUNTER (OUTPATIENT)
Dept: GENERAL RADIOLOGY | Age: 46
Discharge: HOME | End: 2021-10-18
Attending: PHYSICIAN ASSISTANT
Payer: MEDICAID

## 2021-10-18 DIAGNOSIS — M54.2 CERVICAL PAIN: ICD-10-CM

## 2021-10-18 DIAGNOSIS — M48.02 CERVICAL STENOSIS OF SPINAL CANAL: ICD-10-CM

## 2021-10-18 DIAGNOSIS — Z98.1 S/P CERVICAL SPINAL FUSION: Primary | ICD-10-CM

## 2021-10-18 DIAGNOSIS — Z98.1 S/P CERVICAL SPINAL FUSION: ICD-10-CM

## 2021-10-18 PROCEDURE — 72040 X-RAY EXAM NECK SPINE 2-3 VW: CPT | Performed by: PHYSICIAN ASSISTANT

## 2021-10-18 NOTE — TELEPHONE ENCOUNTER
Radiology called with x-ray cervical results. CONCLUSION:         1.  Post cervical fusion with posterior movement of a right C7 screw since the July 2021 suggestive of hardware loosening.  The findings were called to Rocael Grant by the radiology staff

## 2021-10-18 NOTE — TELEPHONE ENCOUNTER
X-rays of cervical spine were reviewed. She has some settling of the graft at C6-7. Note that the right C7 screw is backing out but the marker was at the C6 anterior plate where the screw was removed.     Patient was called recommend we get a CT scan to

## 2021-10-20 NOTE — PROGRESS NOTES
Pt is here today for 6 week post op sx 9/8/21    Xray completed 10/18/21    LOV 9/27/21 with Marlee Lynn PALISA       Status post C6-7 ACDF with stand-alone device 9/8/2021 Dr. Jose Marcial    s/p C3-7 laminectomy and C5-7 PSF 12/20/2020 Dr. Jose Marcial.  Right>left s

## 2021-10-21 ENCOUNTER — OFFICE VISIT (OUTPATIENT)
Dept: SURGERY | Facility: CLINIC | Age: 46
End: 2021-10-21
Payer: MEDICAID

## 2021-10-21 ENCOUNTER — TELEPHONE (OUTPATIENT)
Dept: SURGERY | Facility: CLINIC | Age: 46
End: 2021-10-21

## 2021-10-21 VITALS
DIASTOLIC BLOOD PRESSURE: 82 MMHG | BODY MASS INDEX: 26.82 KG/M2 | SYSTOLIC BLOOD PRESSURE: 122 MMHG | HEART RATE: 104 BPM | WEIGHT: 161 LBS | HEIGHT: 65 IN

## 2021-10-21 DIAGNOSIS — Z98.1 HISTORY OF FUSION OF CERVICAL SPINE: Primary | ICD-10-CM

## 2021-10-21 PROCEDURE — 3074F SYST BP LT 130 MM HG: CPT | Performed by: NEUROLOGICAL SURGERY

## 2021-10-21 PROCEDURE — 3008F BODY MASS INDEX DOCD: CPT | Performed by: NEUROLOGICAL SURGERY

## 2021-10-21 PROCEDURE — 99024 POSTOP FOLLOW-UP VISIT: CPT | Performed by: NEUROLOGICAL SURGERY

## 2021-10-21 PROCEDURE — 3079F DIAST BP 80-89 MM HG: CPT | Performed by: NEUROLOGICAL SURGERY

## 2021-10-21 RX ORDER — OXYCODONE HYDROCHLORIDE 10 MG/1
10 TABLET ORAL EVERY 4 HOURS PRN
Qty: 90 TABLET | Refills: 0 | Status: SHIPPED | OUTPATIENT
Start: 2021-10-21 | End: 2021-12-07

## 2021-10-21 NOTE — TELEPHONE ENCOUNTER
Pt had appt with Dr. Tang Neal today and was told to remove hard collar. Pt wants to make sure Anabelle Rivers is in agreement with Dr. Yanci Young plan to wean from collar.     Pt would like JOSH Taylor to call her or send her a [address] regarding this

## 2021-10-21 NOTE — PROGRESS NOTES
[facility] Neurosurgery follow-up        HISTORY OF PRESENT ILLNESS: Randy Bernstein is a 40year old RH  female. Since the C6-7 discectomy and stand-alone interbody device fusion, she is improved.   Her only current complaint is some right posterior scapular pa wearing her cervical collar but does not have a cane with her. She is here with her family member. 4/23/2021  She states her neck pain continues to be an 8/10. It seemed to be better before she had the CT scan.   She took the brace off for the CT scan front under her right breast.    Her cervical pain is 6 to an 8/10 her left shoulder pain is about a 4/10 her strength is improving in her arms her lower extremity strength is improved she still little unsteady using a cane she denies any bowel or bladder screw.  Films were provided today. She does have lucency around the right C6 and C7 screw. The looks like she has had more progression of the pseudoarthrosis on the right. The left seems to be stable.     CT of the cervical spine shows a healing fracture C 6 plate. This was removed at surgery to accommodate the screw from the C6-7 interbody device which may have collided with the screw in this location. ASSESSMENT:  Only current complaint is right trapezius pain and occasional spasm.   -C4-5 C5-6 ACDF 1

## 2021-10-22 RX ORDER — TOPIRAMATE 25 MG/1
50 TABLET ORAL 2 TIMES DAILY
Qty: 60 TABLET | Refills: 0 | Status: SHIPPED | OUTPATIENT
Start: 2021-10-22 | End: 2021-12-08

## 2021-10-22 RX ORDER — ALBUTEROL SULFATE 90 UG/1
2 AEROSOL, METERED RESPIRATORY (INHALATION) EVERY 6 HOURS PRN
Qty: 3 EACH | Refills: 0 | Status: SHIPPED | OUTPATIENT
Start: 2021-10-22 | End: 2022-01-06

## 2021-10-22 NOTE — TELEPHONE ENCOUNTER
Patient was called. She does have some ongoing neck pain.   She can alternate between the soft collar and the rigid collar    She can let her neck discomfort help decide how much she needs the rigid collar    She with a CT of the neck October 28 ensure opal

## 2021-10-22 NOTE — TELEPHONE ENCOUNTER
Rx Request  Albuterol Sulfate HFA (VENTOLIN HFA) 108 (90 Base) MCG/ACT Inhalation Aero Soln  Disp:      3 in              R: 0  Last Refilled: 12/09/2020  topiramate 25 MG Oral Tab  Disp:      60               R: 0  Last Refilled: 03/09/2021    Last Visit:

## 2021-10-28 ENCOUNTER — HOSPITAL ENCOUNTER (OUTPATIENT)
Dept: CT IMAGING | Facility: HOSPITAL | Age: 46
Discharge: HOME | End: 2021-10-28
Attending: PHYSICIAN ASSISTANT
Payer: MEDICAID

## 2021-10-28 DIAGNOSIS — M48.02 CERVICAL STENOSIS OF SPINAL CANAL: ICD-10-CM

## 2021-10-28 DIAGNOSIS — Z98.1 S/P CERVICAL SPINAL FUSION: ICD-10-CM

## 2021-10-28 DIAGNOSIS — M54.2 CERVICAL PAIN: ICD-10-CM

## 2021-10-28 PROCEDURE — 72125 CT NECK SPINE W/O DYE: CPT | Performed by: PHYSICIAN ASSISTANT

## 2021-10-29 RX ORDER — TOPIRAMATE 25 MG/1
TABLET ORAL
Qty: 60 TABLET | Refills: 0 | OUTPATIENT
Start: 2021-10-29

## 2021-10-30 DIAGNOSIS — F90.8 ATTENTION DEFICIT HYPERACTIVITY DISORDER (ADHD), OTHER TYPE: ICD-10-CM

## 2021-10-30 DIAGNOSIS — R13.10 DYSPHAGIA, UNSPECIFIED TYPE: ICD-10-CM

## 2021-11-01 RX ORDER — METHYLPHENIDATE HYDROCHLORIDE 20 MG/1
20 TABLET ORAL 2 TIMES DAILY
Qty: 60 TABLET | Refills: 0 | Status: SHIPPED | OUTPATIENT
Start: 2021-11-01 | End: 2021-12-08

## 2021-11-02 ENCOUNTER — OFFICE VISIT (OUTPATIENT)
Dept: FAMILY MEDICINE CLINIC | Facility: CLINIC | Age: 46
End: 2021-11-02
Payer: MEDICAID

## 2021-11-02 VITALS
SYSTOLIC BLOOD PRESSURE: 104 MMHG | OXYGEN SATURATION: 97 % | WEIGHT: 161 LBS | HEART RATE: 115 BPM | RESPIRATION RATE: 16 BRPM | DIASTOLIC BLOOD PRESSURE: 74 MMHG | HEIGHT: 65 IN | BODY MASS INDEX: 26.82 KG/M2

## 2021-11-02 DIAGNOSIS — R13.10 DYSPHAGIA, UNSPECIFIED TYPE: ICD-10-CM

## 2021-11-02 DIAGNOSIS — R00.2 PALPITATIONS: Primary | ICD-10-CM

## 2021-11-02 DIAGNOSIS — G47.09 OTHER INSOMNIA: ICD-10-CM

## 2021-11-02 DIAGNOSIS — F90.8 ATTENTION DEFICIT HYPERACTIVITY DISORDER (ADHD), OTHER TYPE: ICD-10-CM

## 2021-11-02 DIAGNOSIS — R23.2 HOT FLASHES: ICD-10-CM

## 2021-11-02 DIAGNOSIS — F32.0 CURRENT MILD EPISODE OF MAJOR DEPRESSIVE DISORDER, UNSPECIFIED WHETHER RECURRENT (HCC): ICD-10-CM

## 2021-11-02 DIAGNOSIS — F41.9 ANXIETY: ICD-10-CM

## 2021-11-02 PROCEDURE — 3008F BODY MASS INDEX DOCD: CPT | Performed by: FAMILY MEDICINE

## 2021-11-02 PROCEDURE — 3078F DIAST BP <80 MM HG: CPT | Performed by: FAMILY MEDICINE

## 2021-11-02 PROCEDURE — 99214 OFFICE O/P EST MOD 30 MIN: CPT | Performed by: FAMILY MEDICINE

## 2021-11-02 PROCEDURE — 3074F SYST BP LT 130 MM HG: CPT | Performed by: FAMILY MEDICINE

## 2021-11-02 RX ORDER — SUCRALFATE ORAL 1 G/10ML
1 SUSPENSION ORAL
Qty: 1200 ML | Refills: 0 | Status: SHIPPED | OUTPATIENT
Start: 2021-11-02

## 2021-11-02 RX ORDER — LAMOTRIGINE 25 MG/1
50 TABLET ORAL DAILY
Qty: 60 TABLET | Refills: 0 | Status: SHIPPED | OUTPATIENT
Start: 2021-11-02 | End: 2021-12-08

## 2021-11-02 RX ORDER — ZOLPIDEM TARTRATE 10 MG/1
10 TABLET ORAL NIGHTLY
Qty: 30 TABLET | Refills: 2 | Status: SHIPPED | OUTPATIENT
Start: 2021-11-02 | End: 2022-01-21

## 2021-11-02 NOTE — PROGRESS NOTES
HPI:   Daniel Valdes is a 40year old female here to discuss  Palpitations and dysphagia and insomnia       Still in neck brace   Poor healing per pt   Getting tymlos injections   Discussed SE profile     Muscle relaxers 1-2x per day   Last narcotic w tablet 0   • sucralfate (CARAFATE) 1 GM/10ML Oral Suspension Take 10 mL (1 g total) by mouth 4 (four) times daily before meals and nightly.  1200 mL 0   • cyclobenzaprine 5 MG Oral Tab Take 1 tablet (5 mg total) by mouth every 6 (six) hours as needed for Mu Cap Take 10 mg by mouth nightly. • Calcium Carbonate-Vitamin D (CALCIUM 600 + D OR) Take 1 tablet by mouth 2 (two) times daily.           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 Other insomnia  Pt advised she may not combine with other medications   - zolpidem 10 MG Oral Tab; Take 1 tablet (10 mg total) by mouth nightly. Dispense: 30 tablet; Refill: 2    2.  Dysphagia, unspecified type    - Esomeprazole Magnesium 20 MG

## 2021-11-03 ENCOUNTER — TELEPHONE (OUTPATIENT)
Dept: FAMILY MEDICINE CLINIC | Facility: CLINIC | Age: 46
End: 2021-11-03

## 2021-11-04 NOTE — TELEPHONE ENCOUNTER
Fax recd from [employer] in regards to medication Esomeprazole Magnesium 20 MG Oral Capsule Delayed Release. The PA request was denied.

## 2021-11-04 NOTE — TELEPHONE ENCOUNTER
Another fax recd stating that a new PA was started for Esomeprazole Magnesium 20 MG Oral Capsule Delayed Release    KEY  [id number]

## 2021-11-07 DIAGNOSIS — F32.0 CURRENT MILD EPISODE OF MAJOR DEPRESSIVE DISORDER, UNSPECIFIED WHETHER RECURRENT (HCC): ICD-10-CM

## 2021-11-07 DIAGNOSIS — F41.9 ANXIETY: ICD-10-CM

## 2021-11-08 RX ORDER — CITALOPRAM 40 MG/1
TABLET ORAL
Qty: 90 TABLET | Refills: 0 | Status: SHIPPED | OUTPATIENT
Start: 2021-11-08

## 2021-11-08 NOTE — TELEPHONE ENCOUNTER
Rx Request  citalopram hydrobromide 40 MG Oral Tab    Disp:     90               R: 0    Associated Dx:  Anxiety    Last Refilled: 08/03/2021    Last Visit: 11/02/2021/

## 2021-11-09 NOTE — TELEPHONE ENCOUNTER
Follow up PA request and form recd for Esomeprazole Magnesium 20 MG Oral Capsule Delayed Release    KEY  [id number]

## 2021-11-11 NOTE — TELEPHONE ENCOUNTER
Fax recd from [employer] denying request for Esomeprazole Magnesium 20 MG Oral Capsule Delayed Release.

## 2021-11-12 ENCOUNTER — TELEMEDICINE (OUTPATIENT)
Dept: SURGERY | Facility: CLINIC | Age: 46
End: 2021-11-12
Payer: MEDICAID

## 2021-11-12 DIAGNOSIS — Z98.1 S/P CERVICAL SPINAL FUSION: Primary | ICD-10-CM

## 2021-11-12 DIAGNOSIS — M54.2 CERVICAL PAIN: ICD-10-CM

## 2021-11-12 PROCEDURE — 99024 POSTOP FOLLOW-UP VISIT: CPT | Performed by: PHYSICIAN ASSISTANT

## 2021-11-12 NOTE — PATIENT INSTRUCTIONS
PLAN:  -After long discussion and reviewing the images patient would like to be as conservative as possible she will use the cervical collar when she is active but when she is more sedentary she can relax the collar.   She may resume driving and other ac

## 2021-11-12 NOTE — PROGRESS NOTES
[facility] Neurosurgery   Virtual visit        Patient agreed to a virtual visit with both video and audio. [place] in the state of [place]. Total time 25 minutes    HISTORY OF PRESENT Brenda Angeles is a 40year old RH  female returns in follow-up. not fallen from being unsteady this was more she truly got dizzy and passed out. Today she is wearing her cervical collar but does not have a cane with her. She is here with her family member.     4/23/2021  She states her neck pain continues to be an 8/1 fracture she continues to have pain at the most distal ribs right around her bra line in the front under her right breast.    Her cervical pain is 6 to an 8/10 her left shoulder pain is about a 4/10 her strength is improving in her arms her lower extremity cervical spine from 10/20/2021 again shows interbody device at C6-7. Shows the screws are in place. There is some slight settling of the implant into the superior endplate of C7.   On the coronal she appears to have a bridging fusion on the right incomple the posterior wall of the right foramina transversarium. Periprosthetic lucency involving the right C6 screw is noted. This screw is somewhat lateral to the facet involving the transverse process.   The right C7 screw has significant periprosthetic giselle

## 2021-11-12 NOTE — PROGRESS NOTES
Pt had surgery on 9/8/21: ACDF C6-7 with interbody device, with removal of  RC6 surgical screw    As indicated in her MyChart on 10/28/21 Yobani is here to discuss next steps such as Physical Therapy, when she can drive as well as questions about her carmel

## 2021-11-24 NOTE — TELEPHONE ENCOUNTER
Informed patient of [facility] OT    She also wanted Dr. Camelia Landa to know Endo took her off  Tymlos injections and so far she is feeling better.

## 2021-12-01 ENCOUNTER — HOSPITAL ENCOUNTER (OUTPATIENT)
Dept: GENERAL RADIOLOGY | Age: 46
Discharge: HOME | End: 2021-12-01
Attending: NEUROLOGICAL SURGERY
Payer: MEDICAID

## 2021-12-01 DIAGNOSIS — Z98.1 HISTORY OF FUSION OF CERVICAL SPINE: ICD-10-CM

## 2021-12-01 PROCEDURE — 72052 X-RAY EXAM NECK SPINE 6/>VWS: CPT | Performed by: NEUROLOGICAL SURGERY

## 2021-12-07 ENCOUNTER — OFFICE VISIT (OUTPATIENT)
Dept: SURGERY | Facility: CLINIC | Age: 46
End: 2021-12-07
Payer: MEDICAID

## 2021-12-07 VITALS
WEIGHT: 161 LBS | BODY MASS INDEX: 26.82 KG/M2 | HEIGHT: 65 IN | SYSTOLIC BLOOD PRESSURE: 120 MMHG | DIASTOLIC BLOOD PRESSURE: 78 MMHG

## 2021-12-07 DIAGNOSIS — M54.2 CERVICAL PAIN: ICD-10-CM

## 2021-12-07 DIAGNOSIS — Z98.1 S/P CERVICAL SPINAL FUSION: Primary | ICD-10-CM

## 2021-12-07 PROCEDURE — 3074F SYST BP LT 130 MM HG: CPT | Performed by: PHYSICIAN ASSISTANT

## 2021-12-07 PROCEDURE — 3078F DIAST BP <80 MM HG: CPT | Performed by: PHYSICIAN ASSISTANT

## 2021-12-07 PROCEDURE — 3008F BODY MASS INDEX DOCD: CPT | Performed by: PHYSICIAN ASSISTANT

## 2021-12-07 PROCEDURE — 99213 OFFICE O/P EST LOW 20 MIN: CPT | Performed by: PHYSICIAN ASSISTANT

## 2021-12-07 RX ORDER — CYCLOBENZAPRINE HCL 5 MG
5 TABLET ORAL EVERY 6 HOURS PRN
Qty: 90 TABLET | Refills: 1 | Status: SHIPPED | OUTPATIENT
Start: 2021-12-07

## 2021-12-07 NOTE — PROGRESS NOTES
[facility]   Follow-up        HISTORY OF PRESENT Mj Griffin is a 40year old RH  female returns in follow-up. She denies any neck pain or soreness. She is off the Percocet she is taking Motrin.   She occasionally takes Flexeril she is neck pain is a 5 to an 8/10 when she wakes up in the morning her left arm is numb her left body and left leg seems to be mostly in the lateral part of the arm in the C8 dermatome.   Overall she feels her strength is about the same her stability is about the rib pain feeling a pop around February 1, 2021. She was laying on the floor looking under her bed where her dog was got up and felt a pop. She denies any bruising or ecchymosis after. He does hurt the left does not hurt to breathe.   She had x-rays of he possibly some early right C6-C7 radiculitis. Negative for any peripheral nerve involvement     EMG from 2012 was normal     IMAGING:  X-rays of cervical spine from December 2021 shows a healing fusion at C6-7.   There does not appear to be any gross motion levels is again noted. Previously noted micro screws in the region of left laminectomy sites have been removed. Open door laminectomy has   been further change into complete laminectomies.      Interval placement of trans facet screws at C5, C6 and C7 fac

## 2021-12-07 NOTE — PATIENT INSTRUCTIONS
Refill policies:    • Allow 2-3 business days for refills; controlled substances may take longer.   • Contact your pharmacy at least 5 days prior to running out of medication and have them send an electronic request or submit request through the “request re Depending on your insurance carrier, approval may take 3-10 days. It is highly recommended patients contact their insurance carrier directly to determine coverage.   If test is done without insurance authorization, patient may be responsible for the entire therapy she can increase her activity as tolerated she has increasing symptoms she will let us know  -See her back in 3 months with an x-ray at 6 months if she is doing well we have we can graduate her  -Refill of Flexeril was sent to her pharmacy

## 2021-12-08 ENCOUNTER — OFFICE VISIT (OUTPATIENT)
Dept: FAMILY MEDICINE CLINIC | Facility: CLINIC | Age: 46
End: 2021-12-08
Payer: MEDICAID

## 2021-12-08 VITALS
HEIGHT: 65 IN | WEIGHT: 156 LBS | HEART RATE: 101 BPM | BODY MASS INDEX: 25.99 KG/M2 | OXYGEN SATURATION: 100 % | RESPIRATION RATE: 16 BRPM | DIASTOLIC BLOOD PRESSURE: 86 MMHG | SYSTOLIC BLOOD PRESSURE: 132 MMHG

## 2021-12-08 DIAGNOSIS — Z00.00 GENERAL MEDICAL EXAM: ICD-10-CM

## 2021-12-08 DIAGNOSIS — R73.9 HYPERGLYCEMIA: Primary | ICD-10-CM

## 2021-12-08 DIAGNOSIS — F90.8 ATTENTION DEFICIT HYPERACTIVITY DISORDER (ADHD), OTHER TYPE: ICD-10-CM

## 2021-12-08 DIAGNOSIS — G43.109 MIGRAINE WITH AURA AND WITHOUT STATUS MIGRAINOSUS, NOT INTRACTABLE: ICD-10-CM

## 2021-12-08 DIAGNOSIS — R13.10 DYSPHAGIA, UNSPECIFIED TYPE: ICD-10-CM

## 2021-12-08 DIAGNOSIS — E04.1 THYROID NODULE: ICD-10-CM

## 2021-12-08 DIAGNOSIS — Z12.11 COLON CANCER SCREENING: ICD-10-CM

## 2021-12-08 DIAGNOSIS — F39 MOOD DISORDER (HCC): ICD-10-CM

## 2021-12-08 PROCEDURE — 3075F SYST BP GE 130 - 139MM HG: CPT | Performed by: FAMILY MEDICINE

## 2021-12-08 PROCEDURE — 3079F DIAST BP 80-89 MM HG: CPT | Performed by: FAMILY MEDICINE

## 2021-12-08 PROCEDURE — 3008F BODY MASS INDEX DOCD: CPT | Performed by: FAMILY MEDICINE

## 2021-12-08 PROCEDURE — 99214 OFFICE O/P EST MOD 30 MIN: CPT | Performed by: FAMILY MEDICINE

## 2021-12-08 RX ORDER — TOPIRAMATE 25 MG/1
50 TABLET ORAL 2 TIMES DAILY
Qty: 60 TABLET | Refills: 0 | Status: SHIPPED | OUTPATIENT
Start: 2021-12-08 | End: 2022-01-03

## 2021-12-08 RX ORDER — METHYLPHENIDATE HYDROCHLORIDE 20 MG/1
20 TABLET ORAL 2 TIMES DAILY
Qty: 60 TABLET | Refills: 0 | Status: SHIPPED | OUTPATIENT
Start: 2021-12-08 | End: 2021-12-14

## 2021-12-08 RX ORDER — LAMOTRIGINE 25 MG/1
50 TABLET ORAL DAILY
Qty: 60 TABLET | Refills: 0 | Status: SHIPPED | OUTPATIENT
Start: 2021-12-08

## 2021-12-08 NOTE — PROGRESS NOTES
HPI:   Reggie Adkins is a 40year old female here to discuss mood, palpitations and dysphagia and insomnia     Pt has been out of the wellbutrin for 2 weeks   Feels she is doing well   Will see therapist soon   Sleeping great on ambien   Discussed dan BY MOUTH DAILY 90 tablet 0   • DICYCLOMINE 20 MG Oral Tab TAKE 1 TABLET(20 MG) BY MOUTH FOUR TIMES DAILY BEFORE MEALS AND AT NIGHT 120 tablet 0   • FLUTICASONE PROPIONATE 50 MCG/ACT Nasal Suspension SHAKE LIQUID AND USE 2 SPRAYS IN EACH NOSTRIL EVERY NIGHT unspecified site    • Anxiety state, unspecified    • Asthma    • Attention deficit disorder without mention of hyperactivity    • Back problem     cervical and lumbar   • Bad breath     Started when stomach pain got worse   • Bloating 2weeks   • Cauda equ Procedure Laterality Date   • APPENDECTOMY  1991   • BLOOD PATCH(BEDSIDE)     • COLONOSCOPY N/A 2/6/2015    Procedure: COLONOSCOPY;  Surgeon: Samaria Jeff MD;  Location: [address] [place] [address] ENDOSCOPY   • ENDOMETRIAL ABLATION      2004   • HYSTERECTOMY  7/20/2015   • Former Smoker        Packs/day: 0.00        Years: 10.00        Pack years: 0        Types: Cigarettes        Quit date: 1999        Years since quittin.8      Smokeless tobacco: Never Used    Vaping Use      Vaping Use: Never used    Alcohol use: lamoTRIgine 25 MG Oral Tab; Take 2 tablets (50 mg total) by mouth daily. Dispense: 60 tablet; Refill: 0    5. Migraine with aura and without status migrainosus, not intractable    - topiramate 25 MG Oral Tab;  Take 2 tablets (50 mg total) by mouth 2 (two)

## 2021-12-14 ENCOUNTER — PATIENT MESSAGE (OUTPATIENT)
Dept: SURGERY | Facility: CLINIC | Age: 46
End: 2021-12-14

## 2021-12-14 ENCOUNTER — PATIENT MESSAGE (OUTPATIENT)
Dept: FAMILY MEDICINE CLINIC | Facility: CLINIC | Age: 46
End: 2021-12-14

## 2021-12-14 ENCOUNTER — TELEPHONE (OUTPATIENT)
Dept: FAMILY MEDICINE CLINIC | Facility: CLINIC | Age: 46
End: 2021-12-14

## 2021-12-14 DIAGNOSIS — R11.0 NAUSEA: ICD-10-CM

## 2021-12-14 DIAGNOSIS — F32.A ANXIETY AND DEPRESSION: ICD-10-CM

## 2021-12-14 DIAGNOSIS — F41.9 ANXIETY AND DEPRESSION: ICD-10-CM

## 2021-12-14 DIAGNOSIS — F90.8 ATTENTION DEFICIT HYPERACTIVITY DISORDER (ADHD), OTHER TYPE: ICD-10-CM

## 2021-12-14 DIAGNOSIS — M54.6 MIDLINE THORACIC BACK PAIN, UNSPECIFIED CHRONICITY: Primary | ICD-10-CM

## 2021-12-14 RX ORDER — METHYLPHENIDATE HYDROCHLORIDE 20 MG/1
20 TABLET ORAL 2 TIMES DAILY
Qty: 60 TABLET | Refills: 0 | OUTPATIENT
Start: 2021-12-14

## 2021-12-14 RX ORDER — ALPRAZOLAM 0.25 MG/1
0.25 TABLET ORAL NIGHTLY PRN
Qty: 30 TABLET | Refills: 0 | Status: SHIPPED | OUTPATIENT
Start: 2021-12-14 | End: 2021-12-15

## 2021-12-14 RX ORDER — ONDANSETRON 4 MG/1
4 TABLET, ORALLY DISINTEGRATING ORAL EVERY 8 HOURS PRN
Qty: 20 TABLET | Refills: 0 | Status: SHIPPED | OUTPATIENT
Start: 2021-12-14

## 2021-12-14 RX ORDER — ONDANSETRON 4 MG/1
4 TABLET, ORALLY DISINTEGRATING ORAL EVERY 8 HOURS PRN
Qty: 20 TABLET | Refills: 0 | Status: SHIPPED | OUTPATIENT
Start: 2021-12-14 | End: 2022-01-21

## 2021-12-14 RX ORDER — METHYLPHENIDATE HYDROCHLORIDE 20 MG/1
20 TABLET ORAL 2 TIMES DAILY
Qty: 60 TABLET | Refills: 0 | Status: SHIPPED | OUTPATIENT
Start: 2021-12-14

## 2021-12-14 NOTE — TELEPHONE ENCOUNTER
From: Lacie Holden  To: Armand Garcia DO  Sent: 12/14/2021 7:12 AM CST  Subject: The GI referral     Hello, the GI referral you gave me unfortunately does not take my insurance so will need a new one.  Also the Ritalin prescription you were trying to fi

## 2021-12-14 NOTE — TELEPHONE ENCOUNTER
Ritalin script from 12/8/21 sent to wrong pharmacy. Pt would like it to go to [facility] she states it is less expensive.   Also pt requesting Zofran refill  Approve/deny last filled 8/23/21  Last OV 12/8/21

## 2021-12-14 NOTE — TELEPHONE ENCOUNTER
Last ov 12/8/2021  Next ov 1/13/2022      Last refill alprazolam 5/18/2021  Last refill ondansetron 8/23/2021

## 2021-12-14 NOTE — TELEPHONE ENCOUNTER
From: Collette Noa  To: JOSH Yeager  Sent: 12/14/2021 7:14 AM CST  Subject: Back pain    Hello, I have been getting a lot of middle back pain. I’m pretty sure it’s just muscle, however I wanted to see what you’re thoughts are. Thank you!    Sh

## 2021-12-15 RX ORDER — ALPRAZOLAM 0.25 MG/1
0.25 TABLET ORAL NIGHTLY PRN
Qty: 30 TABLET | Refills: 0 | Status: SHIPPED | OUTPATIENT
Start: 2021-12-15

## 2022-01-03 DIAGNOSIS — G43.109 MIGRAINE WITH AURA AND WITHOUT STATUS MIGRAINOSUS, NOT INTRACTABLE: ICD-10-CM

## 2022-01-03 RX ORDER — TOPIRAMATE 25 MG/1
TABLET ORAL
Qty: 60 TABLET | Refills: 0 | Status: SHIPPED | OUTPATIENT
Start: 2022-01-03

## 2022-01-06 RX ORDER — ALBUTEROL SULFATE 90 UG/1
AEROSOL, METERED RESPIRATORY (INHALATION)
Qty: 54 G | Refills: 0 | Status: SHIPPED | OUTPATIENT
Start: 2022-01-06

## 2022-01-07 ENCOUNTER — HOSPITAL ENCOUNTER (OUTPATIENT)
Dept: GENERAL RADIOLOGY | Age: 47
Discharge: HOME | End: 2022-01-07
Attending: INTERNAL MEDICINE
Payer: MEDICAID

## 2022-01-07 DIAGNOSIS — M79.674 TOE PAIN, RIGHT: ICD-10-CM

## 2022-01-07 PROCEDURE — 73660 X-RAY EXAM OF TOE(S): CPT | Performed by: INTERNAL MEDICINE

## 2022-01-21 DIAGNOSIS — G47.09 OTHER INSOMNIA: ICD-10-CM

## 2022-01-21 DIAGNOSIS — R11.0 NAUSEA: ICD-10-CM

## 2022-01-21 RX ORDER — ZOLPIDEM TARTRATE 10 MG/1
TABLET ORAL
Qty: 30 TABLET | Refills: 0 | Status: SHIPPED | OUTPATIENT
Start: 2022-01-21

## 2022-01-21 RX ORDER — ONDANSETRON 4 MG/1
TABLET, ORALLY DISINTEGRATING ORAL
Qty: 20 TABLET | Refills: 0 | Status: SHIPPED | OUTPATIENT
Start: 2022-01-21

## 2022-02-07 ENCOUNTER — HOSPITAL ENCOUNTER (OUTPATIENT)
Dept: GENERAL RADIOLOGY | Facility: HOSPITAL | Age: 47
Discharge: HOME | End: 2022-02-07
Attending: PHYSICIAN ASSISTANT
Payer: MEDICAID

## 2022-02-07 ENCOUNTER — LAB ENCOUNTER (OUTPATIENT)
Dept: LAB | Facility: HOSPITAL | Age: 47
End: 2022-02-07
Attending: FAMILY MEDICINE
Payer: MEDICAID

## 2022-02-07 ENCOUNTER — HOSPITAL ENCOUNTER (OUTPATIENT)
Dept: ULTRASOUND IMAGING | Facility: HOSPITAL | Age: 47
Discharge: HOME | End: 2022-02-07
Attending: FAMILY MEDICINE
Payer: MEDICAID

## 2022-02-07 DIAGNOSIS — E04.1 THYROID NODULE: ICD-10-CM

## 2022-02-07 DIAGNOSIS — M54.2 CERVICAL PAIN: ICD-10-CM

## 2022-02-07 DIAGNOSIS — M54.6 MIDLINE THORACIC BACK PAIN, UNSPECIFIED CHRONICITY: ICD-10-CM

## 2022-02-07 DIAGNOSIS — Z98.1 S/P CERVICAL SPINAL FUSION: ICD-10-CM

## 2022-02-07 DIAGNOSIS — Z00.00 GENERAL MEDICAL EXAM: Primary | ICD-10-CM

## 2022-02-07 LAB
ALBUMIN SERPL-MCNC: 4.3 G/DL (ref 3.4–5)
ALBUMIN/GLOB SERPL: 1.2 {RATIO} (ref 1–2)
ALP LIVER SERPL-CCNC: 77 U/L
ALT SERPL-CCNC: 23 U/L
ANION GAP SERPL CALC-SCNC: 8 MMOL/L (ref 0–18)
AST SERPL-CCNC: 14 U/L (ref 15–37)
BASOPHILS # BLD AUTO: 0.04 X10(3) UL (ref 0–0.2)
BASOPHILS NFR BLD AUTO: 0.7 %
BILIRUB SERPL-MCNC: 0.5 MG/DL (ref 0.1–2)
BUN BLD-MCNC: 12 MG/DL (ref 7–18)
CALCIUM BLD-MCNC: 10.3 MG/DL (ref 8.5–10.1)
CHLORIDE SERPL-SCNC: 106 MMOL/L (ref 98–112)
CHOLEST SERPL-MCNC: 263 MG/DL (ref ?–200)
CO2 SERPL-SCNC: 25 MMOL/L (ref 21–32)
CREAT BLD-MCNC: 1.03 MG/DL
EOSINOPHIL # BLD AUTO: 0.07 X10(3) UL (ref 0–0.7)
EOSINOPHIL NFR BLD AUTO: 1.3 %
ERYTHROCYTE [DISTWIDTH] IN BLOOD BY AUTOMATED COUNT: 16.2 %
EST. AVERAGE GLUCOSE BLD GHB EST-MCNC: 126 MG/DL (ref 68–126)
FASTING PATIENT LIPID ANSWER: YES
FASTING STATUS PATIENT QL REPORTED: YES
GLOBULIN PLAS-MCNC: 3.5 G/DL (ref 2.8–4.4)
GLUCOSE BLD-MCNC: 115 MG/DL (ref 70–99)
HBA1C MFR BLD: 6 % (ref ?–5.7)
HCT VFR BLD AUTO: 37.8 %
HDLC SERPL-MCNC: 84 MG/DL (ref 40–59)
HGB BLD-MCNC: 11.5 G/DL
IMM GRANULOCYTES # BLD AUTO: 0.01 X10(3) UL (ref 0–1)
IMM GRANULOCYTES NFR BLD: 0.2 %
LDLC SERPL CALC-MCNC: 162 MG/DL (ref ?–100)
LYMPHOCYTES # BLD AUTO: 1.71 X10(3) UL (ref 1–4)
LYMPHOCYTES NFR BLD AUTO: 31.8 %
MCH RBC QN AUTO: 24.8 PG (ref 26–34)
MCHC RBC AUTO-ENTMCNC: 30.4 G/DL (ref 31–37)
MCV RBC AUTO: 81.5 FL
MONOCYTES # BLD AUTO: 0.53 X10(3) UL (ref 0.1–1)
MONOCYTES NFR BLD AUTO: 9.9 %
NEUTROPHILS # BLD AUTO: 3.01 X10 (3) UL (ref 1.5–7.7)
NEUTROPHILS # BLD AUTO: 3.01 X10(3) UL (ref 1.5–7.7)
NEUTROPHILS NFR BLD AUTO: 56.1 %
NONHDLC SERPL-MCNC: 179 MG/DL (ref ?–130)
OSMOLALITY SERPL CALC.SUM OF ELEC: 289 MOSM/KG (ref 275–295)
PLATELET # BLD AUTO: 285 10(3)UL (ref 150–450)
POTASSIUM SERPL-SCNC: 4.1 MMOL/L (ref 3.5–5.1)
PROT SERPL-MCNC: 7.8 G/DL (ref 6.4–8.2)
RBC # BLD AUTO: 4.64 X10(6)UL
SODIUM SERPL-SCNC: 139 MMOL/L (ref 136–145)
T4 FREE SERPL-MCNC: 1 NG/DL (ref 0.8–1.7)
TRIGL SERPL-MCNC: 102 MG/DL (ref 30–149)
TSI SER-ACNC: 1.11 MIU/ML (ref 0.36–3.74)
VIT B12 SERPL-MCNC: 490 PG/ML (ref 193–986)
VIT D+METAB SERPL-MCNC: 55.9 NG/ML (ref 30–100)
VLDLC SERPL CALC-MCNC: 20 MG/DL (ref 0–30)
WBC # BLD AUTO: 5.4 X10(3) UL (ref 4–11)

## 2022-02-07 PROCEDURE — 3044F HG A1C LEVEL LT 7.0%: CPT | Performed by: FAMILY MEDICINE

## 2022-02-07 PROCEDURE — 82607 VITAMIN B-12: CPT | Performed by: FAMILY MEDICINE

## 2022-02-07 PROCEDURE — 85025 COMPLETE CBC W/AUTO DIFF WBC: CPT | Performed by: FAMILY MEDICINE

## 2022-02-07 PROCEDURE — 80053 COMPREHEN METABOLIC PANEL: CPT | Performed by: FAMILY MEDICINE

## 2022-02-07 PROCEDURE — 83036 HEMOGLOBIN GLYCOSYLATED A1C: CPT | Performed by: FAMILY MEDICINE

## 2022-02-07 PROCEDURE — 72070 X-RAY EXAM THORAC SPINE 2VWS: CPT | Performed by: PHYSICIAN ASSISTANT

## 2022-02-07 PROCEDURE — 80061 LIPID PANEL: CPT

## 2022-02-07 PROCEDURE — 72072 X-RAY EXAM THORAC SPINE 3VWS: CPT | Performed by: PHYSICIAN ASSISTANT

## 2022-02-07 PROCEDURE — 84443 ASSAY THYROID STIM HORMONE: CPT | Performed by: FAMILY MEDICINE

## 2022-02-07 PROCEDURE — 36415 COLL VENOUS BLD VENIPUNCTURE: CPT | Performed by: FAMILY MEDICINE

## 2022-02-07 PROCEDURE — 82306 VITAMIN D 25 HYDROXY: CPT | Performed by: FAMILY MEDICINE

## 2022-02-07 PROCEDURE — 72050 X-RAY EXAM NECK SPINE 4/5VWS: CPT | Performed by: PHYSICIAN ASSISTANT

## 2022-02-07 PROCEDURE — 84439 ASSAY OF FREE THYROXINE: CPT | Performed by: FAMILY MEDICINE

## 2022-02-07 PROCEDURE — 76536 US EXAM OF HEAD AND NECK: CPT | Performed by: FAMILY MEDICINE

## 2022-02-24 RX ORDER — ALPRAZOLAM 0.25 MG/1
0.25 TABLET ORAL NIGHTLY PRN
Qty: 30 TABLET | Refills: 0 | Status: SHIPPED | OUTPATIENT
Start: 2022-02-24

## 2022-02-24 RX ORDER — LOVASTATIN 10 MG/1
10 TABLET ORAL NIGHTLY
Qty: 90 TABLET | Refills: 0 | Status: SHIPPED | OUTPATIENT
Start: 2022-02-24

## 2022-02-25 ENCOUNTER — TELEPHONE (OUTPATIENT)
Dept: FAMILY MEDICINE CLINIC | Facility: CLINIC | Age: 47
End: 2022-02-25

## 2022-02-25 NOTE — TELEPHONE ENCOUNTER
Has been taking her b12, now what? Should she see endo due to her results?   Sense of smell heightened - candles given her headaches, has never happened before

## 2022-03-01 RX ORDER — METHYLPHENIDATE HYDROCHLORIDE 20 MG/1
20 TABLET ORAL 2 TIMES DAILY
Qty: 60 TABLET | Refills: 0 | Status: SHIPPED | OUTPATIENT
Start: 2022-03-01

## 2022-03-04 RX ORDER — DICYCLOMINE HCL 20 MG
20 TABLET ORAL 4 TIMES DAILY
Qty: 120 TABLET | Refills: 0 | Status: SHIPPED | OUTPATIENT
Start: 2022-03-04

## 2022-03-04 RX ORDER — ONDANSETRON 4 MG/1
4 TABLET, ORALLY DISINTEGRATING ORAL EVERY 8 HOURS PRN
Qty: 20 TABLET | Refills: 0 | Status: SHIPPED | OUTPATIENT
Start: 2022-03-04

## 2022-03-23 RX ORDER — ZOLPIDEM TARTRATE 10 MG/1
TABLET ORAL
Qty: 30 TABLET | Refills: 0 | Status: SHIPPED | OUTPATIENT
Start: 2022-03-23

## 2022-03-24 RX ORDER — TOPIRAMATE 25 MG/1
TABLET ORAL
Qty: 60 TABLET | Refills: 0 | Status: SHIPPED | OUTPATIENT
Start: 2022-03-24

## 2022-04-05 ENCOUNTER — PATIENT MESSAGE (OUTPATIENT)
Dept: SURGERY | Facility: CLINIC | Age: 47
End: 2022-04-05

## 2022-04-05 ENCOUNTER — TELEPHONE (OUTPATIENT)
Dept: FAMILY MEDICINE CLINIC | Facility: CLINIC | Age: 47
End: 2022-04-05

## 2022-04-05 ENCOUNTER — OFFICE VISIT (OUTPATIENT)
Dept: FAMILY MEDICINE CLINIC | Facility: CLINIC | Age: 47
End: 2022-04-05
Payer: MEDICAID

## 2022-04-05 VITALS
TEMPERATURE: 98 F | WEIGHT: 152 LBS | OXYGEN SATURATION: 99 % | DIASTOLIC BLOOD PRESSURE: 80 MMHG | HEART RATE: 80 BPM | RESPIRATION RATE: 18 BRPM | BODY MASS INDEX: 25.33 KG/M2 | SYSTOLIC BLOOD PRESSURE: 116 MMHG | HEIGHT: 65 IN

## 2022-04-05 DIAGNOSIS — Z12.31 ENCOUNTER FOR SCREENING MAMMOGRAM FOR HIGH-RISK PATIENT: ICD-10-CM

## 2022-04-05 DIAGNOSIS — F90.8 ATTENTION DEFICIT HYPERACTIVITY DISORDER (ADHD), OTHER TYPE: ICD-10-CM

## 2022-04-05 DIAGNOSIS — D50.9 IRON DEFICIENCY ANEMIA, UNSPECIFIED IRON DEFICIENCY ANEMIA TYPE: Primary | ICD-10-CM

## 2022-04-05 DIAGNOSIS — J45.20 MILD INTERMITTENT ASTHMA WITHOUT COMPLICATION: ICD-10-CM

## 2022-04-05 PROCEDURE — 3074F SYST BP LT 130 MM HG: CPT | Performed by: FAMILY MEDICINE

## 2022-04-05 PROCEDURE — 3079F DIAST BP 80-89 MM HG: CPT | Performed by: FAMILY MEDICINE

## 2022-04-05 PROCEDURE — 3008F BODY MASS INDEX DOCD: CPT | Performed by: FAMILY MEDICINE

## 2022-04-05 PROCEDURE — 99214 OFFICE O/P EST MOD 30 MIN: CPT | Performed by: FAMILY MEDICINE

## 2022-04-05 RX ORDER — METHYLPHENIDATE HYDROCHLORIDE 20 MG/1
20 TABLET ORAL 2 TIMES DAILY
Qty: 60 TABLET | Refills: 0 | Status: SHIPPED | OUTPATIENT
Start: 2022-04-05

## 2022-04-07 ENCOUNTER — TELEPHONE (OUTPATIENT)
Dept: SURGERY | Facility: CLINIC | Age: 47
End: 2022-04-07

## 2022-04-07 NOTE — TELEPHONE ENCOUNTER
Pt calling to update condition, states she is still having burning sensation between shoulders and questioning if this is normal or if she should be concerned. Please advise.

## 2022-04-08 RX ORDER — CITALOPRAM 20 MG/1
20 TABLET ORAL DAILY
Qty: 90 TABLET | Refills: 0 | Status: SHIPPED | OUTPATIENT
Start: 2022-04-08

## 2022-04-08 RX ORDER — METHOCARBAMOL 500 MG/1
500 TABLET, FILM COATED ORAL 2 TIMES DAILY PRN
Qty: 60 TABLET | Refills: 0 | Status: SHIPPED | OUTPATIENT
Start: 2022-04-08

## 2022-04-08 NOTE — TELEPHONE ENCOUNTER
Pt states she is unable to take muscle relaxer currently prescribed due to side effects caused by the combination on wellbutrin and cyclobenzaprine: palpitations, dysphagia and insomnia and high blood pressure. 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 Pt requesting muscle relaxer be changed to methocarbamol which has less side effects and does not interact with wellbutrin. Pt requesting call from [place], [place] or to have medication sent to Pharmacy on file. Pt informed we will forward message.

## 2022-04-08 NOTE — TELEPHONE ENCOUNTER
Rx Request  CITALOPRAM 20 MG Oral Tab    Disp:        90            R: 0    Last Refilled:10/11/22     Last Visit:04/05/2022

## 2022-04-11 RX ORDER — METHOCARBAMOL 500 MG/1
TABLET, FILM COATED ORAL
Qty: 60 TABLET | Refills: 0 | OUTPATIENT
Start: 2022-04-11

## 2022-04-11 RX ORDER — LOVASTATIN 10 MG/1
TABLET ORAL
Qty: 90 TABLET | Refills: 0 | Status: SHIPPED | OUTPATIENT
Start: 2022-04-11

## 2022-04-18 ENCOUNTER — LAB ENCOUNTER (OUTPATIENT)
Dept: LAB | Age: 47
End: 2022-04-18
Attending: FAMILY MEDICINE
Payer: MEDICAID

## 2022-04-18 DIAGNOSIS — D64.9 ANEMIA, UNSPECIFIED TYPE: ICD-10-CM

## 2022-04-18 DIAGNOSIS — E53.8 B12 DEFICIENCY: ICD-10-CM

## 2022-04-18 DIAGNOSIS — R73.9 HYPERGLYCEMIA: ICD-10-CM

## 2022-04-18 DIAGNOSIS — E78.00 ELEVATED CHOLESTEROL: ICD-10-CM

## 2022-04-18 LAB
BASOPHILS # BLD AUTO: 0.06 X10(3) UL (ref 0–0.2)
BASOPHILS NFR BLD AUTO: 0.9 %
CHOLEST SERPL-MCNC: 224 MG/DL (ref ?–200)
EOSINOPHIL # BLD AUTO: 0.17 X10(3) UL (ref 0–0.7)
EOSINOPHIL NFR BLD AUTO: 2.6 %
ERYTHROCYTE [DISTWIDTH] IN BLOOD BY AUTOMATED COUNT: 16.2 %
EST. AVERAGE GLUCOSE BLD GHB EST-MCNC: 131 MG/DL (ref 68–126)
FASTING PATIENT LIPID ANSWER: YES
HBA1C MFR BLD: 6.2 % (ref ?–5.7)
HCT VFR BLD AUTO: 38.4 %
HDLC SERPL-MCNC: 78 MG/DL (ref 40–59)
HGB BLD-MCNC: 11.3 G/DL
IMM GRANULOCYTES # BLD AUTO: 0.02 X10(3) UL (ref 0–1)
IMM GRANULOCYTES NFR BLD: 0.3 %
LDLC SERPL CALC-MCNC: 125 MG/DL (ref ?–100)
LYMPHOCYTES # BLD AUTO: 2.24 X10(3) UL (ref 1–4)
LYMPHOCYTES NFR BLD AUTO: 34.7 %
MCH RBC QN AUTO: 25.5 PG (ref 26–34)
MCHC RBC AUTO-ENTMCNC: 29.4 G/DL (ref 31–37)
MCV RBC AUTO: 86.5 FL
MONOCYTES # BLD AUTO: 0.78 X10(3) UL (ref 0.1–1)
MONOCYTES NFR BLD AUTO: 12.1 %
NEUTROPHILS # BLD AUTO: 3.18 X10 (3) UL (ref 1.5–7.7)
NEUTROPHILS # BLD AUTO: 3.18 X10(3) UL (ref 1.5–7.7)
NEUTROPHILS NFR BLD AUTO: 49.4 %
NONHDLC SERPL-MCNC: 146 MG/DL (ref ?–130)
PLATELET # BLD AUTO: 297 10(3)UL (ref 150–450)
RBC # BLD AUTO: 4.44 X10(6)UL
TRIGL SERPL-MCNC: 121 MG/DL (ref 30–149)
VIT B12 SERPL-MCNC: 941 PG/ML (ref 193–986)
VLDLC SERPL CALC-MCNC: 21 MG/DL (ref 0–30)
WBC # BLD AUTO: 6.5 X10(3) UL (ref 4–11)

## 2022-04-18 PROCEDURE — 85025 COMPLETE CBC W/AUTO DIFF WBC: CPT

## 2022-04-18 PROCEDURE — 36415 COLL VENOUS BLD VENIPUNCTURE: CPT

## 2022-04-18 PROCEDURE — 82607 VITAMIN B-12: CPT

## 2022-04-18 PROCEDURE — 80061 LIPID PANEL: CPT

## 2022-04-18 PROCEDURE — 83036 HEMOGLOBIN GLYCOSYLATED A1C: CPT

## 2022-04-25 ENCOUNTER — HOSPITAL ENCOUNTER (OUTPATIENT)
Dept: MRI IMAGING | Age: 47
Discharge: HOME | End: 2022-04-25
Attending: PODIATRIST
Payer: MEDICAID

## 2022-04-25 DIAGNOSIS — M84.374A STRESS FRACTURE OF METATARSAL BONE OF RIGHT FOOT, INITIAL ENCOUNTER: ICD-10-CM

## 2022-04-25 DIAGNOSIS — M79.671 RIGHT FOOT PAIN: ICD-10-CM

## 2022-04-25 PROCEDURE — 73718 MRI LOWER EXTREMITY W/O DYE: CPT | Performed by: PODIATRIST

## 2022-05-03 RX ORDER — ALPRAZOLAM 0.25 MG/1
0.25 TABLET ORAL NIGHTLY PRN
Qty: 30 TABLET | Refills: 0 | Status: SHIPPED | OUTPATIENT
Start: 2022-05-03

## 2022-05-03 RX ORDER — ZOLPIDEM TARTRATE 10 MG/1
10 TABLET ORAL NIGHTLY
Qty: 30 TABLET | Refills: 0 | Status: SHIPPED | OUTPATIENT
Start: 2022-05-03

## 2022-05-03 RX ORDER — TOPIRAMATE 25 MG/1
50 TABLET ORAL 2 TIMES DAILY
Qty: 60 TABLET | Refills: 0 | Status: SHIPPED | OUTPATIENT
Start: 2022-05-03

## 2022-05-03 RX ORDER — LOVASTATIN 10 MG/1
10 TABLET ORAL NIGHTLY
Qty: 90 TABLET | Refills: 0 | Status: SHIPPED | OUTPATIENT
Start: 2022-05-03

## 2022-05-03 NOTE — TELEPHONE ENCOUNTER
Next Appt:    With 7 [facility] Cici Perez DO)  05/09/2022 at 11:30 AM    LV 4-5-22    LR   4-11-22 LOVASTATIN    3-24-22 TOPIRAMATE    2-24-22 ALPRAZOLAM

## 2022-05-07 RX ORDER — LAMOTRIGINE 25 MG/1
50 TABLET ORAL DAILY
Qty: 60 TABLET | Refills: 0 | Status: SHIPPED | OUTPATIENT
Start: 2022-05-07

## 2022-05-30 ENCOUNTER — PATIENT MESSAGE (OUTPATIENT)
Dept: SURGERY | Facility: CLINIC | Age: 47
End: 2022-05-30

## 2022-05-31 NOTE — TELEPHONE ENCOUNTER
From: [place]  To: JOSH Del Cid  Sent: 5/30/2022 7:58 AM CDT  Subject: Spine pain    Hello. How are you? I have been getting more pain this last week and waking with headaches from the neck again. Should I come in? Should we get an MRI or CT before I come in? Just let me know your thoughts please. Thank you.   Eyal Wang

## 2022-06-02 DIAGNOSIS — G47.09 OTHER INSOMNIA: ICD-10-CM

## 2022-06-02 DIAGNOSIS — K58.9 IRRITABLE BOWEL SYNDROME WITHOUT DIARRHEA: ICD-10-CM

## 2022-06-02 DIAGNOSIS — J45.20 MILD INTERMITTENT ASTHMA WITHOUT COMPLICATION: ICD-10-CM

## 2022-06-03 RX ORDER — ZOLPIDEM TARTRATE 10 MG/1
10 TABLET ORAL NIGHTLY
Qty: 30 TABLET | Refills: 0 | Status: SHIPPED | OUTPATIENT
Start: 2022-06-03

## 2022-06-03 RX ORDER — MONTELUKAST SODIUM 10 MG/1
10 TABLET ORAL NIGHTLY
Qty: 30 TABLET | Refills: 11 | Status: SHIPPED | OUTPATIENT
Start: 2022-06-03

## 2022-06-03 RX ORDER — DICYCLOMINE HCL 20 MG
20 TABLET ORAL 4 TIMES DAILY
Qty: 120 TABLET | Refills: 0 | Status: SHIPPED | OUTPATIENT
Start: 2022-06-03

## 2022-06-03 NOTE — PROGRESS NOTES
[facility] follow-up        HISTORY OF PRESENT Hayes Riding is a 40year old RH  female here to review imaging. Last hx 5/8/18  She did get a soft cervical collar.   It is somewhat large for her neck and it seems to be causing exten fevers or chills or night sweats.     PAST MEDICAL HISTORY:       Past Medical History:   Diagnosis Date   • Abnormal uterine bleeding     • Acute bronchitis     • Acute esophagitis     • Acute upper respiratory infections of unspecified site     • Anxiety SURGICAL HISTORY      Comment: esophagogastroduodenoscopy  No date: OTHER SURGICAL HISTORY      Comment: right thumb trigger finger release   No date: OTHER SURGICAL HISTORY      Comment: ganglion cyst removed left wrist   2005: TARSAL TUNNEL RELEASE SPRAYS IN EACH NOSTRIL EVERY NIGHT Disp: 1 Bottle Rfl: 2   LOVASTATIN 10 MG Oral Tab TAKE 1 TABLET(10 MG) BY MOUTH EVERY NIGHT Disp: 30 tablet Rfl: 2   Cyanocobalamin (VITAMIN B12 OR) Take by mouth daily.  Disp:  Rfl:    Melatonin 10 MG Oral Cap Take by kai is in no acute distress. HEENT:  Normocephalic, atraumatic  SKIN: Warm, dry, no rashes.     NEUROLOGICAL:  This patient is alert and orientated x 3. Speech fluent. Comprehension intact.   Face is symmetrical.      SPINE: Last exam  Moderate neck pain on e was normal     IMAGING:    MRI cervical spine with flexion-extension shows slight anterior cord pressure at C3-4 C4-5 C5-6    X-ray of the cervical spine 5/3/18 shows loss of cervical lordosis with a kyphosis apex at C4-5 and C5-6.   On flexion she has a 1 Where Do You Want The Question To Include Opioid Counseling Located?: Case Summary Tab

## 2022-06-05 DIAGNOSIS — F39 MOOD DISORDER (HCC): ICD-10-CM

## 2022-06-06 ENCOUNTER — OFFICE VISIT (OUTPATIENT)
Dept: SURGERY | Facility: CLINIC | Age: 47
End: 2022-06-06
Payer: MEDICAID

## 2022-06-06 VITALS — HEART RATE: 85 BPM | SYSTOLIC BLOOD PRESSURE: 110 MMHG | DIASTOLIC BLOOD PRESSURE: 80 MMHG

## 2022-06-06 DIAGNOSIS — Z98.1 S/P CERVICAL SPINAL FUSION: Primary | ICD-10-CM

## 2022-06-06 DIAGNOSIS — M54.2 CERVICAL PAIN: ICD-10-CM

## 2022-06-06 PROCEDURE — 3079F DIAST BP 80-89 MM HG: CPT | Performed by: PHYSICIAN ASSISTANT

## 2022-06-06 PROCEDURE — 3074F SYST BP LT 130 MM HG: CPT | Performed by: PHYSICIAN ASSISTANT

## 2022-06-06 PROCEDURE — 99214 OFFICE O/P EST MOD 30 MIN: CPT | Performed by: PHYSICIAN ASSISTANT

## 2022-06-06 RX ORDER — TERIPARATIDE 250 UG/ML
INJECTION, SOLUTION SUBCUTANEOUS
COMMUNITY
Start: 2022-05-23

## 2022-06-07 RX ORDER — LAMOTRIGINE 25 MG/1
TABLET ORAL
Qty: 60 TABLET | Refills: 0 | Status: SHIPPED | OUTPATIENT
Start: 2022-06-07

## 2022-06-14 DIAGNOSIS — F90.8 ATTENTION DEFICIT HYPERACTIVITY DISORDER (ADHD), OTHER TYPE: ICD-10-CM

## 2022-06-14 DIAGNOSIS — G43.109 MIGRAINE WITH AURA AND WITHOUT STATUS MIGRAINOSUS, NOT INTRACTABLE: ICD-10-CM

## 2022-06-14 RX ORDER — TOPIRAMATE 25 MG/1
50 TABLET ORAL 2 TIMES DAILY
Qty: 60 TABLET | Refills: 0 | Status: SHIPPED | OUTPATIENT
Start: 2022-06-14

## 2022-06-15 RX ORDER — METHYLPHENIDATE HYDROCHLORIDE 20 MG/1
20 TABLET ORAL 2 TIMES DAILY
Qty: 60 TABLET | Refills: 0 | Status: SHIPPED | OUTPATIENT
Start: 2022-06-15

## 2022-06-28 DIAGNOSIS — R11.0 NAUSEA: ICD-10-CM

## 2022-06-29 RX ORDER — ONDANSETRON 4 MG/1
4 TABLET, ORALLY DISINTEGRATING ORAL EVERY 8 HOURS PRN
Qty: 20 TABLET | Refills: 0 | Status: SHIPPED | OUTPATIENT
Start: 2022-06-29

## 2022-07-06 DIAGNOSIS — F41.9 ANXIETY: ICD-10-CM

## 2022-07-06 DIAGNOSIS — F39 MOOD DISORDER (HCC): ICD-10-CM

## 2022-07-06 DIAGNOSIS — F32.0 CURRENT MILD EPISODE OF MAJOR DEPRESSIVE DISORDER, UNSPECIFIED WHETHER RECURRENT (HCC): ICD-10-CM

## 2022-07-06 RX ORDER — CITALOPRAM 20 MG/1
TABLET ORAL
Qty: 90 TABLET | Refills: 0 | Status: SHIPPED | OUTPATIENT
Start: 2022-07-06

## 2022-07-06 RX ORDER — LAMOTRIGINE 25 MG/1
TABLET ORAL
Qty: 60 TABLET | Refills: 0 | Status: SHIPPED | OUTPATIENT
Start: 2022-07-06

## 2022-07-12 ENCOUNTER — PATIENT MESSAGE (OUTPATIENT)
Dept: SURGERY | Facility: CLINIC | Age: 47
End: 2022-07-12

## 2022-07-12 DIAGNOSIS — M54.2 CERVICAL PAIN: Primary | ICD-10-CM

## 2022-07-18 NOTE — TELEPHONE ENCOUNTER
From: Jeaneth Edmond  To: JOSH Salcedo  Sent: 7/12/2022 2:03 AM CDT  Subject: Cervical pain    Hello, I am still in a decent amount of pain. Could you help me set up or get to who I need to for the shots we talked about. I would prefer not dr Depea Leger. Thank you so much.   Daily Arroyo

## 2022-07-19 ENCOUNTER — HOSPITAL ENCOUNTER (OUTPATIENT)
Age: 47
Discharge: HOME | End: 2022-07-19
Payer: MEDICAID

## 2022-07-19 ENCOUNTER — APPOINTMENT (OUTPATIENT)
Dept: GENERAL RADIOLOGY | Age: 47
End: 2022-07-19
Attending: PHYSICIAN ASSISTANT
Payer: MEDICAID

## 2022-07-19 ENCOUNTER — TELEPHONE (OUTPATIENT)
Dept: FAMILY MEDICINE CLINIC | Facility: CLINIC | Age: 47
End: 2022-07-19

## 2022-07-19 VITALS
TEMPERATURE: 97 F | HEART RATE: 84 BPM | WEIGHT: 144 LBS | BODY MASS INDEX: 23.99 KG/M2 | RESPIRATION RATE: 20 BRPM | DIASTOLIC BLOOD PRESSURE: 89 MMHG | OXYGEN SATURATION: 99 % | HEIGHT: 65 IN | SYSTOLIC BLOOD PRESSURE: 141 MMHG

## 2022-07-19 DIAGNOSIS — G47.09 OTHER INSOMNIA: ICD-10-CM
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
## 2022-07-19 DIAGNOSIS — F41.9 ANXIETY AND DEPRESSION: ICD-10-CM

## 2022-07-19 DIAGNOSIS — F90.8 ATTENTION DEFICIT HYPERACTIVITY DISORDER (ADHD), OTHER TYPE: ICD-10-CM

## 2022-07-19 DIAGNOSIS — F32.A ANXIETY AND DEPRESSION: ICD-10-CM

## 2022-07-19 DIAGNOSIS — S20.212A RIB CONTUSION, LEFT, INITIAL ENCOUNTER: ICD-10-CM

## 2022-07-19 DIAGNOSIS — S20.211A RIB CONTUSION, RIGHT, INITIAL ENCOUNTER: ICD-10-CM

## 2022-07-19 PROCEDURE — 71111 X-RAY EXAM RIBS/CHEST4/> VWS: CPT | Performed by: PHYSICIAN ASSISTANT

## 2022-07-19 PROCEDURE — 99213 OFFICE O/P EST LOW 20 MIN: CPT | Performed by: PHYSICIAN ASSISTANT

## 2022-07-19 PROCEDURE — 96372 THER/PROPH/DIAG INJ SC/IM: CPT | Performed by: PHYSICIAN ASSISTANT

## 2022-07-19 RX ORDER — ALPRAZOLAM 0.25 MG/1
0.25 TABLET ORAL NIGHTLY PRN
Qty: 30 TABLET | Refills: 0 | Status: SHIPPED | OUTPATIENT
Start: 2022-07-19

## 2022-07-19 RX ORDER — CYCLOBENZAPRINE HCL 10 MG
10 TABLET ORAL NIGHTLY
Qty: 20 TABLET | Refills: 0 | Status: SHIPPED | OUTPATIENT
Start: 2022-07-19

## 2022-07-19 RX ORDER — KETOROLAC TROMETHAMINE 30 MG/ML
30 INJECTION, SOLUTION INTRAMUSCULAR; INTRAVENOUS ONCE
Status: COMPLETED | OUTPATIENT
Start: 2022-07-19 | End: 2022-07-19

## 2022-07-19 RX ORDER — ZOLPIDEM TARTRATE 10 MG/1
10 TABLET ORAL NIGHTLY
Qty: 30 TABLET | Refills: 0 | Status: SHIPPED | OUTPATIENT
Start: 2022-07-19

## 2022-07-19 RX ORDER — METHYLPHENIDATE HYDROCHLORIDE 20 MG/1
20 TABLET ORAL 2 TIMES DAILY
Qty: 60 TABLET | Refills: 0 | Status: SHIPPED | OUTPATIENT
Start: 2022-07-19 | End: 2022-07-28

## 2022-07-19 RX ORDER — DIAZEPAM 2 MG/1
2 TABLET ORAL EVERY 6 HOURS PRN
Qty: 60 TABLET | Refills: 0 | OUTPATIENT
Start: 2022-07-19

## 2022-07-19 RX ORDER — IBUPROFEN 600 MG/1
600 TABLET ORAL EVERY 8 HOURS PRN
Qty: 30 TABLET | Refills: 0 | Status: SHIPPED | OUTPATIENT
Start: 2022-07-19

## 2022-07-19 NOTE — ED INITIAL ASSESSMENT (HPI)
Pt fell last night injuring right chest.  Pt c/o pain with movement and deep breaths.   Pt had spinal surgery in September, c/o upper back pain

## 2022-07-20 ENCOUNTER — TELEPHONE (OUTPATIENT)
Dept: FAMILY MEDICINE CLINIC | Facility: CLINIC | Age: 47
End: 2022-07-20

## 2022-07-20 NOTE — TELEPHONE ENCOUNTER
Dr Jose Rand, please clarify if there are supposed to be 2 doses for citalopram? I only see 20mg Rx'd on 7/6/22. Also, pharmacy wants to know about a calcium Rx w/vit D and B12?? Would this be OTC?

## 2022-07-21 ENCOUNTER — TELEPHONE (OUTPATIENT)
Dept: FAMILY MEDICINE CLINIC | Facility: CLINIC | Age: 47
End: 2022-07-21

## 2022-07-21 ENCOUNTER — TELEPHONE (OUTPATIENT)
Dept: SURGERY | Facility: CLINIC | Age: 47
End: 2022-07-21

## 2022-07-21 DIAGNOSIS — Z98.1 S/P CERVICAL SPINAL FUSION: ICD-10-CM

## 2022-07-21 DIAGNOSIS — M54.2 CERVICAL PAIN: Primary | ICD-10-CM

## 2022-07-21 NOTE — TELEPHONE ENCOUNTER
PT WAS IN THE ER ON Tuesday FOR A FALL AND BRUISED HER RIBS. SHE WANTS TO KNOW IF DR. BOONE WILL GIVE HER ANYTHING FOR THE PAIN AT NIGHT AND ALSO IF Andrew COVINGTON WANTS TO ORDER ANY LABS BEFORE HER APPT ON Monday   THEY GAVE HER A MUSCLE RELAXER  MG OF IBUPROFEN    PLEASE ADVISE

## 2022-07-21 NOTE — TELEPHONE ENCOUNTER
Pt states she fell on 7.19.22 and was seen in ER. She injured her ribs, but she is experiencing arm tingling and wants to know if Orlando Pepe wants to order any additional imaging. Pt scheduled fu on 8.12.22.   Pls call pt to discuss/advise

## 2022-07-21 NOTE — TELEPHONE ENCOUNTER
We did not sent a script for B12 or Vitamin d. Pt is on Citalopram 20mg.        [facility]

## 2022-07-21 NOTE — TELEPHONE ENCOUNTER
States they received a faxed rx for vitamin B12 and vitamin d. They need strength. Advised to fax over order with note.

## 2022-07-21 NOTE — TELEPHONE ENCOUNTER
Please see message from pt, will you rx anything for pain? Pt has appt Monday. Do you want to order any labs prior to Monday appt?

## 2022-07-21 NOTE — TELEPHONE ENCOUNTER
Noted patient has called with update and inquiry regarding her condition. Patient underwent surgery with Dr. Jose Vela on 9/8/21:    ANTERIOR CERVICAL DISCECTOMY AND FUSION CERVICAL 6- CERVICAL 7 WITH INTERBODY DEVICE, REMOVAL OF SURGICAL SCREW RIGHT C6    Patient inquiring if additional imaging needs to be ordered. Routed to [facility] [place], [place].

## 2022-07-22 ENCOUNTER — TELEPHONE (OUTPATIENT)
Dept: FAMILY MEDICINE CLINIC | Facility: CLINIC | Age: 47
End: 2022-07-22

## 2022-07-22 NOTE — TELEPHONE ENCOUNTER
Pt is on a total of 60mg daily   Please verify with pt   Pt cannot be on additional medications for pain due to potential interactions with current medications

## 2022-07-25 ENCOUNTER — LAB ENCOUNTER (OUTPATIENT)
Dept: LAB | Age: 47
End: 2022-07-25
Attending: FAMILY MEDICINE
Payer: MEDICAID

## 2022-07-25 DIAGNOSIS — Z00.00 ROUTINE GENERAL MEDICAL EXAMINATION AT A HEALTH CARE FACILITY: Primary | ICD-10-CM

## 2022-07-25 LAB
CHOLEST SERPL-MCNC: 220 MG/DL (ref ?–200)
FASTING PATIENT LIPID ANSWER: YES
HDLC SERPL-MCNC: 83 MG/DL (ref 40–59)
LDLC SERPL CALC-MCNC: 107 MG/DL (ref ?–100)
NONHDLC SERPL-MCNC: 137 MG/DL (ref ?–130)
TRIGL SERPL-MCNC: 180 MG/DL (ref 30–149)
VLDLC SERPL CALC-MCNC: 31 MG/DL (ref 0–30)

## 2022-07-25 PROCEDURE — 80053 COMPREHEN METABOLIC PANEL: CPT | Performed by: FAMILY MEDICINE

## 2022-07-25 PROCEDURE — 80061 LIPID PANEL: CPT

## 2022-07-25 PROCEDURE — 85025 COMPLETE CBC W/AUTO DIFF WBC: CPT | Performed by: FAMILY MEDICINE

## 2022-07-25 PROCEDURE — 87086 URINE CULTURE/COLONY COUNT: CPT | Performed by: FAMILY MEDICINE

## 2022-07-25 PROCEDURE — 83540 ASSAY OF IRON: CPT | Performed by: FAMILY MEDICINE

## 2022-07-25 PROCEDURE — 36415 COLL VENOUS BLD VENIPUNCTURE: CPT

## 2022-07-25 PROCEDURE — 83550 IRON BINDING TEST: CPT | Performed by: FAMILY MEDICINE

## 2022-07-25 PROCEDURE — 82728 ASSAY OF FERRITIN: CPT | Performed by: FAMILY MEDICINE

## 2022-07-25 PROCEDURE — 83036 HEMOGLOBIN GLYCOSYLATED A1C: CPT | Performed by: FAMILY MEDICINE

## 2022-07-27 DIAGNOSIS — E83.52 SERUM CALCIUM ELEVATED: Primary | ICD-10-CM

## 2022-07-27 DIAGNOSIS — E78.9 SERUM CHOLESTEROL ELEVATED: Primary | ICD-10-CM

## 2022-07-28 ENCOUNTER — TELEPHONE (OUTPATIENT)
Dept: FAMILY MEDICINE CLINIC | Facility: CLINIC | Age: 47
End: 2022-07-28

## 2022-07-28 ENCOUNTER — TELEPHONE (OUTPATIENT)
Dept: SURGERY | Facility: CLINIC | Age: 47
End: 2022-07-28

## 2022-07-28 ENCOUNTER — LAB ENCOUNTER (OUTPATIENT)
Dept: LAB | Age: 47
End: 2022-07-28
Attending: FAMILY MEDICINE
Payer: MEDICAID

## 2022-07-28 ENCOUNTER — HOSPITAL ENCOUNTER (OUTPATIENT)
Dept: GENERAL RADIOLOGY | Age: 47
Discharge: HOME | End: 2022-07-28
Attending: PHYSICIAN ASSISTANT
Payer: MEDICAID

## 2022-07-28 DIAGNOSIS — F90.8 ATTENTION DEFICIT HYPERACTIVITY DISORDER (ADHD), OTHER TYPE: ICD-10-CM

## 2022-07-28 DIAGNOSIS — Z98.1 S/P CERVICAL SPINAL FUSION: ICD-10-CM

## 2022-07-28 DIAGNOSIS — E83.52 SERUM CALCIUM ELEVATED: ICD-10-CM

## 2022-07-28 DIAGNOSIS — M54.2 CERVICAL PAIN: ICD-10-CM

## 2022-07-28 LAB — PTH-INTACT SERPL-MCNC: 21.2 PG/ML (ref 18.5–88)

## 2022-07-28 PROCEDURE — 36415 COLL VENOUS BLD VENIPUNCTURE: CPT

## 2022-07-28 PROCEDURE — 72040 X-RAY EXAM NECK SPINE 2-3 VW: CPT | Performed by: PHYSICIAN ASSISTANT

## 2022-07-28 PROCEDURE — 83970 ASSAY OF PARATHORMONE: CPT

## 2022-07-29 RX ORDER — METHYLPHENIDATE HYDROCHLORIDE 20 MG/1
20 TABLET ORAL 2 TIMES DAILY
Qty: 60 TABLET | Refills: 0 | Status: SHIPPED | OUTPATIENT
Start: 2022-07-29

## 2022-08-24 ENCOUNTER — TELEMEDICINE (OUTPATIENT)
Dept: FAMILY MEDICINE CLINIC | Facility: CLINIC | Age: 47
End: 2022-08-24

## 2022-08-24 DIAGNOSIS — U07.1 COVID-19: Primary | ICD-10-CM

## 2022-08-24 PROCEDURE — 99213 OFFICE O/P EST LOW 20 MIN: CPT | Performed by: FAMILY MEDICINE

## 2022-08-24 RX ORDER — NIRMATRELVIR AND RITONAVIR 300-100 MG
3 KIT ORAL AS DIRECTED
Qty: 30 EACH | Refills: 0 | Status: SHIPPED | OUTPATIENT
Start: 2022-08-24

## 2022-08-24 NOTE — TELEPHONE ENCOUNTER
No imaging.   [facility] message sent
Per patient mychart appointment cancellation message. Patient Comments: I am not sick, but my son has an immune deficiency and can not fight  respiratory infections. so I don't want to risk it. I do need to reschedule.  do I need any x-rays or anything bef
POST-OP DIAGNOSIS:  Scalp mass 24-Aug-2022 17:44:15 x2 [place]ley [place]

## 2022-09-06 ENCOUNTER — OFFICE VISIT (OUTPATIENT)
Dept: SURGERY | Facility: CLINIC | Age: 47
End: 2022-09-06
Payer: MEDICAID

## 2022-09-06 VITALS — SYSTOLIC BLOOD PRESSURE: 124 MMHG | HEART RATE: 107 BPM | OXYGEN SATURATION: 98 % | DIASTOLIC BLOOD PRESSURE: 64 MMHG

## 2022-09-06 DIAGNOSIS — M54.2 CERVICAL PAIN: Primary | ICD-10-CM

## 2022-09-06 DIAGNOSIS — G89.29 CHRONIC MIDLINE LOW BACK PAIN WITHOUT SCIATICA: ICD-10-CM

## 2022-09-06 DIAGNOSIS — Z98.1 S/P CERVICAL SPINAL FUSION: ICD-10-CM

## 2022-09-06 DIAGNOSIS — M54.50 CHRONIC MIDLINE LOW BACK PAIN WITHOUT SCIATICA: ICD-10-CM

## 2022-09-06 PROBLEM — S16.1XXA CERVICAL STRAIN: Status: ACTIVE | Noted: 2020-11-20

## 2022-09-06 PROCEDURE — 3078F DIAST BP <80 MM HG: CPT | Performed by: PHYSICIAN ASSISTANT

## 2022-09-06 PROCEDURE — 99213 OFFICE O/P EST LOW 20 MIN: CPT | Performed by: PHYSICIAN ASSISTANT

## 2022-09-06 PROCEDURE — 3074F SYST BP LT 130 MM HG: CPT | Performed by: PHYSICIAN ASSISTANT

## 2022-09-06 RX ORDER — COVID-19 MOLECULAR TEST ASSAY
KIT MISCELLANEOUS
COMMUNITY
Start: 2022-08-23 | End: 2022-09-06 | Stop reason: ALTCHOICE

## 2022-09-06 RX ORDER — TOPIRAMATE 25 MG/1
TABLET ORAL
COMMUNITY
Start: 2022-08-20

## 2022-09-06 RX ORDER — ONDANSETRON 4 MG/1
TABLET, FILM COATED ORAL
COMMUNITY
Start: 2022-08-20 | End: 2022-09-06

## 2022-09-07 DIAGNOSIS — F90.8 ATTENTION DEFICIT HYPERACTIVITY DISORDER (ADHD), OTHER TYPE: ICD-10-CM

## 2022-09-08 RX ORDER — METHYLPHENIDATE HYDROCHLORIDE 20 MG/1
20 TABLET ORAL 2 TIMES DAILY
Qty: 60 TABLET | Refills: 0 | Status: SHIPPED | OUTPATIENT
Start: 2022-09-08

## 2022-09-08 NOTE — TELEPHONE ENCOUNTER
Appt:   With [address] [place] [address] Vianney Baca DO)  10/25/2022 at 9:30 AM     8-24-22    lr 7-29-22

## 2022-09-16 NOTE — TELEPHONE ENCOUNTER
METHYLPHENIDATE HCL 07/29/2022 07/29/2022 20 mg 60 tablet  30 [facility]      Patient picked up med

## 2022-09-22 RX ORDER — ONDANSETRON 4 MG/1
TABLET, FILM COATED ORAL
Qty: 270 TABLET | Refills: 3 | OUTPATIENT
Start: 2022-09-22

## 2022-09-23 DIAGNOSIS — R13.10 DYSPHAGIA, UNSPECIFIED TYPE: ICD-10-CM

## 2022-09-23 DIAGNOSIS — G47.09 OTHER INSOMNIA: ICD-10-CM

## 2022-09-23 RX ORDER — ZOLPIDEM TARTRATE 10 MG/1
10 TABLET ORAL NIGHTLY
Qty: 30 TABLET | Refills: 0 | Status: SHIPPED | OUTPATIENT
Start: 2022-09-23

## 2022-09-23 RX ORDER — SUCRALFATE ORAL 1 G/10ML
1 SUSPENSION ORAL
Qty: 1200 ML | Refills: 0 | Status: SHIPPED | OUTPATIENT
Start: 2022-09-23

## 2022-10-11 ENCOUNTER — TELEPHONE (OUTPATIENT)
Dept: NEUROLOGY | Facility: CLINIC | Age: 47
End: 2022-10-11

## 2022-10-11 NOTE — TELEPHONE ENCOUNTER
Pt calling requesting call back from MARIS Taylor. She states she has continued to have numbness in both arms and it continues to worsen. Pt states she had a fusion in September and has concern there is something wrong.

## 2022-10-12 NOTE — TELEPHONE ENCOUNTER
Returned pt's call, she is having issues already stated in message from . She is having pain and worsening numbness and tingling in her right arm. She would like call from KRISTEN Taylor to discuss. Informed her he is not currently in the office and that we will relay message. Pt acknowledged.

## 2022-10-13 NOTE — TELEPHONE ENCOUNTER
Patient was called she identified herself on her voicemail. She is having intermittent pain numbness and tingling in the arms that is coming and going I expect that postoperatively for up to a year maybe longer.   She can call and set up a virtual appointment to discuss it further or she can get an in office appointment we can discuss

## 2022-10-14 ENCOUNTER — PATIENT MESSAGE (OUTPATIENT)
Dept: FAMILY MEDICINE CLINIC | Facility: CLINIC | Age: 47
End: 2022-10-14

## 2022-10-19 NOTE — H&P
History & Physical Examination    Patient Name: Vinod Narayanan  MRN: [id number]  CSN: [id number]  YOB: 1975    Diagnosis: Cervical spondylosis C6-7    Present Illness: Previous laminoplasty which was eventually revised to posterior lateral fu sprays by Nasal route nightly., Disp: 1 Bottle, Rfl: 0, Past Week at Unknown time  Lovastatin 10 MG Oral Tab, Take 1 tablet (10 mg total) by mouth nightly.  AT BEDTIME, Disp: 90 tablet, Rfl: 0, 9/7/2021 at 2100  Montelukast Sodium (SINGULAIR) 10 MG Oral Tab breathing  Levofloxacin            RASH, Tightness in Chest  Reglan [Metoclopram*    OTHER (SEE COMMENTS)    Comment:Tardive dyskinesia  Betadine [Povidone *    ITCHING  Meperidine              NAUSEA AND VOMITING  Other                   OTHER (SEE COMMEN screening 7-3-2012    wnl   • Personal history of urinary (tract) infection    • Pneumonia due to organism    • PONV (postoperative nausea and vomiting)    • Problems with swallowing 29 years ago   • Sleep disturbance    • Stress    • Uncomfortable fullnes Grandmother    • Cancer Sister         uterine   • BRCA gene + Sister    • Uterine Cancer Sister    • Bleeding Disorders Sister    • Cancer Paternal Cousin Female         cervical   • Migraines Son    • Diabetes Son    • Other (Other) Son         Multiple Donor Site Anesthesia Type: same as repair anesthesia

## 2022-10-20 ENCOUNTER — OFFICE VISIT (OUTPATIENT)
Dept: SURGERY | Facility: CLINIC | Age: 47
End: 2022-10-20
Payer: MEDICAID

## 2022-10-20 ENCOUNTER — LAB ENCOUNTER (OUTPATIENT)
Dept: LAB | Facility: HOSPITAL | Age: 47
End: 2022-10-20
Attending: FAMILY MEDICINE
Payer: MEDICAID

## 2022-10-20 VITALS — SYSTOLIC BLOOD PRESSURE: 132 MMHG | HEART RATE: 91 BPM | OXYGEN SATURATION: 98 % | DIASTOLIC BLOOD PRESSURE: 72 MMHG

## 2022-10-20 DIAGNOSIS — Z98.1 HISTORY OF FUSION OF CERVICAL SPINE: ICD-10-CM

## 2022-10-20 DIAGNOSIS — M54.2 NECK PAIN: ICD-10-CM

## 2022-10-20 DIAGNOSIS — R20.0 HAND NUMBNESS: ICD-10-CM

## 2022-10-20 DIAGNOSIS — M54.6 MIDLINE THORACIC BACK PAIN, UNSPECIFIED CHRONICITY: ICD-10-CM

## 2022-10-20 DIAGNOSIS — D50.9 IRON DEFICIENCY ANEMIA, UNSPECIFIED IRON DEFICIENCY ANEMIA TYPE: ICD-10-CM

## 2022-10-20 DIAGNOSIS — E83.52 SERUM CALCIUM ELEVATED: ICD-10-CM

## 2022-10-20 DIAGNOSIS — M48.02 CERVICAL STENOSIS OF SPINAL CANAL: ICD-10-CM

## 2022-10-20 DIAGNOSIS — M54.50 CHRONIC MIDLINE LOW BACK PAIN WITHOUT SCIATICA: ICD-10-CM

## 2022-10-20 DIAGNOSIS — M54.12 CERVICAL RADICULOPATHY: ICD-10-CM

## 2022-10-20 DIAGNOSIS — Z98.1 S/P CERVICAL SPINAL FUSION: ICD-10-CM

## 2022-10-20 DIAGNOSIS — S12.9XXS PSEUDOARTHROSIS OF CERVICAL SPINE, SEQUELA: ICD-10-CM

## 2022-10-20 DIAGNOSIS — R29.898 UPPER EXTREMITY WEAKNESS: ICD-10-CM

## 2022-10-20 DIAGNOSIS — M54.2 CERVICAL PAIN: Primary | ICD-10-CM

## 2022-10-20 DIAGNOSIS — E78.9 SERUM CHOLESTEROL ELEVATED: ICD-10-CM

## 2022-10-20 DIAGNOSIS — G89.29 CHRONIC MIDLINE LOW BACK PAIN WITHOUT SCIATICA: ICD-10-CM

## 2022-10-20 LAB
ALBUMIN SERPL-MCNC: 4.3 G/DL (ref 3.4–5)
ALBUMIN/GLOB SERPL: 1.2 {RATIO} (ref 1–2)
ALP LIVER SERPL-CCNC: 73 U/L
ALT SERPL-CCNC: 52 U/L
ANION GAP SERPL CALC-SCNC: 4 MMOL/L (ref 0–18)
AST SERPL-CCNC: 17 U/L (ref 15–37)
BILIRUB SERPL-MCNC: 0.3 MG/DL (ref 0.1–2)
BUN BLD-MCNC: 16 MG/DL (ref 7–18)
CALCIUM BLD-MCNC: 9.9 MG/DL (ref 8.5–10.1)
CHLORIDE SERPL-SCNC: 104 MMOL/L (ref 98–112)
CHOLEST SERPL-MCNC: 248 MG/DL (ref ?–200)
CO2 SERPL-SCNC: 30 MMOL/L (ref 21–32)
CREAT BLD-MCNC: 0.86 MG/DL
DEPRECATED HBV CORE AB SER IA-ACNC: 56.7 NG/ML
FASTING PATIENT LIPID ANSWER: YES
FASTING STATUS PATIENT QL REPORTED: YES
GFR SERPLBLD BASED ON 1.73 SQ M-ARVRAT: 84 ML/MIN/1.73M2 (ref 60–?)
GLOBULIN PLAS-MCNC: 3.6 G/DL (ref 2.8–4.4)
GLUCOSE BLD-MCNC: 109 MG/DL (ref 70–99)
HDLC SERPL-MCNC: 82 MG/DL (ref 40–59)
IRON SATN MFR SERPL: 14 %
IRON SERPL-MCNC: 60 UG/DL
LDLC SERPL CALC-MCNC: 152 MG/DL (ref ?–100)
NONHDLC SERPL-MCNC: 166 MG/DL (ref ?–130)
OSMOLALITY SERPL CALC.SUM OF ELEC: 288 MOSM/KG (ref 275–295)
POTASSIUM SERPL-SCNC: 4.6 MMOL/L (ref 3.5–5.1)
PROT SERPL-MCNC: 7.9 G/DL (ref 6.4–8.2)
SODIUM SERPL-SCNC: 138 MMOL/L (ref 136–145)
TIBC SERPL-MCNC: 443 UG/DL (ref 240–450)
TRANSFERRIN SERPL-MCNC: 297 MG/DL (ref 200–360)
TRIGL SERPL-MCNC: 85 MG/DL (ref 30–149)
VLDLC SERPL CALC-MCNC: 16 MG/DL (ref 0–30)

## 2022-10-20 PROCEDURE — 82728 ASSAY OF FERRITIN: CPT

## 2022-10-20 PROCEDURE — 82570 ASSAY OF URINE CREATININE: CPT | Performed by: FAMILY MEDICINE

## 2022-10-20 PROCEDURE — 80061 LIPID PANEL: CPT

## 2022-10-20 PROCEDURE — 36415 COLL VENOUS BLD VENIPUNCTURE: CPT

## 2022-10-20 PROCEDURE — 82043 UR ALBUMIN QUANTITATIVE: CPT | Performed by: FAMILY MEDICINE

## 2022-10-20 PROCEDURE — 3078F DIAST BP <80 MM HG: CPT | Performed by: PHYSICIAN ASSISTANT

## 2022-10-20 PROCEDURE — 83540 ASSAY OF IRON: CPT

## 2022-10-20 PROCEDURE — 80053 COMPREHEN METABOLIC PANEL: CPT

## 2022-10-20 PROCEDURE — 83550 IRON BINDING TEST: CPT

## 2022-10-20 PROCEDURE — 99214 OFFICE O/P EST MOD 30 MIN: CPT | Performed by: PHYSICIAN ASSISTANT

## 2022-10-20 PROCEDURE — 3075F SYST BP GE 130 - 139MM HG: CPT | Performed by: PHYSICIAN ASSISTANT

## 2022-10-20 RX ORDER — ONDANSETRON 4 MG/1
TABLET, FILM COATED ORAL
COMMUNITY
Start: 2022-10-12 | End: 2022-10-20

## 2022-10-20 RX ORDER — PREDNISONE 10 MG/1
40 TABLET ORAL DAILY
Qty: 30 TABLET | Refills: 0 | Status: SHIPPED | OUTPATIENT
Start: 2022-10-20

## 2022-10-26 ENCOUNTER — TELEPHONE (OUTPATIENT)
Dept: FAMILY MEDICINE CLINIC | Facility: CLINIC | Age: 47
End: 2022-10-26

## 2022-10-31 ENCOUNTER — LAB ENCOUNTER (OUTPATIENT)
Dept: LAB | Age: 47
End: 2022-10-31
Attending: FAMILY MEDICINE
Payer: MEDICAID

## 2022-10-31 DIAGNOSIS — R53.83 OTHER FATIGUE: ICD-10-CM

## 2022-10-31 LAB
BASOPHILS # BLD AUTO: 0.04 X10(3) UL (ref 0–0.2)
BASOPHILS NFR BLD AUTO: 0.7 %
EOSINOPHIL # BLD AUTO: 0.1 X10(3) UL (ref 0–0.7)
EOSINOPHIL NFR BLD AUTO: 1.7 %
ERYTHROCYTE [DISTWIDTH] IN BLOOD BY AUTOMATED COUNT: 12.4 %
HCT VFR BLD AUTO: 44 %
HGB BLD-MCNC: 14.6 G/DL
IMM GRANULOCYTES # BLD AUTO: 0.01 X10(3) UL (ref 0–1)
IMM GRANULOCYTES NFR BLD: 0.2 %
LYMPHOCYTES # BLD AUTO: 2.18 X10(3) UL (ref 1–4)
LYMPHOCYTES NFR BLD AUTO: 38 %
MCH RBC QN AUTO: 32.1 PG (ref 26–34)
MCHC RBC AUTO-ENTMCNC: 33.2 G/DL (ref 31–37)
MCV RBC AUTO: 96.7 FL
MONOCYTES # BLD AUTO: 0.58 X10(3) UL (ref 0.1–1)
MONOCYTES NFR BLD AUTO: 10.1 %
NEUTROPHILS # BLD AUTO: 2.83 X10 (3) UL (ref 1.5–7.7)
NEUTROPHILS # BLD AUTO: 2.83 X10(3) UL (ref 1.5–7.7)
NEUTROPHILS NFR BLD AUTO: 49.3 %
PLATELET # BLD AUTO: 272 10(3)UL (ref 150–450)
RBC # BLD AUTO: 4.55 X10(6)UL
T4 FREE SERPL-MCNC: 0.8 NG/DL (ref 0.8–1.7)
TSI SER-ACNC: 1.19 MIU/ML (ref 0.36–3.74)
VIT B12 SERPL-MCNC: 669 PG/ML (ref 193–986)
VIT D+METAB SERPL-MCNC: 39.7 NG/ML (ref 30–100)
WBC # BLD AUTO: 5.7 X10(3) UL (ref 4–11)

## 2022-10-31 PROCEDURE — 84439 ASSAY OF FREE THYROXINE: CPT

## 2022-10-31 PROCEDURE — 82306 VITAMIN D 25 HYDROXY: CPT

## 2022-10-31 PROCEDURE — 36415 COLL VENOUS BLD VENIPUNCTURE: CPT

## 2022-10-31 PROCEDURE — 82607 VITAMIN B-12: CPT

## 2022-10-31 PROCEDURE — 85025 COMPLETE CBC W/AUTO DIFF WBC: CPT

## 2022-10-31 PROCEDURE — 84443 ASSAY THYROID STIM HORMONE: CPT

## 2022-11-09 DIAGNOSIS — F32.A ANXIETY AND DEPRESSION: ICD-10-CM

## 2022-11-09 DIAGNOSIS — F41.9 ANXIETY AND DEPRESSION: ICD-10-CM

## 2022-11-09 RX ORDER — ALPRAZOLAM 0.25 MG/1
0.25 TABLET ORAL NIGHTLY PRN
Qty: 30 TABLET | Refills: 0 | Status: SHIPPED | OUTPATIENT
Start: 2022-11-09

## 2022-11-29 ENCOUNTER — TELEPHONE (OUTPATIENT)
Dept: NEUROLOGY | Facility: CLINIC | Age: 47
End: 2022-11-29

## 2022-11-29 NOTE — TELEPHONE ENCOUNTER
Pt woke up with pain in her spine and heard a pop and has been in pain that radiates in her shoulder to her neck. Pt states this is a new pain and would like to know what she should do.

## 2022-11-30 ENCOUNTER — OFFICE VISIT (OUTPATIENT)
Dept: FAMILY MEDICINE CLINIC | Facility: CLINIC | Age: 47
End: 2022-11-30
Payer: MEDICAID

## 2022-11-30 VITALS
OXYGEN SATURATION: 98 % | DIASTOLIC BLOOD PRESSURE: 78 MMHG | BODY MASS INDEX: 25.16 KG/M2 | RESPIRATION RATE: 16 BRPM | SYSTOLIC BLOOD PRESSURE: 110 MMHG | WEIGHT: 151 LBS | HEART RATE: 112 BPM | HEIGHT: 65.15 IN

## 2022-11-30 DIAGNOSIS — G47.09 OTHER INSOMNIA: ICD-10-CM

## 2022-11-30 DIAGNOSIS — F41.9 ANXIETY AND DEPRESSION: ICD-10-CM

## 2022-11-30 DIAGNOSIS — Z23 NEED FOR VACCINATION: ICD-10-CM

## 2022-11-30 DIAGNOSIS — J45.20 MILD INTERMITTENT ASTHMA WITHOUT COMPLICATION: ICD-10-CM

## 2022-11-30 DIAGNOSIS — R73.9 HYPERGLYCEMIA: ICD-10-CM

## 2022-11-30 DIAGNOSIS — F90.8 ATTENTION DEFICIT HYPERACTIVITY DISORDER (ADHD), OTHER TYPE: ICD-10-CM

## 2022-11-30 DIAGNOSIS — F32.A ANXIETY AND DEPRESSION: ICD-10-CM

## 2022-11-30 DIAGNOSIS — R35.0 URINARY FREQUENCY: Primary | ICD-10-CM

## 2022-11-30 DIAGNOSIS — R06.83 SNORING: ICD-10-CM

## 2022-11-30 LAB
BILIRUBIN: NEGATIVE
GLUCOSE (URINE DIPSTICK): NEGATIVE MG/DL
KETONES (URINE DIPSTICK): NEGATIVE MG/DL
MULTISTIX LOT#: ABNORMAL NUMERIC
NITRITE, URINE: NEGATIVE
PH, URINE: 6.5 (ref 4.5–8)
PROTEIN (URINE DIPSTICK): NEGATIVE MG/DL
SPECIFIC GRAVITY: 1.02 (ref 1–1.03)
URINE-COLOR: YELLOW
UROBILINOGEN,SEMI-QN: 0.2 MG/DL (ref 0–1.9)

## 2022-11-30 PROCEDURE — 99214 OFFICE O/P EST MOD 30 MIN: CPT | Performed by: FAMILY MEDICINE

## 2022-11-30 PROCEDURE — 3078F DIAST BP <80 MM HG: CPT | Performed by: FAMILY MEDICINE

## 2022-11-30 PROCEDURE — 3008F BODY MASS INDEX DOCD: CPT | Performed by: FAMILY MEDICINE

## 2022-11-30 PROCEDURE — 81003 URINALYSIS AUTO W/O SCOPE: CPT | Performed by: FAMILY MEDICINE

## 2022-11-30 PROCEDURE — 3074F SYST BP LT 130 MM HG: CPT | Performed by: FAMILY MEDICINE

## 2022-11-30 PROCEDURE — 87086 URINE CULTURE/COLONY COUNT: CPT | Performed by: FAMILY MEDICINE

## 2022-11-30 PROCEDURE — 90471 IMMUNIZATION ADMIN: CPT | Performed by: FAMILY MEDICINE

## 2022-11-30 PROCEDURE — 90686 IIV4 VACC NO PRSV 0.5 ML IM: CPT | Performed by: FAMILY MEDICINE

## 2022-11-30 RX ORDER — ZOLPIDEM TARTRATE 12.5 MG/1
12.5 TABLET, FILM COATED, EXTENDED RELEASE ORAL NIGHTLY PRN
Qty: 30 TABLET | Refills: 0 | Status: SHIPPED | OUTPATIENT
Start: 2022-11-30

## 2022-12-01 DIAGNOSIS — F90.8 ATTENTION DEFICIT HYPERACTIVITY DISORDER (ADHD), OTHER TYPE: ICD-10-CM

## 2022-12-01 RX ORDER — METHYLPHENIDATE HYDROCHLORIDE 20 MG/1
20 TABLET ORAL 2 TIMES DAILY
Qty: 60 TABLET | Refills: 0 | Status: SHIPPED | OUTPATIENT
Start: 2022-12-01

## 2022-12-02 DIAGNOSIS — R31.9 HEMATURIA, UNSPECIFIED TYPE: Primary | ICD-10-CM

## 2022-12-03 DIAGNOSIS — F39 MOOD DISORDER (HCC): ICD-10-CM

## 2022-12-05 RX ORDER — LAMOTRIGINE 25 MG/1
50 TABLET ORAL DAILY
Qty: 60 TABLET | Refills: 0 | Status: SHIPPED | OUTPATIENT
Start: 2022-12-05

## 2022-12-07 ENCOUNTER — HOSPITAL ENCOUNTER (OUTPATIENT)
Dept: GENERAL RADIOLOGY | Facility: HOSPITAL | Age: 47
Discharge: HOME | End: 2022-12-07
Attending: PHYSICIAN ASSISTANT
Payer: MEDICAID

## 2022-12-07 DIAGNOSIS — Z98.1 S/P CERVICAL SPINAL FUSION: ICD-10-CM

## 2022-12-07 DIAGNOSIS — M48.02 CERVICAL STENOSIS OF SPINAL CANAL: ICD-10-CM

## 2022-12-07 DIAGNOSIS — Z98.1 HISTORY OF FUSION OF CERVICAL SPINE: ICD-10-CM

## 2022-12-07 DIAGNOSIS — M54.2 CERVICAL PAIN: ICD-10-CM

## 2022-12-07 DIAGNOSIS — M54.2 NECK PAIN: ICD-10-CM

## 2022-12-07 PROCEDURE — 72050 X-RAY EXAM NECK SPINE 4/5VWS: CPT | Performed by: PHYSICIAN ASSISTANT

## 2022-12-09 ENCOUNTER — HOSPITAL ENCOUNTER (OUTPATIENT)
Dept: BONE DENSITY | Age: 47
Discharge: HOME | End: 2022-12-09
Attending: INTERNAL MEDICINE
Payer: MEDICAID

## 2022-12-09 ENCOUNTER — PATIENT MESSAGE (OUTPATIENT)
Dept: SURGERY | Facility: CLINIC | Age: 47
End: 2022-12-09

## 2022-12-09 DIAGNOSIS — M80.80XG OTHER OSTEOPOROSIS WITH CURRENT PATHOLOGICAL FRACTURE WITH DELAYED HEALING, SUBSEQUENT ENCOUNTER: ICD-10-CM

## 2022-12-09 PROCEDURE — 77080 DXA BONE DENSITY AXIAL: CPT | Performed by: INTERNAL MEDICINE

## 2022-12-12 DIAGNOSIS — R13.10 DYSPHAGIA, UNSPECIFIED TYPE: ICD-10-CM

## 2022-12-13 RX ORDER — PREDNISONE 10 MG/1
40 TABLET ORAL DAILY
Qty: 30 TABLET | Refills: 0 | Status: SHIPPED | OUTPATIENT
Start: 2022-12-13

## 2022-12-13 RX ORDER — SUCRALFATE ORAL 1 G/10ML
1 SUSPENSION ORAL
Qty: 1200 ML | Refills: 0 | Status: SHIPPED | OUTPATIENT
Start: 2022-12-13

## 2022-12-19 ENCOUNTER — HOSPITAL ENCOUNTER (OUTPATIENT)
Dept: MRI IMAGING | Facility: HOSPITAL | Age: 47
Discharge: HOME | End: 2022-12-19
Attending: PHYSICIAN ASSISTANT
Payer: MEDICAID

## 2022-12-19 DIAGNOSIS — M54.2 CERVICAL PAIN: ICD-10-CM

## 2022-12-19 DIAGNOSIS — Z98.1 S/P CERVICAL SPINAL FUSION: ICD-10-CM

## 2022-12-19 DIAGNOSIS — M48.02 CERVICAL STENOSIS OF SPINAL CANAL: ICD-10-CM

## 2022-12-19 DIAGNOSIS — Z98.1 HISTORY OF FUSION OF CERVICAL SPINE: ICD-10-CM

## 2022-12-19 PROCEDURE — 72141 MRI NECK SPINE W/O DYE: CPT | Performed by: PHYSICIAN ASSISTANT

## 2022-12-28 ENCOUNTER — TELEPHONE (OUTPATIENT)
Dept: FAMILY MEDICINE CLINIC | Facility: CLINIC | Age: 47
End: 2022-12-28

## 2022-12-28 DIAGNOSIS — R73.9 HYPERGLYCEMIA: Primary | ICD-10-CM

## 2022-12-28 RX ORDER — BLOOD SUGAR DIAGNOSTIC
STRIP MISCELLANEOUS
Qty: 100 STRIP | Refills: 1 | Status: SHIPPED | OUTPATIENT
Start: 2022-12-28 | End: 2023-12-28

## 2023-01-03 ENCOUNTER — OFFICE VISIT (OUTPATIENT)
Dept: SURGERY | Facility: CLINIC | Age: 48
End: 2023-01-03
Payer: MEDICAID

## 2023-01-03 ENCOUNTER — TELEPHONE (OUTPATIENT)
Dept: SURGERY | Facility: CLINIC | Age: 48
End: 2023-01-03

## 2023-01-03 VITALS
BODY MASS INDEX: 24.66 KG/M2 | DIASTOLIC BLOOD PRESSURE: 68 MMHG | SYSTOLIC BLOOD PRESSURE: 116 MMHG | WEIGHT: 148 LBS | HEART RATE: 104 BPM | HEIGHT: 65 IN

## 2023-01-03 DIAGNOSIS — M54.2 NECK PAIN: ICD-10-CM

## 2023-01-03 DIAGNOSIS — Z98.1 S/P CERVICAL SPINAL FUSION: ICD-10-CM

## 2023-01-03 DIAGNOSIS — M54.2 CERVICAL PAIN: Primary | ICD-10-CM

## 2023-01-03 PROCEDURE — 3008F BODY MASS INDEX DOCD: CPT | Performed by: PHYSICIAN ASSISTANT

## 2023-01-03 PROCEDURE — 3074F SYST BP LT 130 MM HG: CPT | Performed by: PHYSICIAN ASSISTANT

## 2023-01-03 PROCEDURE — 99215 OFFICE O/P EST HI 40 MIN: CPT | Performed by: PHYSICIAN ASSISTANT

## 2023-01-03 PROCEDURE — 3078F DIAST BP <80 MM HG: CPT | Performed by: PHYSICIAN ASSISTANT

## 2023-01-03 RX ORDER — DEXAMETHASONE 2 MG/1
TABLET ORAL
COMMUNITY
Start: 2022-12-12

## 2023-01-03 RX ORDER — IBUPROFEN 800 MG/1
800 TABLET ORAL EVERY 6 HOURS PRN
COMMUNITY
Start: 2022-12-12 | End: 2023-01-03

## 2023-01-03 RX ORDER — LIDOCAINE HYDROCHLORIDE 20 MG/ML
SOLUTION ORAL; TOPICAL
COMMUNITY
Start: 2022-12-19

## 2023-01-03 RX ORDER — ACETAMINOPHEN AND CODEINE PHOSPHATE 300; 30 MG/1; MG/1
1 TABLET ORAL EVERY 6 HOURS PRN
COMMUNITY
Start: 2022-12-12 | End: 2023-01-03

## 2023-01-06 ENCOUNTER — PATIENT MESSAGE (OUTPATIENT)
Dept: FAMILY MEDICINE CLINIC | Facility: CLINIC | Age: 48
End: 2023-01-06

## 2023-01-10 ENCOUNTER — OFFICE VISIT (OUTPATIENT)
Dept: SURGERY | Facility: CLINIC | Age: 48
End: 2023-01-10
Payer: MEDICAID

## 2023-01-10 DIAGNOSIS — R31.29 MICROHEMATURIA: Primary | ICD-10-CM

## 2023-01-10 DIAGNOSIS — N39.41 URGE INCONTINENCE: ICD-10-CM

## 2023-01-10 DIAGNOSIS — R82.90 URINE FINDING: ICD-10-CM

## 2023-01-10 LAB
APPEARANCE: CLEAR
BILIRUBIN: NEGATIVE
GLUCOSE (URINE DIPSTICK): NEGATIVE MG/DL
KETONES (URINE DIPSTICK): NEGATIVE MG/DL
MULTISTIX LOT#: ABNORMAL NUMERIC
NITRITE, URINE: NEGATIVE
PH, URINE: 6.5 (ref 4.5–8)
PROTEIN (URINE DIPSTICK): NEGATIVE MG/DL
SPECIFIC GRAVITY: 1.01 (ref 1–1.03)
UROBILINOGEN,SEMI-QN: 0.2 MG/DL (ref 0–1.9)

## 2023-01-10 PROCEDURE — 81015 MICROSCOPIC EXAM OF URINE: CPT | Performed by: PHYSICIAN ASSISTANT

## 2023-01-19 ENCOUNTER — TELEPHONE (OUTPATIENT)
Dept: GASTROENTEROLOGY | Facility: CLINIC | Age: 48
End: 2023-01-19

## 2023-01-19 ENCOUNTER — TELEPHONE (OUTPATIENT)
Facility: CLINIC | Age: 48
End: 2023-01-19

## 2023-01-19 ENCOUNTER — OFFICE VISIT (OUTPATIENT)
Dept: GASTROENTEROLOGY | Facility: CLINIC | Age: 48
End: 2023-01-19

## 2023-01-19 VITALS
DIASTOLIC BLOOD PRESSURE: 82 MMHG | HEIGHT: 65 IN | BODY MASS INDEX: 24.99 KG/M2 | SYSTOLIC BLOOD PRESSURE: 125 MMHG | WEIGHT: 150 LBS | HEART RATE: 83 BPM

## 2023-01-19 DIAGNOSIS — Z80.0 FAMILY HISTORY OF COLON CANCER: Primary | ICD-10-CM

## 2023-01-19 DIAGNOSIS — R10.11 RIGHT UPPER QUADRANT ABDOMINAL PAIN: ICD-10-CM

## 2023-01-19 DIAGNOSIS — K21.00 GASTROESOPHAGEAL REFLUX DISEASE WITH ESOPHAGITIS WITHOUT HEMORRHAGE: ICD-10-CM

## 2023-01-19 DIAGNOSIS — D50.9 IRON DEFICIENCY ANEMIA, UNSPECIFIED IRON DEFICIENCY ANEMIA TYPE: Primary | ICD-10-CM

## 2023-01-19 DIAGNOSIS — Z80.0 FAMILY HISTORY OF COLON CANCER IN FATHER: ICD-10-CM

## 2023-01-19 DIAGNOSIS — D50.9 IRON DEFICIENCY ANEMIA, UNSPECIFIED IRON DEFICIENCY ANEMIA TYPE: ICD-10-CM

## 2023-01-19 DIAGNOSIS — R10.11 RUQ PAIN: ICD-10-CM

## 2023-01-19 DIAGNOSIS — K21.9 GASTROESOPHAGEAL REFLUX DISEASE, UNSPECIFIED WHETHER ESOPHAGITIS PRESENT: ICD-10-CM

## 2023-01-19 RX ORDER — ESOMEPRAZOLE MAGNESIUM 40 MG/1
40 CAPSULE, DELAYED RELEASE ORAL
Qty: 180 CAPSULE | Refills: 3 | Status: SHIPPED | OUTPATIENT
Start: 2023-01-19

## 2023-01-19 RX ORDER — SODIUM, POTASSIUM,MAG SULFATES 17.5-3.13G
SOLUTION, RECONSTITUTED, ORAL ORAL
Qty: 1 EACH | Refills: 0 | Status: SHIPPED | OUTPATIENT
Start: 2023-01-19

## 2023-01-19 RX ORDER — ESOMEPRAZOLE MAGNESIUM 40 MG/1
40 CAPSULE, DELAYED RELEASE ORAL DAILY
Qty: 90 CAPSULE | Refills: 3 | Status: SHIPPED | OUTPATIENT
Start: 2023-01-19 | End: 2023-01-19

## 2023-01-19 NOTE — TELEPHONE ENCOUNTER
Scheduled for:  Colonocopy [id number]; EGD [id number]  Provider Name:  Dr Kushal Bauer  Date:  04/12/2023  Location:  [facility]  Sedation:  MAC  Time:  0510 (pt is aware to arrive at 1130AM)    Prep:  Colyte  Meds/Allergies Reconciled?:  Physician reviewed  Diagnosis with codes:  GISELL D50.9;FHCC Z80. 0;GERD K21.00;RUQP R10.11  Was patient informed to call insurance with codes (Y/N):  Y     Referral sent?:  Referral was sent at the time of electronic surgical scheduling. [address] or [address] notified?:  I sent an electronic request to Endo Scheduling and received a confirmation today. Medication Orders:  Pt is aware to NOT take iron pills, herbal meds and diet supplements for 7 days before exam. Also to NOT take any form of alcohol, recreational drugs and any forms of ED meds 24 hours before exam. Hold metformin day before and day of procedure. Misc Orders:       Further instructions given by staff:  I discussed the prep intructions with the patient in office which SHE verbally understood. Copy of instructions was handed to patient as well. Patient was also advised he will receive a call from PAT nurse 72-24 hours prior procedure to schedule Covid test done.

## 2023-01-19 NOTE — PATIENT INSTRUCTIONS
PLAN    - Increase Nexium to 40 mg twice a day - we have start a prior authorization for this    - Take Metamucil 2 scoops twice a day   - the goal is one soft bowel movement per day   - adjust the metamucil as needed    - Stop dicyclomine and only take as needed    - Please see general surgery when able to discuss your gallbladder      Instructions for the procedure  1. Schedule EGD and colonoscopy with anesthesia [Diagnosis: GERD, upper abdominal pain, family history of colon cancer]    2.  bowel prep from pharmacy (split dose suprep)    3. Continue all medications for procedure    4. Read all bowel prep instructions carefully    5. AVOID seeds, nuts, popcorn, raw fruits and vegetables (cooked is okay) for 2-3 days before procedure    6. You will need to go for COVID testing 72 hours before procedure. The testing team will call you a few days before your procedure to notify you where/when you can get COVID testing. If you do not go for COVID testing, the procedure cannot be performed. 7. If you start any NEW medication after your visit today, please notify us. Certain medications will need to be held before the procedure, or the procedure cannot be performed.

## 2023-01-19 NOTE — TELEPHONE ENCOUNTER
Please start PA for Nexium 40 mg BID. She has a longstanding history of acid reflux and was scoped several times in the past.  She has a history of a hiatal hernia as well. She has been on Nexium 20 mg BID but is having breakthrough symptoms every other day. She has failed:    - omeprazole    - pepcid    - Tums    - Evelyn Leif chews    The system is flagging pantoprazole as having a potential cross reaction with her history of allergies.

## 2023-01-20 ENCOUNTER — TELEPHONE (OUTPATIENT)
Facility: CLINIC | Age: 48
End: 2023-01-20

## 2023-01-20 NOTE — TELEPHONE ENCOUNTER
Please see telephone encounter from 1/19/2023 already routed to PPD PA team to assist with prior authorization for esomeprazole. Duplicate encounter. Encounter closed.

## 2023-01-23 DIAGNOSIS — G47.09 OTHER INSOMNIA: ICD-10-CM

## 2023-01-23 DIAGNOSIS — F90.8 ATTENTION DEFICIT HYPERACTIVITY DISORDER (ADHD), OTHER TYPE: ICD-10-CM

## 2023-01-23 RX ORDER — METHYLPHENIDATE HYDROCHLORIDE 20 MG/1
20 TABLET ORAL 2 TIMES DAILY
Qty: 60 TABLET | Refills: 0 | Status: SHIPPED | OUTPATIENT
Start: 2023-01-23

## 2023-01-23 RX ORDER — ZOLPIDEM TARTRATE 12.5 MG/1
TABLET, FILM COATED, EXTENDED RELEASE ORAL
Qty: 30 TABLET | Refills: 0 | Status: SHIPPED | OUTPATIENT
Start: 2023-01-23

## 2023-01-24 ENCOUNTER — PATIENT MESSAGE (OUTPATIENT)
Dept: GASTROENTEROLOGY | Facility: CLINIC | Age: 48
End: 2023-01-24

## 2023-01-24 NOTE — TELEPHONE ENCOUNTER
From: Sheryl Bell  To: Cash Montez MD  Sent: 1/24/2023 1:48 PM CST  Subject: Referral     Hello, you sent me a referral to [address] [place] [address] [facility] to see a general surgeon, is there one specific or anyone in the general surgical department?   Thank you,  Sahil Giron

## 2023-01-27 DIAGNOSIS — F39 MOOD DISORDER (HCC): ICD-10-CM

## 2023-01-27 RX ORDER — LAMOTRIGINE 25 MG/1
50 TABLET ORAL DAILY
Qty: 60 TABLET | Refills: 3 | OUTPATIENT
Start: 2023-01-27

## 2023-01-30 RX ORDER — LANSOPRAZOLE 30 MG/1
30 TABLET, ORALLY DISINTEGRATING, DELAYED RELEASE ORAL DAILY
Qty: 60 TABLET | Refills: 5 | Status: SHIPPED | OUTPATIENT
Start: 2023-01-30

## 2023-01-30 NOTE — TELEPHONE ENCOUNTER
Dr. Gordo Hurley request for esomeprazole magnesium 40mg capsule delayed release was denied. Patient has to try and fail 2 of the preferred drugs first.  Patient only tried one.   The patient has to try and fail lansoprazole orally disintegrating tablet first or we can appeal.    Please let me know if you want patient to try alternative drug or if you want to try to appeal.    Thank you    Appeals and [address]  Fax # [phone]  Tracking Number: [id number]

## 2023-01-30 NOTE — TELEPHONE ENCOUNTER
Let's try lansoprazole 30 mg daily. I placed an order. I can't find the disintegrating tablet so hopefully this one is OK.  Thanks, SS

## 2023-01-30 NOTE — TELEPHONE ENCOUNTER
Spoke to patient and informed esomeprazole was denied and new script sent. Patient verbalized understanding of whole message and had no further questions at this time.

## 2023-02-08 DIAGNOSIS — G47.09 OTHER INSOMNIA: ICD-10-CM

## 2023-02-08 RX ORDER — ZOLPIDEM TARTRATE 12.5 MG/1
TABLET, FILM COATED, EXTENDED RELEASE ORAL
Qty: 30 TABLET | Refills: 0 | Status: SHIPPED | OUTPATIENT
Start: 2023-02-08

## 2023-02-09 ENCOUNTER — PATIENT MESSAGE (OUTPATIENT)
Dept: SURGERY | Facility: CLINIC | Age: 48
End: 2023-02-09

## 2023-02-09 DIAGNOSIS — M54.2 CERVICAL PAIN: Primary | ICD-10-CM

## 2023-02-09 NOTE — TELEPHONE ENCOUNTER
From: Ray Brown  To: JOSH Cabello  Sent: 2/9/2023 8:47 AM CST  Subject: Pain management     Hello,   I have an appointment with the pain dr on Monday. Wanted to make sure the referral is there. Also, my neck keeps cracking when I turn it, not always, but a lot.  Is that pretty normal?

## 2023-02-20 DIAGNOSIS — F90.8 ATTENTION DEFICIT HYPERACTIVITY DISORDER (ADHD), OTHER TYPE: ICD-10-CM

## 2023-02-21 ENCOUNTER — TELEPHONE (OUTPATIENT)
Dept: SURGERY | Facility: CLINIC | Age: 48
End: 2023-02-21

## 2023-02-21 DIAGNOSIS — M54.2 CERVICAL PAIN: ICD-10-CM

## 2023-02-21 DIAGNOSIS — Z96.9 RETAINED ORTHOPEDIC HARDWARE: Primary | ICD-10-CM

## 2023-02-21 RX ORDER — METHYLPHENIDATE HYDROCHLORIDE 20 MG/1
20 TABLET ORAL 2 TIMES DAILY
Qty: 60 TABLET | Refills: 0 | Status: SHIPPED | OUTPATIENT
Start: 2023-02-21

## 2023-02-21 NOTE — TELEPHONE ENCOUNTER
Spoke with Juan Pimentel from our pain clinic and he says he can get Dr. Tri Farias to perform her cervical hardware injection posteriorly.     Orders were placed Breath Sounds equal & clear to percussion & auscultation, no accessory muscle use

## 2023-02-28 ENCOUNTER — OFFICE VISIT (OUTPATIENT)
Dept: PAIN CLINIC | Facility: CLINIC | Age: 48
End: 2023-02-28
Payer: MEDICAID

## 2023-02-28 VITALS — OXYGEN SATURATION: 99 % | SYSTOLIC BLOOD PRESSURE: 110 MMHG | HEART RATE: 99 BPM | DIASTOLIC BLOOD PRESSURE: 68 MMHG

## 2023-02-28 DIAGNOSIS — Z98.1 HISTORY OF FUSION OF CERVICAL SPINE: Primary | ICD-10-CM

## 2023-02-28 DIAGNOSIS — T84.84XA PAINFUL ORTHOPAEDIC HARDWARE (HCC): ICD-10-CM

## 2023-02-28 DIAGNOSIS — G89.29 OTHER CHRONIC PAIN: ICD-10-CM

## 2023-02-28 PROCEDURE — 3074F SYST BP LT 130 MM HG: CPT | Performed by: PHYSICIAN ASSISTANT

## 2023-02-28 PROCEDURE — 99214 OFFICE O/P EST MOD 30 MIN: CPT | Performed by: PHYSICIAN ASSISTANT

## 2023-02-28 PROCEDURE — 3078F DIAST BP <80 MM HG: CPT | Performed by: PHYSICIAN ASSISTANT

## 2023-03-01 ENCOUNTER — TELEPHONE (OUTPATIENT)
Dept: NEUROLOGY | Facility: CLINIC | Age: 48
End: 2023-03-01

## 2023-03-01 DIAGNOSIS — T84.84XA PAINFUL ORTHOPAEDIC HARDWARE (HCC): Primary | ICD-10-CM

## 2023-03-01 NOTE — TELEPHONE ENCOUNTER
Initiated PA with Rene Flores from [place] [address] for Bilateral hardware injection [id number] / Faxed clinicals to [phone] with confirmation / Case # [id number]

## 2023-03-06 NOTE — TELEPHONE ENCOUNTER
Prior authorization request completed for: Bilateral inferior hardware injection   Authorization # [id number]  Authorization dates: 3/1/2023 - 5/30/2023  CPT codes approved: 14046  Number of visits/dates of service approved: 1  Physician: Dr. Judye Duverney   Location: THE [facility]     Patient can be scheduled. Routed to Navigator.

## 2023-03-21 ENCOUNTER — HOSPITAL ENCOUNTER (OUTPATIENT)
Facility: HOSPITAL | Age: 48
Setting detail: HOSPITAL OUTPATIENT SURGERY
Discharge: HOME | End: 2023-03-21
Attending: ANESTHESIOLOGY | Admitting: ANESTHESIOLOGY
Payer: MEDICAID

## 2023-03-21 ENCOUNTER — APPOINTMENT (OUTPATIENT)
Dept: GENERAL RADIOLOGY | Facility: HOSPITAL | Age: 48
End: 2023-03-21
Attending: ANESTHESIOLOGY
Payer: MEDICAID

## 2023-03-21 VITALS
DIASTOLIC BLOOD PRESSURE: 75 MMHG | SYSTOLIC BLOOD PRESSURE: 106 MMHG | TEMPERATURE: 98 F | OXYGEN SATURATION: 96 % | RESPIRATION RATE: 16 BRPM | HEART RATE: 74 BPM

## 2023-03-21 LAB — GLUCOSE BLD-MCNC: 105 MG/DL (ref 70–99)

## 2023-03-21 PROCEDURE — 64999 UNLISTED PX NERVOUS SYSTEM: CPT | Performed by: ANESTHESIOLOGY

## 2023-03-21 PROCEDURE — 3E0U33Z INTRODUCTION OF ANTI-INFLAMMATORY INTO JOINTS, PERCUTANEOUS APPROACH: ICD-10-PCS | Performed by: ANESTHESIOLOGY

## 2023-03-21 PROCEDURE — 3E0U3BZ INTRODUCTION OF ANESTHETIC AGENT INTO JOINTS, PERCUTANEOUS APPROACH: ICD-10-PCS | Performed by: ANESTHESIOLOGY

## 2023-03-21 RX ORDER — LIDOCAINE HYDROCHLORIDE 10 MG/ML
INJECTION, SOLUTION EPIDURAL; INFILTRATION; INTRACAUDAL; PERINEURAL
Status: DISCONTINUED | OUTPATIENT
Start: 2023-03-21 | End: 2023-03-21

## 2023-03-21 RX ORDER — SODIUM CHLORIDE, SODIUM LACTATE, POTASSIUM CHLORIDE, CALCIUM CHLORIDE 600; 310; 30; 20 MG/100ML; MG/100ML; MG/100ML; MG/100ML
100 INJECTION, SOLUTION INTRAVENOUS CONTINUOUS
Status: DISCONTINUED | OUTPATIENT
Start: 2023-03-21 | End: 2023-03-21

## 2023-03-21 RX ORDER — ONDANSETRON 2 MG/ML
4 INJECTION INTRAMUSCULAR; INTRAVENOUS ONCE AS NEEDED
Status: DISCONTINUED | OUTPATIENT
Start: 2023-03-21 | End: 2023-03-21

## 2023-03-21 RX ORDER — NICOTINE POLACRILEX 4 MG
15 LOZENGE BUCCAL
Status: DISCONTINUED | OUTPATIENT
Start: 2023-03-21 | End: 2023-03-21

## 2023-03-21 RX ORDER — INSULIN ASPART 100 [IU]/ML
3 INJECTION, SOLUTION INTRAVENOUS; SUBCUTANEOUS ONCE
Status: DISCONTINUED | OUTPATIENT
Start: 2023-03-21 | End: 2023-03-21

## 2023-03-21 RX ORDER — ONDANSETRON 2 MG/ML
4 INJECTION INTRAMUSCULAR; INTRAVENOUS ONCE AS NEEDED
Status: COMPLETED | OUTPATIENT
Start: 2023-03-21 | End: 2023-03-21

## 2023-03-21 RX ORDER — NICOTINE POLACRILEX 4 MG
30 LOZENGE BUCCAL
Status: DISCONTINUED | OUTPATIENT
Start: 2023-03-21 | End: 2023-03-21

## 2023-03-21 RX ORDER — DEXTROSE MONOHYDRATE 25 G/50ML
50 INJECTION, SOLUTION INTRAVENOUS
Status: DISCONTINUED | OUTPATIENT
Start: 2023-03-21 | End: 2023-03-21

## 2023-03-21 RX ORDER — DEXAMETHASONE SODIUM PHOSPHATE 10 MG/ML
INJECTION, SOLUTION INTRAMUSCULAR; INTRAVENOUS
Status: DISCONTINUED | OUTPATIENT
Start: 2023-03-21 | End: 2023-03-21

## 2023-03-21 RX ORDER — MIDAZOLAM HYDROCHLORIDE 1 MG/ML
INJECTION INTRAMUSCULAR; INTRAVENOUS
Status: DISCONTINUED | OUTPATIENT
Start: 2023-03-21 | End: 2023-03-21

## 2023-03-21 RX ORDER — DIPHENHYDRAMINE HYDROCHLORIDE 50 MG/ML
50 INJECTION INTRAMUSCULAR; INTRAVENOUS ONCE AS NEEDED
Status: DISCONTINUED | OUTPATIENT
Start: 2023-03-21 | End: 2023-03-21

## 2023-03-21 NOTE — OPERATIVE REPORT
[facility]  Operative Report  3/21/2023     Fuentes Mail Patient Status:  Hospital Outpatient Surgery    1975 MRN [id number]   Location [address] [address] Attending Kisha Wilkins MD   [facility] Day # 0 PCP Mila Garcia DO     Indication: Richy Sparks is a 56year old female with a history of neck pain and painful orthopedic hardware    Preoperative Diagnosis:  Painful orthopaedic hardware [facility]) [T84.84XA]    Postoperative Diagnosis: Same as above. Procedure performed: Bilateral inferior cervical hardware injection with sedation     Anesthesia: Local and IV Sedation. EBL: Less than 1 ml. Procedure Description:  After reviewing the patient's history and performing a focused physical examination, the diagnosis was confirmed and contraindications such as infection and coagulopathy were ruled out. Following review of allergy, potential side effects and complications, including but not necessarily limited to infection, allergic reaction, local tissue breakdown, nerve injury, and paresis, the patient indicated they understood and agreed to proceed. After obtaining the informed consent, the patient was brought to the procedure room and monitored. Per my order and under my supervision, the patient was sedated with intermittent intravenous medications (as recorded). The vital signs were monitored and recorded by an experienced RN. The procedure started after the patient was adequately sedated. Total sedation time was 10 minutes. In the prone position, following sterile prep and drape of the cervical region, the articular pillars of the corresponding facet joints were identified under fluoroscopy. Attention was turned towards the patient's right side first.  The skin and subcutaneous tissue were anesthetized via 25-gauge 1-1/2 inch needle with approximately 1 mL of 1% lidocaine.   Under fluoroscopy, a 25-gauge, 3-1/2-inch spinal needle adjacent to the mid portion of the corresponding pedicle screw at C4, C5, C6. The needle position was confirmed in AP and lateral views. Following negative aspiration for CSF and blood, 0.5 mL 1% lidocaine and 1 mg of Decadron were injected. Similar procedures were performed on the contralateral side. The needles were removed with tips intact. The patient tolerated the procedure very well. No complications were encountered during or immediately after the procedure. The patient was observed until discharge criteria met. Discharge instructions were given and patient was released to a responsible adult. Complications: None. Follow up:  In clinic as needed    Marvella Holter, MD

## 2023-03-21 NOTE — DISCHARGE INSTRUCTIONS
You have been given medication that makes you sleepy. This may have included both pain medication and sleeping medication. Most of the effects have worn off but you may still have some drowsiness for the next 6 to 8 hours. Home Care  Follow these guidelines when you get home:    --Don't drink any alcohol for the next 24 hours. --Don't drive, operate dangerous machinery, make important business or personal    decisions, or sign legal documents during the next 24 hours. Follow Up Care  Follow up with your healthcare provider if you are not alert and back to your usual level of activity within 12 hours    When to seek medical advice  Call your healthcare provider right away if any of these occur:    --Drowsiness that gets worse  --Weakness or dizziness that gets worse  --Repeated vomiting  --You can not be awakened    Home Care Instructions Following Your Pain Procedure     Gila Rivero,  It has been a pleasure to have you as our patient. To help you at home, you must follow these general discharge instructions. We will review these with you before you are discharged. It is our hope that you have a complete and uneventful recovery from our procedure. General Instructions:  What to Expect:  Bandages from your procedure today can be removed when you get home. Please avoid soaking and/or swimming for 24 hours. Showering is okay  It is normal to have increased pain symptoms and/or pain at injection site for up to 3-5 days after procedure, you can use heat or ice (20 minutes on 20 minutes off) for comfort. You may experience some temporary side effects which may include restlessness or insomnia, flushing of the face, or heart palpitations. Please contact the provider if these symptoms do not resolve within 3-4 days. Lightheadedness or nausea may occur and should resolve within 24 to 48 hours.   If you develop a headache after treatment, rest, drink fluids (with caffeine, if possible) and take mild over-the-counter pain medication. If the headache does not improve with the above treatment, contact the physician. Home Medications:  Resume all previously prescribed medication. Please avoid taking NSAIDs (Non-Steriodal Anti-Inflammatory Drugs) such as:  Ibuprofen ( Advil, Motrin) Aleve (Naproxen), Diclofenac, Meloxicam for 6 hours after procedure. If you are on Coumadin (Warfarin) or any other anti-coagulant (or \"blood thinning\") medication such as Plavix (Clopidogrel), Xarelto (Rivaroxaban), Eliquis (Apixaban), Effient (Prasugrel) etc., restart on the following day from the procedure unless otherwise directed by your provider. If you are a diabetic, please increase the frequency of your glucose monitoring after the procedure as steroids may cause a temporary (2-3 day) increase in your blood sugar. Contact your primary care physician if your blood sugar remains elevated as you may require some medication adjustment. Diet:  Resume your regular diet as tolerated. Activity: We recommend that you relax and rest during the rest of your procedure day. If you feel weakness in your arms or legs do not drive. Follow-up Appointment  Please schedule a follow-up visit within 3 to 4 weeks after your last procedure date. Question or Concerns:  Feel free to call our office with any questions or concerns at [phone] (option #2)    Fred Ghosh  Thank you for coming to [facility] for your procedure. The nurses try very hard to make sure you receive the best care possible. Your trust in them as well as us is greatly appreciated.     Thanks so much,   Dr. Perez Kan

## 2023-03-22 ENCOUNTER — TELEPHONE (OUTPATIENT)
Dept: PAIN CLINIC | Facility: CLINIC | Age: 48
End: 2023-03-22

## 2023-03-22 NOTE — TELEPHONE ENCOUNTER
Courtesy called placed to patient for post procedure follow up. Patient stated little sore. Informed patient that soreness is to be expected after the procedure. Educated patient that it takes 3-5 days for the steroid to be effective and to allow adequate time for medication to work. Encouraged patient to alternate ice and heat and to take medications as prescribed. Pt verbalized understanding to call with any questions or concerns.       Procedure: Bilateral inferior cervical hardware injection with sedation  Date: 03/21/23  Follow up Visit Scheduled: 03/30/23

## 2023-04-03 DIAGNOSIS — G47.09 OTHER INSOMNIA: ICD-10-CM

## 2023-04-03 RX ORDER — ZOLPIDEM TARTRATE 12.5 MG/1
12.5 TABLET, FILM COATED, EXTENDED RELEASE ORAL NIGHTLY PRN
Qty: 30 TABLET | Refills: 0 | Status: SHIPPED | OUTPATIENT
Start: 2023-04-03

## 2023-04-05 ENCOUNTER — OFFICE VISIT (OUTPATIENT)
Dept: PAIN CLINIC | Facility: CLINIC | Age: 48
End: 2023-04-05
Payer: MEDICAID

## 2023-04-05 VITALS
HEART RATE: 92 BPM | BODY MASS INDEX: 25 KG/M2 | SYSTOLIC BLOOD PRESSURE: 112 MMHG | WEIGHT: 150 LBS | DIASTOLIC BLOOD PRESSURE: 70 MMHG | OXYGEN SATURATION: 98 %

## 2023-04-05 DIAGNOSIS — Z98.1 HISTORY OF FUSION OF CERVICAL SPINE: Primary | ICD-10-CM

## 2023-04-05 DIAGNOSIS — M79.18 MYOFASCIAL PAIN: ICD-10-CM

## 2023-04-05 PROCEDURE — 3078F DIAST BP <80 MM HG: CPT | Performed by: PHYSICIAN ASSISTANT

## 2023-04-05 PROCEDURE — 3074F SYST BP LT 130 MM HG: CPT | Performed by: PHYSICIAN ASSISTANT

## 2023-04-05 PROCEDURE — 99214 OFFICE O/P EST MOD 30 MIN: CPT | Performed by: PHYSICIAN ASSISTANT

## 2023-04-05 RX ORDER — DICYCLOMINE HCL 20 MG
TABLET ORAL
COMMUNITY
Start: 2023-03-14

## 2023-04-05 RX ORDER — GABAPENTIN 300 MG/1
CAPSULE ORAL
COMMUNITY
Start: 2023-02-13

## 2023-04-05 NOTE — PROGRESS NOTES
Last procedure: Bilateral inferior cervical hardware injection with sedation  Date: 03/21/23    Percentage of relief obtained: 70-80%  Duration of relief: 1 week    Current Pain Score: 8

## 2023-04-06 ENCOUNTER — TELEPHONE (OUTPATIENT)
Dept: PAIN CLINIC | Facility: CLINIC | Age: 48
End: 2023-04-06

## 2023-04-06 NOTE — TELEPHONE ENCOUNTER
Prior authorization request completed for: Bilateral Superior Cervical Paraspinal, Musculature and Trapezius TPI   Authorization #No Prior Authorization is Required -since provider is In Network with plan. Spoke with Ash at [facility] [phone]  Call Reference #: [id number]  time: 12:52    Authorization dates: n/a   CPT codes approved: 56217  Number of visits/dates of service approved: 1  Physician: Kindra Thomason   Location: In Office       Patient can be scheduled. Routed to Navigator.

## 2023-04-07 ENCOUNTER — PATIENT MESSAGE (OUTPATIENT)
Dept: FAMILY MEDICINE CLINIC | Facility: CLINIC | Age: 48
End: 2023-04-07

## 2023-04-07 DIAGNOSIS — R73.01 ELEVATED FASTING GLUCOSE: ICD-10-CM

## 2023-04-07 DIAGNOSIS — R53.83 OTHER FATIGUE: ICD-10-CM

## 2023-04-07 DIAGNOSIS — E83.52 SERUM CALCIUM ELEVATED: ICD-10-CM

## 2023-04-07 DIAGNOSIS — D50.9 IRON DEFICIENCY ANEMIA, UNSPECIFIED IRON DEFICIENCY ANEMIA TYPE: ICD-10-CM

## 2023-04-07 DIAGNOSIS — R73.9 HYPERGLYCEMIA: Primary | ICD-10-CM

## 2023-04-07 DIAGNOSIS — E78.9 SERUM CHOLESTEROL ELEVATED: ICD-10-CM

## 2023-04-10 NOTE — TELEPHONE ENCOUNTER
From: Alfredo Mail  To: Mila Garcia DO  Sent: 4/7/2023 9:57 AM CDT  Subject: Bloodwork    Hello. I have an appointment next month, but i was wondering if we can do all bloodwork needed before. Iron, thyroid, the usual. I know there is some in there already, but thought it would be easier to do before so we can discuss at the appointment.  Thank you  Richy Sparks

## 2023-04-11 ENCOUNTER — TELEPHONE (OUTPATIENT)
Facility: CLINIC | Age: 48
End: 2023-04-11

## 2023-04-11 DIAGNOSIS — K21.00 GASTROESOPHAGEAL REFLUX DISEASE WITH ESOPHAGITIS, UNSPECIFIED WHETHER HEMORRHAGE: ICD-10-CM

## 2023-04-11 DIAGNOSIS — D50.9 IRON DEFICIENCY ANEMIA, UNSPECIFIED IRON DEFICIENCY ANEMIA TYPE: Primary | ICD-10-CM

## 2023-04-11 DIAGNOSIS — Z80.0 FAMILY HISTORY OF COLON CANCER: ICD-10-CM

## 2023-04-11 DIAGNOSIS — R10.11 ABDOMINAL PAIN, RIGHT UPPER QUADRANT: ICD-10-CM

## 2023-04-11 NOTE — TELEPHONE ENCOUNTER
Lmtcb for rescheduling colon/Egd please transfer  to [address] previous procedure canceled in Epic and Endo

## 2023-04-24 ENCOUNTER — OFFICE VISIT (OUTPATIENT)
Dept: PAIN CLINIC | Facility: CLINIC | Age: 48
End: 2023-04-24
Payer: MEDICAID

## 2023-04-24 ENCOUNTER — NURSE ONLY (OUTPATIENT)
Dept: PAIN CLINIC | Facility: CLINIC | Age: 48
End: 2023-04-24
Payer: MEDICAID

## 2023-04-24 VITALS
HEART RATE: 75 BPM | DIASTOLIC BLOOD PRESSURE: 90 MMHG | OXYGEN SATURATION: 98 % | BODY MASS INDEX: 25 KG/M2 | WEIGHT: 150 LBS | SYSTOLIC BLOOD PRESSURE: 130 MMHG

## 2023-04-24 DIAGNOSIS — G95.9 CERVICAL MYELOPATHY WITH CERVICAL RADICULOPATHY (HCC): Primary | ICD-10-CM

## 2023-04-24 DIAGNOSIS — M54.12 CERVICAL MYELOPATHY WITH CERVICAL RADICULOPATHY (HCC): Primary | ICD-10-CM

## 2023-04-24 RX ORDER — TRIAMCINOLONE ACETONIDE 40 MG/ML
80 INJECTION, SUSPENSION INTRA-ARTICULAR; INTRAMUSCULAR ONCE
Status: COMPLETED | OUTPATIENT
Start: 2023-04-24 | End: 2023-04-24

## 2023-04-24 RX ORDER — BUPIVACAINE HYDROCHLORIDE 5 MG/ML
18 INJECTION, SOLUTION EPIDURAL; INTRACAUDAL ONCE
Status: COMPLETED | OUTPATIENT
Start: 2023-04-24 | End: 2023-04-24

## 2023-04-24 NOTE — PROGRESS NOTES
Patient presents in office today with reported pain in Neck to shoulder    Current pain level reported = 6/10    Last reported dose of ibuprofen this am

## 2023-04-24 NOTE — PROCEDURES
Date of procedure: April 24, 2023    Preop diagnosis: History of posterior fusion: Cervical myofascial pain    Postop diagnosis: Same    Procedure: Bilateral cervical TPI    Surgeon: Yarely Huynh    Anesthesia: None    EBL: 0    Indication: Patient is a 16-year-old female with a history of 4 cervical spine surgeries including posterior fusion with significant myofascial pain    Procedure detail: Informed consent was obtained. A timeout was done. The skin overlying the cervical spine was prepped in usual sterile fashion. Subsequently a 27-gauge needle was used to deliver: X-ray of 80 mg triamcinolone with 18 cc of 0.5% bupivacaine and multiple trigger points. Muscles injected include the levator scapula, semispinalis services, and the trapezius. Needle withdrawn tip intact. Patient tolerated procedure well.     Yarely Huynh MD

## 2023-04-24 NOTE — PROGRESS NOTES
Timeout completed prior to procedure @ 5511  Participants present for timeout:  Dr. Vincenzo Perez  , and patient.

## 2023-05-02 ENCOUNTER — LAB ENCOUNTER (OUTPATIENT)
Dept: LAB | Age: 48
End: 2023-05-02
Attending: FAMILY MEDICINE
Payer: MEDICAID

## 2023-05-02 DIAGNOSIS — E83.52 SERUM CALCIUM ELEVATED: ICD-10-CM

## 2023-05-02 DIAGNOSIS — R73.01 ELEVATED FASTING GLUCOSE: ICD-10-CM

## 2023-05-02 DIAGNOSIS — R73.9 HYPERGLYCEMIA: ICD-10-CM

## 2023-05-02 DIAGNOSIS — R53.83 OTHER FATIGUE: ICD-10-CM

## 2023-05-02 DIAGNOSIS — E78.9 SERUM CHOLESTEROL ELEVATED: ICD-10-CM

## 2023-05-02 DIAGNOSIS — D50.9 IRON DEFICIENCY ANEMIA, UNSPECIFIED IRON DEFICIENCY ANEMIA TYPE: ICD-10-CM

## 2023-05-02 LAB
ALBUMIN SERPL-MCNC: 4.2 G/DL (ref 3.4–5)
ALBUMIN/GLOB SERPL: 1.3 {RATIO} (ref 1–2)
ALP LIVER SERPL-CCNC: 66 U/L
ALT SERPL-CCNC: 48 U/L
ANION GAP SERPL CALC-SCNC: 4 MMOL/L (ref 0–18)
AST SERPL-CCNC: 13 U/L (ref 15–37)
BILIRUB SERPL-MCNC: 0.6 MG/DL (ref 0.1–2)
BUN BLD-MCNC: 17 MG/DL (ref 7–18)
CALCIUM BLD-MCNC: 9.4 MG/DL (ref 8.5–10.1)
CHLORIDE SERPL-SCNC: 106 MMOL/L (ref 98–112)
CHOLEST SERPL-MCNC: 238 MG/DL (ref ?–200)
CO2 SERPL-SCNC: 26 MMOL/L (ref 21–32)
CREAT BLD-MCNC: 0.81 MG/DL
CREAT UR-SCNC: 76.3 MG/DL
DEPRECATED HBV CORE AB SER IA-ACNC: 99.1 NG/ML
EST. AVERAGE GLUCOSE BLD GHB EST-MCNC: 120 MG/DL (ref 68–126)
FASTING PATIENT LIPID ANSWER: YES
FASTING STATUS PATIENT QL REPORTED: YES
GFR SERPLBLD BASED ON 1.73 SQ M-ARVRAT: 91 ML/MIN/1.73M2 (ref 60–?)
GLOBULIN PLAS-MCNC: 3.3 G/DL (ref 2.8–4.4)
GLUCOSE BLD-MCNC: 101 MG/DL (ref 70–99)
HBA1C MFR BLD: 5.8 % (ref ?–5.7)
HDLC SERPL-MCNC: 80 MG/DL (ref 40–59)
IRON SATN MFR SERPL: 27 %
IRON SERPL-MCNC: 135 UG/DL
LDLC SERPL CALC-MCNC: 141 MG/DL (ref ?–100)
MICROALBUMIN UR-MCNC: 0.68 MG/DL
MICROALBUMIN/CREAT 24H UR-RTO: 8.9 UG/MG (ref ?–30)
NONHDLC SERPL-MCNC: 158 MG/DL (ref ?–130)
OSMOLALITY SERPL CALC.SUM OF ELEC: 284 MOSM/KG (ref 275–295)
POTASSIUM SERPL-SCNC: 4.4 MMOL/L (ref 3.5–5.1)
PROT SERPL-MCNC: 7.5 G/DL (ref 6.4–8.2)
SODIUM SERPL-SCNC: 136 MMOL/L (ref 136–145)
T4 FREE SERPL-MCNC: 0.8 NG/DL (ref 0.8–1.7)
TIBC SERPL-MCNC: 508 UG/DL (ref 240–450)
TRANSFERRIN SERPL-MCNC: 341 MG/DL (ref 200–360)
TRIGL SERPL-MCNC: 97 MG/DL (ref 30–149)
TSI SER-ACNC: 2.38 MIU/ML (ref 0.36–3.74)
VIT B12 SERPL-MCNC: 578 PG/ML (ref 193–986)
VIT D+METAB SERPL-MCNC: 47.1 NG/ML (ref 30–100)
VLDLC SERPL CALC-MCNC: 18 MG/DL (ref 0–30)

## 2023-05-02 PROCEDURE — 80053 COMPREHEN METABOLIC PANEL: CPT

## 2023-05-02 PROCEDURE — 80061 LIPID PANEL: CPT

## 2023-05-02 PROCEDURE — 82728 ASSAY OF FERRITIN: CPT

## 2023-05-02 PROCEDURE — 3044F HG A1C LEVEL LT 7.0%: CPT | Performed by: FAMILY MEDICINE

## 2023-05-02 PROCEDURE — 82043 UR ALBUMIN QUANTITATIVE: CPT

## 2023-05-02 PROCEDURE — 3061F NEG MICROALBUMINURIA REV: CPT | Performed by: FAMILY MEDICINE

## 2023-05-02 PROCEDURE — 82607 VITAMIN B-12: CPT

## 2023-05-02 PROCEDURE — 83036 HEMOGLOBIN GLYCOSYLATED A1C: CPT

## 2023-05-02 PROCEDURE — 82306 VITAMIN D 25 HYDROXY: CPT

## 2023-05-02 PROCEDURE — 84443 ASSAY THYROID STIM HORMONE: CPT

## 2023-05-02 PROCEDURE — 83540 ASSAY OF IRON: CPT

## 2023-05-02 PROCEDURE — 83550 IRON BINDING TEST: CPT

## 2023-05-02 PROCEDURE — 82570 ASSAY OF URINE CREATININE: CPT

## 2023-05-02 PROCEDURE — 84439 ASSAY OF FREE THYROXINE: CPT

## 2023-05-02 PROCEDURE — 36415 COLL VENOUS BLD VENIPUNCTURE: CPT

## 2023-05-03 ENCOUNTER — OFFICE VISIT (OUTPATIENT)
Dept: FAMILY MEDICINE CLINIC | Facility: CLINIC | Age: 48
End: 2023-05-03
Payer: MEDICAID

## 2023-05-03 VITALS
SYSTOLIC BLOOD PRESSURE: 112 MMHG | BODY MASS INDEX: 25.8 KG/M2 | OXYGEN SATURATION: 97 % | RESPIRATION RATE: 16 BRPM | HEIGHT: 64.75 IN | WEIGHT: 153 LBS | HEART RATE: 71 BPM | DIASTOLIC BLOOD PRESSURE: 74 MMHG

## 2023-05-03 DIAGNOSIS — R06.83 SNORING: ICD-10-CM

## 2023-05-03 DIAGNOSIS — F41.9 ANXIETY: ICD-10-CM

## 2023-05-03 DIAGNOSIS — F32.0 CURRENT MILD EPISODE OF MAJOR DEPRESSIVE DISORDER, UNSPECIFIED WHETHER RECURRENT (HCC): ICD-10-CM

## 2023-05-03 DIAGNOSIS — Z12.31 ENCOUNTER FOR SCREENING MAMMOGRAM FOR HIGH-RISK PATIENT: ICD-10-CM

## 2023-05-03 DIAGNOSIS — F90.8 ATTENTION DEFICIT HYPERACTIVITY DISORDER (ADHD), OTHER TYPE: ICD-10-CM

## 2023-05-03 DIAGNOSIS — R73.9 HYPERGLYCEMIA: Primary | ICD-10-CM

## 2023-05-03 PROCEDURE — 99214 OFFICE O/P EST MOD 30 MIN: CPT | Performed by: FAMILY MEDICINE

## 2023-05-03 PROCEDURE — 3078F DIAST BP <80 MM HG: CPT | Performed by: FAMILY MEDICINE

## 2023-05-03 PROCEDURE — 3008F BODY MASS INDEX DOCD: CPT | Performed by: FAMILY MEDICINE

## 2023-05-03 PROCEDURE — 3074F SYST BP LT 130 MM HG: CPT | Performed by: FAMILY MEDICINE

## 2023-05-03 RX ORDER — CITALOPRAM 20 MG/1
20 TABLET ORAL EVERY MORNING
Qty: 90 TABLET | Refills: 0 | Status: SHIPPED | OUTPATIENT
Start: 2023-05-03

## 2023-05-03 RX ORDER — METHYLPHENIDATE HYDROCHLORIDE 20 MG/1
20 TABLET ORAL 2 TIMES DAILY
Qty: 60 TABLET | Refills: 0 | Status: SHIPPED | OUTPATIENT
Start: 2023-05-03

## 2023-05-03 RX ORDER — CITALOPRAM 40 MG/1
40 TABLET ORAL DAILY
Qty: 90 TABLET | Refills: 0 | Status: SHIPPED | OUTPATIENT
Start: 2023-05-03

## 2023-05-10 ENCOUNTER — PATIENT MESSAGE (OUTPATIENT)
Dept: SURGERY | Facility: CLINIC | Age: 48
End: 2023-05-10

## 2023-05-11 NOTE — TELEPHONE ENCOUNTER
From: Raf Meenndez  To: JOSH Cantu  Sent: 5/10/2023 4:22 AM CDT  Subject: Follow up since hardware injections     Hello,  I wanted to follow up with you since I have had the hardware injections. I had a second round of shots, these were done in a different area. I have been doing pretty good with these. They have lasted longer. I am still getting some issues, some pain, the pop or crack when I move my neck once and awhile. However pain and numbness have gotten a lot better. Do you think this is all a good sign that the hardware might not have to come out, a least for awhile?   Talk soon, thank you,  Vita Judd

## 2023-05-12 ENCOUNTER — HOSPITAL ENCOUNTER (OUTPATIENT)
Age: 48
Discharge: HOME | End: 2023-05-12
Payer: MEDICAID

## 2023-05-12 VITALS
HEART RATE: 102 BPM | WEIGHT: 148 LBS | BODY MASS INDEX: 24.66 KG/M2 | OXYGEN SATURATION: 98 % | TEMPERATURE: 98 F | RESPIRATION RATE: 20 BRPM | HEIGHT: 65 IN | SYSTOLIC BLOOD PRESSURE: 131 MMHG | DIASTOLIC BLOOD PRESSURE: 84 MMHG

## 2023-05-12 DIAGNOSIS — J01.00 ACUTE NON-RECURRENT MAXILLARY SINUSITIS: Primary | ICD-10-CM

## 2023-05-12 DIAGNOSIS — H10.32 ACUTE CONJUNCTIVITIS OF LEFT EYE, UNSPECIFIED ACUTE CONJUNCTIVITIS TYPE: ICD-10-CM

## 2023-05-12 PROCEDURE — 99203 OFFICE O/P NEW LOW 30 MIN: CPT | Performed by: NURSE PRACTITIONER

## 2023-05-12 RX ORDER — AMOXICILLIN AND CLAVULANATE POTASSIUM 875; 125 MG/1; MG/1
1 TABLET, FILM COATED ORAL 2 TIMES DAILY
Qty: 20 TABLET | Refills: 0 | Status: SHIPPED | OUTPATIENT
Start: 2023-05-12 | End: 2023-05-22

## 2023-05-12 RX ORDER — SULFACETAMIDE SODIUM 100 MG/ML
2 SOLUTION/ DROPS OPHTHALMIC EVERY 6 HOURS
Qty: 1 EACH | Refills: 0 | Status: SHIPPED | OUTPATIENT
Start: 2023-05-12 | End: 2023-05-22

## 2023-05-22 ENCOUNTER — HOSPITAL ENCOUNTER (OUTPATIENT)
Dept: MRI IMAGING | Age: 48
Discharge: HOME | End: 2023-05-22
Attending: PODIATRIST
Payer: MEDICAID

## 2023-05-22 DIAGNOSIS — M77.51 BURSITIS OF RIGHT FOOT: ICD-10-CM

## 2023-05-22 DIAGNOSIS — M77.51 TENDINITIS OF RIGHT FOOT: ICD-10-CM

## 2023-05-22 DIAGNOSIS — M79.671 RIGHT FOOT PAIN: ICD-10-CM

## 2023-05-22 DIAGNOSIS — G47.09 OTHER INSOMNIA: ICD-10-CM

## 2023-05-22 PROCEDURE — 73718 MRI LOWER EXTREMITY W/O DYE: CPT | Performed by: PODIATRIST

## 2023-05-22 RX ORDER — ZOLPIDEM TARTRATE 12.5 MG/1
12.5 TABLET, FILM COATED, EXTENDED RELEASE ORAL NIGHTLY PRN
Qty: 30 TABLET | Refills: 0 | Status: SHIPPED | OUTPATIENT
Start: 2023-05-22

## 2023-06-18 NOTE — CONSULTS
[facility]  [address] [place] [address] Patient Status:  Inpatient    1975 MRN [id number]   [facility] 3SW-A Attending Kimberly Thompson MD   Hosp Day # 0 PCP Jose Rowan DO     Reason for consult: med mgt    Requested by:  years of age - 2005   • Mastodynia    • Menses painful    • Migraines    • Nausea 2weeks   • Night sweats    • Other screening mammogram 06/11/2012   • Pain in joints    • Pain with bowel movements Years   • Pap smear for cervical cancer screening 7-3-2012 left wrist    • SHOULDER ARTHROSCOPY Right 2/13/2018    Performed by Rosa May MD at [facility] MAIN OR   • [address]  2005    right foot   • TONSILLECTOMY     • TOTAL ABDOM HYSTERECTOMY         Social History:  r Rfl:    topiramate 25 MG Oral Tab Take 50 mg by mouth 2 (two) times daily. Disp:  Rfl:    Acetaminophen (ACETAMINOPHEN EXTRA STRENGTH) 500 MG Oral Cap Take 1,000 mg by mouth one time.    Disp:  Rfl:    Citalopram Hydrobromide 20 MG Oral Tab Take 20 mg by mo Soln Inhale 2 puffs into the lungs every 6 (six) hours as needed. Disp:  Rfl: 1       Review of Systems:   A comprehensive 14 point review of systems was completed. Pertinent positives and negatives noted in the HPI.     Physical Exam:    /65 (BP within normal limits

## 2023-08-01 DIAGNOSIS — G47.09 OTHER INSOMNIA: ICD-10-CM

## 2023-08-04 RX ORDER — ZOLPIDEM TARTRATE 12.5 MG/1
12.5 TABLET, FILM COATED, EXTENDED RELEASE ORAL NIGHTLY PRN
Qty: 30 TABLET | Refills: 0 | Status: SHIPPED | OUTPATIENT
Start: 2023-08-04

## 2023-08-04 NOTE — TELEPHONE ENCOUNTER
A refill request was received for:  Requested Prescriptions     Pending Prescriptions Disp Refills    ZOLPIDEM TARTRATE ER 12.5 MG Oral Tab CR [Pharmacy Med Name: ZOLPIDEM TARTRATE ER 12.5MG TAB] 30 tablet 0     Sig: TAKE 1 TABLET (12.5 MG TOTAL) BY MOUTH NIGHTLY AS NEEDED FOR SLEEP.        Last refill date:6/19/2023      Last office visit:5/3/2023     Follow up due:  Future Appointments   Date Time Provider Tanisha Plunkett   8/10/2023  9:00 AM Brian Carballo DO [facility] [address]   9/14/2023  7:20 AM [facility] [address] [facility]

## 2023-08-10 ENCOUNTER — LAB ENCOUNTER (OUTPATIENT)
Dept: LAB | Age: 48
End: 2023-08-10
Attending: FAMILY MEDICINE
Payer: MEDICAID

## 2023-08-10 ENCOUNTER — OFFICE VISIT (OUTPATIENT)
Dept: FAMILY MEDICINE CLINIC | Facility: CLINIC | Age: 48
End: 2023-08-10
Payer: MEDICAID

## 2023-08-10 VITALS
DIASTOLIC BLOOD PRESSURE: 80 MMHG | SYSTOLIC BLOOD PRESSURE: 108 MMHG | OXYGEN SATURATION: 98 % | BODY MASS INDEX: 26.66 KG/M2 | HEIGHT: 65 IN | HEART RATE: 83 BPM | WEIGHT: 160 LBS | RESPIRATION RATE: 16 BRPM

## 2023-08-10 DIAGNOSIS — G57.10 MERALGIA PARESTHETICA, UNSPECIFIED LATERALITY: ICD-10-CM

## 2023-08-10 DIAGNOSIS — M79.671 CHRONIC FOOT PAIN, RIGHT: ICD-10-CM

## 2023-08-10 DIAGNOSIS — L50.9 URTICARIA: ICD-10-CM

## 2023-08-10 DIAGNOSIS — F32.0 CURRENT MILD EPISODE OF MAJOR DEPRESSIVE DISORDER, UNSPECIFIED WHETHER RECURRENT (HCC): ICD-10-CM

## 2023-08-10 DIAGNOSIS — F41.9 ANXIETY: ICD-10-CM

## 2023-08-10 DIAGNOSIS — R73.9 HYPERGLYCEMIA: ICD-10-CM

## 2023-08-10 DIAGNOSIS — G89.29 CHRONIC FOOT PAIN, RIGHT: ICD-10-CM

## 2023-08-10 DIAGNOSIS — E78.00 ELEVATED CHOLESTEROL: ICD-10-CM

## 2023-08-10 DIAGNOSIS — E04.9 GOITER: ICD-10-CM

## 2023-08-10 DIAGNOSIS — K21.00 GASTROESOPHAGEAL REFLUX DISEASE WITH ESOPHAGITIS WITHOUT HEMORRHAGE: ICD-10-CM

## 2023-08-10 DIAGNOSIS — H04.129 DRY EYE: Primary | ICD-10-CM

## 2023-08-10 DIAGNOSIS — F90.8 ATTENTION DEFICIT HYPERACTIVITY DISORDER (ADHD), OTHER TYPE: ICD-10-CM

## 2023-08-10 LAB
ALBUMIN SERPL-MCNC: 4.3 G/DL (ref 3.4–5)
ALBUMIN/GLOB SERPL: 1.3 {RATIO} (ref 1–2)
ALP LIVER SERPL-CCNC: 59 U/L
ALT SERPL-CCNC: 43 U/L
ANION GAP SERPL CALC-SCNC: 7 MMOL/L (ref 0–18)
AST SERPL-CCNC: 23 U/L (ref 15–37)
BILIRUB SERPL-MCNC: 0.5 MG/DL (ref 0.1–2)
BUN BLD-MCNC: 10 MG/DL (ref 7–18)
CALCIUM BLD-MCNC: 10.2 MG/DL (ref 8.5–10.1)
CHLORIDE SERPL-SCNC: 105 MMOL/L (ref 98–112)
CHOLEST SERPL-MCNC: 241 MG/DL (ref ?–200)
CO2 SERPL-SCNC: 26 MMOL/L (ref 21–32)
CREAT BLD-MCNC: 0.86 MG/DL
EGFRCR SERPLBLD CKD-EPI 2021: 84 ML/MIN/1.73M2 (ref 60–?)
EST. AVERAGE GLUCOSE BLD GHB EST-MCNC: 128 MG/DL (ref 68–126)
FASTING PATIENT LIPID ANSWER: YES
FASTING STATUS PATIENT QL REPORTED: YES
GLOBULIN PLAS-MCNC: 3.4 G/DL (ref 2.8–4.4)
GLUCOSE BLD-MCNC: 109 MG/DL (ref 70–99)
HBA1C MFR BLD: 6.1 % (ref ?–5.7)
HDLC SERPL-MCNC: 79 MG/DL (ref 40–59)
LDLC SERPL CALC-MCNC: 140 MG/DL (ref ?–100)
NONHDLC SERPL-MCNC: 162 MG/DL (ref ?–130)
OSMOLALITY SERPL CALC.SUM OF ELEC: 286 MOSM/KG (ref 275–295)
POTASSIUM SERPL-SCNC: 4 MMOL/L (ref 3.5–5.1)
PROT SERPL-MCNC: 7.7 G/DL (ref 6.4–8.2)
SODIUM SERPL-SCNC: 138 MMOL/L (ref 136–145)
TRIGL SERPL-MCNC: 127 MG/DL (ref 30–149)
VLDLC SERPL CALC-MCNC: 23 MG/DL (ref 0–30)

## 2023-08-10 PROCEDURE — 80053 COMPREHEN METABOLIC PANEL: CPT

## 2023-08-10 PROCEDURE — 82785 ASSAY OF IGE: CPT

## 2023-08-10 PROCEDURE — 99215 OFFICE O/P EST HI 40 MIN: CPT | Performed by: FAMILY MEDICINE

## 2023-08-10 PROCEDURE — 3008F BODY MASS INDEX DOCD: CPT | Performed by: FAMILY MEDICINE

## 2023-08-10 PROCEDURE — 80061 LIPID PANEL: CPT

## 2023-08-10 PROCEDURE — 36415 COLL VENOUS BLD VENIPUNCTURE: CPT

## 2023-08-10 PROCEDURE — 3044F HG A1C LEVEL LT 7.0%: CPT | Performed by: FAMILY MEDICINE

## 2023-08-10 PROCEDURE — 3079F DIAST BP 80-89 MM HG: CPT | Performed by: FAMILY MEDICINE

## 2023-08-10 PROCEDURE — 86003 ALLG SPEC IGE CRUDE XTRC EA: CPT

## 2023-08-10 PROCEDURE — 83036 HEMOGLOBIN GLYCOSYLATED A1C: CPT

## 2023-08-10 PROCEDURE — 3074F SYST BP LT 130 MM HG: CPT | Performed by: FAMILY MEDICINE

## 2023-08-10 RX ORDER — CITALOPRAM 20 MG/1
20 TABLET ORAL EVERY MORNING
Qty: 90 TABLET | Refills: 0 | Status: SHIPPED | OUTPATIENT
Start: 2023-08-10

## 2023-08-10 RX ORDER — METHYLPHENIDATE HYDROCHLORIDE 20 MG/1
20 TABLET ORAL 2 TIMES DAILY
Qty: 60 TABLET | Refills: 0 | Status: SHIPPED | OUTPATIENT
Start: 2023-08-10

## 2023-08-10 RX ORDER — LOVASTATIN 10 MG/1
10 TABLET ORAL NIGHTLY
Qty: 90 TABLET | Refills: 0 | Status: SHIPPED | OUTPATIENT
Start: 2023-08-10

## 2023-08-10 RX ORDER — LANSOPRAZOLE 30 MG/1
30 TABLET, ORALLY DISINTEGRATING, DELAYED RELEASE ORAL DAILY
Qty: 60 TABLET | Refills: 5 | Status: SHIPPED | OUTPATIENT
Start: 2023-08-10

## 2023-08-10 RX ORDER — CITALOPRAM 40 MG/1
40 TABLET ORAL DAILY
Qty: 90 TABLET | Refills: 0 | Status: SHIPPED | OUTPATIENT
Start: 2023-08-10

## 2023-08-11 DIAGNOSIS — R73.9 HYPERGLYCEMIA: Primary | ICD-10-CM

## 2023-08-11 DIAGNOSIS — E78.00 ELEVATED CHOLESTEROL: ICD-10-CM

## 2023-08-14 LAB
A ALTERNATA IGE QN: <0.1 KUA/L (ref ?–0.1)
A FUMIGATUS IGE QN: <0.1 KUA/L (ref ?–0.1)
AMER SYCAMORE IGE QN: <0.1 KUA/L (ref ?–0.1)
BERMUDA GRASS IGE QN: <0.1 KUA/L (ref ?–0.1)
BOXELDER IGE QN: <0.1 KUA/L (ref ?–0.1)
C HERBARUM IGE QN: <0.1 KUA/L (ref ?–0.1)
CALIF WALNUT IGE QN: <0.1 KUA/L (ref ?–0.1)
CAT DANDER IGE QN: <0.1 KUA/L (ref ?–0.1)
CLAM IGE QN: <0.1 KUA/L (ref ?–0.1)
CMN PIGWEED IGE QN: <0.1 KUA/L (ref ?–0.1)
CODFISH IGE QN: <0.1 KUA/L (ref ?–0.1)
COMMON RAGWEED IGE QN: <0.1 KUA/L (ref ?–0.1)
CORN IGE QN: <0.1 KUA/L (ref ?–0.1)
COTTONWOOD IGE QN: <0.1 KUA/L (ref ?–0.1)
COW MILK IGE QN: <0.1 KUA/L (ref ?–0.1)
D FARINAE IGE QN: <0.1 KUA/L (ref ?–0.1)
D PTERONYSS IGE QN: <0.1 KUA/L (ref ?–0.1)
DOG DANDER IGE QN: <0.1 KUA/L (ref ?–0.1)
EGG WHITE IGE QN: <0.1 KUA/L (ref ?–0.1)
IGE SERPL-ACNC: 4.94 KU/L (ref 2–214)
IGE SERPL-ACNC: 4.97 KU/L (ref 2–214)
M RACEMOSUS IGE QN: <0.1 KUA/L (ref ?–0.1)
MARSH ELDER IGE QN: <0.1 KUA/L (ref ?–0.1)
MOUSE EPITH IGE QN: <0.1 KUA/L (ref ?–0.1)
MT JUNIPER IGE QN: <0.1 KUA/L (ref ?–0.1)
P NOTATUM IGE QN: <0.1 KUA/L (ref ?–0.1)
PEANUT IGE QN: <0.1 KUA/L (ref ?–0.1)
PECAN/HICK TREE IGE QN: <0.1 KUA/L (ref ?–0.1)
ROACH IGE QN: <0.1 KUA/L (ref ?–0.1)
SALTWORT IGE QN: <0.1 KUA/L (ref ?–0.1)
SCALLOP IGE QN: <0.1 KUA/L (ref ?–0.1)
SESAME SEED IGE QN: <0.1 KUA/L (ref ?–0.1)
SHRIMP IGE QN: <0.1 KUA/L (ref ?–0.1)
SOYBEAN IGE QN: <0.1 KUA/L (ref ?–0.1)
TIMOTHY IGE QN: <0.1 KUA/L (ref ?–0.1)
WALNUT IGE QN: <0.1 KUA/L (ref ?–0.1)
WHEAT IGE QN: <0.1 KUA/L (ref ?–0.1)
WHITE ASH IGE QN: <0.1 KUA/L (ref ?–0.1)
WHITE ELM IGE QN: <0.1 KUA/L (ref ?–0.1)
WHITE MULBERRY IGE QN: <0.1 KUA/L (ref ?–0.1)
WHITE OAK IGE QN: <0.1 KUA/L (ref ?–0.1)

## 2023-08-17 DIAGNOSIS — G47.09 OTHER INSOMNIA: ICD-10-CM

## 2023-08-17 RX ORDER — ZOLPIDEM TARTRATE 12.5 MG/1
12.5 TABLET, FILM COATED, EXTENDED RELEASE ORAL NIGHTLY PRN
Qty: 30 TABLET | Refills: 1 | OUTPATIENT
Start: 2023-08-17

## 2023-08-25 DIAGNOSIS — F32.A ANXIETY AND DEPRESSION: ICD-10-CM

## 2023-08-25 DIAGNOSIS — R11.0 NAUSEA: ICD-10-CM

## 2023-08-25 DIAGNOSIS — F41.9 ANXIETY AND DEPRESSION: ICD-10-CM

## 2023-08-25 RX ORDER — ALPRAZOLAM 0.25 MG/1
0.25 TABLET ORAL NIGHTLY PRN
Qty: 30 TABLET | Refills: 0 | Status: SHIPPED | OUTPATIENT
Start: 2023-08-25

## 2023-08-25 RX ORDER — ONDANSETRON 4 MG/1
4 TABLET, ORALLY DISINTEGRATING ORAL EVERY 8 HOURS PRN
Qty: 20 TABLET | Refills: 0 | Status: SHIPPED | OUTPATIENT
Start: 2023-08-25

## 2023-08-25 NOTE — TELEPHONE ENCOUNTER
A refill request was received for:  Requested Prescriptions     Pending Prescriptions Disp Refills    ondansetron 4 MG Oral Tablet Dispersible 20 tablet 0     Sig: Place 1 tablet (4 mg total) under the tongue every 8 (eight) hours as needed for Nausea.        Last refill date:6/29/2022       Last office visit:8/10/2023     Follow up due:  Future Appointments   Date Time Provider Tanisha Plunkett   8/28/2023  9:00 AM [address] [facility]   9/14/2023  7:20 AM [facility]   9/27/2023  8:15 PM SCHEDULE BY DATE DHARMESH Gregorio   11/15/2023  9:30 AM Gurdeep Hernandez DO [facility] [address]

## 2023-09-17 DIAGNOSIS — R13.10 DYSPHAGIA, UNSPECIFIED TYPE: ICD-10-CM

## 2023-09-18 RX ORDER — SUCRALFATE ORAL 1 G/10ML
1 SUSPENSION ORAL
Qty: 1200 ML | Refills: 0 | Status: SHIPPED | OUTPATIENT
Start: 2023-09-18

## 2023-09-20 ENCOUNTER — HOSPITAL ENCOUNTER (OUTPATIENT)
Dept: ULTRASOUND IMAGING | Age: 48
Discharge: HOME | End: 2023-09-20
Attending: FAMILY MEDICINE
Payer: MEDICAID

## 2023-09-20 DIAGNOSIS — E04.9 GOITER: ICD-10-CM

## 2023-09-20 PROCEDURE — 76536 US EXAM OF HEAD AND NECK: CPT | Performed by: FAMILY MEDICINE

## 2023-09-22 NOTE — TELEPHONE ENCOUNTER
A refill request was received for:  Requested Prescriptions     Pending Prescriptions Disp Refills    ALBUTEROL 108 (90 Base) MCG/ACT Inhalation Aero Soln [Pharmacy Med Name: ALBUTEROL INH 90MCG AER] 51 g 0     Sig: INHALE 1-2 PUFFS BY MOUTH EVERY 4 TO 6 HOURS AS NEEDED       Last refill date:16/2022       Last office visit:8/10/2023     Follow up due:  Future Appointments   Date Time Provider Tanisha Plunkett   9/27/2023 10:00 AM [facility]   11/15/2023  9:30 AM Kavin Lea DO [facility] [address]

## 2023-09-23 RX ORDER — ALBUTEROL SULFATE 90 UG/1
1-2 AEROSOL, METERED RESPIRATORY (INHALATION)
Qty: 51 G | Refills: 0 | Status: SHIPPED | OUTPATIENT
Start: 2023-09-23

## 2023-09-27 ENCOUNTER — OFFICE VISIT (OUTPATIENT)
Dept: SLEEP CENTER | Age: 48
End: 2023-09-27
Attending: Other
Payer: MEDICAID

## 2023-09-27 DIAGNOSIS — R06.83 SNORING: ICD-10-CM

## 2023-09-27 DIAGNOSIS — F90.8 ATTENTION DEFICIT HYPERACTIVITY DISORDER (ADHD), OTHER TYPE: ICD-10-CM

## 2023-09-27 PROCEDURE — 95806 SLEEP STUDY UNATT&RESP EFFT: CPT

## 2023-09-27 RX ORDER — DICYCLOMINE HCL 20 MG
40 TABLET ORAL EVERY EVENING
Qty: 180 TABLET | Refills: 0 | Status: SHIPPED | OUTPATIENT
Start: 2023-09-27

## 2023-09-27 NOTE — TELEPHONE ENCOUNTER
A refill request was received for:  Requested Prescriptions     Pending Prescriptions Disp Refills    methylphenidate 20 MG Oral Tab 60 tablet 0     Sig: Take 1 tablet (20 mg total) by mouth 2 (two) times daily.        Last refill date: 8/10/2023      Last office visit:8/10/2023     Follow up due:  Future Appointments   Date Time Provider Tanisha Plunkett   11/15/2023  9:30 AM Kavin Lea DO [facility] [address]

## 2023-09-27 NOTE — PROGRESS NOTES
Neurosurgery Cervical  Follow up      REASON FOR VISIT: Hx of ACDF C4-6, cervical follow up      [address] Roseann Reynolds is a 44year old female with a history of ACDF 12/19/18 with Dr. Anabelle Mendez. Resents for cervical follow-up.     Patient neck surgery in [address] and has lt sided neck pain. FINDINGS:  C1-C7 are adequately visualized. No evidence of fracture or dislocation. Hardware at the C4-C6 levels noted. No significant change alignment flexion or extension.   Prevertebral soft recommendations. I reviewed imaging. I discussed the plan and reviewed imaging with the patient. The patient agrees with the plan, verbalized understanding and is appreciative. All questions were sought out and thoroughly answered to satisfaction. Notification Instructions: Patient will be notified of biopsy results. However, patient instructed to call the office if not contacted within 2 weeks.

## 2023-09-28 ENCOUNTER — SLEEP STUDY (OUTPATIENT)
Facility: CLINIC | Age: 48
End: 2023-09-28
Payer: MEDICAID

## 2023-09-28 DIAGNOSIS — G47.9 SLEEP DISORDER: Primary | ICD-10-CM

## 2023-09-28 PROCEDURE — 95806 SLEEP STUDY UNATT&RESP EFFT: CPT | Performed by: OTHER

## 2023-09-28 RX ORDER — METHYLPHENIDATE HYDROCHLORIDE 20 MG/1
20 TABLET ORAL 2 TIMES DAILY
Qty: 60 TABLET | Refills: 0 | Status: SHIPPED | OUTPATIENT
Start: 2023-09-28

## 2023-10-01 DIAGNOSIS — J45.20 MILD INTERMITTENT ASTHMA WITHOUT COMPLICATION: ICD-10-CM

## 2023-10-02 RX ORDER — MONTELUKAST SODIUM 10 MG/1
10 TABLET ORAL NIGHTLY
Qty: 90 TABLET | Refills: 0 | Status: SHIPPED | OUTPATIENT
Start: 2023-10-02

## 2023-10-02 RX ORDER — FERROUS SULFATE 325(65) MG
1 TABLET ORAL EVERY MORNING
Qty: 90 TABLET | Refills: 0 | Status: SHIPPED | OUTPATIENT
Start: 2023-10-02

## 2023-10-02 RX ORDER — METHOCARBAMOL 500 MG/1
500 TABLET, FILM COATED ORAL 2 TIMES DAILY PRN
Qty: 180 TABLET | Refills: 0 | OUTPATIENT
Start: 2023-10-02

## 2023-10-02 RX ORDER — TOPIRAMATE 25 MG/1
50 TABLET ORAL 2 TIMES DAILY
Qty: 360 TABLET | Refills: 0 | Status: SHIPPED | OUTPATIENT
Start: 2023-10-02

## 2023-10-02 NOTE — TELEPHONE ENCOUNTER
.A refill request was received for:  Requested Prescriptions     Pending Prescriptions Disp Refills    MONTELUKAST 10 MG Oral Tab [Pharmacy Med Name: MONTELUKAST SODIUM 10MG TAB] 90 tablet 0     Sig: TAKE 1 TABLET BY MOUTH DAILY AT BEDTIME    FEROSUL 325 (65 Fe) MG Oral Tab [Pharmacy Med Name: Irma Arti TAB] 90 tablet 0     Sig: TAKE ONE TABLET BY MOUTH IN THE MORNING       Last refill date:   6/3/2022    Last office visit: 8/10/2023    Follow up due:  Future Appointments   Date Time Provider Tanisha Plunkett   11/15/2023  9:30 AM Shabbir Pizano DO [facility] [address]

## 2023-10-02 NOTE — TELEPHONE ENCOUNTER
.A refill request was received for:  Requested Prescriptions     Pending Prescriptions Disp Refills    TOPIRAMATE 25 MG Oral Tab [Pharmacy Med Name: TOPIRAMATE 25MG TAB] 360 tablet 0     Sig: TAKE TWO TABLETS BY MOUTH TWICE A DAY    METHOCARBAMOL 500 MG Oral Tab [Pharmacy Med Name: METHOCARBAMOL 500MG TAB] 180 tablet 0     Sig: TAKE 1 TABLET BY MOUTH TWICE A DAY AS NEEDED     Last refill date:   methocarbomal 4/8/2023  Topiramate 8/20/20223    Meds not on active med list    Last office visit: 8/10/2023    Follow up due:  Future Appointments   Date Time Provider Tanisha Plunkett   11/15/2023  9:30 AM Serena Delgadillo DO [facility] [address] [facility]

## 2023-10-02 NOTE — PROCEDURES
[address] [address]       Accredited by the [employer] (AASM)    PATIENT'S NAME:        Oumou Puri  ATTENDING PHYSICIAN:   Blain Canavan, DO  REFERRING PHYSICIAN:   Adalid Mckeon D.O.  PATIENT ACCOUNT #:     [de-identified]        LOCATION:       [address] [place] [address] #:      [id number]        YOB: 1975  DATE OF STUDY:         09/27/2023    SLEEP STUDY REPORT    STUDY TYPE:  Unattended sleep study. CLINICAL HISTORY:  This is a 50-year-old female who is undergoing evaluation for snoring and sleep-disordered breathing. Her body mass index is 27. UNATTENDED SLEEP STUDY RECORDING PARAMETERS:  The patient underwent a formal technically adequate unattended diagnostic sleep study [facility] The study was performed in accordance with the AASM standard for Out of Center Sleep Testing. The four-channel Type III HST measures the following parameters:  flow, respiratory effort, pulse, and oxygen saturation. SCORING:  This study was scored in accordance with AASM scoring rules and Medicare rule 1B. FINDINGS:  The flow evaluation recording time began at 9:31 p.m. and ended at 8:10 a.m. for a duration of 10 hours and 28 minutes. Light snoring was recorded. There were a total of 28 hypopneas, 43 apneas. Overall apnea-hypopnea index is 6.8. Supine index is 7.4. SaO2 radha is 89%. IMPRESSION:  Overall mild obstructive sleep apnea. Overall AHI is 6.8. SaO2 radha is 89%. The patient slept in the supine position the majority of the night. DIAGNOSIS:  Obstructive sleep apnea. DIAGNOSTIC PLAN:    1. At the request of the referring physician, we will confer with the patient regarding the results of the study and make treatment recommendations.    2.   Patient is a candidate for nasal CPAP, oral appliance therapy, and evaluation of the upper airway by ear, nose, throat specialist.   3.   CPAP titration should be completed. 4.   Weight loss would likely have an ameliorating effect on the degree of sleep-disordered breathing identified, and weight loss is advised as appropriate. 5.   If daytime sleepiness is a complaint, the patient needs to understand potential dangers associated with reduced daytime vigilance. 6.   Care should be used with the administration of anesthetic and sedative agents, and alcohol should be avoided. Thank you for your confidence in the [facility]. Please do not hesitate to contact us for any questions at [phone].     Dictated By Nelson Andrew DO  d: 10/02/2023 15:56:25  t: 10/02/2023 16:12:37  Job [id number]  RB/    cc: Kiran Powers D.O.

## 2023-10-06 ENCOUNTER — TELEPHONE (OUTPATIENT)
Facility: CLINIC | Age: 48
End: 2023-10-06

## 2023-10-06 DIAGNOSIS — G47.33 OSA (OBSTRUCTIVE SLEEP APNEA): Primary | ICD-10-CM

## 2023-10-18 DIAGNOSIS — R11.0 NAUSEA: ICD-10-CM

## 2023-10-20 RX ORDER — ONDANSETRON 4 MG/1
4 TABLET, ORALLY DISINTEGRATING ORAL EVERY 8 HOURS PRN
Qty: 20 TABLET | Refills: 0 | Status: SHIPPED | OUTPATIENT
Start: 2023-10-20

## 2023-10-20 NOTE — TELEPHONE ENCOUNTER
.A refill request was received for:  Requested Prescriptions     Pending Prescriptions Disp Refills    ondansetron 4 MG Oral Tablet Dispersible 20 tablet 0     Sig: Place 1 tablet (4 mg total) under the tongue every 8 (eight) hours as needed for Nausea.        Last refill date:   8/25/2023    Last office visit: 8/10/2023    Follow up due:  Future Appointments   Date Time Provider Tanisha Plunkett   11/15/2023  9:30 AM Nancy Neely DO [facility]

## 2023-10-24 ENCOUNTER — TELEPHONE (OUTPATIENT)
Dept: FAMILY MEDICINE CLINIC | Facility: CLINIC | Age: 48
End: 2023-10-24

## 2023-10-28 DIAGNOSIS — F41.9 ANXIETY: ICD-10-CM

## 2023-10-28 DIAGNOSIS — F32.0 CURRENT MILD EPISODE OF MAJOR DEPRESSIVE DISORDER, UNSPECIFIED WHETHER RECURRENT (HCC): ICD-10-CM

## 2023-10-30 RX ORDER — CITALOPRAM 20 MG/1
20 TABLET ORAL EVERY MORNING
Qty: 90 TABLET | Refills: 0 | Status: SHIPPED | OUTPATIENT
Start: 2023-10-30

## 2023-10-30 NOTE — TELEPHONE ENCOUNTER
.A refill request was received for:  Requested Prescriptions     Pending Prescriptions Disp Refills    citalopram 20 MG Oral Tab 90 tablet 0     Sig: Take 1 tablet (20 mg total) by mouth every morning.        Last refill date:   8/10/2023    Last office visit: 8/10/2023    Follow up due:  Future Appointments   Date Time Provider Tanisha Plunkett   11/15/2023  9:30 AM Koffi Brown DO [facility]

## 2023-10-31 DIAGNOSIS — E78.00 ELEVATED CHOLESTEROL: ICD-10-CM

## 2023-10-31 RX ORDER — LOVASTATIN 10 MG/1
10 TABLET ORAL NIGHTLY
Qty: 90 TABLET | Refills: 0 | Status: SHIPPED | OUTPATIENT
Start: 2023-10-31

## 2023-11-08 RX ORDER — METHOCARBAMOL 500 MG/1
500 TABLET, FILM COATED ORAL 2 TIMES DAILY PRN
Qty: 60 TABLET | Refills: 0 | OUTPATIENT
Start: 2023-11-08

## 2023-11-08 NOTE — TELEPHONE ENCOUNTER
.A refill request was received for:  Requested Prescriptions     Pending Prescriptions Disp Refills    METHOCARBAMOL 500 MG Oral Tab [Pharmacy Med Name: METHOCARBAMOL 500MG TAB] 60 tablet 0     Sig: TAKE ONE TABLET BY MOUTH TWICE A DAY AS NEEDED       Last refill date:   not on active list    Last office visit: 8/10/2023    Follow up due:  Future Appointments   Date Time Provider Department Center   11/15/2023  9:30 AM Alva Fiore DO [facility]   11/28/2023  1:00 PM Malena Mckeon DO [facility]

## 2023-11-14 DIAGNOSIS — F90.8 ATTENTION DEFICIT HYPERACTIVITY DISORDER (ADHD), OTHER TYPE: ICD-10-CM

## 2023-11-14 NOTE — TELEPHONE ENCOUNTER
.A refill request was received for:  Requested Prescriptions     Pending Prescriptions Disp Refills    METHOCARBAMOL 500 MG Oral Tab [Pharmacy Med Name: METHOCARBAMOL 500MG TAB] 60 tablet 0     Sig: TAKE ONE TABLET BY MOUTH TWICE A DAY AS NEEDED    METHYLPHENIDATE 20 MG Oral Tab [Pharmacy Med Name: METHYLPHENIDATE HYDR 20MG TAB] 60 tablet 0     Sig: TAKE 1 TABLET (20 MG TOTAL) BY MOUTH 2 (TWO) TIMES DAILY.       Last refill date:   methyphenidate 9/28/2023    Last office visit: 8/10/2023    Follow up due:  Future Appointments   Date Time Provider Department Center   11/15/2023  9:30 AM Alva Fiore DO [facility]   11/28/2023  1:15 PM Malena Mckeon DO [facility] [place]

## 2023-11-15 RX ORDER — METHOCARBAMOL 500 MG/1
500 TABLET, FILM COATED ORAL 2 TIMES DAILY PRN
Qty: 60 TABLET | Refills: 0 | OUTPATIENT
Start: 2023-11-15

## 2023-11-15 RX ORDER — METHYLPHENIDATE HYDROCHLORIDE 20 MG/1
20 TABLET ORAL 2 TIMES DAILY
Qty: 60 TABLET | Refills: 0 | OUTPATIENT
Start: 2023-11-15

## 2023-11-28 ENCOUNTER — OFFICE VISIT (OUTPATIENT)
Dept: SURGERY | Facility: CLINIC | Age: 48
End: 2023-11-28
Payer: MEDICAID

## 2023-11-28 ENCOUNTER — TELEMEDICINE (OUTPATIENT)
Facility: CLINIC | Age: 48
End: 2023-11-28
Payer: MEDICAID

## 2023-11-28 VITALS
WEIGHT: 155 LBS | HEIGHT: 65 IN | HEART RATE: 85 BPM | DIASTOLIC BLOOD PRESSURE: 84 MMHG | SYSTOLIC BLOOD PRESSURE: 122 MMHG | BODY MASS INDEX: 25.83 KG/M2

## 2023-11-28 DIAGNOSIS — G83.4 CAUDA EQUINA COMPRESSION (HCC): ICD-10-CM

## 2023-11-28 DIAGNOSIS — G47.33 OBSTRUCTIVE SLEEP APNEA: Primary | ICD-10-CM

## 2023-11-28 DIAGNOSIS — G95.9 CERVICAL MYELOPATHY WITH CERVICAL RADICULOPATHY: ICD-10-CM

## 2023-11-28 DIAGNOSIS — M54.12 CERVICAL MYELOPATHY WITH CERVICAL RADICULOPATHY: ICD-10-CM

## 2023-11-28 DIAGNOSIS — G47.01 INSOMNIA DUE TO MEDICAL CONDITION: ICD-10-CM

## 2023-11-28 DIAGNOSIS — M79.18 MYOFASCIAL PAIN: ICD-10-CM

## 2023-11-28 DIAGNOSIS — Z98.1 HISTORY OF FUSION OF CERVICAL SPINE: Primary | ICD-10-CM

## 2023-11-28 PROCEDURE — 3079F DIAST BP 80-89 MM HG: CPT | Performed by: PHYSICIAN ASSISTANT

## 2023-11-28 PROCEDURE — 3008F BODY MASS INDEX DOCD: CPT | Performed by: PHYSICIAN ASSISTANT

## 2023-11-28 PROCEDURE — 99204 OFFICE O/P NEW MOD 45 MIN: CPT | Performed by: OTHER

## 2023-11-28 PROCEDURE — 3074F SYST BP LT 130 MM HG: CPT | Performed by: PHYSICIAN ASSISTANT

## 2023-11-28 PROCEDURE — 99213 OFFICE O/P EST LOW 20 MIN: CPT | Performed by: PHYSICIAN ASSISTANT

## 2023-11-28 RX ORDER — DICYCLOMINE HCL 20 MG
TABLET ORAL
COMMUNITY
Start: 2023-11-13

## 2023-11-28 RX ORDER — TOPIRAMATE 25 MG/1
TABLET ORAL
COMMUNITY
Start: 2023-11-13

## 2023-11-28 RX ORDER — METHOCARBAMOL 500 MG/1
TABLET, FILM COATED ORAL
COMMUNITY
Start: 2023-10-24

## 2023-11-28 RX ORDER — ZOLPIDEM TARTRATE 12.5 MG/1
TABLET, FILM COATED, EXTENDED RELEASE ORAL
COMMUNITY
Start: 2023-08-17

## 2023-11-28 NOTE — PROGRESS NOTES
[facility] General Pulmonary Progress Note    History of Present Illness:  Mayte Arguello   This is a 53year old female who presents with the following symptoms, risk factors, behaviors or other items associated with sleep problems. Sleep Apnea:   wakes with headache; wakes with dry mouth; nasal congestion; restless sleep; non-refreshing sleep; diabetes; previous sleep study  Insomnia:  difficulty falling asleep; difficulty staying asleep; waking too early; non-refreshing sleep; restless sleep; not getting enough sleep; irregular sleep schedule; mind racing; pain or discomfort; depresssion; anxiety  Restless Leg:  urge to move legs when trying to sleep; urge to move is worse when seated or lying; symptoms are worse in the evening or at bedtime  Parasomnias:   sleep talking; body rocking before falling asleep; very restless sleep  Daytime Problems:  sleepiness; fatigue; irritable/tierney; memory problems; sleepy while driving; previous sleep study    The patient's [place] Sleepiness score is 9/24. She is a poor sleeper  She is tired all the time  She is unable to maintain sleep   Sleeps 4-5 hours    Insomnia for years  Had to have 4 spinal surgeries, unable to nap, worsening sleep  Parthenia [place] , having odd behaviors, stopped taking it  Tried melatonin  No other medication  Has no awareness of snoring,   In bed 10pm, SL a couple of hours, wakes 2-3 hours later, takes 15min to get back to  sleep, wakles multiple times a night sometimes 10 times  OOB by 7am  Not sure why she is waking  Sometimes arms are numb  Otherwise awakenings are brief but numerous      9/2023 HST  Overall apnea-hypopnea index is 6.8. Supine index is 7.4.   SaO2 radha is 89%         Past Medical History:   Past Medical History:   Diagnosis Date    Abdominal distention 2weeks    Abdominal pain 6 months    Abnormal uterine bleeding     Acute bronchitis     Acute esophagitis     Acute upper respiratory infections of unspecified site     Anxiety state, unspecified     Asthma     Attention deficit disorder without mention of hyperactivity     Back problem     cervical and lumbar    Bad breath     Started when stomach pain got worse    Bloating 2weeks    Cauda equina compression (HCC)     Cauda equina syndrome without mention of neurogenic bladder     Constipation Occasionaly    Decorative tattoo     Depression     Diabetes ([address]     Diet controlled; no meds    Diabetes (HCC)     Diarrhea, unspecified Occasionaly    Dysesthesia     Dyspareunia     Easy bruising     Endometriosis     Essential hypertension     Fatigue     Flatulence/gas pain/belching 2weeks    Food intolerance     Frequent urination     Frequent UTI     Headache disorder     Heartburn 29 years ago    Heavy menses     Hematuria     Hiatal hernia     High cholesterol     Hyperlipidemia     Hypertension     IBS (irritable bowel syndrome)     Irregular bowel habits     Irregular menstrual cycle     Itch of skin O    Occasionally on my legs, not due to dry skin    Leaking of urine     Lipid screening 09/17/2011    Loss of appetite     Lump or mass in breast     Malaise     Malignant hyperthermia     patient's son at 4years of age - 2006    Mastodynia     Menses painful     Migraines     Nausea 2weeks    Night sweats     Obstructive sleep apnea 11/28/2023    Other screening mammogram 06/11/2012    Pain in joints     Pain with bowel movements Years    Pap smear for cervical cancer screening 07/03/2012    wnl    Personal history of urinary (tract) infection     Pneumonia due to organism     PONV (postoperative nausea and vomiting)     Problems with swallowing 29 years ago    Sleep disturbance     Stress     Uncomfortable fullness after meals 2weeks    Unspecified disorder of thyroid     hypothyroidism    Unspecified viral infection, in conditions classified elsewhere and of unspecified site     Visual impairment     glasses    Vomiting 1week    Weight loss         Past Surgical History:   Past Surgical History:   Procedure Laterality Date    APPENDECTOMY  1991    BLOOD PATCH(BEDSIDE)      COLONOSCOPY N/A 2/6/2015    Procedure: COLONOSCOPY;  Surgeon: Dolly Valdez MD;  Location: [facility] ENDOSCOPY    ENDOMETRIAL ABLATION      2004    HYSTERECTOMY  7/20/2015    HYSTEROSCOPY,ABLATION ENDOMETRIUM      Shadia Sauceda  07/2020    LAPAROSCOPY,DIAGNOSTIC  1991    multiple    LYSIS OF ADHESIONS      VENKATESH LOCALIZATION WIRE 1 SITE RIGHT (CPT=19281)      in her 20's    OTHER Right 02/2018    ARTHROSCOPIC ACROMIOPLASTY LYSIS OF ADHESIONS RIGHT SHOULDER    OTHER  12/19/2018    ANTERIOR DISCECTOMY AND FUSION. CERVICAL 4-CERVICAL 5 AND CERVICAL 5-CERVICAL 6.  ALLOGRAFT, PLATE AND SCREWS    OTHER  09/08/2021    ANTERIOR CERVICAL DISCECTOMY AND FUSION CERVICAL 6- CERVICAL 7 WITH INTERBODY DEVICE, REMOVAL OF SURGICAL SCREW RIGHT C6    OTHER SURGICAL HISTORY  2001    esophagogastroduodenoscopy    OTHER SURGICAL HISTORY      right thumb trigger finger release     OTHER SURGICAL HISTORY      ganglion cyst removed left wrist     OTHER SURGICAL HISTORY  10/30/2019    Cystoscopy- Dr. Kai Bill  2005    right foot    TONSILLECTOMY      TOTAL ABDOM HYSTERECTOMY           Family Medical History:   Family History   Problem Relation Age of Onset    Diabetes Father     Hypertension Father     Colon Polyps Father     Other (ADD) Father     Breast Cancer Mother 54    Diabetes Mother     Stroke Mother     Other (Other) Mother     Other (hypothyroidism) Mother     Heart Disorder Maternal Grandmother         CHF    Stroke Maternal Grandfather     Diabetes Paternal Grandmother     Cancer Sister         uterine    BRCA gene + Sister     Uterine Cancer Sister     Bleeding Disorders Sister     Other (thyroid cancer) Sister     Cancer Paternal Cousin Female         cervical    Migraines Son     Diabetes Son     Other (Other) Son         Multiple issues    Other (Other) Daughter         Oconnor Flakito Social History:   Social History     Socioeconomic History    Marital status:      Spouse name: Not on file    Number of children: Not on file    Years of education: Not on file    Highest education level: Not on file   Occupational History    Not on file   Tobacco Use    Smoking status: Former     Packs/day: 0.00     Years: 10.00     Additional pack years: 0.00     Total pack years: 0.00     Types: Cigarettes     Quit date: 1999     Years since quittin.8    Smokeless tobacco: Never   Vaping Use    Vaping Use: Never used   Substance and Sexual Activity    Alcohol use: Yes     Comment: occasionally    Drug use: No    Sexual activity: Yes     Partners: Male   Other Topics Concern     Service Not Asked    Blood Transfusions Not Asked    Caffeine Concern Yes     Comment: 2 CUPS    Occupational Exposure Not Asked    Hobby Hazards Not Asked    Sleep Concern Not Asked    Stress Concern Not Asked    Weight Concern Not Asked    Special Diet Not Asked    Back Care Not Asked    Exercise No     Comment: not currently    Bike Helmet Not Asked    Seat Belt Not Asked    Self-Exams Not Asked   Social History Narrative    Not on file     Social Determinants of Health     Financial Resource Strain: Not on file   Food Insecurity: Not on file   Transportation Needs: Not on file   Physical Activity: Not on file   Stress: Not on file   Social Connections: Not on file   Housing Stability: Not on file        Medications:   Current Outpatient Medications   Medication Sig Dispense Refill    Zolpidem Tartrate ER 12.5 MG Oral Tab CR       dicyclomine 20 MG Oral Tab       methocarbamol 500 MG Oral Tab       topiramate 25 MG Oral Tab       Lovastatin 10 MG Oral Tab Take 1 tablet (10 mg total) by mouth nightly. 90 tablet 0    citalopram 20 MG Oral Tab Take 1 tablet (20 mg total) by mouth every morning.  90 tablet 0    ondansetron 4 MG Oral Tablet Dispersible Place 1 tablet (4 mg total) under the tongue every 8 (eight) hours as needed for Nausea. 20 tablet 0    montelukast 10 MG Oral Tab Take 1 tablet (10 mg total) by mouth nightly. 90 tablet 0    Ferrous Sulfate (FEROSUL) 325 (65 Fe) MG Oral Tab Take 1 tablet (325 mg total) by mouth every morning. 90 tablet 0    methylphenidate 20 MG Oral Tab Take 1 tablet (20 mg total) by mouth 2 (two) times daily. 60 tablet 0    albuterol 108 (90 Base) MCG/ACT Inhalation Aero Soln Inhale 1-2 puffs into the lungs every 4 to 6 hours as needed. 51 g 0    sucralfate (CARAFATE) 1 GM/10ML Oral Suspension Take 10 mL (1 g total) by mouth 4 (four) times daily before meals and nightly. 1200 mL 0    ALPRAZolam 0.25 MG Oral Tab Take 1 tablet (0.25 mg total) by mouth nightly as needed. 30 tablet 0    citalopram 40 MG Oral Tab Take 1 tablet (40 mg total) by mouth daily. 90 tablet 0    lansoprazole 30 MG Oral Tablet Delayed Release Dispersible Take 1 tablet (30 mg total) by mouth daily. 60 tablet 5    Na Sulfate-K Sulfate-Mg Sulf (SUPREP BOWEL PREP KIT) 17.5-3.13-1.6 GM/177ML Oral Solution Take as directed (Patient not taking: Reported on 11/28/2023) 1 each 0    Blood Glucose Monitoring Suppl (ACCU-CHEK ZACK PLUS) w/Device Does not apply Kit 1 Device by Other route daily. 1 kit 0    Accu-Chek Softclix Lancets Does not apply Misc 1 lancet. by Finger stick route daily. Use as directed. 100 each 0    Glucose Blood (ACCU-CHEK ZACK PLUS) In Vitro Strip Check blood sugar once daily. 100 strip 1    Teriparatide, Recombinant, 620 MCG/2.48ML Subcutaneous Solution Pen-injector       Insulin Pen Needle (BD PEN NEEDLE YANDY U/F) 32G X 4 MM Does not apply Misc Inject 1 each into the skin daily. 100 each 3    Multiple Vitamin Oral Tab Take 1 tablet by mouth daily. Cyanocobalamin (VITAMIN B12 OR) Take 1 tablet by mouth daily. Calcium Carbonate-Vitamin D (CALCIUM 600 + D OR) Take 1 tablet by mouth 2 (two) times daily.            Review of Systems: Review of Systems     Physical Exam:  [facility] 01/01/2004 Constitutional: alert, cooperative. No acute distress. HEENT: Head NC/AT. Mask in place    Results:  Personally reviewed      Assessment/Plan:  1. Obstructive sleep apnea  Mild BOSTON on HST, CPAP trial  The pathophysiology and mechanisms of obstructive sleep apnea were explained. Adverse health consequences seen in association with BOSTON include increased risk of cardiovascular events , cerebrovascular events, hypertension,  diabetes. Treatment options were discussed. Positive airway pressure therapy is the gold standard. Other options include mandibular advancement devices, evaluation of the upper airway by ear nose throat specialist, inspire therapy, and weight loss to be used in conjunction. 2. Insomnia due to medical condition  Discussed options, will have PAP trial and re-assess    3. Cervical myelopathy with cervical radiculopathy     4. Cauda equina compression ([facility] [address]        Malena Mckeon DO  11/28/2023    This visit is conducted using Telemedicine with live, interactive video and audio. Patient has been referred to the [facility] website at [email or web address] to review the yearly Consent to Treat document. Patient understands and accepts financial responsibility for any deductible, co-insurance and/or co-pays associated with this service.

## 2023-11-28 NOTE — PROGRESS NOTES
Patient: El Crowe  Medical Record Number: [id number]  YOB: 1975  PCP: Mauricio Stack DO    Reason for visit: Cervical follow up    HISTORY OF CHIEF COMPLAINT:    El Crowe is a very pleasant 53year old female who presents today for: Cervical follow up  Recent injection history:  S/p 4/24/23: bilateral cervical TPI  S/p 3/21/23: Bilateral inferior cervical hardware injection  Surgical history:  Status post 9/8/2021: Dr. Katerina Peguero: ACDF C6-7  Status post 12/14/2020: Dr. Katerina Peguero: Posterior cervical C3-7 laminectomy, removal of laminoplasty hardware cervical 3-6 segmental instrumentation bilaterally cervical 5, cervical 6, cervical 7, posterior lateral fusion bilaterally cervical 5, cervical 6, cervical 7  Status post 7/6/2020: Dr. Katerina Peguero: C3-7 laminoplasty  Status post 12/19/2018: Dr. Katerina Peguero: ACDF C4-6  States no significant relief with the March injection. Almost 100% relief with injections in April. She states after her trigger point injections, resolution of extremity numbness and muscle pain. She was very pleased with her progress. This lasted many months. She would like to consider these injections again. She has no new neurologic complaints. She did have 1 episode of right \"electrical\" upper extremity pain, this resolved shortly after. Last hx: 1/3/23  HISTORY OF PRESENT Enzo Asher is a 56year old RH  female returns in follow-up. Since her last visit she had another round of prednisone in December got her through the holidays but really has not resolved her pain. States her neck pain over the back of her neck up high is a 6-10 over 10 its worse on flexion extension worse first thing in the morning she is getting occipital headaches. She also has pain lower down at the base of the hardware between her shoulder blades.   She has right C4 radiculopathy which is a 9/10 with numbness and tingling some right pectoris muscle cramping she has right C8 radiculitis with numbness and tingling and a shocklike sensation. She denies any left arm symptoms she denies any numbness or tingling in the legs no weakness in the legs she is getting some cramping in her right hamstring she is recently diagnosed with a right foot Johns's neuroma.   She had a little bit of bladder leakage and some hematuria she is seeing urology for this    Past Medical History:   Diagnosis Date    Abdominal distention 2weeks    Abdominal pain 6 months    Abnormal uterine bleeding     Acute bronchitis     Acute esophagitis     Acute upper respiratory infections of unspecified site     Anxiety state, unspecified     Asthma     Attention deficit disorder without mention of hyperactivity     Back problem     cervical and lumbar    Bad breath     Started when stomach pain got worse    Bloating 2weeks    Cauda equina compression (HCC)     Cauda equina syndrome without mention of neurogenic bladder     Constipation Occasionaly    Decorative tattoo     Depression     Diabetes ([address]     Diet controlled; no meds    Diabetes (HCC)     Diarrhea, unspecified Occasionaly    Dysesthesia     Dyspareunia     Easy bruising     Endometriosis     Essential hypertension     Fatigue     Flatulence/gas pain/belching 2weeks    Food intolerance     Frequent urination     Frequent UTI     Headache disorder     Heartburn 29 years ago    Heavy menses     Hematuria     Hiatal hernia     High cholesterol     Hyperlipidemia     Hypertension     IBS (irritable bowel syndrome)     Irregular bowel habits     Irregular menstrual cycle     Itch of skin O    Occasionally on my legs, not due to dry skin    Leaking of urine     Lipid screening 09/17/2011    Loss of appetite     Lump or mass in breast     Malaise     Malignant hyperthermia     patient's son at 3years of age - 2006    Mastodynia     Menses painful     Migraines     Nausea 2weeks    Night sweats     Other screening mammogram 06/11/2012    Pain in joints     Pain with bowel movements Years    Pap smear for cervical cancer screening 07/03/2012    wnl    Personal history of urinary (tract) infection     Pneumonia due to organism     PONV (postoperative nausea and vomiting)     Problems with swallowing 29 years ago    Sleep disturbance     Stress     Uncomfortable fullness after meals 2weeks    Unspecified disorder of thyroid     hypothyroidism    Unspecified viral infection, in conditions classified elsewhere and of unspecified site     Visual impairment     glasses    Vomiting 1week    Weight loss       Past Surgical History:   Procedure Laterality Date    APPENDECTOMY  1991    BLOOD PATCH(BEDSIDE)      COLONOSCOPY N/A 2/6/2015    Procedure: COLONOSCOPY;  Surgeon: Samara Blanchard MD;  Location: [facility] ENDOSCOPY    ENDOMETRIAL ABLATION      2004    HYSTERECTOMY  7/20/2015    HYSTEROSCOPY,ABLATION ENDOMETRIUM      [place] [facility]  07/2020    LAPAROSCOPY,DIAGNOSTIC  1991    multiple    LYSIS OF ADHESIONS      VENKATESH LOCALIZATION WIRE 1 SITE RIGHT (CPT=19281)      in her 20's    OTHER Right 02/2018    ARTHROSCOPIC ACROMIOPLASTY LYSIS OF ADHESIONS RIGHT SHOULDER    OTHER  12/19/2018    ANTERIOR DISCECTOMY AND FUSION. CERVICAL 4-CERVICAL 5 AND CERVICAL 5-CERVICAL 6.  ALLOGRAFT, PLATE AND SCREWS    OTHER  09/08/2021    ANTERIOR CERVICAL DISCECTOMY AND FUSION CERVICAL 6- CERVICAL 7 WITH INTERBODY DEVICE, REMOVAL OF SURGICAL SCREW RIGHT C6    OTHER SURGICAL HISTORY  2001    esophagogastroduodenoscopy    OTHER SURGICAL HISTORY      right thumb trigger finger release     OTHER SURGICAL HISTORY      ganglion cyst removed left wrist     OTHER SURGICAL HISTORY  10/30/2019    Cystoscopy- Dr. Michael Magaña  2005    right foot    TONSILLECTOMY      TOTAL ABDOM HYSTERECTOMY        Family History   Problem Relation Age of Onset    Diabetes Father     Hypertension Father     Colon Polyps Father     Other (ADD) Father     Breast Cancer Mother 54    Diabetes Mother     Stroke Mother     Other (Other) Mother     Other (hypothyroidism) Mother     Heart Disorder Maternal Grandmother         CHF    Stroke Maternal Grandfather     Diabetes Paternal Grandmother     Cancer Sister         uterine    BRCA gene + Sister     Uterine Cancer Sister     Bleeding Disorders Sister     Other (thyroid cancer) Sister     Cancer Paternal Cousin Female         cervical    Migraines Son     Diabetes Son     Other (Other) Son         Multiple issues    Other (Other) Daughter         Mvp, eds      Social History     Socioeconomic History    Marital status:    Tobacco Use    Smoking status: Former     Packs/day: 0.00     Years: 10.00     Additional pack years: 0.00     Total pack years: 0.00     Types: Cigarettes     Quit date: 1999     Years since quittin.8    Smokeless tobacco: Never   Vaping Use    Vaping Use: Never used   Substance and Sexual Activity    Alcohol use: Yes     Comment: occasionally    Drug use: No    Sexual activity: Yes     Partners: Male   Other Topics Concern    Caffeine Concern Yes     Comment: 2 CUPS    Exercise No     Comment: not currently      Allergies   Allergen Reactions    Demerol OTHER (SEE COMMENTS)     Difficulty breathing    Levofloxacin RASH and Tightness in Chest    Reglan [Metoclopramide] OTHER (SEE COMMENTS)     Tardive dyskinesia    Betadine [Povidone Iodine] ITCHING    Meperidine NAUSEA AND VOMITING    Other OTHER (SEE COMMENTS)     Sensitive skin, milk,dust mites    Quinolones UNKNOWN      Current Medications:  Current Outpatient Medications   Medication Sig Dispense Refill    Zolpidem Tartrate ER 12.5 MG Oral Tab CR       dicyclomine 20 MG Oral Tab       methocarbamol 500 MG Oral Tab       topiramate 25 MG Oral Tab       Lovastatin 10 MG Oral Tab Take 1 tablet (10 mg total) by mouth nightly. 90 tablet 0    citalopram 20 MG Oral Tab Take 1 tablet (20 mg total) by mouth every morning.  90 tablet 0    ondansetron 4 MG Oral Tablet Dispersible Place 1 tablet (4 mg total) under the tongue every 8 (eight) hours as needed for Nausea. 20 tablet 0    montelukast 10 MG Oral Tab Take 1 tablet (10 mg total) by mouth nightly. 90 tablet 0    Ferrous Sulfate (FEROSUL) 325 (65 Fe) MG Oral Tab Take 1 tablet (325 mg total) by mouth every morning. 90 tablet 0    methylphenidate 20 MG Oral Tab Take 1 tablet (20 mg total) by mouth 2 (two) times daily. 60 tablet 0    albuterol 108 (90 Base) MCG/ACT Inhalation Aero Soln Inhale 1-2 puffs into the lungs every 4 to 6 hours as needed. 51 g 0    sucralfate (CARAFATE) 1 GM/10ML Oral Suspension Take 10 mL (1 g total) by mouth 4 (four) times daily before meals and nightly. 1200 mL 0    ALPRAZolam 0.25 MG Oral Tab Take 1 tablet (0.25 mg total) by mouth nightly as needed. 30 tablet 0    citalopram 40 MG Oral Tab Take 1 tablet (40 mg total) by mouth daily. 90 tablet 0    lansoprazole 30 MG Oral Tablet Delayed Release Dispersible Take 1 tablet (30 mg total) by mouth daily. 60 tablet 5    Blood Glucose Monitoring Suppl (ACCU-CHEK ZACK PLUS) w/Device Does not apply Kit 1 Device by Other route daily. 1 kit 0    Accu-Chek Softclix Lancets Does not apply Misc 1 lancet. by Finger stick route daily. Use as directed. 100 each 0    Glucose Blood (ACCU-CHEK ZACK PLUS) In Vitro Strip Check blood sugar once daily. 100 strip 1    Teriparatide, Recombinant, 620 MCG/2.48ML Subcutaneous Solution Pen-injector       Insulin Pen Needle (BD PEN NEEDLE YANDY U/F) 32G X 4 MM Does not apply Misc Inject 1 each into the skin daily. 100 each 3    Multiple Vitamin Oral Tab Take 1 tablet by mouth daily. Cyanocobalamin (VITAMIN B12 OR) Take 1 tablet by mouth daily. Calcium Carbonate-Vitamin D (CALCIUM 600 + D OR) Take 1 tablet by mouth 2 (two) times daily.         Na Sulfate-K Sulfate-Mg Sulf (SUPREP BOWEL PREP KIT) 17.5-3.13-1.6 GM/177ML Oral Solution Take as directed (Patient not taking: Reported on 11/28/2023) 1 each 0        REVIEW OF SYSTEMS   Comprehensive review of systems done. Negative except what is outlined in the above HPI. PHYSICAL EXAMIMATION    height is 65\" and weight is 155 lb (70.3 kg). Her blood pressure is 122/84 and her pulse is 85. GENERAL: Very pleasant patient is in no apparent distress. Sitting comfortably in the examination chair. HEENT: Normocephalic, atraumatic. RESPIRATORY RATE: Easy and Even   SKIN: Warm and dry. Posterior cervical incision well healed   NEURO: Awake, alert and orientated. Speech fluent, comprehension intact, answering questions appropriately. SPINE:  Gait/Coordination: Gait deferred. Nontender to the posterior midline cervical spine to palpation. Upper Extremity Strength:     Deltoid  Biceps  Triceps    Intrinsics      Right 5 5 5 5 5     Left 5 5 5 5 5   5/5 bilateral IP  Tests:   Test Right   (POS or NEG) Left   (POS or NEG)   Chin's Sign Neg Neg     DATA:   None    IMAGING:   Study Result    Narrative   PROCEDURE:  MRI SPINE CERVICAL (CPT=72141)     LOCATION:                                           COMPARISON:  [facility] , CT, CT SPINE CERVICAL (CPT=72125), 10/28/2021, 4:15 PM.  [facility] , MR, MRI SPINE CERVICAL (W+WO) (CPT=72156), 7/16/2021, 9:16 AM.     INDICATIONS:  M48.02 Cervical stenosis of spinal canal Z98.1 History of fusion of cervical spine Z98.1 S/P cervical spinal fusion M54.2 Cervical pain     TECHNIQUE:  Multiplanar T1 and T2 weighted images including fat suppression sequences. Images acquired in sagittal and axial planes. PATIENT STATED HISTORY: (As transcribed by Technologist)  Persistent neck pain radiating to right shoulder and right upper extremity. Concern for thoracic outlet syndrome. FINDINGS:  Cervical laminectomy changes are seen spanning C3-C7. Anterior cervical fusion hardware is seen spanning C4-C7. Posterior cervical fusion hardware is seen spanning C5-C7. Susceptibility artifact in the surgical hardware limits evaluation of  surrounding structures.      There is subtle focal kyphosis at the C6-7 level. There is otherwise anatomic alignment. Vertebral body heights are well-maintained. Mild disc desiccation in the cervical spine. No focal worrisome marrow signal abnormality is seen. The cervical spinal cord has a normal course and caliber. No focal cord signal abnormality is seen. No focal mass or fluid collection is seen in the cervical spinal canal.  The paraspinal soft tissues are unremarkable. The craniocervical junction is  unremarkable. C2-3 and C3-4: There are minimal uncovertebral and facet joint degenerative changes. There is no significant spinal canal or neural foraminal stenosis. No significant interval change. C4-5 through C6-7:  At the fused levels, there is no significant spinal canal or neural foraminal stenosis identified. No significant interval change. C7-T1:  There is no significant abnormality. Impression   CONCLUSION:       1. Stable postoperative changes in the cervical spine as above spanning C3-C7. 2. Stable minimal degenerative changes in the cervical spine as above. There is no significant spinal canal or neural foraminal stenosis at any level. 3. No focal spinal cord signal abnormality identified. Please see above for further details. Dictated by (CST): Cosmo Wick MD on 12/19/2022 at 7:47 AM      Finalized by (CST): Cosmo Wick MD on 12/19/2022 at 7:54 AM         MEDICAL DECISION MAKING:     ASSESSMENT and PLAN:    ICD-10-CM    1. History of fusion of cervical spine  Z98.1       2. Myofascial pain  M79.18         PLAN:   1. Medication: None prescribed  2. Imaging:    - Reviewed today:    -MRI cervical spine:     - Agree with radiology, no significant spinal canal stenosis noted    - Ordered today:    -None. Will consider further cervical imaging if no improvement with conservative treatment  3.   Pain management:   -Recommend follow-up to discuss further trigger point injections. The patient had almost 100% relief of her symptoms, which lasted many months. She would like to consider these injections again. 4. Follow up as needed or call or follow up sooner or go to the ED for any new, worsening or concerning signs or symptoms     I discussed the plan with the patient. The patient agrees with the plan, verbalized understanding and is appreciative. All questions were sought out and thoroughly answered to satisfaction. Total visit time: 30 min  More than 50% spent coordinating care, providing patient education, reviewing imaging and counseling. RigobertoFairfield TUNDE Zamorano M.S., PA-C  [address], [address] [place] [address] [facility]  [place], [address] [facility]  [phone]  11/28/2023 9:56 AM    Dragon speech recognition software was used to prepare this note. If a word or phrase is confusing, it is likely due to a failure of recognition. Please contact me with any questions or clarifications.

## 2023-11-28 NOTE — PROGRESS NOTES
Established patient:  Reason for follow up:   Neck pain, bilateral arm numbness    Numeric Rating Scale:        Pain at Present:  6/10       Most recent treatments for Current Pain Condition:     Injection-03/21/23  Response to treatment: some relief    New imaging or testing since your last office visit:    No recent imaging

## 2023-11-30 ENCOUNTER — OFFICE VISIT (OUTPATIENT)
Dept: PAIN CLINIC | Facility: CLINIC | Age: 48
End: 2023-11-30
Payer: MEDICAID

## 2023-11-30 VITALS — OXYGEN SATURATION: 98 % | HEART RATE: 90 BPM | DIASTOLIC BLOOD PRESSURE: 84 MMHG | SYSTOLIC BLOOD PRESSURE: 118 MMHG

## 2023-11-30 DIAGNOSIS — Z98.1 HISTORY OF FUSION OF CERVICAL SPINE: ICD-10-CM

## 2023-11-30 DIAGNOSIS — M79.18 MYOFASCIAL PAIN: Primary | ICD-10-CM

## 2023-11-30 DIAGNOSIS — Z98.1 HX OF FUSION OF CERVICAL SPINE: ICD-10-CM

## 2023-11-30 PROCEDURE — 3074F SYST BP LT 130 MM HG: CPT | Performed by: PHYSICIAN ASSISTANT

## 2023-11-30 PROCEDURE — 3079F DIAST BP 80-89 MM HG: CPT | Performed by: PHYSICIAN ASSISTANT

## 2023-11-30 PROCEDURE — 99214 OFFICE O/P EST MOD 30 MIN: CPT | Performed by: PHYSICIAN ASSISTANT

## 2023-11-30 NOTE — PROGRESS NOTES
Patient presents in office today with reported pain in upper extremities- feeling numbness. Goes shoulders to fingertips- mostly the right arm.     Current pain level reported = 10/10    Last reported dose of NA today      Narcotic Contract renewal NA  Urine Drug screen NA

## 2023-12-04 ENCOUNTER — TELEPHONE (OUTPATIENT)
Dept: PAIN CLINIC | Facility: CLINIC | Age: 48
End: 2023-12-04

## 2023-12-04 NOTE — TELEPHONE ENCOUNTER
Prior authorization request completed for: B trapezius and right superior thoracic (TPI)  Authorization # no auth needed   Pre-D: no  Exclusions/Restrictions: no  Covered Benefit: yes  Authorization dates: n/a  CPT codes approved: 71527  Number of visits/dates of service approved: 1  Physician: tae  Location: office  Call Ref#: [id number]  Representative Name: [employer]: [phone]

## 2023-12-04 NOTE — TELEPHONE ENCOUNTER
Spoke with patient advised that insurance did not require pre certification w/insurance -based on medical necessity. Patient VU and agreed to schedule.      Scheduled patient for In Office -bilateral trapezius and right superior thoracic TPI on 12/22/23 @ 1130AM

## 2023-12-05 ENCOUNTER — ORDER TRANSCRIPTION (OUTPATIENT)
Dept: SLEEP CENTER | Age: 48
End: 2023-12-05

## 2023-12-07 ENCOUNTER — TELEPHONE (OUTPATIENT)
Facility: CLINIC | Age: 48
End: 2023-12-07

## 2023-12-07 DIAGNOSIS — G47.33 OSA (OBSTRUCTIVE SLEEP APNEA): Primary | ICD-10-CM

## 2023-12-07 NOTE — TELEPHONE ENCOUNTER
----- Message from KORY Fraser sent at 12/5/2023 12:55 PM CST -----  Regarding: CPAP denied  Dr. Sagar House,     The patient was denied her CPAP test here at the [facility]. Please reach out to this patient at [phone] for a new set up on APAP. Please contact us with any questions at [phone].       Thank Shelly Nino

## 2023-12-22 ENCOUNTER — NURSE ONLY (OUTPATIENT)
Dept: PAIN CLINIC | Facility: CLINIC | Age: 48
End: 2023-12-22
Payer: MEDICAID

## 2023-12-22 ENCOUNTER — OFFICE VISIT (OUTPATIENT)
Dept: PAIN CLINIC | Facility: CLINIC | Age: 48
End: 2023-12-22
Payer: MEDICAID

## 2023-12-22 VITALS — HEART RATE: 101 BPM | OXYGEN SATURATION: 97 % | DIASTOLIC BLOOD PRESSURE: 76 MMHG | SYSTOLIC BLOOD PRESSURE: 114 MMHG

## 2023-12-22 DIAGNOSIS — M79.18 MYOFASCIAL PAIN: Primary | ICD-10-CM

## 2023-12-22 RX ORDER — TRIAMCINOLONE ACETONIDE 40 MG/ML
80 INJECTION, SUSPENSION INTRA-ARTICULAR; INTRAMUSCULAR ONCE
Status: COMPLETED | OUTPATIENT
Start: 2023-12-22 | End: 2023-12-22

## 2023-12-22 RX ORDER — BUPIVACAINE HYDROCHLORIDE 5 MG/ML
18 INJECTION, SOLUTION EPIDURAL; INTRACAUDAL ONCE
Status: COMPLETED | OUTPATIENT
Start: 2023-12-22 | End: 2023-12-22

## 2023-12-22 NOTE — PROCEDURES
Date of procedure: December 22, 2023    Preop diagnosis: Cervical myofascial pain    Postop diagnosis: Same     procedure: Bilateral cervical TPI    Surgeon: Austyn Ortiz    Anesthesia: None        Indication: Patient is a 20-year-old female with a history of neck pain    Procedure detail: Informed consent obtained. Timeout was done. Skin overlying the cervical spine was prepped in usual sterile fashion. Subsequently, a 27-gauge needle was used to deliver a total mixture of 80 mg of triamcinolone with 18 cc of 0.5% bupivacaine to multiple trigger points. Muscles injected include the levator scapulae, trapezius, and rhomboid. Patient tolerated procedure well. Excellent hemostasis. No objective subjective weakness.     Austyn Ortiz MD

## 2023-12-22 NOTE — PROGRESS NOTES
Patient presents in office today with reported pain in bilateral trapezius and right superior thoracic area    Current pain level reported = 7/10    Last reported dose of NA today      Narcotic Contract renewal NA    Urine Drug screen NA          '

## 2023-12-22 NOTE — PROGRESS NOTES
Timeout completed prior to procedure @0324 . Participants present for timeout:  Dr. Block Parents , and patient.

## 2023-12-26 DIAGNOSIS — F41.9 ANXIETY: ICD-10-CM

## 2023-12-26 DIAGNOSIS — F41.9 ANXIETY AND DEPRESSION: ICD-10-CM

## 2023-12-26 DIAGNOSIS — F32.0 CURRENT MILD EPISODE OF MAJOR DEPRESSIVE DISORDER, UNSPECIFIED WHETHER RECURRENT (HCC): ICD-10-CM

## 2023-12-26 DIAGNOSIS — F90.8 ATTENTION DEFICIT HYPERACTIVITY DISORDER (ADHD), OTHER TYPE: ICD-10-CM

## 2023-12-26 DIAGNOSIS — F32.A ANXIETY AND DEPRESSION: ICD-10-CM

## 2023-12-26 DIAGNOSIS — R13.10 DYSPHAGIA, UNSPECIFIED TYPE: ICD-10-CM

## 2023-12-26 DIAGNOSIS — J45.20 MILD INTERMITTENT ASTHMA WITHOUT COMPLICATION: ICD-10-CM

## 2023-12-26 DIAGNOSIS — R11.0 NAUSEA: ICD-10-CM

## 2023-12-27 ENCOUNTER — TELEPHONE (OUTPATIENT)
Dept: FAMILY MEDICINE CLINIC | Facility: CLINIC | Age: 48
End: 2023-12-27

## 2023-12-27 RX ORDER — ONDANSETRON 4 MG/1
4 TABLET, ORALLY DISINTEGRATING ORAL EVERY 8 HOURS PRN
Qty: 20 TABLET | Refills: 0 | Status: SHIPPED | OUTPATIENT
Start: 2023-12-27 | End: 2023-12-27 | Stop reason: CLARIF

## 2023-12-27 RX ORDER — CITALOPRAM 40 MG/1
40 TABLET ORAL DAILY
Qty: 15 TABLET | Refills: 0 | Status: SHIPPED | OUTPATIENT
Start: 2023-12-27 | End: 2023-12-27 | Stop reason: CLARIF

## 2023-12-27 RX ORDER — TOPIRAMATE 25 MG/1
50 TABLET ORAL 2 TIMES DAILY
Qty: 60 TABLET | Refills: 0 | Status: SHIPPED | OUTPATIENT
Start: 2023-12-27 | End: 2023-12-28 | Stop reason: ALTCHOICE

## 2023-12-27 RX ORDER — DICYCLOMINE HCL 20 MG
40 TABLET ORAL EVERY EVENING
Qty: 30 TABLET | Refills: 0 | Status: SHIPPED | OUTPATIENT
Start: 2023-12-27

## 2023-12-27 RX ORDER — LIDOCAINE HYDROCHLORIDE 20 MG/ML
SOLUTION ORAL; TOPICAL
Qty: 15 TABLET | Refills: 0 | Status: SHIPPED | OUTPATIENT
Start: 2023-12-27

## 2023-12-27 RX ORDER — METHYLPHENIDATE HYDROCHLORIDE 20 MG/1
20 TABLET ORAL 2 TIMES DAILY
Qty: 30 TABLET | Refills: 0 | Status: SHIPPED | OUTPATIENT
Start: 2023-12-27 | End: 2023-12-27 | Stop reason: CLARIF

## 2023-12-27 RX ORDER — MONTELUKAST SODIUM 10 MG/1
10 TABLET ORAL NIGHTLY
Qty: 15 TABLET | Refills: 0 | Status: SHIPPED | OUTPATIENT
Start: 2023-12-27

## 2023-12-27 RX ORDER — SUCRALFATE ORAL 1 G/10ML
1 SUSPENSION ORAL
Qty: 1200 ML | Refills: 0 | Status: SHIPPED | OUTPATIENT
Start: 2023-12-27 | End: 2023-12-27 | Stop reason: CLARIF

## 2023-12-27 RX ORDER — ALPRAZOLAM 0.25 MG/1
0.25 TABLET ORAL NIGHTLY PRN
Qty: 15 TABLET | Refills: 0 | Status: SHIPPED | OUTPATIENT
Start: 2023-12-27 | End: 2023-12-27 | Stop reason: CLARIF

## 2023-12-27 NOTE — TELEPHONE ENCOUNTER
A refill request was received for:  Requested Prescriptions     Pending Prescriptions Disp Refills    DICYCLOMINE 20 MG Oral Tab [Pharmacy Med Name: DICYCLOMINE HCL 20MG TAB] 180 tablet 0     Sig: TAKE 2 TABLETS BY MOUTH EVERY EVENING    TOPIRAMATE 25 MG Oral Tab [Pharmacy Med Name: TOPIRAMATE 25MG TAB] 360 tablet 0     Sig: TAKE 2 TABLETS (50 MG TOTAL) BY MOUTH 2 (TWO) TIMES DAILY. MONTELUKAST 10 MG Oral Tab [Pharmacy Med Name: MONTELUKAST SODIUM 10MG TAB] 90 tablet 0     Sig: TAKE 1 TABLET (10 MG TOTAL) BY MOUTH NIGHTLY. FEROSUL 325 (65 Fe) MG Oral Tab [Pharmacy Med Name: FEROSUL 325MG TAB] 90 tablet 0     Sig: TAKE 1 TABLET (325 MG TOTAL) BY MOUTH EVERY MORNING. Last refill date: 08/10/2023    Last office visit: 10/02/2023    Follow up due:  No future appointments.

## 2023-12-27 NOTE — TELEPHONE ENCOUNTER
A refill request was received for:  Requested Prescriptions     Pending Prescriptions Disp Refills    citalopram 40 MG Oral Tab 90 tablet 0     Sig: Take 1 tablet (40 mg total) by mouth daily.    ALPRAZolam 0.25 MG Oral Tab 30 tablet 0     Sig: Take 1 tablet (0.25 mg total) by mouth nightly as needed.    sucralfate (CARAFATE) 1 GM/10ML Oral Suspension 1200 mL 0     Sig: Take 10 mL (1 g total) by mouth 4 (four) times daily before meals and nightly.    methylphenidate 20 MG Oral Tab 60 tablet 0     Sig: Take 1 tablet (20 mg total) by mouth 2 (two) times daily.    ondansetron 4 MG Oral Tablet Dispersible 20 tablet 0     Sig: Place 1 tablet (4 mg total) under the tongue every 8 (eight) hours as needed for Nausea.       Last office visit: 08/10/2023    Follow up due:  No future appointments.

## 2023-12-28 ENCOUNTER — OFFICE VISIT (OUTPATIENT)
Dept: FAMILY MEDICINE CLINIC | Facility: CLINIC | Age: 48
End: 2023-12-28
Payer: MEDICAID

## 2023-12-28 VITALS
DIASTOLIC BLOOD PRESSURE: 80 MMHG | SYSTOLIC BLOOD PRESSURE: 118 MMHG | WEIGHT: 163 LBS | OXYGEN SATURATION: 97 % | BODY MASS INDEX: 27.16 KG/M2 | HEIGHT: 65 IN | HEART RATE: 100 BPM

## 2023-12-28 DIAGNOSIS — R05.8 PRODUCTIVE COUGH: ICD-10-CM

## 2023-12-28 DIAGNOSIS — R06.89 DECREASED BREATH SOUNDS: Primary | ICD-10-CM

## 2023-12-28 PROCEDURE — 99214 OFFICE O/P EST MOD 30 MIN: CPT | Performed by: PHYSICIAN ASSISTANT

## 2023-12-28 PROCEDURE — 3074F SYST BP LT 130 MM HG: CPT | Performed by: PHYSICIAN ASSISTANT

## 2023-12-28 PROCEDURE — 3008F BODY MASS INDEX DOCD: CPT | Performed by: PHYSICIAN ASSISTANT

## 2023-12-28 PROCEDURE — 3079F DIAST BP 80-89 MM HG: CPT | Performed by: PHYSICIAN ASSISTANT

## 2023-12-28 RX ORDER — AZITHROMYCIN 250 MG/1
TABLET, FILM COATED ORAL
Qty: 6 TABLET | Refills: 0 | Status: SHIPPED | OUTPATIENT
Start: 2023-12-28 | End: 2024-01-01

## 2023-12-28 RX ORDER — CEFDINIR 300 MG/1
300 CAPSULE ORAL 2 TIMES DAILY
Qty: 14 CAPSULE | Refills: 0 | Status: SHIPPED | OUTPATIENT
Start: 2023-12-28 | End: 2024-01-04

## 2023-12-28 RX ORDER — PREDNISONE 20 MG/1
40 TABLET ORAL DAILY
Qty: 10 TABLET | Refills: 0 | Status: SHIPPED | OUTPATIENT
Start: 2023-12-28 | End: 2024-01-02

## 2023-12-28 NOTE — TELEPHONE ENCOUNTER
Pt has a 2 week supply of medication refills until she can see Dr. Firoe for med follow up. Please schedule.

## 2023-12-29 ENCOUNTER — TELEPHONE (OUTPATIENT)
Dept: FAMILY MEDICINE CLINIC | Facility: CLINIC | Age: 48
End: 2023-12-29

## 2024-01-08 ENCOUNTER — LAB ENCOUNTER (OUTPATIENT)
Dept: LAB | Age: 49
End: 2024-01-08
Attending: FAMILY MEDICINE
Payer: MEDICAID

## 2024-01-08 ENCOUNTER — PATIENT MESSAGE (OUTPATIENT)
Dept: FAMILY MEDICINE CLINIC | Facility: CLINIC | Age: 49
End: 2024-01-08

## 2024-01-08 DIAGNOSIS — E11.9 TYPE 2 DIABETES MELLITUS WITHOUT COMPLICATION, WITHOUT LONG-TERM CURRENT USE OF INSULIN (HCC): ICD-10-CM

## 2024-01-08 LAB
ALBUMIN SERPL-MCNC: 4.2 G/DL (ref 3.4–5)
ALBUMIN/GLOB SERPL: 1.3 {RATIO} (ref 1–2)
ALP LIVER SERPL-CCNC: 66 U/L
ALT SERPL-CCNC: 48 U/L
ANION GAP SERPL CALC-SCNC: 5 MMOL/L (ref 0–18)
AST SERPL-CCNC: 22 U/L (ref 15–37)
BILIRUB SERPL-MCNC: 0.4 MG/DL (ref 0.1–2)
BUN BLD-MCNC: 11 MG/DL (ref 9–23)
CALCIUM BLD-MCNC: 9.9 MG/DL (ref 8.5–10.1)
CHLORIDE SERPL-SCNC: 106 MMOL/L (ref 98–112)
CHOLEST SERPL-MCNC: 229 MG/DL (ref ?–200)
CO2 SERPL-SCNC: 31 MMOL/L (ref 21–32)
CREAT BLD-MCNC: 0.99 MG/DL
CREAT UR-SCNC: 47.8 MG/DL
EGFRCR SERPLBLD CKD-EPI 2021: 71 ML/MIN/1.73M2 (ref 60–?)
EST. AVERAGE GLUCOSE BLD GHB EST-MCNC: 123 MG/DL (ref 68–126)
FASTING PATIENT LIPID ANSWER: YES
FASTING STATUS PATIENT QL REPORTED: YES
GLOBULIN PLAS-MCNC: 3.2 G/DL (ref 2.8–4.4)
GLUCOSE BLD-MCNC: 99 MG/DL (ref 70–99)
HBA1C MFR BLD: 5.9 % (ref ?–5.7)
HDLC SERPL-MCNC: 80 MG/DL (ref 40–59)
LDLC SERPL CALC-MCNC: 134 MG/DL (ref ?–100)
MICROALBUMIN UR-MCNC: <0.5 MG/DL
NONHDLC SERPL-MCNC: 149 MG/DL (ref ?–130)
OSMOLALITY SERPL CALC.SUM OF ELEC: 293 MOSM/KG (ref 275–295)
POTASSIUM SERPL-SCNC: 4.7 MMOL/L (ref 3.5–5.1)
PROT SERPL-MCNC: 7.4 G/DL (ref 6.4–8.2)
SODIUM SERPL-SCNC: 142 MMOL/L (ref 136–145)
TRIGL SERPL-MCNC: 87 MG/DL (ref 30–149)
VLDLC SERPL CALC-MCNC: 16 MG/DL (ref 0–30)

## 2024-01-08 PROCEDURE — 36415 COLL VENOUS BLD VENIPUNCTURE: CPT

## 2024-01-08 PROCEDURE — 82043 UR ALBUMIN QUANTITATIVE: CPT

## 2024-01-08 PROCEDURE — 80061 LIPID PANEL: CPT

## 2024-01-08 PROCEDURE — 82570 ASSAY OF URINE CREATININE: CPT

## 2024-01-08 PROCEDURE — 80053 COMPREHEN METABOLIC PANEL: CPT

## 2024-01-08 PROCEDURE — 83036 HEMOGLOBIN GLYCOSYLATED A1C: CPT

## 2024-01-08 RX ORDER — METHYLPHENIDATE HYDROCHLORIDE 20 MG/1
20 TABLET ORAL 2 TIMES DAILY
Qty: 60 TABLET | Refills: 0 | Status: SHIPPED | OUTPATIENT
Start: 2024-01-08 | End: 2024-02-07

## 2024-01-08 NOTE — TELEPHONE ENCOUNTER
From: Nadya Mina  To: Alva Fiore  Sent: 1/8/2024 2:00 PM CST  Subject: Methylphenidate     Hello, [facility] is having a hard time with this script could you send it to the other pharmacy please

## 2024-01-09 ENCOUNTER — TELEPHONE (OUTPATIENT)
Dept: FAMILY MEDICINE CLINIC | Facility: CLINIC | Age: 49
End: 2024-01-09

## 2024-01-09 NOTE — TELEPHONE ENCOUNTER
methylphenidate (METHYLIN) 20 MG Oral Tab       APPROVED 1/1/24-1/9/25        [facility]

## 2024-01-27 DIAGNOSIS — J45.20 MILD INTERMITTENT ASTHMA WITHOUT COMPLICATION: ICD-10-CM

## 2024-01-29 RX ORDER — MONTELUKAST SODIUM 10 MG/1
10 TABLET ORAL NIGHTLY
Qty: 15 TABLET | Refills: 0 | Status: SHIPPED | OUTPATIENT
Start: 2024-01-29

## 2024-01-30 DIAGNOSIS — E78.00 ELEVATED CHOLESTEROL: ICD-10-CM

## 2024-01-30 RX ORDER — LIDOCAINE HYDROCHLORIDE 20 MG/ML
SOLUTION ORAL; TOPICAL
Qty: 15 TABLET | Refills: 0 | Status: SHIPPED | OUTPATIENT
Start: 2024-01-30

## 2024-01-30 RX ORDER — LOVASTATIN 10 MG/1
10 TABLET ORAL NIGHTLY
Qty: 90 TABLET | Refills: 0 | Status: SHIPPED | OUTPATIENT
Start: 2024-01-30

## 2024-01-30 RX ORDER — TOPIRAMATE 25 MG/1
50 TABLET ORAL 2 TIMES DAILY
Qty: 60 TABLET | Refills: 0 | OUTPATIENT
Start: 2024-01-30

## 2024-01-30 RX ORDER — DICYCLOMINE HCL 20 MG
40 TABLET ORAL EVERY EVENING
Qty: 30 TABLET | Refills: 0 | Status: SHIPPED | OUTPATIENT
Start: 2024-01-30

## 2024-02-14 DIAGNOSIS — J45.20 MILD INTERMITTENT ASTHMA WITHOUT COMPLICATION: ICD-10-CM

## 2024-02-14 RX ORDER — TOPIRAMATE 25 MG/1
50 TABLET ORAL 2 TIMES DAILY
Qty: 60 TABLET | Refills: 2 | Status: SHIPPED | OUTPATIENT
Start: 2024-02-14

## 2024-02-15 RX ORDER — DICYCLOMINE HCL 20 MG
40 TABLET ORAL EVERY EVENING
Qty: 30 TABLET | Refills: 0 | Status: SHIPPED | OUTPATIENT
Start: 2024-02-15

## 2024-02-15 RX ORDER — FERROUS SULFATE 325(65) MG
1 TABLET ORAL EVERY MORNING
Qty: 15 TABLET | Refills: 0 | Status: SHIPPED | OUTPATIENT
Start: 2024-02-15

## 2024-02-15 RX ORDER — MONTELUKAST SODIUM 10 MG/1
10 TABLET ORAL NIGHTLY
Qty: 15 TABLET | Refills: 0 | Status: SHIPPED | OUTPATIENT
Start: 2024-02-15

## 2024-02-15 NOTE — TELEPHONE ENCOUNTER
A refill request was received for:  Requested Prescriptions     Pending Prescriptions Disp Refills    DICYCLOMINE 20 MG Oral Tab [Pharmacy Med Name: DICYCLOMINE HCL 20MG TAB] 30 tablet 0     Sig: TAKE 2 TABLETS BY MOUTH EVERY EVENING    FEROSUL 325 (65 Fe) MG Oral Tab [Pharmacy Med Name: FEROSUL 325MG TAB] 15 tablet 0     Sig: TAKE ONE TABLET BY MOUTH EVERY MORNING       Last refill date:dicyclomine 1/30/2024         Last office visit:1/8.2024     Follow up due:  Future Appointments   Date Time Provider Department Center   3/9/2024 11:00 AM [facility]  [facility]

## 2024-02-15 NOTE — TELEPHONE ENCOUNTER
A refill request was received for:  Requested Prescriptions     Pending Prescriptions Disp Refills    MONTELUKAST 10 MG Oral Tab [Pharmacy Med Name: MONTELUKAST SODIUM 10MG TAB] 15 tablet 0     Sig: TAKE 1 TABLET BY MOUTH NIGHTLY.       Last refill date:1/29/2024       Last office visit:1/8/2024     Follow up due:  Future Appointments   Date Time Provider Department Center   3/9/2024 11:00 AM [facility]

## 2024-02-16 DIAGNOSIS — E11.9 TYPE 2 DIABETES MELLITUS WITHOUT COMPLICATION, WITHOUT LONG-TERM CURRENT USE OF INSULIN (HCC): ICD-10-CM

## 2024-02-16 DIAGNOSIS — R11.0 NAUSEA: ICD-10-CM

## 2024-02-16 DIAGNOSIS — R13.10 DYSPHAGIA, UNSPECIFIED TYPE: ICD-10-CM

## 2024-02-17 RX ORDER — ONDANSETRON 4 MG/1
4 TABLET, ORALLY DISINTEGRATING ORAL EVERY 8 HOURS PRN
Qty: 20 TABLET | Refills: 0 | Status: SHIPPED | OUTPATIENT
Start: 2024-02-17

## 2024-02-17 RX ORDER — SUCRALFATE ORAL 1 G/10ML
1 SUSPENSION ORAL 4 TIMES DAILY
Qty: 1200 ML | Refills: 0 | Status: SHIPPED | OUTPATIENT
Start: 2024-02-17

## 2024-02-17 RX ORDER — METHYLPHENIDATE HYDROCHLORIDE 20 MG/1
20 TABLET ORAL 2 TIMES DAILY
Qty: 60 TABLET | Refills: 0 | OUTPATIENT
Start: 2024-02-17 | End: 2024-03-18

## 2024-02-17 NOTE — TELEPHONE ENCOUNTER
A refill request was received for:  Requested Prescriptions     Pending Prescriptions Disp Refills    ONDANSETRON 4 MG Oral Tablet Dispersible [Pharmacy Med Name: ONDANSETRON ODT 4MG ODT] 20 tablet 0     Sig: PLACE 1 TABLET UNDER THE TONGUE EVERY EIGHT HOURS AS NEEDED FOR NAUSEA.    SUCRALFATE 1 GM/10ML Oral Suspension [Pharmacy Med Name: SUCRALFATE 1G/10ML SUSP]  0     Sig: TAKE 10 ML BY MOUTH 4 TIMES DAILY BEFORE MEALS AND NIGHTLY.    METHYLPHENIDATE 20 MG Oral Tab [Pharmacy Med Name: METHYLPHENIDATE HYDR 20MG TAB] 60 tablet 0     Sig: TAKE 1 TABLET (20 MG TOTAL) BY MOUTH 2 (TWO) TIMES DAILY.       Last refill date:1/8/2024       Last office visit:1/8/2024     Follow up due:  Future Appointments   Date Time Provider Department Center   3/9/2024 11:00 AM [facility]

## 2024-02-21 ENCOUNTER — MED REC SCAN ONLY (OUTPATIENT)
Facility: CLINIC | Age: 49
End: 2024-02-21

## 2024-03-06 ENCOUNTER — HOSPITAL ENCOUNTER (OUTPATIENT)
Age: 49
Discharge: HOME | End: 2024-03-06
Payer: MEDICAID

## 2024-03-06 ENCOUNTER — MED REC SCAN ONLY (OUTPATIENT)
Facility: CLINIC | Age: 49
End: 2024-03-06

## 2024-03-06 ENCOUNTER — APPOINTMENT (OUTPATIENT)
Dept: GENERAL RADIOLOGY | Age: 49
End: 2024-03-06
Attending: NURSE PRACTITIONER
Payer: MEDICAID

## 2024-03-06 VITALS
SYSTOLIC BLOOD PRESSURE: 113 MMHG | BODY MASS INDEX: 26.16 KG/M2 | DIASTOLIC BLOOD PRESSURE: 78 MMHG | HEART RATE: 102 BPM | WEIGHT: 157 LBS | OXYGEN SATURATION: 98 % | RESPIRATION RATE: 18 BRPM | TEMPERATURE: 98 F | HEIGHT: 65 IN

## 2024-03-06 DIAGNOSIS — R05.1 ACUTE COUGH: ICD-10-CM

## 2024-03-06 DIAGNOSIS — J20.8 ACUTE VIRAL BRONCHITIS: Primary | ICD-10-CM

## 2024-03-06 DIAGNOSIS — J45.20 MILD INTERMITTENT ASTHMA WITHOUT COMPLICATION (HCC): ICD-10-CM

## 2024-03-06 PROCEDURE — 99214 OFFICE O/P EST MOD 30 MIN: CPT | Performed by: NURSE PRACTITIONER

## 2024-03-06 PROCEDURE — 71046 X-RAY EXAM CHEST 2 VIEWS: CPT | Performed by: NURSE PRACTITIONER

## 2024-03-06 RX ORDER — PREDNISONE 20 MG/1
40 TABLET ORAL DAILY
Qty: 10 TABLET | Refills: 0 | Status: SHIPPED | OUTPATIENT
Start: 2024-03-06 | End: 2024-03-11

## 2024-03-06 NOTE — ED PROVIDER NOTES
Patient Seen in: [facility]      History     Chief Complaint   Patient presents with    Cough     Cough, some difficulty taking deep breath. At home covid test negative - Entered by patient     Stated Complaint: Cough - Cough, some difficulty taking deep breath. At home covid test negative    Subjective:   48-year-old female presents today with complaints of worsening cough over the last 4 to 5 days.  States does have some feelings of shortness of breath does have a history of pneumonia states feels very similar.  Denies any fever or chills.  No nausea vomiting diarrhea.  Has had some mild postnasal drip.  Denies any headaches dizziness.  Does have generalized fatigue.  No other symptoms or concerns.  The patient's medication list, past medical history and social history elements as listed in today's nurse's notes were reviewed and agreed (except as otherwise stated in the HPI).  The patient's family history reviewed and determined to be noncontributory to the presenting problem            Objective:   Past Medical History:   Diagnosis Date    Abdominal distention 2weeks    Abdominal pain 6 months    Abnormal uterine bleeding     Acute bronchitis     Acute esophagitis     Acute upper respiratory infections of unspecified site     Anxiety state, unspecified     Asthma (HCC)     Attention deficit disorder without mention of hyperactivity     Back problem     cervical and lumbar    Bad breath     Started when stomach pain got worse    Bloating 2weeks    Cauda equina compression (HCC)     Cauda equina syndrome without mention of neurogenic bladder     Constipation Occasionaly    Decorative tattoo     Depression     Diabetes (HCC)     Diet controlled; no meds    Diabetes (HCC)     Diarrhea, unspecified Occasionaly    Dysesthesia     Dyspareunia     Easy bruising     Endometriosis     Essential hypertension     Fatigue     Flatulence/gas pain/belching 2weeks    Food intolerance     Frequent urination      Frequent UTI     Headache disorder     Heartburn 29 years ago    Heavy menses     Hematuria     Hiatal hernia     High cholesterol     Hyperlipidemia     Hypertension     IBS (irritable bowel syndrome)     Irregular bowel habits     Irregular menstrual cycle     Itch of skin O    Occasionally on my legs, not due to dry skin    Leaking of urine     Lipid screening 09/17/2011    Loss of appetite     Lump or mass in breast     Malaise     Malignant hyperthermia     patient's son at 5 years of age - 2006    Mastodynia     Menses painful     Migraines     Nausea 2weeks    Night sweats     Obstructive sleep apnea 11/28/2023    Other screening mammogram 06/11/2012    Pain in joints     Pain with bowel movements Years    Pap smear for cervical cancer screening 07/03/2012    wnl    Personal history of urinary (tract) infection     Pneumonia due to organism     PONV (postoperative nausea and vomiting)     Problems with swallowing 29 years ago    Sleep disturbance     Stress     Uncomfortable fullness after meals 2weeks    Unspecified disorder of thyroid     hypothyroidism    Unspecified viral infection, in conditions classified elsewhere and of unspecified site     Visual impairment     glasses    Vomiting 1week    Weight loss               Past Surgical History:   Procedure Laterality Date    APPENDECTOMY  1991    BLOOD PATCH(BEDSIDE)      COLONOSCOPY N/A 2/6/2015    Procedure: COLONOSCOPY;  Surgeon: Marlene Payne MD;  Location:  ENDOSCOPY    ENDOMETRIAL ABLATION      2004    HYSTERECTOMY  7/20/2015    HYSTEROSCOPY,ABLATION ENDOMETRIUM      LAMINECTOMY,CERVICAL  07/2020    LAPAROSCOPY,DIAGNOSTIC  1991    multiple    LYSIS OF ADHESIONS      [facility] LOCALIZATION WIRE 1 SITE RIGHT (CPT=19281)      in her 20's    OTHER Right 02/2018    ARTHROSCOPIC ACROMIOPLASTY LYSIS OF ADHESIONS RIGHT SHOULDER    OTHER  12/19/2018    ANTERIOR DISCECTOMY AND FUSION. CERVICAL 4-CERVICAL 5 AND CERVICAL 5-CERVICAL 6. ALLOGRAFT, PLATE AND SCREWS     OTHER  2021    ANTERIOR CERVICAL DISCECTOMY AND FUSION CERVICAL 6- CERVICAL 7 WITH INTERBODY DEVICE, REMOVAL OF SURGICAL SCREW RIGHT C6    OTHER SURGICAL HISTORY      esophagogastroduodenoscopy    OTHER SURGICAL HISTORY      right thumb trigger finger release     OTHER SURGICAL HISTORY      ganglion cyst removed left wrist     OTHER SURGICAL HISTORY  10/30/2019    Cystoscopy- Dr. Sofia    SIGMOIDOSCOPY,DIAGNOSTIC      TARSAL TUNNEL RELEASE      right foot    TONSILLECTOMY      TOTAL ABDOM HYSTERECTOMY                  Social History     Socioeconomic History    Marital status:    Tobacco Use    Smoking status: Former     Packs/day: 0.00     Years: 10.00     Additional pack years: 0.00     Total pack years: 0.00     Types: Cigarettes     Quit date: 1999     Years since quittin.0    Smokeless tobacco: Never   Vaping Use    Vaping Use: Never used   Substance and Sexual Activity    Alcohol use: Yes     Comment: occasionally    Drug use: No    Sexual activity: Yes     Partners: Male   Other Topics Concern    Caffeine Concern Yes     Comment: 2 CUPS    Exercise Yes     Comment: active at home              Review of Systems    Positive for stated complaint: Cough - Cough, some difficulty taking deep breath. At home covid test negative  Other systems are as noted in HPI.  Constitutional and vital signs reviewed.      All other systems reviewed and negative except as noted above.    Physical Exam     ED Triage Vitals   BP 24 1607 113/78   Pulse 24 1607 102   Resp 24 1607 18   Temp 24 1607 97.7 °F (36.5 °C)   Temp src 24 1607 Temporal   SpO2 24 1607 98 %   O2 Device 24 1604 None (Room air)       Current:/78   Pulse 102   Temp 97.7 °F (36.5 °C) (Temporal)   Resp 18   Ht 165.1 cm (5' 5\")   Wt 71.2 kg   LMP 2004   SpO2 98%   BMI 26.13 kg/m²         Physical Exam  Vitals and nursing note reviewed.   Constitutional:       Appearance:  Normal appearance. She is well-developed.   HENT:      Head: Normocephalic.      Right Ear: Tympanic membrane and ear canal normal.      Left Ear: Tympanic membrane and ear canal normal.      Nose: Mucosal edema present.      Mouth/Throat:      Pharynx: Uvula midline. Posterior oropharyngeal erythema present.   Eyes:      Conjunctiva/sclera: Conjunctivae normal.      Pupils: Pupils are equal, round, and reactive to light.   Cardiovascular:      Rate and Rhythm: Normal rate and regular rhythm.   Pulmonary:      Effort: Pulmonary effort is normal.      Breath sounds: Examination of the right-upper field reveals decreased breath sounds. Examination of the right-lower field reveals decreased breath sounds. Decreased breath sounds present.   Musculoskeletal:      Cervical back: Normal range of motion and neck supple.   Lymphadenopathy:      Cervical: No cervical adenopathy.   Skin:     General: Skin is warm and dry.   Neurological:      Mental Status: She is alert and oriented to person, place, and time.               ED Course   Labs Reviewed - No data to display               XR CHEST PA + LAT CHEST (CPT=71046)    Result Date: 3/6/2024  PROCEDURE:  XR CHEST PA + LAT CHEST (CPT=71046)  INDICATIONS:  Cough - Cough, some difficulty taking deep breath. At home covid test negative  COMPARISON:  [facility], XR, XR RIBS WITH CHEST, BILATERAL   (TFP=73572), 7/19/2022, 10:07 AM.  TECHNIQUE:  PA and lateral chest radiographs were obtained.  PATIENT STATED HISTORY: (As transcribed by Technologist)  The patient has a cough and congestion for one week.    FINDINGS:  Lung volumes are satisfactory.  Elevation of the right diaphragm is similar to the prior.  No new focal or lobar consolidation.  There is moderate peribronchial cuffing.  In the acute setting consider bronchitis or viral illness.  Heart and pulmonary vessels appear stable, normal caliber.  Mediastinal contours are smooth.  Anterior and posterior  hardware fixation of the lower cervical spine is again seen.             CONCLUSION:  Peribronchial cuffing.   LOCATION:  [place]   Dictated by (CST): Ranjit Bazan MD on 3/06/2024 at 4:45 PM     Finalized by (CST): Ranjit Bazan MD on 3/06/2024 at 4:46 PM         MDM     Please note that this report has been produced using speech recognition software and may contain errors related to that system including, but not limited to, errors in grammar, punctuation, and spelling, as well as words and phrases that possibly may have been recognized inappropriately.  If there are any questions or concerns, contact the dictating provider for clarification.        Note to patient: The 21st Century Cures Act makes medical notes like these available to patients in the interest of transparency. However, this is a medical document intended as peer to peer communication. It is written in medical language and may contain abbreviations or verbiage that are unfamiliar. It may appear blunt or direct. Medical documents are intended to carry relevant information, facts as evident, and the clinical opinion of the practitioner.                                   Medical Decision Making  Differential diagnosis includes but is not limited to: COVID-19, viral URI, strep throat, influenza, pneumonia, sinusitis, bronchitis      Presents today with complaints of mild URI symptoms with worsening cough and feelings of shortness of breath especially with exertion.  Lungs are diminished on the right side but no wheezing or rails noted on exam.  Symptoms for about 1 week.  Has been using over-the-counter medications without much relief.  Chest x-ray does show parabronchial cuffing consistent with viral bronchitis.  Will give prescription for prednisone to help with inflammation of the lungs.  Patient does have albuterol inhaler at home encouraged use as needed.  Encouraged to Push fluids and rest, alternate Tylenol and Motrin for any fever pain.  Take  over-the-counter antihistamine and cough suppressant as needed.  Use a cool-mist humidifier at the bedside.  To follow primary care physician in 1 week if symptoms do not improve.  Take over-the-counter zinc and vitamin C to boost your immune system.  Patient verbalized understanding agree with plan of care.        Amount and/or Complexity of Data Reviewed  Radiology: ordered and independent interpretation performed. Decision-making details documented in ED Course.     Details: Chest x-ray    Risk  OTC drugs.  Prescription drug management.        Disposition and Plan     Clinical Impression:  1. Acute viral bronchitis    2. Acute cough         Disposition:  Discharge  3/6/2024  4:50 pm    Follow-up:  Alva Fiore DO  [address] [place] [id number] [phone]    In 1 week  As needed          Medications Prescribed:  Current Discharge Medication List        START taking these medications    Details   predniSONE 20 MG Oral Tab Take 2 tablets (40 mg total) by mouth daily for 5 days.  Qty: 10 tablet, Refills: 0

## 2024-03-06 NOTE — ED INITIAL ASSESSMENT (HPI)
Cough since Friday. Home covid negative. Denies fever. Co fatigue and hyperglycemia. Bs running 184

## 2024-03-06 NOTE — TELEPHONE ENCOUNTER
A refill request was received for:  Requested Prescriptions     Pending Prescriptions Disp Refills    MONTELUKAST 10 MG Oral Tab [Pharmacy Med Name: MONTELUKAST SODIUM 10MG TAB] 15 tablet 0     Sig: TAKE ONE TABLET BY MOUTH NIGHTLY    FEROSUL 325 (65 Fe) MG Oral Tab [Pharmacy Med Name: FEROSUL 325MG TAB] 15 tablet 0     Sig: TAKE 1 TABLET (325 MG TOTAL) BY MOUTH EVERY MORNING.    DICYCLOMINE 20 MG Oral Tab [Pharmacy Med Name: DICYCLOMINE HCL 20MG TAB] 30 tablet 0     Sig: TAKE TWO TABLETS BY MOUTH EVERY evening       Last refill date:2/2024      Last office visit:1/8/2024     Follow up due:  Future Appointments   Date Time Provider Department Center   3/9/2024 11:00 AM  [facility]  [facility]

## 2024-03-07 RX ORDER — FERROUS SULFATE 325(65) MG
325 TABLET ORAL
Qty: 15 TABLET | Refills: 0 | Status: SHIPPED | OUTPATIENT
Start: 2024-03-07

## 2024-03-07 RX ORDER — MONTELUKAST SODIUM 10 MG/1
10 TABLET ORAL NIGHTLY
Qty: 15 TABLET | Refills: 0 | Status: SHIPPED | OUTPATIENT
Start: 2024-03-07

## 2024-03-07 RX ORDER — DICYCLOMINE HCL 20 MG
40 TABLET ORAL EVERY EVENING
Qty: 30 TABLET | Refills: 0 | Status: SHIPPED | OUTPATIENT
Start: 2024-03-07

## 2024-03-11 ENCOUNTER — HOSPITAL ENCOUNTER (OUTPATIENT)
Age: 49
Discharge: HOME | End: 2024-03-11
Payer: MEDICAID

## 2024-03-11 ENCOUNTER — APPOINTMENT (OUTPATIENT)
Dept: GENERAL RADIOLOGY | Age: 49
End: 2024-03-11
Attending: NURSE PRACTITIONER
Payer: MEDICAID

## 2024-03-11 VITALS
OXYGEN SATURATION: 98 % | BODY MASS INDEX: 25.99 KG/M2 | SYSTOLIC BLOOD PRESSURE: 110 MMHG | WEIGHT: 156 LBS | DIASTOLIC BLOOD PRESSURE: 71 MMHG | HEART RATE: 90 BPM | HEIGHT: 65 IN | RESPIRATION RATE: 18 BRPM | TEMPERATURE: 97 F

## 2024-03-11 DIAGNOSIS — Z87.01 HISTORY OF PNEUMONIA: ICD-10-CM

## 2024-03-11 DIAGNOSIS — J98.01 ACUTE BRONCHOSPASM: Primary | ICD-10-CM

## 2024-03-11 DIAGNOSIS — J45.41 MODERATE PERSISTENT ASTHMA WITH EXACERBATION (HCC): ICD-10-CM

## 2024-03-11 DIAGNOSIS — R06.02 SOB (SHORTNESS OF BREATH): ICD-10-CM

## 2024-03-11 DIAGNOSIS — R00.0 TACHYCARDIA WITH HEART RATE 100-120 BEATS PER MINUTE: ICD-10-CM

## 2024-03-11 LAB
DDIMER WHOLE BLOOD: 371 NG/ML DDU (ref ?–400)
SARS-COV-2 RNA RESP QL NAA+PROBE: NOT DETECTED

## 2024-03-11 PROCEDURE — 99214 OFFICE O/P EST MOD 30 MIN: CPT | Performed by: NURSE PRACTITIONER

## 2024-03-11 PROCEDURE — 71046 X-RAY EXAM CHEST 2 VIEWS: CPT | Performed by: NURSE PRACTITIONER

## 2024-03-11 PROCEDURE — U0002 COVID-19 LAB TEST NON-CDC: HCPCS | Performed by: NURSE PRACTITIONER

## 2024-03-11 PROCEDURE — 94640 AIRWAY INHALATION TREATMENT: CPT | Performed by: NURSE PRACTITIONER

## 2024-03-11 PROCEDURE — 85378 FIBRIN DEGRADE SEMIQUANT: CPT | Performed by: NURSE PRACTITIONER

## 2024-03-11 RX ORDER — PREDNISONE 20 MG/1
60 TABLET ORAL ONCE
Status: COMPLETED | OUTPATIENT
Start: 2024-03-11 | End: 2024-03-11

## 2024-03-11 RX ORDER — PROMETHAZINE HYDROCHLORIDE 25 MG/1
3 TABLET ORAL AS NEEDED
Qty: 30 EACH | Refills: 0 | Status: SHIPPED | OUTPATIENT
Start: 2024-03-11

## 2024-03-11 RX ORDER — ALBUTEROL SULFATE 2.5 MG/3ML
2.5 SOLUTION RESPIRATORY (INHALATION) EVERY 4 HOURS PRN
Qty: 30 EACH | Refills: 0 | Status: SHIPPED | OUTPATIENT
Start: 2024-03-11 | End: 2024-04-10

## 2024-03-11 RX ORDER — ALBUTEROL SULFATE 2.5 MG/3ML
2.5 SOLUTION RESPIRATORY (INHALATION) ONCE
Status: COMPLETED | OUTPATIENT
Start: 2024-03-11 | End: 2024-03-11

## 2024-03-12 NOTE — ED PROVIDER NOTES
Patient Seen in: [facility]      History     Chief Complaint   Patient presents with    Difficulty Breathing     Was just there last week, now worse, lost voice - Entered by patient     Stated Complaint: Difficulty Breathing - Was just there last week, now worse, lost voice    Subjective:   HPI    Patient is a pleasant 48-year-old female with asthma, diabetes and history of pneumonia here for evaluation of worsening cough and shortness of breath.  Patient was evaluated here at immediate care last week and prescribed prednisone, chest x-ray was negative at time of evaluation.  Patient states over the last few days, she has felt more tight with deep inspiration and developed laryngitis over the past 2 days.  Denies recent fevers or chills.  Tolerating p.o. intake.  Does report increased fatigue.  Has not utilized rescue inhaler.  Felt minimal relief with prednisone.    Objective:   Past Medical History:   Diagnosis Date    Abdominal distention 2weeks    Abdominal pain 6 months    Abnormal uterine bleeding     Acute bronchitis     Acute esophagitis     Acute upper respiratory infections of unspecified site     Anxiety state, unspecified     Asthma (HCC)     Attention deficit disorder without mention of hyperactivity     Back problem     cervical and lumbar    Bad breath     Started when stomach pain got worse    Bloating 2weeks    Cauda equina compression (HCC)     Cauda equina syndrome without mention of neurogenic bladder     Constipation Occasionaly    Decorative tattoo     Depression     Diabetes (HCC)     Diet controlled; no meds    Diabetes (HCC)     Diarrhea, unspecified Occasionaly    Dysesthesia     Dyspareunia     Easy bruising     Endometriosis     Essential hypertension     Fatigue     Flatulence/gas pain/belching 2weeks    Food intolerance     Frequent urination     Frequent UTI     Headache disorder     Heartburn 29 years ago    Heavy menses     Hematuria     Hiatal hernia     High  cholesterol     Hyperlipidemia     Hypertension     IBS (irritable bowel syndrome)     Irregular bowel habits     Irregular menstrual cycle     Itch of skin O    Occasionally on my legs, not due to dry skin    Leaking of urine     Lipid screening 09/17/2011    Loss of appetite     Lump or mass in breast     Malaise     Malignant hyperthermia     patient's son at 5 years of age - 2006    Mastodynia     Menses painful     Migraines     Nausea 2weeks    Night sweats     Obstructive sleep apnea 11/28/2023    Other screening mammogram 06/11/2012    Pain in joints     Pain with bowel movements Years    Pap smear for cervical cancer screening 07/03/2012    wnl    Personal history of urinary (tract) infection     Pneumonia due to organism     PONV (postoperative nausea and vomiting)     Problems with swallowing 29 years ago    Sleep disturbance     Stress     Uncomfortable fullness after meals 2weeks    Unspecified disorder of thyroid     hypothyroidism    Unspecified viral infection, in conditions classified elsewhere and of unspecified site     Visual impairment     glasses    Vomiting 1week    Weight loss               Past Surgical History:   Procedure Laterality Date    APPENDECTOMY  1991    BLOOD PATCH(BEDSIDE)      COLONOSCOPY N/A 2/6/2015    Procedure: COLONOSCOPY;  Surgeon: Marlene Payne MD;  Location:  ENDOSCOPY    ENDOMETRIAL ABLATION      2004    HYSTERECTOMY  7/20/2015    HYSTEROSCOPY,ABLATION ENDOMETRIUM      LAMINECTOMY,CERVICAL  07/2020    LAPAROSCOPY,DIAGNOSTIC  1991    multiple    LYSIS OF ADHESIONS      [facility] LOCALIZATION WIRE 1 SITE RIGHT (CPT=19281)      in her 20's    OTHER Right 02/2018    ARTHROSCOPIC ACROMIOPLASTY LYSIS OF ADHESIONS RIGHT SHOULDER    OTHER  12/19/2018    ANTERIOR DISCECTOMY AND FUSION. CERVICAL 4-CERVICAL 5 AND CERVICAL 5-CERVICAL 6. ALLOGRAFT, PLATE AND SCREWS    OTHER  09/08/2021    ANTERIOR CERVICAL DISCECTOMY AND FUSION CERVICAL 6- CERVICAL 7 WITH INTERBODY DEVICE, REMOVAL OF  SURGICAL SCREW RIGHT C6    OTHER SURGICAL HISTORY      esophagogastroduodenoscopy    OTHER SURGICAL HISTORY      right thumb trigger finger release     OTHER SURGICAL HISTORY      ganglion cyst removed left wrist     OTHER SURGICAL HISTORY  10/30/2019    Cystoscopy- Dr. Sofia    SIGMOIDOSCOPY,DIAGNOSTIC      TARSAL TUNNEL RELEASE  2005    right foot    TONSILLECTOMY      TOTAL ABDOM HYSTERECTOMY                  Social History     Socioeconomic History    Marital status:    Tobacco Use    Smoking status: Former     Packs/day: 0.00     Years: 10.00     Additional pack years: 0.00     Total pack years: 0.00     Types: Cigarettes     Quit date: 1999     Years since quittin.1    Smokeless tobacco: Never   Vaping Use    Vaping Use: Never used   Substance and Sexual Activity    Alcohol use: Yes     Comment: occasionally    Drug use: No    Sexual activity: Yes     Partners: Male   Other Topics Concern    Caffeine Concern Yes     Comment: 2 CUPS    Exercise Yes     Comment: active at home              Review of Systems    Positive for stated complaint: Difficulty Breathing - Was just there last week, now worse, lost voice  Other systems are as noted in HPI.  Constitutional and vital signs reviewed.      All other systems reviewed and negative except as noted above.    Physical Exam     ED Triage Vitals   BP 24 1226 117/83   Pulse 24 1226 119   Resp 24 1226 20   Temp 24 1226 97.4 °F (36.3 °C)   Temp src 24 1226 Temporal   SpO2 24 1226 98 %   O2 Device 24 1209 None (Room air)       Current:/71   Pulse 90   Temp 97.4 °F (36.3 °C) (Temporal)   Resp 18   Ht 165.1 cm (5' 5\")   Wt 70.8 kg   LMP 2004   SpO2 98%   BMI 25.96 kg/m²         Physical Exam  Vitals and nursing note reviewed.   Constitutional:       General: She is not in acute distress.     Appearance: She is well-developed. She is not ill-appearing, toxic-appearing or diaphoretic.   HENT:       Mouth/Throat:      Mouth: Mucous membranes are moist.   Eyes:      Extraocular Movements: Extraocular movements intact.      Pupils: Pupils are equal, round, and reactive to light.   Cardiovascular:      Rate and Rhythm: Normal rate and regular rhythm.   Pulmonary:      Effort: Pulmonary effort is normal.      Breath sounds: Normal breath sounds. No decreased breath sounds or wheezing.   Neurological:      Mental Status: She is alert.             ED Course     Labs Reviewed   D-DIMER (POC) - Normal   RAPID SARS-COV-2 BY PCR - Normal     XR CHEST PA + LAT CHEST (CPT=71046)    Result Date: 3/11/2024  CONCLUSION:  No focal consolidation.   LOCATION:  [facility]   Dictated by (CST): Baylee Lynn MD on 3/11/2024 at 1:46 PM     Finalized by (CST): Baylee Lynn MD on 3/11/2024 at 1:46 PM              MDM                                   Medical Decision Making  Differentials include but are not limited to acute bronchospasm, pneumonia, viral URI, PE and asthma exacerbation.  Patient is mildly tachycardic at 119 bpm upon arrival, D-dimer negative.  Chest x-ray unremarkable.  Patient was given prednisone and albuterol nebulizer treatment here with subjective improvement.  Heart rate reduced to 90 bpm.  Patient prefers to hold systemic steroid at this time and we will plan to utilize albuterol nebulizer treatments at home as needed.  Close outpatient follow-up with PCP.  Monitoring parameters and ER precautions reviewed with patient who agree with plan of care.  All questions answered to patient's satisfaction.    Amount and/or Complexity of Data Reviewed  Labs: ordered. Decision-making details documented in ED Course.  Radiology: ordered and independent interpretation performed. Decision-making details documented in ED Course.        Disposition and Plan     Clinical Impression:  1. Acute bronchospasm    2. SOB (shortness of breath)    3. Tachycardia with heart rate 100-120 beats per minute    4. History of pneumonia    5.  Moderate persistent asthma with exacerbation (HCC)         Disposition:  Discharge  3/11/2024  2:40 pm    Follow-up:  Alva Fiore DO  [address] [place] [id number]  [phone]    In 1 week            Medications Prescribed:  Discharge Medication List as of 3/11/2024  2:40 PM        START taking these medications    Details   !! albuterol (2.5 MG/3ML) 0.083% Inhalation Nebu Soln Take 3 mL (2.5 mg total) by nebulization every 4 (four) hours as needed for Wheezing or Shortness of Breath., Normal, Disp-30 each, R-0      sodium chloride 0.9 % Inhalation Nebu Soln Take 3 mL by nebulization as needed for Wheezing., Normal, Disp-30 each, R-0       !! - Potential duplicate medications found. Please discuss with provider.

## 2024-03-13 DIAGNOSIS — J45.20 MILD INTERMITTENT ASTHMA WITHOUT COMPLICATION (HCC): ICD-10-CM

## 2024-03-13 DIAGNOSIS — R13.10 DYSPHAGIA, UNSPECIFIED TYPE: ICD-10-CM

## 2024-03-13 RX ORDER — SUCRALFATE ORAL 1 G/10ML
SUSPENSION ORAL
Refills: 2 | OUTPATIENT
Start: 2024-03-13

## 2024-03-13 RX ORDER — MONTELUKAST SODIUM 10 MG/1
10 TABLET ORAL NIGHTLY
Qty: 15 TABLET | Refills: 2 | Status: SHIPPED | OUTPATIENT
Start: 2024-03-13

## 2024-03-13 RX ORDER — ALBUTEROL SULFATE 90 UG/1
1-2 AEROSOL, METERED RESPIRATORY (INHALATION)
Qty: 51 G | Refills: 4 | Status: SHIPPED | OUTPATIENT
Start: 2024-03-13

## 2024-03-13 NOTE — TELEPHONE ENCOUNTER
.A refill request was received for:  Requested Prescriptions     Pending Prescriptions Disp Refills    MONTELUKAST 10 MG Oral Tab [Pharmacy Med Name: MONTELUKAST SODIUM 10MG TAB] 15 tablet 2     Sig: TAKE ONE TABLET BY MOUTH NIGHTLY    SUCRALFATE 1 GM/10ML Oral Suspension [Pharmacy Med Name: SUCRALFATE 1G/10ML SUSP]  2     Sig: TAKE 10 ML BY MOUTH 4 TIMES DAILY.    ALBUTEROL 108 (90 Base) MCG/ACT Inhalation Aero Soln [Pharmacy Med Name: ALBUTEROL INH 90MCG AER] 51 g 4     Sig: INHALE 1-2 PUFFS INTO THE LUNGS EVERY 4 TO 6 HOURS AS NEEDED.       Last refill date:   montelukast 3/7/2024  Sucralfate 2/17/2024  Albuterol 9/23/2023      Last office visit: 1/8/2024    Follow up due:  Future Appointments   Date Time Provider Department Center   3/30/2024 11:20 AM [address] [facility]

## 2024-03-16 DIAGNOSIS — F32.0 CURRENT MILD EPISODE OF MAJOR DEPRESSIVE DISORDER, UNSPECIFIED WHETHER RECURRENT (HCC): ICD-10-CM

## 2024-03-16 DIAGNOSIS — F41.9 ANXIETY: ICD-10-CM

## 2024-03-18 RX ORDER — CITALOPRAM 40 MG/1
40 TABLET ORAL DAILY
Qty: 90 TABLET | Refills: 0 | Status: SHIPPED | OUTPATIENT
Start: 2024-03-18

## 2024-03-18 RX ORDER — TOPIRAMATE 25 MG/1
50 TABLET ORAL 2 TIMES DAILY
Qty: 120 TABLET | Refills: 1 | OUTPATIENT
Start: 2024-03-18

## 2024-03-27 ENCOUNTER — TELEPHONE (OUTPATIENT)
Dept: FAMILY MEDICINE CLINIC | Facility: CLINIC | Age: 49
End: 2024-03-27

## 2024-03-27 NOTE — TELEPHONE ENCOUNTER
Spoke to Nadya to let her know Dr. Fiore will no longer be prescribing meds for her, due to her and  sharing antidepressant meds.  Per Risk Dept. she can see another provider in office and it would be up to that provider to prescribe meds or not. Pt aware 4/22/24 appt will be cancelled.

## 2024-03-30 ENCOUNTER — HOSPITAL ENCOUNTER (EMERGENCY)
Facility: HOSPITAL | Age: 49
Discharge: HOME | End: 2024-03-30
Attending: EMERGENCY MEDICINE
Payer: MEDICAID

## 2024-03-30 ENCOUNTER — APPOINTMENT (OUTPATIENT)
Dept: CT IMAGING | Facility: HOSPITAL | Age: 49
End: 2024-03-30
Attending: EMERGENCY MEDICINE
Payer: MEDICAID

## 2024-03-30 ENCOUNTER — HOSPITAL ENCOUNTER (OUTPATIENT)
Dept: MAMMOGRAPHY | Facility: HOSPITAL | Age: 49
Discharge: HOME | End: 2024-03-30
Attending: FAMILY MEDICINE
Payer: MEDICAID

## 2024-03-30 VITALS
TEMPERATURE: 98 F | BODY MASS INDEX: 26.16 KG/M2 | SYSTOLIC BLOOD PRESSURE: 127 MMHG | RESPIRATION RATE: 18 BRPM | OXYGEN SATURATION: 98 % | WEIGHT: 157 LBS | HEART RATE: 77 BPM | HEIGHT: 65 IN | DIASTOLIC BLOOD PRESSURE: 93 MMHG

## 2024-03-30 DIAGNOSIS — M47.22 OSTEOARTHRITIS OF SPINE WITH RADICULOPATHY, CERVICAL REGION: Primary | ICD-10-CM

## 2024-03-30 DIAGNOSIS — Z12.31 ENCOUNTER FOR SCREENING MAMMOGRAM FOR HIGH-RISK PATIENT: ICD-10-CM

## 2024-03-30 PROCEDURE — 77067 SCR MAMMO BI INCL CAD: CPT | Performed by: FAMILY MEDICINE

## 2024-03-30 PROCEDURE — 77063 BREAST TOMOSYNTHESIS BI: CPT | Performed by: FAMILY MEDICINE

## 2024-03-30 PROCEDURE — 72125 CT NECK SPINE W/O DYE: CPT | Performed by: EMERGENCY MEDICINE

## 2024-03-30 PROCEDURE — 99284 EMERGENCY DEPT VISIT MOD MDM: CPT

## 2024-03-30 PROCEDURE — 72128 CT CHEST SPINE W/O DYE: CPT | Performed by: EMERGENCY MEDICINE

## 2024-03-30 PROCEDURE — 99285 EMERGENCY DEPT VISIT HI MDM: CPT

## 2024-03-30 RX ORDER — METHYLPREDNISOLONE 4 MG/1
TABLET ORAL
Qty: 1 EACH | Refills: 0 | Status: SHIPPED | OUTPATIENT
Start: 2024-03-30

## 2024-03-30 NOTE — ED PROVIDER NOTES
Patient Seen in: [facility] Emergency Department      History     Chief Complaint   Patient presents with    Back Pain     Stated Complaint: neck and back pain, history of radiculopathy    Subjective:     HPI    48-year-old woman whose had multilevel cervical fusion by Dr. Olsen with anxiety/depression, ADD, in the past.  She comes in because she is having pain in her lower cervical and upper thoracic spine.  She has some tingling in her hands, especially in the morning.  She sees Dr. Stevens for periodic spinal injections.  She has increasing pain with movement.    Objective:   Past Medical History:   Diagnosis Date    Abdominal distention 2weeks    Abdominal pain 6 months    Abnormal uterine bleeding     Acute bronchitis     Acute esophagitis     Acute upper respiratory infections of unspecified site     Anxiety state, unspecified     Asthma (HCC)     Attention deficit disorder without mention of hyperactivity     Back problem     cervical and lumbar    Bad breath     Started when stomach pain got worse    Bloating 2weeks    Cauda equina compression (HCC)     Cauda equina syndrome without mention of neurogenic bladder     Constipation Occasionaly    Decorative tattoo     Depression     Diabetes (HCC)     Diet controlled; no meds    Diabetes (HCC)     Diarrhea, unspecified Occasionaly    Dysesthesia     Dyspareunia     Easy bruising     Endometriosis     Essential hypertension     Fatigue     Flatulence/gas pain/belching 2weeks    Food intolerance     Frequent urination     Frequent UTI     Headache disorder     Heartburn 29 years ago    Heavy menses     Hematuria     Hiatal hernia     High cholesterol     Hyperlipidemia     Hypertension     IBS (irritable bowel syndrome)     Irregular bowel habits     Irregular menstrual cycle     Itch of skin O    Occasionally on my legs, not due to dry skin    Leaking of urine     Lipid screening 09/17/2011    Loss of appetite     Lump or mass in breast     Malaise      Malignant hyperthermia     patient's son at 5 years of age - 2006    Mastodynia     Menses painful     Migraines     Nausea 2weeks    Night sweats     Obstructive sleep apnea 11/28/2023    Other screening mammogram 06/11/2012    Pain in joints     Pain with bowel movements Years    Pap smear for cervical cancer screening 07/03/2012    wnl    Personal history of urinary (tract) infection     Pneumonia due to organism     PONV (postoperative nausea and vomiting)     Problems with swallowing 29 years ago    Sleep disturbance     Stress     Uncomfortable fullness after meals 2weeks    Unspecified disorder of thyroid     hypothyroidism    Unspecified viral infection, in conditions classified elsewhere and of unspecified site     Visual impairment     glasses    Vomiting 1week    Weight loss               Past Surgical History:   Procedure Laterality Date    APPENDECTOMY  1991    BLOOD PATCH(BEDSIDE)      COLONOSCOPY N/A 02/06/2015    Procedure: COLONOSCOPY;  Surgeon: Marlene Payne MD;  Location:  ENDOSCOPY    ENDOMETRIAL ABLATION      2004    HYSTERECTOMY  07/20/2015    HYSTEROSCOPY,ABLATION ENDOMETRIUM      LAMINECTOMY,CERVICAL  07/2020    LAPAROSCOPY,DIAGNOSTIC  1991    multiple    LYSIS OF ADHESIONS      VENKATESH LOCALIZATION WIRE 1 SITE RIGHT (CPT=19281)      in her 20's    OOPHORECTOMY      OTHER Right 02/2018    ARTHROSCOPIC ACROMIOPLASTY LYSIS OF ADHESIONS RIGHT SHOULDER    OTHER  12/19/2018    ANTERIOR DISCECTOMY AND FUSION. CERVICAL 4-CERVICAL 5 AND CERVICAL 5-CERVICAL 6. ALLOGRAFT, PLATE AND SCREWS    OTHER  09/08/2021    ANTERIOR CERVICAL DISCECTOMY AND FUSION CERVICAL 6- CERVICAL 7 WITH INTERBODY DEVICE, REMOVAL OF SURGICAL SCREW RIGHT C6    OTHER SURGICAL HISTORY  2001    esophagogastroduodenoscopy    OTHER SURGICAL HISTORY      right thumb trigger finger release     OTHER SURGICAL HISTORY      ganglion cyst removed left wrist     OTHER SURGICAL HISTORY  10/30/2019    Cystoscopy- Dr. Sofia     SIGMOIDOSCOPY,DIAGNOSTIC      TARSAL TUNNEL RELEASE      right foot    TONSILLECTOMY      TOTAL ABDOM HYSTERECTOMY                  Social History     Socioeconomic History    Marital status:    Tobacco Use    Smoking status: Former     Packs/day: 0.00     Years: 10.00     Additional pack years: 0.00     Total pack years: 0.00     Types: Cigarettes     Quit date: 1999     Years since quittin.1    Smokeless tobacco: Never   Vaping Use    Vaping Use: Never used   Substance and Sexual Activity    Alcohol use: Yes     Comment: occasionally    Drug use: No    Sexual activity: Yes     Partners: Male   Other Topics Concern    Caffeine Concern Yes     Comment: 2 CUPS    Exercise Yes     Comment: active at home              Review of Systems    Positive for stated complaint: neck and back pain, history of radiculopathy  Other systems are as noted in HPI.  Constitutional and vital signs reviewed.      All other systems reviewed and negative except as noted above.    Physical Exam     ED Triage Vitals [24 1136]   /88   Pulse 77   Resp 16   Temp 98.4 °F (36.9 °C)   Temp src Temporal   SpO2 96 %   O2 Device None (Room air)       Current:BP (!) 127/93   Pulse 77   Temp 98.4 °F (36.9 °C) (Temporal)   Resp 18   Ht 165.1 cm (5' 5\")   Wt 71.2 kg   LMP 2004   SpO2 98%   BMI 26.13 kg/m²       General:  Vitals as listed.  No acute distress   HEENT: Sclerae anicteric.  Conjunctivae show no pallor.  Oropharynx clear, mucous membranes moist   Lungs: good air exchange   Extremities: no edema, normal peripheral pulses   Neuro: Alert oriented and nonfocal.  Normal radial/median/ulnar motor/sensory exam of the upper extremities.    ED Course   Labs Reviewed - No data to display  CT SPINE THORACIC (CPT=72128)    Result Date: 3/30/2024  CONCLUSION:  Mild thoracic scoliosis convex to right.   LOCATION:  [facility]   Dictated by (CST): Kavin Plunkett MD on 3/30/2024 at 3:53 PM     Finalized by (CST):  Kavin Plunkett MD on 3/30/2024 at 3:56 PM       CT SPINE CERVICAL (CPT=72125)    Result Date: 3/30/2024  CONCLUSION:  Extensive postsurgical changes as detailed above.  The tip of the left C7 screw extends 1 mm beyond the posterior cortex of C7, and the findings are otherwise unchanged.  No fracture or subluxation.    LOCATION:  [facility]   Dictated by (CST): Kavin Plunkett MD on 3/30/2024 at 3:43 PM     Finalized by (CST): Kavin Plunkett MD on 3/30/2024 at 3:53 PM        ED COURSE and MDM       I reviewed prior external notes including cervical spine MRI scan that was done 12/19/2022 and showed stable postoperative changes and no spinal cord signal abnormality    Referred to spine surgery as a person that did her cervical fusion has retired.  She can also see her pain specialist.  Will prescribe Medrol Dosepak.    I have discussed with the patient the results of testing, differential diagnosis, and treatment plan. They expressed clear understanding of these instructions and agrees to the plan provided.    Disposition and Plan     Clinical Impression:  1. Osteoarthritis of spine with radiculopathy, cervical region         Disposition:  Discharge  3/30/2024  4:08 pm    Follow-up:  Rock Mckinley MD  [address] [address]  [phone]    Follow up in 1 week(s)      Pasquale Zepeda MD  [address] [place] [address]  [phone]    Schedule an appointment as soon as possible for a visit in 1 week(s)          Medications Prescribed:  Current Discharge Medication List        START taking these medications    Details   methylPREDNISolone (MEDROL) 4 MG Oral Tablet Therapy Pack Dosepack: take as directed  Qty: 1 each, Refills: 0

## 2024-04-01 DIAGNOSIS — J45.20 MILD INTERMITTENT ASTHMA WITHOUT COMPLICATION (HCC): ICD-10-CM

## 2024-04-02 RX ORDER — DICYCLOMINE HCL 20 MG
40 TABLET ORAL EVERY EVENING
Qty: 30 TABLET | Refills: 0 | Status: SHIPPED | OUTPATIENT
Start: 2024-04-02

## 2024-04-02 RX ORDER — MONTELUKAST SODIUM 10 MG/1
10 TABLET ORAL NIGHTLY
Qty: 15 TABLET | Refills: 2 | Status: SHIPPED | OUTPATIENT
Start: 2024-04-02

## 2024-04-02 RX ORDER — FERROUS SULFATE 325(65) MG
1 TABLET ORAL
Qty: 15 TABLET | Refills: 0 | Status: SHIPPED | OUTPATIENT
Start: 2024-04-02

## 2024-04-11 ENCOUNTER — TELEPHONE (OUTPATIENT)
Dept: FAMILY MEDICINE CLINIC | Facility: CLINIC | Age: 49
End: 2024-04-11

## 2024-04-11 DIAGNOSIS — F32.0 CURRENT MILD EPISODE OF MAJOR DEPRESSIVE DISORDER, UNSPECIFIED WHETHER RECURRENT (HCC): ICD-10-CM

## 2024-04-11 DIAGNOSIS — J45.20 MILD INTERMITTENT ASTHMA WITHOUT COMPLICATION (HCC): ICD-10-CM

## 2024-04-11 DIAGNOSIS — F41.9 ANXIETY: ICD-10-CM

## 2024-04-11 RX ORDER — MONTELUKAST SODIUM 10 MG/1
10 TABLET ORAL NIGHTLY
Qty: 30 TABLET | Refills: 2 | OUTPATIENT
Start: 2024-04-11

## 2024-04-11 RX ORDER — LIDOCAINE HYDROCHLORIDE 20 MG/ML
1 SOLUTION ORAL; TOPICAL
Qty: 30 TABLET | Refills: 2 | OUTPATIENT
Start: 2024-04-11

## 2024-04-11 RX ORDER — DICYCLOMINE HCL 20 MG
40 TABLET ORAL EVERY EVENING
Qty: 60 TABLET | Refills: 2 | OUTPATIENT
Start: 2024-04-11

## 2024-04-11 RX ORDER — CITALOPRAM 40 MG/1
40 TABLET ORAL DAILY
Qty: 14 TABLET | Refills: 0 | Status: SHIPPED | OUTPATIENT
Start: 2024-04-11

## 2024-04-11 NOTE — TELEPHONE ENCOUNTER
Spoke to pharmacy - he asked about Topiramate.   Hatchet Flap Text: The defect edges were debeveled with a #15 scalpel blade.  Given the location of the defect, shape of the defect and the proximity to free margins a hatchet flap was deemed most appropriate.  Using a sterile surgical marker, an appropriate hatchet flap was drawn incorporating the defect and placing the expected incisions within the relaxed skin tension lines where possible.    The area thus outlined was incised deep to adipose tissue with a #15 scalpel blade.  The skin margins were undermined to an appropriate distance in all directions utilizing iris scissors.

## 2024-04-11 NOTE — TELEPHONE ENCOUNTER
[facility] calling on behalf of patient in regards to the below scripts - inquiring why they were only for 15 days instead of the full amounts.      dicyclomine 20 MG Oral Tab 30 tablet 0 4/2/2024 --   Sig:   Take 2 tablets (40 mg total) by mouth every evening.       Ferrous Sulfate (FEROSUL) 325 (65 Fe) MG Oral Tab 15 tablet 0 4/2/2024 --   Sig:   Take 1 tablet (325 mg total) by mouth daily with breakfast.       montelukast 10 MG Oral Tab 15 tablet 2 4/2/2024 --   Sig:   Take 1 tablet (10 mg total) by mouth nightly.       citalopram 40 MG Oral Tab 90 tablet 0 3/18/2024 --   Sig:   Take 1 tablet (40 mg total) by mouth daily.       Please advise. Patient has upcoming appt w/ LR on 4/22.

## 2024-04-11 NOTE — TELEPHONE ENCOUNTER
A refill request was received for:  Requested Prescriptions     Pending Prescriptions Disp Refills    CITALOPRAM 40 MG Oral Tab [Pharmacy Med Name: CITALOPRAM HYDROBROM 40MG TAB] 30 tablet 2     Sig: TAKE 1 TABLET (40 MG TOTAL) BY MOUTH DAILY.     Refused Prescriptions Disp Refills    MONTELUKAST 10 MG Oral Tab [Pharmacy Med Name: MONTELUKAST SODIUM 10MG TAB] 30 tablet 2     Sig: TAKE ONE TABLET BY MOUTH NIGHTLY     Refused By: TRACY SEVILLA     Reason for Refusal: Duplicate refill request    FEROSUL 325 (65 Fe) MG Oral Tab [Pharmacy Med Name: FEROSUL 325MG TAB] 30 tablet 2     Sig: TAKE 1 TABLET (325 MG TOTAL) BY MOUTH DAILY WITH BREAKFAST.     Refused By: TRACY SEVILLA     Reason for Refusal: Duplicate refill request    DICYCLOMINE 20 MG Oral Tab [Pharmacy Med Name: DICYCLOMINE HCL 20MG TAB] 60 tablet 2     Sig: TAKE 2 TABLETS (40 MG TOTAL) BY MOUTH EVERY EVENING.     Refused By: TRACY SEVILLA     Reason for Refusal: Duplicate refill request       Last refill date:   3/18/2024    Last office visit: 1/8/2024    Follow up due:  Future Appointments   Date Time Provider Department Center   4/22/2024  9:00 AM Jennifer Rogers PA-C [facility]

## 2024-04-16 ENCOUNTER — TELEPHONE (OUTPATIENT)
Dept: ORTHOPEDICS CLINIC | Facility: CLINIC | Age: 49
End: 2024-04-16

## 2024-04-16 DIAGNOSIS — M54.2 NECK PAIN: Primary | ICD-10-CM

## 2024-04-16 NOTE — TELEPHONE ENCOUNTER
Patient is scheduled for neck pain. Please advise if imaging is needed.      Future Appointments   Date Time Provider Department Center   4/22/2024  9:00 AM Jennifer Rogers PA-C [facility]   4/23/2024  2:40 PM Pasquale Zepeda MD [facility]

## 2024-04-16 NOTE — TELEPHONE ENCOUNTER
XR ordered per ortho protocol. XR scheduled and patient was notified via [employer] to let them know that they should arrive 15-20 minutes early, in order for them to complete imaging.

## 2024-04-22 ENCOUNTER — LAB ENCOUNTER (OUTPATIENT)
Dept: LAB | Age: 49
End: 2024-04-22
Attending: FAMILY MEDICINE
Payer: MEDICAID

## 2024-04-22 DIAGNOSIS — E11.9 TYPE 2 DIABETES MELLITUS WITHOUT COMPLICATION, WITHOUT LONG-TERM CURRENT USE OF INSULIN (HCC): ICD-10-CM

## 2024-04-22 DIAGNOSIS — E78.00 ELEVATED CHOLESTEROL: ICD-10-CM

## 2024-04-22 LAB
ALBUMIN SERPL-MCNC: 4.3 G/DL (ref 3.4–5)
ALBUMIN/GLOB SERPL: 1.3 {RATIO} (ref 1–2)
ALP LIVER SERPL-CCNC: 57 U/L
ALT SERPL-CCNC: 48 U/L
ANION GAP SERPL CALC-SCNC: 5 MMOL/L (ref 0–18)
AST SERPL-CCNC: 21 U/L (ref 15–37)
BILIRUB SERPL-MCNC: 0.4 MG/DL (ref 0.1–2)
BUN BLD-MCNC: 8 MG/DL (ref 9–23)
CALCIUM BLD-MCNC: 9.6 MG/DL (ref 8.5–10.1)
CHLORIDE SERPL-SCNC: 109 MMOL/L (ref 98–112)
CHOLEST SERPL-MCNC: 236 MG/DL (ref ?–200)
CO2 SERPL-SCNC: 27 MMOL/L (ref 21–32)
CREAT BLD-MCNC: 1.09 MG/DL
EGFRCR SERPLBLD CKD-EPI 2021: 63 ML/MIN/1.73M2 (ref 60–?)
EST. AVERAGE GLUCOSE BLD GHB EST-MCNC: 120 MG/DL (ref 68–126)
FASTING PATIENT LIPID ANSWER: YES
FASTING STATUS PATIENT QL REPORTED: YES
GLOBULIN PLAS-MCNC: 3.2 G/DL (ref 2.8–4.4)
GLUCOSE BLD-MCNC: 113 MG/DL (ref 70–99)
HBA1C MFR BLD: 5.8 % (ref ?–5.7)
HDLC SERPL-MCNC: 65 MG/DL (ref 40–59)
LDLC SERPL CALC-MCNC: 136 MG/DL (ref ?–100)
NONHDLC SERPL-MCNC: 171 MG/DL (ref ?–130)
OSMOLALITY SERPL CALC.SUM OF ELEC: 291 MOSM/KG (ref 275–295)
POTASSIUM SERPL-SCNC: 4.8 MMOL/L (ref 3.5–5.1)
PROT SERPL-MCNC: 7.5 G/DL (ref 6.4–8.2)
SODIUM SERPL-SCNC: 141 MMOL/L (ref 136–145)
TRIGL SERPL-MCNC: 199 MG/DL (ref 30–149)
VLDLC SERPL CALC-MCNC: 37 MG/DL (ref 0–30)

## 2024-04-22 PROCEDURE — 83036 HEMOGLOBIN GLYCOSYLATED A1C: CPT

## 2024-04-22 PROCEDURE — 36415 COLL VENOUS BLD VENIPUNCTURE: CPT

## 2024-04-22 PROCEDURE — 80053 COMPREHEN METABOLIC PANEL: CPT

## 2024-04-22 PROCEDURE — 80061 LIPID PANEL: CPT

## 2024-04-22 RX ORDER — LOVASTATIN 10 MG/1
10 TABLET ORAL NIGHTLY
Qty: 90 TABLET | Refills: 0 | Status: SHIPPED | OUTPATIENT
Start: 2024-04-22

## 2024-04-23 ENCOUNTER — HOSPITAL ENCOUNTER (OUTPATIENT)
Dept: GENERAL RADIOLOGY | Age: 49
Discharge: HOME | End: 2024-04-23
Attending: STUDENT IN AN ORGANIZED HEALTH CARE EDUCATION/TRAINING PROGRAM
Payer: MEDICAID

## 2024-04-23 ENCOUNTER — OFFICE VISIT (OUTPATIENT)
Dept: ORTHOPEDICS CLINIC | Facility: CLINIC | Age: 49
End: 2024-04-23
Payer: MEDICAID

## 2024-04-23 VITALS — BODY MASS INDEX: 25.99 KG/M2 | WEIGHT: 156 LBS | HEIGHT: 65 IN

## 2024-04-23 DIAGNOSIS — M54.2 NECK PAIN: ICD-10-CM

## 2024-04-23 DIAGNOSIS — R20.2 NUMBNESS AND TINGLING IN RIGHT HAND: Primary | ICD-10-CM

## 2024-04-23 DIAGNOSIS — R20.0 NUMBNESS AND TINGLING IN RIGHT HAND: Primary | ICD-10-CM

## 2024-04-23 PROCEDURE — 72050 X-RAY EXAM NECK SPINE 4/5VWS: CPT | Performed by: STUDENT IN AN ORGANIZED HEALTH CARE EDUCATION/TRAINING PROGRAM

## 2024-04-23 PROCEDURE — 99205 OFFICE O/P NEW HI 60 MIN: CPT | Performed by: STUDENT IN AN ORGANIZED HEALTH CARE EDUCATION/TRAINING PROGRAM

## 2024-04-23 NOTE — H&P
[facility] - ORTHOPEDICS  [address] [place] [address], [address], [place] [address]  [address] [place] [address]  [phone]     NEW PATIENT VISIT - HISTORY AND PHYSICAL EXAMINATION     Name: Nadya Mina   MRN: [id number]  Date: 04/23/24       CC: history of multiple cervical surgeries, right upper extremity pain    REFERRED BY: ED    HPI:   Nadya Mina is a very pleasant 48 year old female who presents today for evaluation of neck and arm pain.  Patient has complex surgical history including multiple cervical decompression and fusions, including anterior cervical discectomy and fusion C4-C6 with subsequent stand-alone fusion at C6-7, as well as posterior laminectomy and fusion.  It is difficult to discern the evolution of patient's symptoms as well as the result from each surgical intervention.  Review prior notes from neurosurgery clinic reveal radicular as well as myelopathic symptoms which seemed to have improved with surgery. Patient presents today after follow-up from the emergency room for numbness and tingling in the right hand.  Patient is also followed by pain clinic.  Patient does describe that the numbness and tingling in the right hand, particularly middle finger has been more recent.  However some of the symptoms predated her surgeries but improved.     Prior spine surgery:   -C4-5 C5-6 ACDF 12/19/18 Dr. Valencia  -Status post right shoulder surgery 2/13/18  -C3-6 laminectomy revision C5-C7 12/20/2020 Dr. Valencia C6-7 pseudoarthrosis.  -C6-7 anterior fusion extension 9/8/2021    Bowel and bladder symptoms: absent.      PMH:   Past Medical History:    Abdominal distention    Abdominal pain    Abnormal uterine bleeding    Acute bronchitis    Acute esophagitis    Acute upper respiratory infections of unspecified site    Anxiety state, unspecified    Asthma (HCC)    Attention deficit disorder without mention of hyperactivity    Back problem    cervical and lumbar     Bad breath    Started when stomach pain got worse    Bloating    Cauda equina compression (HCC)    Cauda equina syndrome without mention of neurogenic bladder    Constipation    Decorative tattoo    Depression    Diabetes (HCC)    Diet controlled; no meds    Diabetes (HCC)    Diarrhea, unspecified    Dysesthesia    Dyspareunia    Easy bruising    Endometriosis    Essential hypertension    Fatigue    Flatulence/gas pain/belching    Food intolerance    Frequent urination    Frequent UTI    Headache disorder    Heartburn    Heavy menses    Hematuria    Hiatal hernia    High cholesterol    Hyperlipidemia    Hypertension    IBS (irritable bowel syndrome)    Irregular bowel habits    Irregular menstrual cycle    Itch of skin    Occasionally on my legs, not due to dry skin    Leaking of urine    Lipid screening    Loss of appetite    Lump or mass in breast    Malaise    Malignant hyperthermia    patient's son at 5 years of age - 2006    Mastodynia    Menses painful    Migraines    Nausea    Night sweats    Obstructive sleep apnea    Other screening mammogram    Pain in joints    Pain with bowel movements    Pap smear for cervical cancer screening    wnl    Personal history of urinary (tract) infection    Pneumonia due to organism    PONV (postoperative nausea and vomiting)    Problems with swallowing    Sleep disturbance    Stress    Uncomfortable fullness after meals    Unspecified disorder of thyroid    hypothyroidism    Unspecified viral infection, in conditions classified elsewhere and of unspecified site    Visual impairment    glasses    Vomiting    Weight loss       PAST SURGICAL HX:  Past Surgical History:   Procedure Laterality Date    Appendectomy  1991    Blood patch(bedside)      Colonoscopy N/A 02/06/2015    Procedure: COLONOSCOPY;  Surgeon: Marlene Payne MD;  Location:  ENDOSCOPY    Endometrial ablation      2004    Hysterectomy  07/20/2015    Hysteroscopy,ablation endometrium      Laminectomy,cervical   07/2020    Laparoscopy,diagnostic  1991    multiple    Lysis of adhesions      Kevin localization wire 1 site right (cpt=19281)      in her 20's    Oophorectomy      Other Right 02/2018    ARTHROSCOPIC ACROMIOPLASTY LYSIS OF ADHESIONS RIGHT SHOULDER    Other  12/19/2018    ANTERIOR DISCECTOMY AND FUSION. CERVICAL 4-CERVICAL 5 AND CERVICAL 5-CERVICAL 6. ALLOGRAFT, PLATE AND SCREWS    Other  09/08/2021    ANTERIOR CERVICAL DISCECTOMY AND FUSION CERVICAL 6- CERVICAL 7 WITH INTERBODY DEVICE, REMOVAL OF SURGICAL SCREW RIGHT C6    Other surgical history  2001    esophagogastroduodenoscopy    Other surgical history      right thumb trigger finger release     Other surgical history      ganglion cyst removed left wrist     Other surgical history  10/30/2019    Cystoscopy- Dr. Sofia    Sigmoidoscopy,diagnostic      Tarsal tunnel release  2005    right foot    Tonsillectomy      Total abdom hysterectomy         FAMILY HX:  Family History   Problem Relation Age of Onset    Breast Cancer Mother 55    Diabetes Mother     Stroke Mother     Other (Other) Mother     Other (hypothyroidism) Mother     Diabetes Father     Hypertension Father     Colon Polyps Father     Other (ADD) Father     Cancer Sister         uterine    BRCA gene + Sister     Uterine Cancer Sister     Bleeding Disorders Sister     Other (thyroid cancer) Sister     Other (Other) Daughter         Mvp, eds    Migraines Son     Diabetes Son     Other (Other) Son         Multiple issues    Heart Disorder Maternal Grandmother         CHF    Stroke Maternal Grandfather     Diabetes Paternal Grandmother     Cancer Paternal Cousin Female         cervical       ALLERGIES:  Demerol, Levofloxacin, Reglan [metoclopramide], Betadine [povidone iodine], Meperidine, Other, and Quinolones    MEDICATIONS:   Current Outpatient Medications   Medication Sig Dispense Refill    Lovastatin 10 MG Oral Tab Take 1 tablet (10 mg total) by mouth nightly. 90 tablet 0    citalopram 40 MG Oral Tab  Take 1 tablet (40 mg total) by mouth daily. 14 tablet 0    dicyclomine 20 MG Oral Tab Take 2 tablets (40 mg total) by mouth every evening. 30 tablet 0    Ferrous Sulfate (FEROSUL) 325 (65 Fe) MG Oral Tab Take 1 tablet (325 mg total) by mouth daily with breakfast. 15 tablet 0    montelukast 10 MG Oral Tab Take 1 tablet (10 mg total) by mouth nightly. 15 tablet 2    methylPREDNISolone (MEDROL) 4 MG Oral Tablet Therapy Pack Dosepack: take as directed 1 each 0    albuterol 108 (90 Base) MCG/ACT Inhalation Aero Soln Inhale 1-2 puffs into the lungs every 4 to 6 hours as needed. 51 g 4    sodium chloride 0.9 % Inhalation Nebu Soln Take 3 mL by nebulization as needed for Wheezing. 30 each 0    Esomeprazole Magnesium (NEXIUM OR) Take by mouth.      ondansetron 4 MG Oral Tablet Dispersible Take 1 tablet (4 mg total) by mouth every 8 (eight) hours as needed for Nausea. 20 tablet 0    sucralfate 1 GM/10ML Oral Suspension Take 10 mL (1 g total) by mouth 4 (four) times daily. 1200 mL 0    ALPRAZolam 0.25 MG Oral Tab Take 1 tablet (0.25 mg total) by mouth nightly as needed. 30 tablet 0    Teriparatide, Recombinant, 620 MCG/2.48ML Subcutaneous Solution Pen-injector       Insulin Pen Needle (BD PEN NEEDLE YANDY U/F) 32G X 4 MM Does not apply Misc Inject 1 each into the skin daily. 100 each 3    Multiple Vitamin Oral Tab Take 1 tablet by mouth daily.      Cyanocobalamin (VITAMIN B12 OR) Take 1 tablet by mouth daily.        Calcium Carbonate-Vitamin D (CALCIUM 600 + D OR) Take 1 tablet by mouth 2 (two) times daily.           ROS: A comprehensive 14 point review of systems was performed and was negative aside from the aforementioned per history of present illness.    SOCIAL HX:  Social History     Tobacco Use    Smoking status: Former     Current packs/day: 0.00     Types: Cigarettes     Quit date: 1989     Years since quittin.2    Smokeless tobacco: Never   Substance Use Topics    Alcohol use: Yes     Comment: occasionally          PE:   Vitals:    04/23/24 1433   Weight: 156 lb (70.8 kg)   Height: 5' 5\" (1.651 m)     Estimated body mass index is 25.96 kg/m² as calculated from the following:    Height as of this encounter: 5' 5\" (1.651 m).    Weight as of this encounter: 156 lb (70.8 kg).    Physical Exam  Constitutional:       Appearance: Normal appearance.   HENT:      Head: Normocephalic and atraumatic.   Eyes:      Extraocular Movements: Extraocular movements intact.   Cardiovascular:      Pulses: Normal pulses. Skin warm and well perfused.  Pulmonary:      Effort: Pulmonary effort is normal. No respiratory distress.   Skin:     General: Skin is warm.   Psychiatric:         Mood and Affect: Mood normal.     Spine Exam:    Normal gait without difficulty  Able to heel, toe, tandem gait without difficulty  Level shoulders and hips in even stance    Limited C-spine ROM    No tenderness to palpation of C-spine    Spluring: negative    Chin's: negative  IRR: negative  Sustained clonus: negative     and release: normal  Rhomberg: normal    UE Strength: 5/5 D Bi Tri WE FDS IO  UE Sensation: normal in C5-T1 distribution  UE reflexes: normal    Radiographic Examination/Diagnostics:  XR and CT personally viewed, independently interpreted and radiology report was reviewed.  X-ray of the cervical spine demonstrates status post C4-C7 anterior fusion as well as see 5 to C7 posterior fusion with laminectomy extending to C3.  CT scan of the cervical spine demonstrates post fusion changes without significant stenosis  MRI of the cervical spine from 2022 did not reveal significant stenosis    IMPRESSION: Nadya Mina is a 48 year old female with complicated surgical history including multiple cervical fusion and decompression surgeries presenting with right arm numbness    PLAN:   - [facility] to evaluate for her neurologic complaints  - Continue follow up with pain clinic    I spent 60 minutes in preparation to see the patient,  counseling/education of relevant pathology, discussing imaging results, ordering medication/therapy intervention, and care coordination.        Pasquale Zeepda MD  Orthopedic Spine Surgeon  [facility]   [address] [place] [address], [address]  [email or web address]  t: [phone]   f: [phone]        This note was dictated using Dragon software.  While it was briefly proofread prior to completion, some grammatical, spelling, and word choice errors due to dictation may still occur.

## 2024-04-25 ENCOUNTER — OFFICE VISIT (OUTPATIENT)
Dept: FAMILY MEDICINE CLINIC | Facility: CLINIC | Age: 49
End: 2024-04-25
Payer: MEDICAID

## 2024-04-25 VITALS
DIASTOLIC BLOOD PRESSURE: 78 MMHG | WEIGHT: 163 LBS | SYSTOLIC BLOOD PRESSURE: 114 MMHG | HEIGHT: 65 IN | OXYGEN SATURATION: 99 % | HEART RATE: 102 BPM | BODY MASS INDEX: 27.16 KG/M2

## 2024-04-25 DIAGNOSIS — E04.1 THYROID NODULE: ICD-10-CM

## 2024-04-25 DIAGNOSIS — Z87.39 HISTORY OF OSTEOPENIA: ICD-10-CM

## 2024-04-25 DIAGNOSIS — E78.2 MIXED HYPERLIPIDEMIA: ICD-10-CM

## 2024-04-25 DIAGNOSIS — E55.9 VITAMIN D DEFICIENCY: ICD-10-CM

## 2024-04-25 DIAGNOSIS — E11.9 TYPE 2 DIABETES MELLITUS WITHOUT COMPLICATION, WITHOUT LONG-TERM CURRENT USE OF INSULIN (HCC): ICD-10-CM

## 2024-04-25 DIAGNOSIS — Z00.00 ROUTINE GENERAL MEDICAL EXAMINATION AT A HEALTH CARE FACILITY: Primary | ICD-10-CM

## 2024-04-25 DIAGNOSIS — F41.9 ANXIETY: ICD-10-CM

## 2024-04-25 DIAGNOSIS — E53.8 VITAMIN B12 DEFICIENCY: ICD-10-CM

## 2024-04-25 DIAGNOSIS — J45.20 MILD INTERMITTENT ASTHMA WITHOUT COMPLICATION (HCC): ICD-10-CM

## 2024-04-25 DIAGNOSIS — Z86.39 HISTORY OF HYPOTHYROIDISM: ICD-10-CM

## 2024-04-25 DIAGNOSIS — D50.9 IRON DEFICIENCY ANEMIA, UNSPECIFIED IRON DEFICIENCY ANEMIA TYPE: ICD-10-CM

## 2024-04-25 DIAGNOSIS — F32.0 CURRENT MILD EPISODE OF MAJOR DEPRESSIVE DISORDER, UNSPECIFIED WHETHER RECURRENT (HCC): ICD-10-CM

## 2024-04-25 DIAGNOSIS — F90.8 ATTENTION DEFICIT HYPERACTIVITY DISORDER (ADHD), OTHER TYPE: ICD-10-CM

## 2024-04-25 DIAGNOSIS — G57.61 MORTON NEUROMA, RIGHT: ICD-10-CM

## 2024-04-25 PROBLEM — R09.89 LABILE BLOOD PRESSURE: Status: RESOLVED | Noted: 2018-01-15 | Resolved: 2024-04-25

## 2024-04-25 PROBLEM — Z47.89 ORTHOPEDIC AFTERCARE: Status: RESOLVED | Noted: 2018-02-14 | Resolved: 2024-04-25

## 2024-04-25 PROBLEM — S16.1XXA CERVICAL STRAIN: Status: RESOLVED | Noted: 2020-11-20 | Resolved: 2024-04-25

## 2024-04-25 PROBLEM — K58.8 OTHER IRRITABLE BOWEL SYNDROME: Status: RESOLVED | Noted: 2018-08-20 | Resolved: 2024-04-25

## 2024-04-25 PROBLEM — R10.11 RUQ ABDOMINAL PAIN: Status: RESOLVED | Noted: 2017-05-22 | Resolved: 2024-04-25

## 2024-04-25 PROBLEM — E78.00 ELEVATED CHOLESTEROL: Status: RESOLVED | Noted: 2018-01-15 | Resolved: 2024-04-25

## 2024-04-25 PROBLEM — M75.01 ADHESIVE CAPSULITIS OF RIGHT SHOULDER: Status: RESOLVED | Noted: 2017-07-13 | Resolved: 2024-04-25

## 2024-04-25 PROCEDURE — 99396 PREV VISIT EST AGE 40-64: CPT | Performed by: PHYSICIAN ASSISTANT

## 2024-04-25 RX ORDER — METHYLPHENIDATE HYDROCHLORIDE 20 MG/1
20 TABLET ORAL 2 TIMES DAILY
Qty: 60 TABLET | Refills: 0 | Status: SHIPPED | OUTPATIENT
Start: 2024-04-25 | End: 2024-05-25

## 2024-04-25 RX ORDER — FERROUS SULFATE 325(65) MG
1 TABLET ORAL
Qty: 90 TABLET | Refills: 3 | Status: SHIPPED | OUTPATIENT
Start: 2024-04-25

## 2024-04-25 RX ORDER — ALPRAZOLAM 0.25 MG/1
0.25 TABLET ORAL NIGHTLY PRN
Qty: 30 TABLET | Refills: 5 | Status: SHIPPED | OUTPATIENT
Start: 2024-04-25

## 2024-04-25 RX ORDER — METHYLPHENIDATE HYDROCHLORIDE 20 MG/1
20 TABLET ORAL 2 TIMES DAILY
Qty: 60 TABLET | Refills: 0 | Status: SHIPPED | OUTPATIENT
Start: 2024-05-26 | End: 2024-06-25

## 2024-04-25 RX ORDER — CITALOPRAM 40 MG/1
40 TABLET ORAL DAILY
Qty: 90 TABLET | Refills: 1 | Status: SHIPPED | OUTPATIENT
Start: 2024-04-25

## 2024-04-25 RX ORDER — METHYLPHENIDATE HYDROCHLORIDE 20 MG/1
20 TABLET ORAL 2 TIMES DAILY
Qty: 60 TABLET | Refills: 0 | Status: SHIPPED | OUTPATIENT
Start: 2024-06-26 | End: 2024-07-26

## 2024-04-25 RX ORDER — MONTELUKAST SODIUM 10 MG/1
10 TABLET ORAL NIGHTLY
Qty: 90 TABLET | Refills: 1 | Status: SHIPPED | OUTPATIENT
Start: 2024-04-25

## 2024-04-25 RX ORDER — TIRZEPATIDE 2.5 MG/.5ML
2.5 INJECTION, SOLUTION SUBCUTANEOUS WEEKLY
Qty: 2 ML | Refills: 0 | Status: SHIPPED | OUTPATIENT
Start: 2024-04-25 | End: 2024-05-17

## 2024-04-25 NOTE — PROGRESS NOTES
Subjective:   Patient ID: Nadya Mina is a 48 year old female.    HPI  Patient presents today requesting physical exam.      Diet: well balanced  Exercise: staying active  Tobacco use: denies  Drug use: denies  Alcohol use: occasionally  Sexually active: with spouse  LMP: MARIAM  Marital status: , 3 children    Health maintenance:  Pap/Hpv: MARIAM  Mammogram: 3/30/24 negative  Performs SBEs: yes  Dexa scan: 12/9/22 normal  Colonoscopy: 2/6/15 normal, recall 10 years  Discussed age appropriate vaccines    H/o asthma  Controlled  Only uses albuterol prn, very rarely    H/o anxiety/depression  Well controlled on citalopram 40 mg daily  Would like to continue same dose  No SI/HI  No recent panic attacks  Uses xanax prn nightly to help with sleep    H/o ADHD  On ritalin 20 mg BID  Tolerating well  Notes improved focus and attention  Would like to continue same dose    H/o osteopenia  Was on injections for a while  Has been off for some time now  Due for repeat dexa   Used to see endocrinology    H/o mere neuroma of right foot  Symptoms wax and wane  She has had injections in the past  Would like referral to a new podiatrist as her previous one is no longer in network    H/o DM2  Recent A1c is okay  She continues to gain weight  The plan with previous PCP was to start on GLP-1 which she would like to do  She has tried various oral medications in the past and cannot tolerate due to side effects  No personal or family h/o thyroid cancer  Checks feet regularly, no neuropathy  No urinary symptoms  Due for CYRUS        History/Other:   Review of Systems   Constitutional:  Negative for chills, fatigue and fever.   HENT:  Negative for congestion, ear pain, rhinorrhea and sore throat.    Eyes:  Negative for visual disturbance.   Respiratory:  Negative for cough, shortness of breath and wheezing.    Cardiovascular:  Negative for chest pain, palpitations and leg swelling.   Gastrointestinal:  Negative for abdominal pain,  diarrhea, nausea and vomiting.   Genitourinary:  Negative for dysuria, frequency and hematuria.   Musculoskeletal:  Negative for arthralgias, gait problem and myalgias.   Skin:  Negative for rash.   Neurological:  Negative for weakness, light-headedness and headaches.   Hematological:  Negative for adenopathy.   Psychiatric/Behavioral:  Negative for dysphoric mood. The patient is not nervous/anxious.      Current Outpatient Medications   Medication Sig Dispense Refill    Lovastatin 10 MG Oral Tab Take 1 tablet (10 mg total) by mouth nightly. 90 tablet 0    citalopram 40 MG Oral Tab Take 1 tablet (40 mg total) by mouth daily. 14 tablet 0    dicyclomine 20 MG Oral Tab Take 2 tablets (40 mg total) by mouth every evening. 30 tablet 0    Ferrous Sulfate (FEROSUL) 325 (65 Fe) MG Oral Tab Take 1 tablet (325 mg total) by mouth daily with breakfast. 15 tablet 0    montelukast 10 MG Oral Tab Take 1 tablet (10 mg total) by mouth nightly. 15 tablet 2    albuterol 108 (90 Base) MCG/ACT Inhalation Aero Soln Inhale 1-2 puffs into the lungs every 4 to 6 hours as needed. 51 g 4    sodium chloride 0.9 % Inhalation Nebu Soln Take 3 mL by nebulization as needed for Wheezing. 30 each 0    Esomeprazole Magnesium (NEXIUM OR) Take by mouth.      ondansetron 4 MG Oral Tablet Dispersible Take 1 tablet (4 mg total) by mouth every 8 (eight) hours as needed for Nausea. 20 tablet 0    sucralfate 1 GM/10ML Oral Suspension Take 10 mL (1 g total) by mouth 4 (four) times daily. 1200 mL 0    ALPRAZolam 0.25 MG Oral Tab Take 1 tablet (0.25 mg total) by mouth nightly as needed. 30 tablet 0    Insulin Pen Needle (BD PEN NEEDLE YANDY U/F) 32G X 4 MM Does not apply Misc Inject 1 each into the skin daily. 100 each 3    Multiple Vitamin Oral Tab Take 1 tablet by mouth daily.      Cyanocobalamin (VITAMIN B12 OR) Take 1 tablet by mouth daily.        Calcium Carbonate-Vitamin D (CALCIUM 600 + D OR) Take 1 tablet by mouth 2 (two) times daily.          Allergies:  Allergies   Allergen Reactions    Demerol OTHER (SEE COMMENTS)     Difficulty breathing    Levofloxacin RASH and Tightness in Chest    Reglan [Metoclopramide] OTHER (SEE COMMENTS)     Tardive dyskinesia    Betadine [Povidone Iodine] ITCHING    Meperidine NAUSEA AND VOMITING    Other OTHER (SEE COMMENTS)     Sensitive skin, milk,dust mites    Quinolones UNKNOWN       Objective:   Physical Exam  Vitals and nursing note reviewed.   Constitutional:       General: She is not in acute distress.     Appearance: Normal appearance. She is well-developed.   HENT:      Head: Normocephalic and atraumatic.      Right Ear: Tympanic membrane, ear canal and external ear normal.      Left Ear: Tympanic membrane, ear canal and external ear normal.      Nose: Nose normal.      Mouth/Throat:      Mouth: Mucous membranes are moist.   Eyes:      Extraocular Movements: Extraocular movements intact.      Conjunctiva/sclera: Conjunctivae normal.      Pupils: Pupils are equal, round, and reactive to light.   Neck:      Thyroid: No thyromegaly.   Cardiovascular:      Rate and Rhythm: Normal rate and regular rhythm.      Pulses: Normal pulses.      Heart sounds: Normal heart sounds.   Pulmonary:      Effort: Pulmonary effort is normal.      Breath sounds: Normal breath sounds. No wheezing or rales.   Abdominal:      General: Bowel sounds are normal. There is no distension.      Palpations: Abdomen is soft. There is no mass.      Tenderness: There is no abdominal tenderness.   Musculoskeletal:         General: No tenderness. Normal range of motion.      Cervical back: Normal range of motion and neck supple.   Lymphadenopathy:      Cervical: No cervical adenopathy.   Skin:     General: Skin is warm and dry.      Findings: No rash.   Neurological:      General: No focal deficit present.      Mental Status: She is alert and oriented to person, place, and time.   Psychiatric:         Mood and Affect: Mood normal.         Behavior:  Behavior normal.         Assessment & Plan:   1. Routine general medical examination at a health care facility  Patient  here with active issues below. Health maintenance issues discussed. Encouraged routine vaccines.  Advised healthy diet and regular exercise.  Annual physicals.    2. Mild intermittent asthma without complication (HCC)  Controlled. Cpm.   - montelukast 10 MG Oral Tab; Take 1 tablet (10 mg total) by mouth nightly.  Dispense: 90 tablet; Refill: 1    3. Anxiety  Stable overall. Cpm. Refills given. Follow up in 6 months. Sooner prn.   - ALPRAZolam 0.25 MG Oral Tab; Take 1 tablet (0.25 mg total) by mouth nightly as needed for Sleep or Anxiety.  Dispense: 30 tablet; Refill: 5  - citalopram 40 MG Oral Tab; Take 1 tablet (40 mg total) by mouth daily.  Dispense: 90 tablet; Refill: 1    4. Current mild episode of major depressive disorder, unspecified whether recurrent (HCC)  Stable overall. Cpm. Refills given. Follow up in 6 months. Sooner prn.  - citalopram 40 MG Oral Tab; Take 1 tablet (40 mg total) by mouth daily.  Dispense: 90 tablet; Refill: 1    5. Attention deficit hyperactivity disorder (ADHD), other type  Stable overall. Cpm. Refills given. Follow up in 6 months. Sooner prn.  - methylphenidate (METHYLIN) 20 MG Oral Tab; Take 1 tablet (20 mg total) by mouth 2 (two) times daily.  Dispense: 60 tablet; Refill: 0  - methylphenidate (METHYLIN) 20 MG Oral Tab; Take 1 tablet (20 mg total) by mouth 2 (two) times daily.  Dispense: 60 tablet; Refill: 0  - methylphenidate (METHYLIN) 20 MG Oral Tab; Take 1 tablet (20 mg total) by mouth 2 (two) times daily.  Dispense: 60 tablet; Refill: 0    6. History of osteopenia  Repeat bone density scan and treat accordingly. Refer to endo if needed.   - XR DEXA BONE DENSITOMETRY (CPT=77080); Future    7. Johns neuroma, right  Symptoms wax and wane. Referral back to podiatry for ongoing management.   - Podiatry Referral - In Network    8. Type 2 diabetes mellitus without  complication, without long-term current use of insulin (HCC)  Recent A1c 5.9. she wants to start mounjaro to help with ongoing weight gain and keep BG controlled. Medication use and side effects discussed. Continue working on diet and exercise. Get a diabetic eye exam. Check feet regularly. Follow up in 4 weeks to reassess response.   - Tirzepatide (MOUNJARO) 2.5 MG/0.5ML Subcutaneous Solution Pen-injector; Inject 2.5 mg into the skin once a week for 4 doses.  Dispense: 2 mL; Refill: 0    9. Iron deficiency anemia, unspecified iron deficiency anemia type  Continue supplementation. Check labs next visit.   - Ferrous Sulfate (FEROSUL) 325 (65 Fe) MG Oral Tab; Take 1 tablet (325 mg total) by mouth daily with breakfast.  Dispense: 90 tablet; Refill: 3    10. Mixed hyperlipidemia  Recent lipids look unchanged. Her 10 year risk is 1.7%. recommend CT calcium heart scan. Consider increasing lovastatin up to 20 mg based on results.     11. Vitamin D deficiency  Continue supplements for now. Recheck next visit.    12. Vitamin B12 deficiency  Continue supplements for now. Recheck next visit.    13. Thyroid nodule  Ultrasound stable as of 9/2023. Repeat 9/2024.    14. History of hypothyroidism  Recheck TSH next visit.

## 2024-04-30 ENCOUNTER — TELEPHONE (OUTPATIENT)
Dept: FAMILY MEDICINE CLINIC | Facility: CLINIC | Age: 49
End: 2024-04-30

## 2024-05-20 ENCOUNTER — OFFICE VISIT (OUTPATIENT)
Dept: PAIN CLINIC | Facility: CLINIC | Age: 49
End: 2024-05-20

## 2024-05-20 ENCOUNTER — TELEPHONE (OUTPATIENT)
Dept: PAIN CLINIC | Facility: CLINIC | Age: 49
End: 2024-05-20

## 2024-05-20 VITALS — OXYGEN SATURATION: 98 % | HEART RATE: 105 BPM | SYSTOLIC BLOOD PRESSURE: 122 MMHG | DIASTOLIC BLOOD PRESSURE: 78 MMHG

## 2024-05-20 DIAGNOSIS — M79.18 CERVICAL MYOFASCIAL PAIN SYNDROME: Primary | ICD-10-CM

## 2024-05-20 PROCEDURE — 99214 OFFICE O/P EST MOD 30 MIN: CPT | Performed by: PHYSICIAN ASSISTANT

## 2024-05-20 NOTE — PROGRESS NOTES
HPI:   Nadya Mina presents with complaints of Neck pain with B trapezius/rhomboid pain and n/t to Ues.      The pain is described as moderate aching that is intermittent.  The patient’s activity level has increased since last visit.  The pain is worst unrelated to time of day.    Changes in condition/history since last visit: Patient is here today for follow up, having been seen last on 12/22/23 for repeat TPI.  Procedure was well tolerated, and had no adverse effects.  Overall, reports good relief, and until end of 302024, was fine.  Prior to this, had good relief with hardware injection on 3/21/23.        States that she began to develop return of B UE tingling, having had similar issues prior to her cervical fusion.  Seen in ER, and sent for CT, and discharged to spine surgery.  Sent for EMG, and told to follow here for additional injections.      Last procedure: Bilateral TPI #2  Date: 12/22/23 (4/24/2023)    Percent relief: 100%    Duration: 3.5 months    Previous procedure: Bilateral cervical hardware injection date: 3/21/2023    Percent relief: 80%    Duration: 60% sustained    The following activities will increase the patient’s pain: prolonged activity     The following activities decrease the patient’s pain: limiting activity level    Functional Assessment: Patient reports that they are able to complete all of their ADL's such as eating, bathing, using the toilet, dressing and getting up from a bed or a chair independently.    Current Medications:  Current Outpatient Medications   Medication Sig Dispense Refill    ALPRAZolam 0.25 MG Oral Tab Take 1 tablet (0.25 mg total) by mouth nightly as needed for Sleep or Anxiety. 30 tablet 5    montelukast 10 MG Oral Tab Take 1 tablet (10 mg total) by mouth nightly. 90 tablet 1    citalopram 40 MG Oral Tab Take 1 tablet (40 mg total) by mouth daily. 90 tablet 1    methylphenidate (METHYLIN) 20 MG Oral Tab Take 1 tablet (20 mg total) by mouth 2 (two) times  daily. 60 tablet 0    [START ON 5/26/2024] methylphenidate (METHYLIN) 20 MG Oral Tab Take 1 tablet (20 mg total) by mouth 2 (two) times daily. 60 tablet 0    [START ON 6/26/2024] methylphenidate (METHYLIN) 20 MG Oral Tab Take 1 tablet (20 mg total) by mouth 2 (two) times daily. 60 tablet 0    Ferrous Sulfate (FEROSUL) 325 (65 Fe) MG Oral Tab Take 1 tablet (325 mg total) by mouth daily with breakfast. 90 tablet 3    Lovastatin 10 MG Oral Tab Take 1 tablet (10 mg total) by mouth nightly. 90 tablet 0    dicyclomine 20 MG Oral Tab Take 2 tablets (40 mg total) by mouth every evening. 30 tablet 0    albuterol 108 (90 Base) MCG/ACT Inhalation Aero Soln Inhale 1-2 puffs into the lungs every 4 to 6 hours as needed. 51 g 4    sodium chloride 0.9 % Inhalation Nebu Soln Take 3 mL by nebulization as needed for Wheezing. 30 each 0    Esomeprazole Magnesium (NEXIUM OR) Take by mouth.      ondansetron 4 MG Oral Tablet Dispersible Take 1 tablet (4 mg total) by mouth every 8 (eight) hours as needed for Nausea. 20 tablet 0    sucralfate 1 GM/10ML Oral Suspension Take 10 mL (1 g total) by mouth 4 (four) times daily. 1200 mL 0    Insulin Pen Needle (BD PEN NEEDLE YANDY U/F) 32G X 4 MM Does not apply Misc Inject 1 each into the skin daily. 100 each 3    Multiple Vitamin Oral Tab Take 1 tablet by mouth daily.      Cyanocobalamin (VITAMIN B12 OR) Take 1 tablet by mouth daily.        Calcium Carbonate-Vitamin D (CALCIUM 600 + D OR) Take 1 tablet by mouth 2 (two) times daily.          Side effects of medications: None    Relief obtained from medication management: n/a    Patient requires assistance with: No assistance required    Reviewed Patient History Dated: 12/22/23 no changes noted  Patient is currently on pain medications:  Viry  [place] Prescription Monitoring review: N/A    Physical Exam:   Vitals: /78 (BP Location: Right arm, Patient Position: Sitting, Cuff Size: adult)   Pulse 105   LMP 01/01/2004   SpO2 98%   VAS Pain  Score:  7/10    General Appearance: Well developed, well nourished, normal build, independent body habitus, no apparent physical disabilities, well groomed    Neurological Exam: WNL-Orientation to time, place and person, normal mood & effect, normal concentration & attention span  Inspection: non-antalgic, no acute distress   Pain with ROM C spine in all planes of motion  Pain to palpation superior R thoracic paraspinal musculature and B traps  No focal sensory/motor deficits  Negative spurling  Radiology/Lab Test Reviewed: XR:    MRI C spine:  CONCLUSION:         1. Stable postoperative changes in the cervical spine as above spanning C3-C7.       2. Stable minimal degenerative changes in the cervical spine as above.  There is no significant spinal canal or neural foraminal stenosis at any level.       3. No focal spinal cord signal abnormality identified    CT 3/30/24:    C2-C3:  No significant disc/facet abnormality, spinal stenosis, or foraminal stenosis.   C3-C4:  Laminectomy change.  No spinal stenosis or foraminal stenosis.   C4-C5:  Laminectomy and fusion change.  No spinal stenosis or foraminal stenosis.   C5-C6:  Laminectomy and fusion change.  No spinal stenosis or foraminal stenosis.   C6-C7:  Laminectomy and fusion change.  No spinal stenosis or foraminal stenosis.   C7-T1:  No significant disc/facet abnormality, spinal stenosis, or foraminal stenosis.     Did the pt have a positive pain response from the most recent TPI?: yes  Percentage of relief obtained: 100%  Duration of pain relief: >3 months, though still with some moderate relief, though pain is returning  Does the pt have recurring myofacial pain that is causing functional limitation?: yes     Patient educated and verbalized understanding.  Medical Decision Making:   Diagnosis:    Encounter Diagnosis   Name Primary?    Cervical myofascial pain syndrome Yes       Impression: Elimination of pain after bilateral trap and cervical paraspinal trigger  point injections on 4/24/2023.  For over 5 months, was very pleased with her response, though pain began to return, and underwent repeat procedure on 12/22/23, again, to elimination of pain.    She began to develop n/t to B UE toward end of 3/2024, and was seen in ER.  She was sent for CT, and discharged to spine surgery.  Sent for repeat injections for her returning pain, and EMG for her UE n/t.  On exam, has pain to palpation B cervical paraspinals, traps, rhomboids with identifiable trigger points.  Will proceed with repeat TPI, while she goes for EMG.    Plan: Patient to schedule the following injection: B trapezius, cervical paraspinal, and rhomboid TPI Levels: as stated, Procedure and risks were discussed with pt. including headache, bleeding, infection and potential nerve damage.  F/u 2-3 weeks.    No orders of the defined types were placed in this encounter.      Meds & Refills for this Visit:  Requested Prescriptions      No prescriptions requested or ordered in this encounter       Imaging & Consults:  None    The patient indicates understanding of these issues and agrees to the plan.    JOSH Padilla

## 2024-05-20 NOTE — TELEPHONE ENCOUNTER
Prior authorization request completed for: bilateral cervical trap TPI  Authorization #no auth needed   Pre-D: no  Exclusions/Restrictions: no  Covered Benefit: yes   Authorization dates: n/a  CPT codes approved: 79116  Number of visits/dates of service approved: 1  Physician: tae  Location: office  Call Ref#: [id number]  Representative Name: alta castro   Insurance Carrier: [phone]    Patient can be scheduled. Routed to Navigator.

## 2024-05-20 NOTE — TELEPHONE ENCOUNTER
Contacted patient advised that insurance did not require pre certification,based on medical necessity. Patient VU and agreed to schedule.     Scheduled patient for In Office -bilateral cervical, trap, rhomboid TPI on 05/22/24 @ 11:15 AM

## 2024-05-20 NOTE — PROGRESS NOTES
Patient presents in office today with reported pain in bilateral arms, pain and numbness, weakness in hands    Patient has CT scan and xray in system, EMG was ordered but patient unable to get in until November    Current pain level reported = 7/10    Last reported dose of n/a      Narcotic Contract renewal n/a    Urine Drug screen n/a

## 2024-05-20 NOTE — TELEPHONE ENCOUNTER
Letter recd in mail from [facility] letting us know that they have received appeal on 5/16/2024 for Mounjaro.  They will review and send a written decision to patient on or before 5/31/2024.

## 2024-05-20 NOTE — PATIENT INSTRUCTIONS
Refill policies:    Allow 2-3 business days for refills; controlled substances may take longer.  Contact your pharmacy at least 5 days prior to running out of medication and have them send an electronic request or submit request through the “request refill” option in your [employer] account.  Refills are not addressed on weekends; covering physicians do not authorize routine medications on weekends.  No narcotics or controlled substances are refilled after noon on Fridays or by on call physicians.  By law, narcotics must be electronically prescribed.  A 30 day supply with no refills is the maximum allowed.  If your prescription is due for a refill, you may be due for a follow up appointment.  To best provide you care, patients receiving routine medications need to be seen at least once a year.  Patients receiving narcotic/controlled substance medications need to be seen at least once every 3 months.  In the event that your preferred pharmacy does not have the requested medication in stock (e.g. Backordered), it is your responsibility to find another pharmacy that has the requested medication available.  We will gladly send a new prescription to that pharmacy at your request.    Scheduling Tests:    If your physician has ordered radiology tests such as MRI or CT scans, please contact Central Scheduling at [phone] right away to schedule the test.  Once scheduled, the [facility] Centralized Referral Team will work with your insurance carrier to obtain pre-certification or prior authorization.  Depending on your insurance carrier, approval may take 3-10 days.  It is highly recommended patients assure they have received an authorization before having a test performed.  If test is done without insurance authorization, patient may be responsible for the entire amount billed.      Precertification and Prior Authorizations:  If your physician has recommended that you have a procedure or additional testing performed the [facility]  Centralized Referral Team will contact your insurance carrier to obtain pre-certification or prior authorization.    You are strongly encouraged to contact your insurance carrier to verify that your procedure/test has been approved and is a COVERED benefit.  Although the [facility] Centralized Referral Team does its due diligence, the insurance carrier gives the disclaimer that \"Although the procedure is authorized, this does not guarantee payment.\"    Ultimately the patient is responsible for payment.   Thank you for your understanding in this matter.  Paperwork Completion:  If you require FMLA or disability paperwork for your recovery, please make sure to either drop it off or have it faxed to our office at [phone]. Be sure the form has your name and date of birth on it.  The form will be faxed to our Forms Department and they will complete it for you.  There is a 25$ fee for all forms that need to be filled out.  Please be aware there is a 10-14 day turnaround time.  You will need to sign a release of information (GURWINDER) form if your paperwork does not come with one.  You may call the Forms Department with any questions at [phone].  Their fax number is [phone].

## 2024-05-22 ENCOUNTER — OFFICE VISIT (OUTPATIENT)
Dept: PAIN CLINIC | Facility: CLINIC | Age: 49
End: 2024-05-22

## 2024-05-22 ENCOUNTER — NURSE ONLY (OUTPATIENT)
Dept: PAIN CLINIC | Facility: CLINIC | Age: 49
End: 2024-05-22

## 2024-05-22 VITALS — DIASTOLIC BLOOD PRESSURE: 80 MMHG | HEART RATE: 100 BPM | OXYGEN SATURATION: 98 % | SYSTOLIC BLOOD PRESSURE: 118 MMHG

## 2024-05-22 DIAGNOSIS — M79.18 CERVICAL MYOFASCIAL PAIN SYNDROME: Primary | ICD-10-CM

## 2024-05-22 DIAGNOSIS — M25.511 CHRONIC RIGHT SHOULDER PAIN: ICD-10-CM

## 2024-05-22 DIAGNOSIS — Z98.1 HX OF FUSION OF CERVICAL SPINE: ICD-10-CM

## 2024-05-22 DIAGNOSIS — G89.29 CHRONIC RIGHT SHOULDER PAIN: ICD-10-CM

## 2024-05-22 PROCEDURE — 20553 NJX 1/MLT TRIGGER POINTS 3/>: CPT | Performed by: ANESTHESIOLOGY

## 2024-05-22 RX ORDER — BUPIVACAINE HYDROCHLORIDE 5 MG/ML
18 INJECTION, SOLUTION EPIDURAL; INTRACAUDAL ONCE
Status: COMPLETED | OUTPATIENT
Start: 2024-05-22 | End: 2024-05-22

## 2024-05-22 RX ORDER — TRIAMCINOLONE ACETONIDE 40 MG/ML
80 INJECTION, SUSPENSION INTRA-ARTICULAR; INTRAMUSCULAR ONCE
Status: COMPLETED | OUTPATIENT
Start: 2024-05-22 | End: 2024-05-22

## 2024-05-22 NOTE — PROGRESS NOTES
Timeout completed prior to procedure @ 1847  Participants present for timeout:  Dr. Mckinley, RN Layne , and patient.

## 2024-05-22 NOTE — PROGRESS NOTES
Patient presents in office today with reported pain in Neck,shoulder    Current pain level reported = 7/10    Last reported dose of advil yesterday am

## 2024-05-22 NOTE — PROCEDURES
Date of procedure: May 22, 2024      Preop diagnosis: Cervical myofascial pain     Postop diagnosis: Same      procedure: Bilateral cervical TPI     Surgeon: Rock Mckinley     Anesthesia: None        Indication: Patient is a 48-year-old female with a history of neck pain     Procedure detail: Informed consent obtained.  Timeout was done.  Skin overlying the cervical spine was prepped in usual sterile fashion.  Subsequently, a 27-gauge needle was used to deliver a total mixture of 80 mg of triamcinolone with 18 cc of 0.5% bupivacaine to multiple trigger points.  Muscles injected include the levator scapulae, trapezius, and rhomboid.  Patient tolerated procedure well.  Excellent hemostasis.  No objective subjective weakness.    Rock Mckinley MD

## 2024-05-23 ENCOUNTER — TELEPHONE (OUTPATIENT)
Dept: FAMILY MEDICINE CLINIC | Facility: CLINIC | Age: 49
End: 2024-05-23

## 2024-05-23 DIAGNOSIS — F90.8 ATTENTION DEFICIT HYPERACTIVITY DISORDER (ADHD), OTHER TYPE: ICD-10-CM

## 2024-05-23 RX ORDER — METHYLPHENIDATE HYDROCHLORIDE 20 MG/1
20 TABLET ORAL 2 TIMES DAILY
Qty: 60 TABLET | Refills: 0 | Status: SHIPPED | OUTPATIENT
Start: 2024-05-23 | End: 2024-06-22

## 2024-05-23 NOTE — TELEPHONE ENCOUNTER
RE: methylphenidate (METHYLIN) 20 MG - fill date is 5/25.  Pharmacy asking for early refill, today, as they do not fill or deliver on wknds.

## 2024-06-10 DIAGNOSIS — R73.9 HYPERGLYCEMIA: ICD-10-CM

## 2024-06-11 RX ORDER — BLOOD SUGAR DIAGNOSTIC
STRIP MISCELLANEOUS
Qty: 100 STRIP | Refills: 1 | Status: SHIPPED | OUTPATIENT
Start: 2024-06-11

## 2024-06-15 DIAGNOSIS — F41.9 ANXIETY: ICD-10-CM

## 2024-06-17 RX ORDER — ALPRAZOLAM 0.25 MG/1
0.25 TABLET ORAL NIGHTLY PRN
Qty: 30 TABLET | Refills: 5 | OUTPATIENT
Start: 2024-06-17

## 2024-09-05 DIAGNOSIS — R11.0 NAUSEA: ICD-10-CM

## 2024-09-05 DIAGNOSIS — E78.00 ELEVATED CHOLESTEROL: ICD-10-CM

## 2024-09-05 DIAGNOSIS — F41.9 ANXIETY: ICD-10-CM

## 2024-09-05 DIAGNOSIS — F32.0 CURRENT MILD EPISODE OF MAJOR DEPRESSIVE DISORDER, UNSPECIFIED WHETHER RECURRENT (HCC): ICD-10-CM

## 2024-09-05 DIAGNOSIS — J45.20 MILD INTERMITTENT ASTHMA WITHOUT COMPLICATION (HCC): ICD-10-CM

## 2024-09-05 DIAGNOSIS — D50.9 IRON DEFICIENCY ANEMIA, UNSPECIFIED IRON DEFICIENCY ANEMIA TYPE: ICD-10-CM

## 2024-09-05 RX ORDER — FERROUS SULFATE 325(65) MG
1 TABLET ORAL
Qty: 90 TABLET | Refills: 3 | Status: SHIPPED | OUTPATIENT
Start: 2024-09-05

## 2024-09-05 RX ORDER — CITALOPRAM HYDROBROMIDE 40 MG/1
40 TABLET ORAL DAILY
Qty: 90 TABLET | Refills: 0 | Status: SHIPPED | OUTPATIENT
Start: 2024-09-05

## 2024-09-05 RX ORDER — LOVASTATIN 10 MG
10 TABLET ORAL NIGHTLY
Qty: 90 TABLET | Refills: 0 | Status: SHIPPED | OUTPATIENT
Start: 2024-09-05

## 2024-09-05 RX ORDER — ONDANSETRON 4 MG/1
4 TABLET, ORALLY DISINTEGRATING ORAL EVERY 8 HOURS PRN
Qty: 20 TABLET | Refills: 0 | Status: SHIPPED | OUTPATIENT
Start: 2024-09-05

## 2024-09-05 RX ORDER — MONTELUKAST SODIUM 10 MG/1
10 TABLET ORAL NIGHTLY
Qty: 90 TABLET | Refills: 0 | Status: SHIPPED | OUTPATIENT
Start: 2024-09-05

## 2024-09-05 RX ORDER — ALPRAZOLAM 0.25 MG
0.25 TABLET ORAL NIGHTLY PRN
Qty: 30 TABLET | Refills: 2 | Status: SHIPPED | OUTPATIENT
Start: 2024-09-05

## 2024-09-05 NOTE — TELEPHONE ENCOUNTER
A refill request was received for:  Requested Prescriptions     Pending Prescriptions Disp Refills    ALPRAZolam 0.25 MG Oral Tab 30 tablet 5     Sig: Take 1 tablet (0.25 mg total) by mouth nightly as needed for Sleep or Anxiety.    montelukast 10 MG Oral Tab 90 tablet 1     Sig: Take 1 tablet (10 mg total) by mouth nightly.    citalopram 40 MG Oral Tab 90 tablet 1     Sig: Take 1 tablet (40 mg total) by mouth daily.    Ferrous Sulfate (FEROSUL) 325 (65 Fe) MG Oral Tab 90 tablet 3     Sig: Take 1 tablet (325 mg total) by mouth daily with breakfast.       Last refill date: 2/2024      Last office visit:4/25/2024     Follow up due:  Future Appointments   Date Time Provider Department Center   9/16/2024 11:15 AM Larisa Livingston MD [facility]

## 2024-09-11 ENCOUNTER — TELEPHONE (OUTPATIENT)
Dept: RHEUMATOLOGY | Facility: CLINIC | Age: 49
End: 2024-09-11

## 2024-09-16 ENCOUNTER — OFFICE VISIT (OUTPATIENT)
Dept: RHEUMATOLOGY | Facility: CLINIC | Age: 49
End: 2024-09-16
Payer: COMMERCIAL

## 2024-09-16 VITALS
WEIGHT: 159.81 LBS | HEART RATE: 97 BPM | OXYGEN SATURATION: 97 % | BODY MASS INDEX: 26.63 KG/M2 | HEIGHT: 65 IN | TEMPERATURE: 98 F | SYSTOLIC BLOOD PRESSURE: 116 MMHG | DIASTOLIC BLOOD PRESSURE: 76 MMHG | RESPIRATION RATE: 16 BRPM

## 2024-09-16 DIAGNOSIS — M75.01 ADHESIVE CAPSULITIS OF RIGHT SHOULDER: ICD-10-CM

## 2024-09-16 DIAGNOSIS — Z86.19 FREQUENT INFECTIONS: ICD-10-CM

## 2024-09-16 DIAGNOSIS — Z98.1 HX OF FUSION OF CERVICAL SPINE: ICD-10-CM

## 2024-09-16 DIAGNOSIS — M54.12 CERVICAL MYELOPATHY WITH CERVICAL RADICULOPATHY (HCC): ICD-10-CM

## 2024-09-16 DIAGNOSIS — I77.1 ARTERIAL INSUFFICIENCY (HCC): ICD-10-CM

## 2024-09-16 DIAGNOSIS — G57.61 MORTON NEUROMA OF RIGHT FOOT: ICD-10-CM

## 2024-09-16 DIAGNOSIS — G62.9 NEUROPATHY: Primary | ICD-10-CM

## 2024-09-16 DIAGNOSIS — M17.0 BILATERAL PRIMARY OSTEOARTHRITIS OF KNEE: ICD-10-CM

## 2024-09-16 DIAGNOSIS — M41.9 SCOLIOSIS OF THORACIC SPINE, UNSPECIFIED SCOLIOSIS TYPE: ICD-10-CM

## 2024-09-16 DIAGNOSIS — G95.9 CERVICAL MYELOPATHY WITH CERVICAL RADICULOPATHY (HCC): ICD-10-CM

## 2024-09-16 DIAGNOSIS — M47.816 OSTEOARTHRITIS OF LUMBAR SPINE, UNSPECIFIED SPINAL OSTEOARTHRITIS COMPLICATION STATUS: ICD-10-CM

## 2024-09-16 DIAGNOSIS — G54.0 NEUROGENIC THORACIC OUTLET SYNDROME: ICD-10-CM

## 2024-09-16 PROCEDURE — 3008F BODY MASS INDEX DOCD: CPT | Performed by: INTERNAL MEDICINE

## 2024-09-16 PROCEDURE — 3074F SYST BP LT 130 MM HG: CPT | Performed by: INTERNAL MEDICINE

## 2024-09-16 PROCEDURE — 3078F DIAST BP <80 MM HG: CPT | Performed by: INTERNAL MEDICINE

## 2024-09-16 PROCEDURE — 99204 OFFICE O/P NEW MOD 45 MIN: CPT | Performed by: INTERNAL MEDICINE

## 2024-09-16 RX ORDER — METHYLPHENIDATE HYDROCHLORIDE 20 MG/1
TABLET ORAL 2 TIMES DAILY
COMMUNITY

## 2024-09-16 NOTE — PROGRESS NOTES
Rheumatology New Patient Note  =====================================================================================================    Date of visit: 9/16/2024  ?  Chief complaint: neuropathic pain, arm pain  Chief Complaint   Patient presents with    New Patient     New patient      Referring (will send letter)  Angelita May     PCP  Alva Fiore DO  Fax: [phone]  Phone: [phone]    =====================================================================================================  HPI    Nadya Mina is a 49 year old female     Here for further evaluation of polyarthralgia and neuropathic pain.  TPI do help. Still with pain after cervical surgeries.     Diagnosed with cervical radiculopathy/myelopathy. That lead to multiple surgeries:  12/19/2018: Dr. Valencia: ACDF C4-6  7/6/2020: Dr. Valencia: C3-7 laminoplasty  12/14/2020: Dr. Valencia: Posterior cervical C3-7 laminectomy, removal of laminoplasty hardware cervical 3-6 segmental instrumentation bilaterally cervical 5, cervical 6, cervical 7, posterior lateral fusion bilaterally cervical 5, cervical 6, cervical 7  9/8/2021: Dr. Valencia: ACDF C6-7  Arms are numb in the morning/evening. 2020 EMG/NCS: negative.  Has had this neuropathic pain even prior to the EMG  Diagnosed with BOSTON: on CPAP x 1 year.     No physical therapy or exercises  -2 kids with hypermobile EDS. Daughter with POTS and MVP.  -one child with CVID.  -patient is not hypermobile.     Chronic migraines.   -hx of osteopenia, s/p MARIAM-BSO in 2015 at the age of 40.   -hx of osteopenia (femoral T-score -1.3 in 2020 and loose screws in the bone: was on forteo x 2.5 years,   -Fractured bone of foot 3 x in the past.     14 point ROS negative except noted above    Medications:  Current Outpatient Medications on File Prior to Visit   Medication Sig Dispense Refill    methylphenidate 20 MG Oral Tab Take by mouth 2 (two) times daily.      ondansetron 4 MG Oral Tablet Dispersible Take 1 tablet (4 mg  total) by mouth every 8 (eight) hours as needed for Nausea. 20 tablet 0    Lovastatin 10 MG Oral Tab Take 1 tablet (10 mg total) by mouth nightly. 90 tablet 0    ALPRAZolam 0.25 MG Oral Tab Take 1 tablet (0.25 mg total) by mouth nightly as needed for Sleep or Anxiety. 30 tablet 2    montelukast 10 MG Oral Tab Take 1 tablet (10 mg total) by mouth nightly. 90 tablet 0    citalopram 40 MG Oral Tab Take 1 tablet (40 mg total) by mouth daily. 90 tablet 0    Ferrous Sulfate (FEROSUL) 325 (65 Fe) MG Oral Tab Take 1 tablet (325 mg total) by mouth daily with breakfast. 90 tablet 3    dicyclomine 20 MG Oral Tab Take 2 tablets (40 mg total) by mouth every evening. 30 tablet 0    albuterol 108 (90 Base) MCG/ACT Inhalation Aero Soln Inhale 1-2 puffs into the lungs every 4 to 6 hours as needed. 51 g 4    sodium chloride 0.9 % Inhalation Nebu Soln Take 3 mL by nebulization as needed for Wheezing. 30 each 0    Esomeprazole Magnesium (NEXIUM OR) Take by mouth.      sucralfate 1 GM/10ML Oral Suspension Take 10 mL (1 g total) by mouth 4 (four) times daily. 1200 mL 0    Multiple Vitamin Oral Tab Take 1 tablet by mouth daily.      Cyanocobalamin (VITAMIN B12 OR) Take 1 tablet by mouth daily.        Calcium Carbonate-Vitamin D (CALCIUM 600 + D OR) Take 1 tablet by mouth 2 (two) times daily.        Glucose Blood (ONETOUCH VERIO) In Vitro Strip CHECK BLOOD SUGAR ONCE DAILY (Patient not taking: Reported on 9/16/2024) 100 strip 1    Insulin Pen Needle (BD PEN NEEDLE YANDY U/F) 32G X 4 MM Does not apply Misc Inject 1 each into the skin daily. (Patient not taking: Reported on 9/16/2024) 100 each 3     No current facility-administered medications on file prior to visit.       Past Medical History:  Past Medical History:    Abdominal distention    Abdominal pain    Abnormal uterine bleeding    Acute bronchitis    Acute esophagitis    Acute upper respiratory infections of unspecified site    Allergic rhinitis    Grass, mold, regular seasonal     Anxiety state, unspecified    Arthritis    In my back    Asthma (HCC)    Attention deficit disorder without mention of hyperactivity    Back problem    cervical and lumbar    Bad breath    Started when stomach pain got worse    Bloating    Cauda equina compression (HCC)    Cauda equina syndrome without mention of neurogenic bladder    Constipation    Decorative tattoo    Depression    Diabetes (HCC)    Diet controlled; no meds    Diabetes ([facility])    Diarrhea, unspecified    Dysesthesia    Dyspareunia    Easy bruising    Endometriosis    Esophageal reflux    Essential hypertension    Fatigue    Flatulence/gas pain/belching    Food intolerance    Frequent urination    Frequent UTI    Headache disorder    Heartburn    Heavy menses    Hematuria    Hiatal hernia    High cholesterol    Hyperlipidemia    Hypertension    IBS (irritable bowel syndrome)    Irregular bowel habits    Irregular menstrual cycle    Itch of skin    Occasionally on my legs, not due to dry skin    Leaking of urine    Lipid screening    Loss of appetite    Lump or mass in breast    Malaise    Malignant hyperthermia    patient's son at 5 years of age - 2006    Mastodynia    Menses painful    Migraines    Nausea    Night sweats    Obstructive sleep apnea    Osteoarthritis    Other screening mammogram    Pain in joints    Pain with bowel movements    Pap smear for cervical cancer screening    wnl    Personal history of urinary (tract) infection    Pneumonia due to organism    PONV (postoperative nausea and vomiting)    Problems with swallowing    Sleep apnea    Sleep disturbance    Stress    Uncomfortable fullness after meals    Unspecified disorder of thyroid    hypothyroidism    Unspecified viral infection, in conditions classified elsewhere and of unspecified site    Visual impairment    glasses    Vomiting    Weight loss     Past Surgical History:  Past Surgical History:   Procedure Laterality Date    Adenoidectomy      Child    Appendectomy  1991     Appendectomy      Blood patch(bedside)      Colonoscopy N/A 2015    Procedure: COLONOSCOPY;  Surgeon: Marlene Payne MD;  Location:  ENDOSCOPY    Endometrial ablation      2004    Hysterectomy  2015    Hysteroscopy,ablation endometrium      Laminectomy,cervical  2020    Laparoscopy,diagnostic      multiple    Lysis of adhesions      Kevin localization wire 1 site right (cpt=19281)      in her 20's    Myringotomy, laser-assisted      Child          Oophorectomy      Other Right 2018    ARTHROSCOPIC ACROMIOPLASTY LYSIS OF ADHESIONS RIGHT SHOULDER    Other  2018    ANTERIOR DISCECTOMY AND FUSION. CERVICAL 4-CERVICAL 5 AND CERVICAL 5-CERVICAL 6. ALLOGRAFT, PLATE AND SCREWS    Other  2021    ANTERIOR CERVICAL DISCECTOMY AND FUSION CERVICAL 6- CERVICAL 7 WITH INTERBODY DEVICE, REMOVAL OF SURGICAL SCREW RIGHT C6    Other surgical history      esophagogastroduodenoscopy    Other surgical history      right thumb trigger finger release     Other surgical history      ganglion cyst removed left wrist     Other surgical history  10/30/2019    Cystoscopy- Dr. Sofia    Sigmoidoscopy,diagnostic      Tarsal tunnel release      right foot    Tonsillectomy      Total abdom hysterectomy       Family History:  Family History   Problem Relation Age of Onset    Breast Cancer Mother 55    Diabetes Mother     Stroke Mother     Other (Other) Mother     Other (hypothyroidism) Mother     Cancer Mother         Breast cancer    Heart Disorder Mother     Diabetes Father     Hypertension Father     Colon Polyps Father     Other (ADD) Father     Heart Disorder Father     Cancer Sister         Uterin and thyroid    BRCA gene + Sister     Uterine Cancer Sister     Bleeding Disorders Sister     Other (thyroid cancer) Sister     Other (Other) Daughter         Mvp, eds    Migraines Son     Diabetes Son     Other (Other) Son         Multiple issues    Anemia Son     Asthma Son     Depression Son      Genetic Disease Son         Omar Mora Syndrome    Heart Disorder Maternal Grandmother         CHF    Stroke Maternal Grandfather     Diabetes Paternal Grandmother     Cancer Paternal Cousin Female         cervical    Heart Disorder Daughter     Musculo-skelatal Disorder Daughter      Social History:  Social History     Socioeconomic History    Marital status:    Tobacco Use    Smoking status: Former     Current packs/day: 0.00     Average packs/day: 0.5 packs/day for 10.0 years (5.0 ttl pk-yrs)     Types: Cigarettes     Quit date: 1989     Years since quittin.6    Smokeless tobacco: Never   Vaping Use    Vaping status: Never Used   Substance and Sexual Activity    Alcohol use: Yes     Alcohol/week: 2.0 standard drinks of alcohol     Types: 2 Cans of beer per week     Comment: occasionally    Drug use: No    Sexual activity: Yes     Partners: Male   Other Topics Concern    Caffeine Concern No    Stress Concern Yes    Weight Concern Yes    Special Diet No    Exercise Yes    Seat Belt Yes     ?  Allergies:  Allergies   Allergen Reactions    Demerol OTHER (SEE COMMENTS)     Difficulty breathing    Levofloxacin RASH and Tightness in Chest    Reglan [Metoclopramide] OTHER (SEE COMMENTS)     Tardive dyskinesia    Betadine [Povidone Iodine] ITCHING    Meperidine NAUSEA AND VOMITING    Other OTHER (SEE COMMENTS)     Sensitive skin, milk,dust mites    Quinolones UNKNOWN         Objective    Vitals:    24 1101   BP: 116/76   Pulse: 97   Resp: 16   Temp: 97.7 °F (36.5 °C)   SpO2: 97%   Weight: 159 lb 12.8 oz (72.5 kg)   Height: 5' 5\" (1.651 m)     GEN: NAD, well-nourished.   HEENT: Head: NCAT. Face: No lesions. Eyes: Conjunctiva clear. Sclera are anicteric. PERRLA. EOMs are full. Ears: The right and left ear canals are clear.  Nose: No external or internal nasal deformities. Nasal septum is midline. Mouth: The lips are within normal limits.  No oral ulcers Tongue is midline with no lesions. The oral  cavity is clear.   Neck: Supple. No neck masses. No thyromegaly. No LAD, parotid or submandicular gland palpated.   PULM: easy effort  Extremities: No cyanosis, edema or deformities.   Neurologic: Strength, CN2-12 grossly intact   Psych: normal affect.   Skin: No lesions or rashes.  MSK: 28 joint count performed. No evidence of synovitis in mcp, pip, dip, wrist, elbows, shoulders, hips, knees, ankles, mtp unless otherwise noted. Full ROM of elbows, wrists, knees.     No peripheral joint synovitis.  Abduction of the bilateral hands above 90 degrees at the shoulders elicits paresthesias radiating down the fingers.  Placing palms on head makes pain worse.  -Tender palpation in the cervical paraspinals.  Kyphotic posture noted  -Scapular winging bilaterally  -Bilateral knee OA changes      ?  Labs:  Lab Results   Component Value Date    WBC 5.7 10/31/2022    RBC 4.55 10/31/2022    HGB 14.6 10/31/2022    HCT 44.0 10/31/2022    .0 10/31/2022    MPV 10.5 (H) 05/09/2011    MCV 96.7 10/31/2022    MCH 32.1 10/31/2022    MCHC 33.2 10/31/2022    RDW 12.4 10/31/2022    NEPRELIM 2.83 10/31/2022    NEPERCENT 49.3 10/31/2022    LYPERCENT 38.0 10/31/2022    MOPERCENT 10.1 10/31/2022    EOPERCENT 1.7 10/31/2022    BAPERCENT 0.7 10/31/2022    NE 2.83 10/31/2022    LYMABS 2.18 10/31/2022    MOABSO 0.58 10/31/2022    EOABSO 0.10 10/31/2022    BAABSO 0.04 10/31/2022     Lab Results   Component Value Date     (H) 04/22/2024    BUN 8 (L) 04/22/2024    BUNCREA NOT APPLICABLE 10/04/2021    CREATSERUM 1.09 (H) 04/22/2024    ANIONGAP 5 04/22/2024    GFR 80 02/06/2018    GFRNAA 74 07/25/2022    GFRAA 85 07/25/2022    CA 9.6 04/22/2024    OSMOCALC 291 04/22/2024    ALKPHO 57 04/22/2024    AST 21 04/22/2024    ALT 48 04/22/2024    BILT 0.4 04/22/2024    TP 7.5 04/22/2024    ALB 4.3 04/22/2024    GLOBULIN 3.2 04/22/2024    AGRATIO 1.8 10/04/2021     04/22/2024    K 4.8 04/22/2024     04/22/2024    CO2 27.0 04/22/2024          No results found for: \"ANATI\", \"SULLY\", \"ANAS\", \"ANASCRN\", \"ANASCRNRFLX\", \"ESPERANZA\"  No results found for: \"SSA\", \"SSAUR\", \"ANTISSA\", \"SSA52\", \"SSA60\", \"SSADD\", \"SSB\", \"ANTISSB\"  No results found for: \"DSDNA\", \"ANTIDSDNA\", \"SMUD\", \"ANTISM\", \"SM\", \"RNP\", \"ANTIRNP\", \"SMITHRNP\"  No results found for: \"SCL70\", \"SCL\", \"BDDWPFI78\"  No results found for: \"C3\", \"C4\"  No results found for: \"DRVVT\", \"LAINT\", \"PTTLUPUS\", \"LUPUSINTERP\", \"LA\", \"D2RD8BSBBV\", \"B8NB9UHZXF\", \"C8MQXEJFOI\", \"F3JAHQOEED\"  No results found for: \"CARDIOLIPIGG\", \"CARDIOLIPIGM\", \"CARDIOLIPIGA\", \"CARDIOIGA\", \"CARLIP\"      Additional Labs:    Radiology:    Radiology review:      =====================================================================================================  Assessment and Plan    Assessment:  1. Neuropathy    2. Frequent infections    3. Arterial insufficiency (HCC)    4. Neurogenic thoracic outlet syndrome    5. Cervical myelopathy with cervical radiculopathy (HCC)    6. Hx of fusion of cervical spine    7. Adhesive capsulitis of right shoulder    8. Scoliosis of thoracic spine, unspecified scoliosis type    9. Bilateral primary osteoarthritis of knee    10. Johns neuroma of right foot    11. Osteoarthritis of lumbar spine, unspecified spinal osteoarthritis complication status      Neuropathic pain that is intermittent and positional is likely due to neurogenic thoracic outlet syndrome.   Patient has underwent multiple cervical surgeries including cervical fusion for previously noted radiculopathy/myelopathy. Prior EMG/NCS negative for any radicular pathology.  -No evidence of fibromyalgia or peripheral inflammatory arthritis.     #Cervical DJD: S/p fusion  #Bilateral knee osteoarthritis  #History of right frozen shoulder  #Scoliosis of the thoracic spine: with scapular winging indicating thoracic paraspinal and core weakness  #Lumbar degenerative disc disease (DJD)    #osteopenia: Clinically noted to have weak bones during  cervical fusion surgery.  Fractured foot in the past. S/p MARIAM-BSO in 2015.  S/p 2 years of Forteo with improvement in DEXA.  ?  Plan:  -Agree with neuropathic workup already ordered by PCP office.  Add on vitamin B6  -History of CVID in child.  Patient with frequent infections.  Obtain quantitative immunoglobulins  -2 children with hypermobile EDS.  Patient does not have generalized hypermobility on examination today  -Physical therapy for the above issues which I think should correct her neurogenic thoracic outlet syndrome and her neuropathy  -If symptoms do not improve then obtain ultrasound of the arterial duplex Dopplers of the bilateral upper extremities to evaluate for arterial TOS    Rtc prn    Diagnoses and all orders for this visit:    Neuropathy  -     Immunoglobulin A, Quant; Future  -     Immunoglobulin G, Quant; Future  -     Immunoglobulin M, Quant; Future  -     Monoclonal Protein Study; Future  -     CK (Creatine Kinase) (Not Creatinine); Future  -     Vitamin B6; Future  -     US ARTERIAL DUPLEX UPPER EXTREMITY BILATERAL (CPT=93930); Future  -     Folic Acid Serum (Folate); Future  -     Physical Therapy Referral - [facility]    Frequent infections  -     Immunoglobulin A, Quant; Future  -     Immunoglobulin G, Quant; Future  -     Immunoglobulin M, Quant; Future  -     Monoclonal Protein Study; Future  -     CK (Creatine Kinase) (Not Creatinine); Future  -     Vitamin B6; Future  -     US ARTERIAL DUPLEX UPPER EXTREMITY BILATERAL (CPT=93930); Future  -     Folic Acid Serum (Folate); Future  -     Physical Therapy Referral - [facility]    Arterial insufficiency (HCC)  -     Immunoglobulin A, Quant; Future  -     Immunoglobulin G, Quant; Future  -     Immunoglobulin M, Quant; Future  -     Monoclonal Protein Study; Future  -     CK (Creatine Kinase) (Not Creatinine); Future  -     Vitamin B6; Future  -     US ARTERIAL DUPLEX UPPER EXTREMITY BILATERAL (CPT=93930); Future  -     Folic Acid  Serum (Folate); Future  -     Physical Therapy Referral - [facility]    Neurogenic thoracic outlet syndrome  -     Immunoglobulin A, Quant; Future  -     Immunoglobulin G, Quant; Future  -     Immunoglobulin M, Quant; Future  -     Monoclonal Protein Study; Future  -     CK (Creatine Kinase) (Not Creatinine); Future  -     Vitamin B6; Future  -     US ARTERIAL DUPLEX UPPER EXTREMITY BILATERAL (CPT=93930); Future  -     Folic Acid Serum (Folate); Future  -     Physical Therapy Referral - [facility]    Cervical myelopathy with cervical radiculopathy (HCC)  -     Physical Therapy Referral - [facility]    Hx of fusion of cervical spine  -     Physical Therapy Referral - [facility]    Adhesive capsulitis of right shoulder  -     Physical Therapy Referral - [facility]    Scoliosis of thoracic spine, unspecified scoliosis type  -     Physical Therapy Referral - [facility]    Bilateral primary osteoarthritis of knee  -     Physical Therapy Referral - [facility]    Johns neuroma of right foot  -     Physical Therapy Referral - [facility]    Osteoarthritis of lumbar spine, unspecified spinal osteoarthritis complication status  -     Physical Therapy Referral - [facility]        No follow-ups on file.      The above plan of care, diagnosis, orders, and follow-up were discussed with the patient. Questions related to this recommended plan of care were answered.    Thank you for referring this delightful patient to me. Please feel free to contact me with any questions.     This report was performed utilizing speech recognition software technology. Despite proofreading, speech recognition errors could escape detection. If a word or phrase is confusing or out of context, please do not hesitate to call for   clarification.       Kind regards      Larisa Livingston MD  [facility]

## 2024-09-18 ENCOUNTER — OFFICE VISIT (OUTPATIENT)
Dept: PAIN CLINIC | Facility: CLINIC | Age: 49
End: 2024-09-18
Payer: COMMERCIAL

## 2024-09-18 VITALS
DIASTOLIC BLOOD PRESSURE: 84 MMHG | WEIGHT: 159 LBS | SYSTOLIC BLOOD PRESSURE: 126 MMHG | HEART RATE: 100 BPM | BODY MASS INDEX: 26 KG/M2 | OXYGEN SATURATION: 98 %

## 2024-09-18 DIAGNOSIS — Z98.1 HX OF FUSION OF CERVICAL SPINE: ICD-10-CM

## 2024-09-18 DIAGNOSIS — M79.18 MYOFASCIAL PAIN: Primary | ICD-10-CM

## 2024-09-18 PROCEDURE — 3074F SYST BP LT 130 MM HG: CPT | Performed by: PHYSICIAN ASSISTANT

## 2024-09-18 PROCEDURE — 99214 OFFICE O/P EST MOD 30 MIN: CPT | Performed by: PHYSICIAN ASSISTANT

## 2024-09-18 PROCEDURE — 3079F DIAST BP 80-89 MM HG: CPT | Performed by: PHYSICIAN ASSISTANT

## 2024-09-18 NOTE — PATIENT INSTRUCTIONS
Refill policies:    Allow 2-3 business days for refills; controlled substances may take longer.  Contact your pharmacy at least 5 days prior to running out of medication and have them send an electronic request or submit request through the “request refill” option in your [employer] account.  Refills are not addressed on weekends; covering physicians do not authorize routine medications on weekends.  No narcotics or controlled substances are refilled after noon on Fridays or by on call physicians.  By law, narcotics must be electronically prescribed.  A 30 day supply with no refills is the maximum allowed.  If your prescription is due for a refill, you may be due for a follow up appointment.  To best provide you care, patients receiving routine medications need to be seen at least once a year.  Patients receiving narcotic/controlled substance medications need to be seen at least once every 3 months.  In the event that your preferred pharmacy does not have the requested medication in stock (e.g. Backordered), it is your responsibility to find another pharmacy that has the requested medication available.  We will gladly send a new prescription to that pharmacy at your request.    Scheduling Tests:    If your physician has ordered radiology tests such as MRI or CT scans, please contact Central Scheduling at [phone] right away to schedule the test.  Once scheduled, the [facility] Centralized Referral Team will work with your insurance carrier to obtain pre-certification or prior authorization.  Depending on your insurance carrier, approval may take 3-10 days.  It is highly recommended patients assure they have received an authorization before having a test performed.  If test is done without insurance authorization, patient may be responsible for the entire amount billed.      Precertification and Prior Authorizations:  If your physician has recommended that you have a procedure or additional testing performed the [facility]  Centralized Referral Team will contact your insurance carrier to obtain pre-certification or prior authorization.    You are strongly encouraged to contact your insurance carrier to verify that your procedure/test has been approved and is a COVERED benefit.  Although the [facility] Centralized Referral Team does its due diligence, the insurance carrier gives the disclaimer that \"Although the procedure is authorized, this does not guarantee payment.\"    Ultimately the patient is responsible for payment.   Thank you for your understanding in this matter.  Paperwork Completion:  If you require FMLA or disability paperwork for your recovery, please make sure to either drop it off or have it faxed to our office at [phone]. Be sure the form has your name and date of birth on it.  The form will be faxed to our Forms Department and they will complete it for you.  There is a 25$ fee for all forms that need to be filled out.  Please be aware there is a 10-14 day turnaround time.  You will need to sign a release of information (GURWINDER) form if your paperwork does not come with one.  You may call the Forms Department with any questions at [phone].  Their fax number is [phone].

## 2024-09-18 NOTE — PROGRESS NOTES
Patient presents in office today with reported pain in cervical spine area, thoracic area as well    Current pain level reported = 7/10    Last reported dose of advil connor today      Narcotic Contract renewal none    Urine Drug screen none

## 2024-09-18 NOTE — PROGRESS NOTES
HPI:   Nadya Mina presents with complaints of Neck pain with R trapezius/rhomboid pain and n/t to [facility].      The pain is described as moderate aching that is intermittent.  The patient’s activity level has increased since last visit.  The pain is worst unrelated to time of day.    Changes in condition/history since last visit: Patient is here today for follow up, having been seen last on 5/22/24 for repeat TPI.  Procedure was well tolerated, and had no adverse effects.  Overall, reports 90% relief, and was very pleased with her response.  With time, pain did begun to return, though L is still improved.  Wishes to repeat procedure, though only on R.       Prior to this, had good relief with hardware injection on 3/21/23.        Of note, she has been evaluated by rheumatology, and is scheduled to begin PT.      Last procedure: Bilateral TPI #3  Date: 5/22/24 (4/24/23, 12/22/23)    Percent relief: 100%    Duration: 3 months    Previous procedure: Bilateral cervical hardware injection date: 3/21/2023    Percent relief: 80%    Duration: 60% sustained    The following activities will increase the patient’s pain: prolonged activity     The following activities decrease the patient’s pain: limiting activity level    Functional Assessment: Patient reports that they are able to complete all of their ADL's such as eating, bathing, using the toilet, dressing and getting up from a bed or a chair independently.    Current Medications:  Current Outpatient Medications   Medication Sig Dispense Refill    methylphenidate 20 MG Oral Tab Take by mouth 2 (two) times daily.      ondansetron 4 MG Oral Tablet Dispersible Take 1 tablet (4 mg total) by mouth every 8 (eight) hours as needed for Nausea. 20 tablet 0    Lovastatin 10 MG Oral Tab Take 1 tablet (10 mg total) by mouth nightly. 90 tablet 0    ALPRAZolam 0.25 MG Oral Tab Take 1 tablet (0.25 mg total) by mouth nightly as needed for Sleep or Anxiety. 30 tablet 2    montelukast 10  MG Oral Tab Take 1 tablet (10 mg total) by mouth nightly. 90 tablet 0    citalopram 40 MG Oral Tab Take 1 tablet (40 mg total) by mouth daily. 90 tablet 0    Ferrous Sulfate (FEROSUL) 325 (65 Fe) MG Oral Tab Take 1 tablet (325 mg total) by mouth daily with breakfast. 90 tablet 3    Glucose Blood (ONETOUCH VERIO) In Vitro Strip CHECK BLOOD SUGAR ONCE DAILY 100 strip 1    dicyclomine 20 MG Oral Tab Take 2 tablets (40 mg total) by mouth every evening. 30 tablet 0    albuterol 108 (90 Base) MCG/ACT Inhalation Aero Soln Inhale 1-2 puffs into the lungs every 4 to 6 hours as needed. 51 g 4    sodium chloride 0.9 % Inhalation Nebu Soln Take 3 mL by nebulization as needed for Wheezing. 30 each 0    Esomeprazole Magnesium (NEXIUM OR) Take by mouth.      sucralfate 1 GM/10ML Oral Suspension Take 10 mL (1 g total) by mouth 4 (four) times daily. 1200 mL 0    Insulin Pen Needle (BD PEN NEEDLE YANDY U/F) 32G X 4 MM Does not apply Misc Inject 1 each into the skin daily. 100 each 3    Multiple Vitamin Oral Tab Take 1 tablet by mouth daily.      Cyanocobalamin (VITAMIN B12 OR) Take 1 tablet by mouth daily.        Calcium Carbonate-Vitamin D (CALCIUM 600 + D OR) Take 1 tablet by mouth 2 (two) times daily.          Side effects of medications: None    Relief obtained from medication management: n/a    Patient requires assistance with: No assistance required    Reviewed Patient History Dated: 12/22/23 no changes noted  Patient is currently on pain medications:  No  [place] Prescription Monitoring review: N/A    Physical Exam:   Vitals: /84   Pulse 100   Wt 159 lb (72.1 kg)   LMP 01/01/2004   SpO2 98%   BMI 26.46 kg/m²   VAS Pain Score:  7/10    General Appearance: Well developed, well nourished, normal build, independent body habitus, no apparent physical disabilities, well groomed    Neurological Exam: WNL-Orientation to time, place and person, normal mood & effect, normal concentration & attention span  Inspection:  non-antalgic, no acute distress   Pain with ROM C spine in all planes of motion  Pain to palpation superior R thoracic paraspinal musculature, trap, rhomboid  No focal sensory/motor deficits  Negative spurling  Radiology/Lab Test Reviewed: XR:    MRI C spine:  CONCLUSION:         1. Stable postoperative changes in the cervical spine as above spanning C3-C7.       2. Stable minimal degenerative changes in the cervical spine as above.  There is no significant spinal canal or neural foraminal stenosis at any level.       3. No focal spinal cord signal abnormality identified    CT 3/30/24:    C2-C3:  No significant disc/facet abnormality, spinal stenosis, or foraminal stenosis.   C3-C4:  Laminectomy change.  No spinal stenosis or foraminal stenosis.   C4-C5:  Laminectomy and fusion change.  No spinal stenosis or foraminal stenosis.   C5-C6:  Laminectomy and fusion change.  No spinal stenosis or foraminal stenosis.   C6-C7:  Laminectomy and fusion change.  No spinal stenosis or foraminal stenosis.   C7-T1:  No significant disc/facet abnormality, spinal stenosis, or foraminal stenosis.     Did the pt have a positive pain response from the most recent TPI?: yes  Percentage of relief obtained: 100%  Duration of pain relief: >3 months, though still with some moderate relief, though pain is returning  Does the pt have recurring myofacial pain that is causing functional limitation?: yes     Patient educated and verbalized understanding.  Medical Decision Making:   Diagnosis:    Encounter Diagnoses   Name Primary?    Myofascial pain Yes    Hx of fusion of cervical spine          Impression: Elimination of pain after bilateral trap and cervical paraspinal trigger point injections on 4/24/2023.  For over 5 months, was very pleased with her response, though pain began to return, and underwent repeat procedure on 12/22/23, again, to elimination of pain, that did return, and had third TPI 5/22/24.  100% relief, and while the left  continues to be fine, right has eventually returned.  Wishes a repeat injection, though specific to the right side.  Order placed.  Of note, she has been evaluated by rheumatology, and was sent to physical therapy, though is yet to initiate this.  Encouraged her to continue with therapy, as ordered.    Plan: Patient to schedule the following injection: R trapezius, cervical paraspinal, and rhomboid TPI Levels: as stated, Procedure and risks were discussed with pt. including headache, bleeding, infection and potential nerve damage.  F/u 2-3 weeks.    No orders of the defined types were placed in this encounter.      Meds & Refills for this Visit:  Requested Prescriptions      No prescriptions requested or ordered in this encounter       Imaging & Consults:  None    The patient indicates understanding of these issues and agrees to the plan.    JOSH Padilla

## 2024-09-19 ENCOUNTER — TELEPHONE (OUTPATIENT)
Dept: PAIN CLINIC | Facility: CLINIC | Age: 49
End: 2024-09-19

## 2024-09-19 NOTE — TELEPHONE ENCOUNTER
PATIENT NAME:  SHERRI INIGUEZ/ [phone] (home)/ There is no work phone number on file.  PATIENT :  1975  REFERRAL ID #:  [id number]  REFERRAL STATUS:  Authorized [1]  REVIEW REFERRAL NOTES FOR MORE INFORMATION:  DATE AUTHORIZED:  2024 // EXPIRATION DATE: 2025   REFERRED BY:  SHAD VENCES MD (TEL: [phone])  REFERRED TO: SHAD VENCES MD (TEL: [phone])     No vendor specified on referral. / No vendor phone number known.  No facility specified on referral  REFERRED FOR:    Diagnoses:    M79.18 (ICD-10-CM) - 729.1 (ICD-9-CM) - Myofascial pain  Procedures:     [id number] - [facility] PAIN NAVIGATOR   NUMBER OF VISITS AUTH: 1

## 2024-09-19 NOTE — TELEPHONE ENCOUNTER
Order Questions    Question Answer Comment   Anesthesia Type Local    Provider Elías    Location Office    Procedure Other (please add comment) RIGHT cervical paraspinal, trap, rhomboid TPI   CPT (Hit enter after each entry) INJECTION; SINGLE/MULTIPLE TRIGGER POINT(S), 3> MUSCLE    Medical clearance requested (will send to Pain Navigator) No    Patient has Medicare coverage? No

## 2024-09-20 NOTE — TELEPHONE ENCOUNTER
Call transferred from PSR - patient calling to schedule TPI.     Scheduled patient for In Office -Right Cervical Paraspinal,Trap,Rhomboid TPI on 09/27/24 @ 10:00 AM.

## 2024-09-26 NOTE — PROGRESS NOTES
[facility] Neurosurgery follow-up        HISTORY OF PRESENT Altagracia All is a 41year old RH  female s/p C3-7 laminectomy and C5-7 PSF 12/20/2020 Dr. Fariha Bender. Lower posterior neck pain. Started 2 weeks ago. Arms numbness on and off. Swollen hand.   Legs numbness and tingling and weakness. She is using a cane today as she is more wobbly when she looks down she gets spasms and cramping in her torso and body. This improves on cervical extension. She has been using the cervical brace.   She is on Medrol whi history: 1/30/2020  Left CS and shoulder pain. Unsteady with walking. Worse in the morning. Stairs unsteady. Writing declining. Urinary incontinence.      Last hx:   Since her last visit she states the prednisone did not help her between her visit on Januar severe pain at 8/10. She continues with C7 radiculopathy on the left. She has ongoing back pain and cramping in her legs. Last history: 10/28/2019  She states she did well when she was last seen back in June.   However 4 weeks ago she woke up with diony overall she is doing much better with walking. She is wearing the cervical collar. Last hx: 9/21/18  here in follow-up for cervical myelopathy. She continues to have cervical pain which is an 8/10.   She continues to have bilateral C5-C6 radiculopat 1/27/21 C4-6 ACDF. PSF  C5-7. Dr Denae Cordero reviewed report and films    XR CS 10/21/2020  FINDINGS:      There is evidence of an anterior hardware fusion from C4-C6 and posterior hardware fusion from C5-C7.   Surgical hardware appears stable without postoperati anterior cervical fusion extend from the C4 through C6 level,  stable. Vertebral body height and disc spaces are stable when compared to most recent CT of the cervical spine dated 7/7/2020.   No osseous encroachment on the canal.  No neural foraminal narro levels of C2-3 C3-4 and C6-7 are intact with no acute disc herniation or pathology      MRI cervical spine with flexion-extension shows slight anterior cord pressure at C3-4 C4-5 C5-6    X-ray of the cervical spine 5/3/18 shows loss of cervical lordosis wi [address], [address] [place] [address] Fritz Garvey, [address] [facility]  [phone] [place]

## 2024-09-27 ENCOUNTER — OFFICE VISIT (OUTPATIENT)
Dept: PAIN CLINIC | Facility: CLINIC | Age: 49
End: 2024-09-27
Payer: COMMERCIAL

## 2024-09-27 ENCOUNTER — LAB ENCOUNTER (OUTPATIENT)
Dept: LAB | Age: 49
End: 2024-09-27
Attending: INTERNAL MEDICINE
Payer: COMMERCIAL

## 2024-09-27 ENCOUNTER — NURSE ONLY (OUTPATIENT)
Dept: PAIN CLINIC | Facility: CLINIC | Age: 49
End: 2024-09-27
Payer: COMMERCIAL

## 2024-09-27 VITALS
OXYGEN SATURATION: 100 % | SYSTOLIC BLOOD PRESSURE: 120 MMHG | BODY MASS INDEX: 26 KG/M2 | HEART RATE: 64 BPM | WEIGHT: 159 LBS | DIASTOLIC BLOOD PRESSURE: 80 MMHG

## 2024-09-27 DIAGNOSIS — E53.8 VITAMIN B12 DEFICIENCY: ICD-10-CM

## 2024-09-27 DIAGNOSIS — I77.1 ARTERIAL INSUFFICIENCY (HCC): ICD-10-CM

## 2024-09-27 DIAGNOSIS — M79.18 MYOFASCIAL PAIN: Primary | ICD-10-CM

## 2024-09-27 DIAGNOSIS — G62.9 NEUROPATHY: ICD-10-CM

## 2024-09-27 DIAGNOSIS — E11.9 TYPE 2 DIABETES MELLITUS WITHOUT COMPLICATION, WITHOUT LONG-TERM CURRENT USE OF INSULIN (HCC): ICD-10-CM

## 2024-09-27 DIAGNOSIS — E55.9 VITAMIN D DEFICIENCY: ICD-10-CM

## 2024-09-27 DIAGNOSIS — Z00.00 ROUTINE GENERAL MEDICAL EXAMINATION AT A HEALTH CARE FACILITY: ICD-10-CM

## 2024-09-27 DIAGNOSIS — G54.0 NEUROGENIC THORACIC OUTLET SYNDROME: ICD-10-CM

## 2024-09-27 DIAGNOSIS — Z86.19 FREQUENT INFECTIONS: ICD-10-CM

## 2024-09-27 DIAGNOSIS — D50.9 IRON DEFICIENCY ANEMIA, UNSPECIFIED IRON DEFICIENCY ANEMIA TYPE: ICD-10-CM

## 2024-09-27 LAB
ALBUMIN SERPL-MCNC: 5.3 G/DL (ref 3.2–4.8)
ALBUMIN/GLOB SERPL: 2.3 {RATIO} (ref 1–2)
ALP LIVER SERPL-CCNC: 70 U/L
ALT SERPL-CCNC: 42 U/L
ANION GAP SERPL CALC-SCNC: 9 MMOL/L (ref 0–18)
AST SERPL-CCNC: 28 U/L (ref ?–34)
BASOPHILS # BLD AUTO: 0.04 X10(3) UL (ref 0–0.2)
BASOPHILS NFR BLD AUTO: 0.7 %
BILIRUB SERPL-MCNC: 0.3 MG/DL (ref 0.3–1.2)
BUN BLD-MCNC: 15 MG/DL (ref 9–23)
CALCIUM BLD-MCNC: 10.4 MG/DL (ref 8.7–10.4)
CHLORIDE SERPL-SCNC: 103 MMOL/L (ref 98–112)
CHOLEST SERPL-MCNC: 245 MG/DL (ref ?–200)
CK SERPL-CCNC: 81 U/L
CO2 SERPL-SCNC: 28 MMOL/L (ref 21–32)
CREAT BLD-MCNC: 0.96 MG/DL
CREAT UR-SCNC: 95.7 MG/DL
DEPRECATED HBV CORE AB SER IA-ACNC: 124 NG/ML
EGFRCR SERPLBLD CKD-EPI 2021: 73 ML/MIN/1.73M2 (ref 60–?)
EOSINOPHIL # BLD AUTO: 0.11 X10(3) UL (ref 0–0.7)
EOSINOPHIL NFR BLD AUTO: 2 %
ERYTHROCYTE [DISTWIDTH] IN BLOOD BY AUTOMATED COUNT: 12.4 %
EST. AVERAGE GLUCOSE BLD GHB EST-MCNC: 117 MG/DL (ref 68–126)
FASTING PATIENT LIPID ANSWER: YES
FASTING STATUS PATIENT QL REPORTED: YES
FOLATE SERPL-MCNC: 12.6 NG/ML (ref 5.4–?)
GLOBULIN PLAS-MCNC: 2.3 G/DL (ref 2–3.5)
GLUCOSE BLD-MCNC: 119 MG/DL (ref 70–99)
HBA1C MFR BLD: 5.7 % (ref ?–5.7)
HCT VFR BLD AUTO: 43.4 %
HDLC SERPL-MCNC: 64 MG/DL (ref 40–59)
HGB BLD-MCNC: 14.5 G/DL
IGA SERPL-MCNC: 160.1 MG/DL (ref 40–350)
IGM SERPL-MCNC: 131.8 MG/DL (ref 50–300)
IMM GRANULOCYTES # BLD AUTO: 0.01 X10(3) UL (ref 0–1)
IMM GRANULOCYTES NFR BLD: 0.2 %
IMMUNOGLOBULIN PNL SER-MCNC: 477 MG/DL (ref 650–1600)
IRON SATN MFR SERPL: 20 %
IRON SERPL-MCNC: 73 UG/DL
LDLC SERPL CALC-MCNC: 160 MG/DL (ref ?–100)
LYMPHOCYTES # BLD AUTO: 1.86 X10(3) UL (ref 1–4)
LYMPHOCYTES NFR BLD AUTO: 33.9 %
MCH RBC QN AUTO: 31.6 PG (ref 26–34)
MCHC RBC AUTO-ENTMCNC: 33.4 G/DL (ref 31–37)
MCV RBC AUTO: 94.6 FL
MICROALBUMIN UR-MCNC: <0.3 MG/DL
MONOCYTES # BLD AUTO: 0.53 X10(3) UL (ref 0.1–1)
MONOCYTES NFR BLD AUTO: 9.7 %
NEUTROPHILS # BLD AUTO: 2.93 X10 (3) UL (ref 1.5–7.7)
NEUTROPHILS # BLD AUTO: 2.93 X10(3) UL (ref 1.5–7.7)
NEUTROPHILS NFR BLD AUTO: 53.5 %
NONHDLC SERPL-MCNC: 181 MG/DL (ref ?–130)
OSMOLALITY SERPL CALC.SUM OF ELEC: 292 MOSM/KG (ref 275–295)
PLATELET # BLD AUTO: 265 10(3)UL (ref 150–450)
POTASSIUM SERPL-SCNC: 3.6 MMOL/L (ref 3.5–5.1)
PROT SERPL-MCNC: 7.6 G/DL (ref 5.7–8.2)
RBC # BLD AUTO: 4.59 X10(6)UL
SODIUM SERPL-SCNC: 140 MMOL/L (ref 136–145)
TOTAL IRON BINDING CAPACITY: 358 UG/DL (ref 250–425)
TRANSFERRIN SERPL-MCNC: 291 MG/DL (ref 250–380)
TRIGL SERPL-MCNC: 119 MG/DL (ref 30–149)
TSI SER-ACNC: 1.2 MIU/ML (ref 0.55–4.78)
VIT B12 SERPL-MCNC: 796 PG/ML (ref 211–911)
VIT D+METAB SERPL-MCNC: 125.6 NG/ML (ref 30–100)
VLDLC SERPL CALC-MCNC: 23 MG/DL (ref 0–30)
WBC # BLD AUTO: 5.5 X10(3) UL (ref 4–11)

## 2024-09-27 PROCEDURE — 82728 ASSAY OF FERRITIN: CPT

## 2024-09-27 PROCEDURE — 84165 PROTEIN E-PHORESIS SERUM: CPT

## 2024-09-27 PROCEDURE — 82043 UR ALBUMIN QUANTITATIVE: CPT

## 2024-09-27 PROCEDURE — 83521 IG LIGHT CHAINS FREE EACH: CPT

## 2024-09-27 PROCEDURE — 80061 LIPID PANEL: CPT

## 2024-09-27 PROCEDURE — 82784 ASSAY IGA/IGD/IGG/IGM EACH: CPT

## 2024-09-27 PROCEDURE — 82550 ASSAY OF CK (CPK): CPT

## 2024-09-27 PROCEDURE — 86334 IMMUNOFIX E-PHORESIS SERUM: CPT

## 2024-09-27 PROCEDURE — 82306 VITAMIN D 25 HYDROXY: CPT

## 2024-09-27 PROCEDURE — 84443 ASSAY THYROID STIM HORMONE: CPT

## 2024-09-27 PROCEDURE — 83540 ASSAY OF IRON: CPT

## 2024-09-27 PROCEDURE — 3074F SYST BP LT 130 MM HG: CPT | Performed by: ANESTHESIOLOGY

## 2024-09-27 PROCEDURE — 82570 ASSAY OF URINE CREATININE: CPT

## 2024-09-27 PROCEDURE — 85025 COMPLETE CBC W/AUTO DIFF WBC: CPT

## 2024-09-27 PROCEDURE — 84207 ASSAY OF VITAMIN B-6: CPT

## 2024-09-27 PROCEDURE — 36415 COLL VENOUS BLD VENIPUNCTURE: CPT

## 2024-09-27 PROCEDURE — 3079F DIAST BP 80-89 MM HG: CPT | Performed by: ANESTHESIOLOGY

## 2024-09-27 PROCEDURE — 80053 COMPREHEN METABOLIC PANEL: CPT

## 2024-09-27 PROCEDURE — 83550 IRON BINDING TEST: CPT

## 2024-09-27 PROCEDURE — 83036 HEMOGLOBIN GLYCOSYLATED A1C: CPT

## 2024-09-27 PROCEDURE — 82746 ASSAY OF FOLIC ACID SERUM: CPT

## 2024-09-27 PROCEDURE — 20553 NJX 1/MLT TRIGGER POINTS 3/>: CPT | Performed by: ANESTHESIOLOGY

## 2024-09-27 PROCEDURE — 82607 VITAMIN B-12: CPT

## 2024-09-27 RX ORDER — TRIAMCINOLONE ACETONIDE 40 MG/ML
80 INJECTION, SUSPENSION INTRA-ARTICULAR; INTRAMUSCULAR ONCE
Status: COMPLETED | OUTPATIENT
Start: 2024-09-27 | End: 2024-09-27

## 2024-09-27 RX ORDER — BUPIVACAINE HYDROCHLORIDE 5 MG/ML
18 INJECTION, SOLUTION EPIDURAL; INTRACAUDAL ONCE
Status: COMPLETED | OUTPATIENT
Start: 2024-09-27 | End: 2024-09-27

## 2024-09-27 NOTE — PROCEDURES
Date of procedure: September 27, 2024      Preop diagnosis: Cervical myofascial pain     Postop diagnosis: Same      procedure: Bilateral cervical TPI     Surgeon: Rock Mckinley     Anesthesia: None        Indication: Patient is a 48-year-old female with a history of neck pain     Procedure detail: Informed consent obtained.  Timeout was done.  Skin overlying the cervical spine was prepped in usual sterile fashion.  Subsequently, a 27-gauge needle was used to deliver a total mixture of 80 mg of triamcinolone with 18 cc of 0.5% bupivacaine to multiple trigger points.  Muscles injected include the levator scapulae, trapezius, and rhomboid.  Patient tolerated procedure well.  Excellent hemostasis.  No objective subjective weakness.     Rock Mckinley MD

## 2024-09-27 NOTE — PATIENT INSTRUCTIONS
Refill policies:    Allow 2-3 business days for refills; controlled substances may take longer.  Contact your pharmacy at least 5 days prior to running out of medication and have them send an electronic request or submit request through the “request refill” option in your [employer] account.  Refills are not addressed on weekends; covering physicians do not authorize routine medications on weekends.  No narcotics or controlled substances are refilled after noon on Fridays or by on call physicians.  By law, narcotics must be electronically prescribed.  A 30 day supply with no refills is the maximum allowed.  If your prescription is due for a refill, you may be due for a follow up appointment.  To best provide you care, patients receiving routine medications need to be seen at least once a year.  Patients receiving narcotic/controlled substance medications need to be seen at least once every 3 months.  In the event that your preferred pharmacy does not have the requested medication in stock (e.g. Backordered), it is your responsibility to find another pharmacy that has the requested medication available.  We will gladly send a new prescription to that pharmacy at your request.    Scheduling Tests:    If your physician has ordered radiology tests such as MRI or CT scans, please contact Central Scheduling at [phone] right away to schedule the test.  Once scheduled, the [facility] Centralized Referral Team will work with your insurance carrier to obtain pre-certification or prior authorization.  Depending on your insurance carrier, approval may take 3-10 days.  It is highly recommended patients assure they have received an authorization before having a test performed.  If test is done without insurance authorization, patient may be responsible for the entire amount billed.      Precertification and Prior Authorizations:  If your physician has recommended that you have a procedure or additional testing performed the [facility]  Centralized Referral Team will contact your insurance carrier to obtain pre-certification or prior authorization.    You are strongly encouraged to contact your insurance carrier to verify that your procedure/test has been approved and is a COVERED benefit.  Although the [facility] Centralized Referral Team does its due diligence, the insurance carrier gives the disclaimer that \"Although the procedure is authorized, this does not guarantee payment.\"    Ultimately the patient is responsible for payment.   Thank you for your understanding in this matter.  Paperwork Completion:  If you require FMLA or disability paperwork for your recovery, please make sure to either drop it off or have it faxed to our office at [phone]. Be sure the form has your name and date of birth on it.  The form will be faxed to our Forms Department and they will complete it for you.  There is a 25$ fee for all forms that need to be filled out.  Please be aware there is a 10-14 day turnaround time.  You will need to sign a release of information (GURWINDER) form if your paperwork does not come with one.  You may call the Forms Department with any questions at [phone].  Their fax number is [phone].

## 2024-09-27 NOTE — PROGRESS NOTES
Timeout completed prior to procedure @ 1591.  Participants present for timeout:  Dr. Mckinley, SAUL Fournier, and patient.

## 2024-09-29 ENCOUNTER — TELEPHONE (OUTPATIENT)
Dept: RHEUMATOLOGY | Facility: CLINIC | Age: 49
End: 2024-09-29

## 2024-09-29 DIAGNOSIS — R76.8 LOW IMMUNOGLOBULIN LEVEL: Primary | ICD-10-CM

## 2024-10-02 LAB
ALBUMIN SERPL ELPH-MCNC: 4.66 G/DL (ref 3.75–5.21)
ALBUMIN/GLOB SERPL: 1.92 {RATIO} (ref 1–2)
ALPHA1 GLOB SERPL ELPH-MCNC: 0.25 G/DL (ref 0.19–0.46)
ALPHA2 GLOB SERPL ELPH-MCNC: 0.76 G/DL (ref 0.48–1.05)
B-GLOBULIN SERPL ELPH-MCNC: 0.87 G/DL (ref 0.68–1.23)
GAMMA GLOB SERPL ELPH-MCNC: 0.55 G/DL (ref 0.62–1.7)
KAPPA LC FREE SER-MCNC: 1.61 MG/DL (ref 0.33–1.94)
KAPPA LC FREE/LAMBDA FREE SER NEPH: 1.45 {RATIO} (ref 0.26–1.65)
LAMBDA LC FREE SERPL-MCNC: 1.11 MG/DL (ref 0.57–2.63)
PROT SERPL-MCNC: 7.1 G/DL (ref 5.7–8.2)

## 2024-10-03 LAB — VITAMIN B6: 86.9 UG/L

## 2024-10-22 ENCOUNTER — TELEPHONE (OUTPATIENT)
Dept: RHEUMATOLOGY | Facility: CLINIC | Age: 49
End: 2024-10-22

## 2024-10-22 DIAGNOSIS — E67.8 EXCESSIVE VITAMIN B6 INTAKE: Primary | ICD-10-CM

## 2024-11-05 ENCOUNTER — PATIENT MESSAGE (OUTPATIENT)
Dept: SURGERY | Facility: CLINIC | Age: 49
End: 2024-11-05

## 2024-11-05 NOTE — TELEPHONE ENCOUNTER
Mychart message from patient received.      Patient was last seen in office on 5/21/24    Per last note patient was supposed to follow up after PT.     Patient has previously been seen for cervical issues. Sent MyChart response requesting patient make appointment for assessment and to discuss back pain.

## 2024-11-06 ENCOUNTER — TELEPHONE (OUTPATIENT)
Dept: FAMILY MEDICINE CLINIC | Facility: CLINIC | Age: 49
End: 2024-11-06

## 2024-11-06 ENCOUNTER — OFFICE VISIT (OUTPATIENT)
Dept: SURGERY | Facility: CLINIC | Age: 49
End: 2024-11-06
Payer: COMMERCIAL

## 2024-11-06 ENCOUNTER — PATIENT MESSAGE (OUTPATIENT)
Dept: FAMILY MEDICINE CLINIC | Facility: CLINIC | Age: 49
End: 2024-11-06

## 2024-11-06 VITALS
OXYGEN SATURATION: 98 % | HEIGHT: 65 IN | HEART RATE: 88 BPM | DIASTOLIC BLOOD PRESSURE: 68 MMHG | WEIGHT: 155 LBS | BODY MASS INDEX: 25.83 KG/M2 | SYSTOLIC BLOOD PRESSURE: 120 MMHG

## 2024-11-06 DIAGNOSIS — M62.89 MUSCLE TIGHTNESS: ICD-10-CM

## 2024-11-06 DIAGNOSIS — G89.29 CHRONIC MIDLINE THORACIC BACK PAIN: ICD-10-CM

## 2024-11-06 DIAGNOSIS — M54.9 BACK PAIN, UNSPECIFIED BACK LOCATION, UNSPECIFIED BACK PAIN LATERALITY, UNSPECIFIED CHRONICITY: Primary | ICD-10-CM

## 2024-11-06 DIAGNOSIS — M54.16 LUMBAR RADICULOPATHY: ICD-10-CM

## 2024-11-06 DIAGNOSIS — M53.3 PAIN OF RIGHT SACROILIAC JOINT: ICD-10-CM

## 2024-11-06 DIAGNOSIS — M54.50 LUMBAR PAIN: Primary | ICD-10-CM

## 2024-11-06 DIAGNOSIS — M54.6 CHRONIC MIDLINE THORACIC BACK PAIN: ICD-10-CM

## 2024-11-06 PROCEDURE — 3078F DIAST BP <80 MM HG: CPT | Performed by: PHYSICIAN ASSISTANT

## 2024-11-06 PROCEDURE — 99213 OFFICE O/P EST LOW 20 MIN: CPT | Performed by: PHYSICIAN ASSISTANT

## 2024-11-06 PROCEDURE — 3008F BODY MASS INDEX DOCD: CPT | Performed by: PHYSICIAN ASSISTANT

## 2024-11-06 PROCEDURE — 3074F SYST BP LT 130 MM HG: CPT | Performed by: PHYSICIAN ASSISTANT

## 2024-11-06 NOTE — PATIENT INSTRUCTIONS
Refill policies:    Allow 2-3 business days for refills; controlled substances may take longer.  Contact your pharmacy at least 5 days prior to running out of medication and have them send an electronic request or submit request through the “request refill” option in your [email or web address] account.  Refills are not addressed on weekends; covering physicians do not authorize routine medications on weekends.  No narcotics or controlled substances are refilled after noon on Fridays or by on call physicians.  By law, narcotics must be electronically prescribed.  A 30 day supply with no refills is the maximum allowed.  If your prescription is due for a refill, you may be due for a follow up appointment.  To best provide you care, patients receiving routine medications need to be seen at least once a year.  Patients receiving narcotic/controlled substance medications need to be seen at least once every 3 months.  In the event that your preferred pharmacy does not have the requested medication in stock (e.g. Backordered), it is your responsibility to find another pharmacy that has the requested medication available.  We will gladly send a new prescription to that pharmacy at your request.    Scheduling Tests:    If your physician has ordered radiology tests such as MRI or CT scans, please contact Central Scheduling at [phone] right away to schedule the test.  Once scheduled, the [facility] Centralized Referral Team will work with your insurance carrier to obtain pre-certification or prior authorization.  Depending on your insurance carrier, approval may take 3-10 days.  It is highly recommended patients assure they have received an authorization before having a test performed.  If test is done without insurance authorization, patient may be responsible for the entire amount billed.      Precertification and Prior Authorizations:  If your physician has recommended that you have a procedure or additional testing performed the [facility]  Centralized Referral Team will contact your insurance carrier to obtain pre-certification or prior authorization.    You are strongly encouraged to contact your insurance carrier to verify that your procedure/test has been approved and is a COVERED benefit.  Although the [facility] Centralized Referral Team does its due diligence, the insurance carrier gives the disclaimer that \"Although the procedure is authorized, this does not guarantee payment.\"    Ultimately the patient is responsible for payment.   Thank you for your understanding in this matter.  Paperwork Completion:  If you require FMLA or disability paperwork for your recovery, please make sure to either drop it off or have it faxed to our office at [phone]. Be sure the form has your name and date of birth on it.  The form will be faxed to our Forms Department and they will complete it for you.  There is a 25$ fee for all forms that need to be filled out.  Please be aware there is a 10-14 day turnaround time.  You will need to sign a release of information (GURWINDER) form if your paperwork does not come with one.  You may call the Forms Department with any questions at [phone].  Their fax number is [phone].

## 2024-11-06 NOTE — PROGRESS NOTES
Patient: Nadya Mina  Medical Record Number: [id number]  YOB: 1975  PCP: Alva Fiore DO    Reason for visit: Lumbar follow up    HISTORY OF CHIEF COMPLAINT:    Nadya Mina is a very pleasant 49 year old female who presents today for: Lumbar follow up  The patient endorses midthoracic pain that radiates down towards the low back.  She was unable to complete PT for posture and core/Cabrera strengthening, secondary to loss of insurance.  Insurance was recently reinstated.  She would like to begin PT as prior discussed.  She is also recently, since approximately Saturday, been experiencing right lower extremity pain, radiating down the outer aspect down the back of the leg towards the foot.  She is unsure of weakness, or if it is secondary to pain.  No numbness or tingling.  No recent conservative treatments and Saturday for this.  She is due to follow-up with pain services for injections.  She saw a rheumatology who has concerns for thoracic outlet syndrome.    Last hx: 5/21/24  aNdya Mina is a very pleasant 49 year old female who presents today for: Cervical follow up  The patient endorses arm numbness progressing on the right side.  This has been relieved in the past with TPI's.  She is scheduled for another TPI with pain services.  Her last 1 was in December which completely relieved her symptoms.  These last for a few months and her symptoms return.  She endorses no weakness or gait instability.  She saw Dr. Zepeda who recommended an EMG.  She is scheduled for November.  She endorses chronic muscle aches and tightness.  Brain fog.  Fatigue.  She has not seen rheumatology in the past.  The patient endorses she has never focused on specific cervical strengthening and posturing or core/spine strengthening.  She believes this would help given multiple cervical surgeries and wearing the collar for multiple months at a time.  She would like to begin exercising.     Last hx 11/28/23:  Nadya RENTERIA  Keeley is a very pleasant 48 year old female who presents today for: Cervical follow up  Recent injection history:  S/p 4/24/23: bilateral cervical TPI  S/p 3/21/23: Bilateral inferior cervical hardware injection  Surgical history:  Status post 9/8/2021: Dr. Valencia: ACDF C6-7  Status post 12/14/2020: Dr. Valencia: Posterior cervical C3-7 laminectomy, removal of laminoplasty hardware cervical 3-6 segmental instrumentation bilaterally cervical 5, cervical 6, cervical 7, posterior lateral fusion bilaterally cervical 5, cervical 6, cervical 7  Status post 7/6/2020: Dr. Valencia: C3-7 laminoplasty  Status post 12/19/2018: Dr. Valencia: ACDF C4-6  States no significant relief with the March injection. Almost 100% relief with injections in April.  She states after her trigger point injections, resolution of extremity numbness and muscle pain.  She was very pleased with her progress.  This lasted many months.  She would like to consider these injections again.  She has no new neurologic complaints.  She did have 1 episode of right \"electrical\" upper extremity pain, this resolved shortly after.     Last hx: 1/3/23  HISTORY OF PRESENT ILLNESS:Nadya Mina is a 47 year old RH  female returns in follow-up.    Since her last visit she had another round of prednisone in December got her through the holidays but really has not resolved her pain.  States her neck pain over the back of her neck up high is a 6-10 over 10 its worse on flexion extension worse first thing in the morning she is getting occipital headaches.  She also has pain lower down at the base of the hardware between her shoulder blades.  She has right C4 radiculopathy which is a 9/10 with numbness and tingling some right pectoris muscle cramping she has right C8 radiculitis with numbness and tingling and a shocklike sensation.  She denies any left arm symptoms she denies any numbness or tingling in the legs no weakness in the legs she is getting some cramping in her right  hamstring she is recently diagnosed with a right foot Johns's neuroma.  She had a little bit of bladder leakage and some hematuria she is seeing urology for this    Past Medical History:    Abdominal distention    Abdominal pain    Abnormal uterine bleeding    Acute bronchitis    Acute esophagitis    Acute upper respiratory infections of unspecified site    Allergic rhinitis    Grass, mold, regular seasonal    Anxiety state, unspecified    Arthritis    In my back    Asthma ([facility])    Attention deficit disorder without mention of hyperactivity    Back problem    cervical and lumbar    Bad breath    Started when stomach pain got worse    Bloating    Cauda equina compression ([facility])    Cauda equina syndrome without mention of neurogenic bladder    Constipation    Decorative tattoo    Depression    Diabetes ([facility])    Diet controlled; no meds    Diabetes ([facility])    Diarrhea, unspecified    Dysesthesia    Dyspareunia    Easy bruising    Endometriosis    Esophageal reflux    Essential hypertension    Fatigue    Flatulence/gas pain/belching    Food intolerance    Frequent urination    Frequent UTI    Headache disorder    Heartburn    Heavy menses    Hematuria    Hiatal hernia    High cholesterol    Hyperlipidemia    Hypertension    IBS (irritable bowel syndrome)    Irregular bowel habits    Irregular menstrual cycle    Itch of skin    Occasionally on my legs, not due to dry skin    Leaking of urine    Lipid screening    Loss of appetite    Lump or mass in breast    Malaise    Malignant hyperthermia    patient's son at 5 years of age - 2006    Mastodynia    Menses painful    Migraines    Nausea    Night sweats    Obstructive sleep apnea    Osteoarthritis    Other screening mammogram    Pain in joints    Pain with bowel movements    Pap smear for cervical cancer screening    wnl    Personal history of urinary (tract) infection    Pneumonia due to organism    PONV (postoperative nausea and vomiting)    Problems with swallowing     Sleep apnea    Sleep disturbance    Stress    Uncomfortable fullness after meals    Unspecified disorder of thyroid    hypothyroidism    Unspecified viral infection, in conditions classified elsewhere and of unspecified site    Visual impairment    glasses    Vomiting    Weight loss      Past Surgical History:   Procedure Laterality Date    Adenoidectomy      Child    Appendectomy      Appendectomy      Blood patch(bedside)      Colonoscopy N/A 2015    Procedure: COLONOSCOPY;  Surgeon: Marlene Payne MD;  Location:  ENDOSCOPY    Endometrial ablation      2004    Hysterectomy  2015    Hysteroscopy,ablation endometrium      Laminectomy,cervical  2020    Laparoscopy,diagnostic      multiple    Lysis of adhesions      Kevin localization wire 1 site right (cpt=19281)      in her 's    Myringotomy, laser-assisted      Child          Oophorectomy      Other Right 2018    ARTHROSCOPIC ACROMIOPLASTY LYSIS OF ADHESIONS RIGHT SHOULDER    Other  2018    ANTERIOR DISCECTOMY AND FUSION. CERVICAL 4-CERVICAL 5 AND CERVICAL 5-CERVICAL 6. ALLOGRAFT, PLATE AND SCREWS    Other  2021    ANTERIOR CERVICAL DISCECTOMY AND FUSION CERVICAL 6- CERVICAL 7 WITH INTERBODY DEVICE, REMOVAL OF SURGICAL SCREW RIGHT C6    Other surgical history      esophagogastroduodenoscopy    Other surgical history      right thumb trigger finger release     Other surgical history      ganglion cyst removed left wrist     Other surgical history  10/30/2019    Cystoscopy- Dr. Sofia    Sigmoidoscopy,diagnostic      Tarsal tunnel release      right foot    Tonsillectomy      Total abdom hysterectomy        Family History   Problem Relation Age of Onset    Breast Cancer Mother 55    Diabetes Mother     Stroke Mother     Other (Other) Mother     Other (hypothyroidism) Mother     Cancer Mother         Breast cancer    Heart Disorder Mother     Diabetes Father     Hypertension Father     Colon Polyps Father      Other (ADD) Father     Heart Disorder Father     Cancer Sister         Uterin and thyroid    BRCA gene + Sister     Uterine Cancer Sister     Bleeding Disorders Sister     Other (thyroid cancer) Sister     Other (Other) Daughter         Mvp, eds    Migraines Son     Diabetes Son     Other (Other) Son         Multiple issues    Anemia Son     Asthma Son     Depression Son     Genetic Disease Son         Omar Silver Syndrome    Heart Disorder Maternal Grandmother         CHF    Stroke Maternal Grandfather     Diabetes Paternal Grandmother     Cancer Paternal Cousin Female         cervical    Heart Disorder Daughter     Musculo-skelatal Disorder Daughter       Social History     Socioeconomic History    Marital status:    Tobacco Use    Smoking status: Former     Current packs/day: 0.00     Average packs/day: 0.5 packs/day for 10.0 years (5.0 ttl pk-yrs)     Types: Cigarettes     Quit date: 1989     Years since quittin.7    Smokeless tobacco: Never   Vaping Use    Vaping status: Never Used   Substance and Sexual Activity    Alcohol use: Yes     Alcohol/week: 2.0 standard drinks of alcohol     Types: 2 Cans of beer per week     Comment: occasionally    Drug use: No    Sexual activity: Yes     Partners: Male   Other Topics Concern    Caffeine Concern No    Stress Concern Yes    Weight Concern Yes    Special Diet No    Exercise Yes    Seat Belt Yes      Allergies[1]   Current Medications:  Current Outpatient Medications   Medication Sig Dispense Refill    Lovastatin 20 MG Oral Tab Take 1 tablet (20 mg total) by mouth nightly. 90 tablet 0    methylphenidate 20 MG Oral Tab Take by mouth 2 (two) times daily.      ondansetron 4 MG Oral Tablet Dispersible Take 1 tablet (4 mg total) by mouth every 8 (eight) hours as needed for Nausea. 20 tablet 0    ALPRAZolam 0.25 MG Oral Tab Take 1 tablet (0.25 mg total) by mouth nightly as needed for Sleep or Anxiety. 30 tablet 2    montelukast 10 MG Oral Tab Take 1 tablet  (10 mg total) by mouth nightly. 90 tablet 0    citalopram 40 MG Oral Tab Take 1 tablet (40 mg total) by mouth daily. 90 tablet 0    Ferrous Sulfate (FEROSUL) 325 (65 Fe) MG Oral Tab Take 1 tablet (325 mg total) by mouth daily with breakfast. 90 tablet 3    Glucose Blood (ONETOUCH VERIO) In Vitro Strip CHECK BLOOD SUGAR ONCE DAILY 100 strip 1    dicyclomine 20 MG Oral Tab Take 2 tablets (40 mg total) by mouth every evening. 30 tablet 0    albuterol 108 (90 Base) MCG/ACT Inhalation Aero Soln Inhale 1-2 puffs into the lungs every 4 to 6 hours as needed. 51 g 4    sodium chloride 0.9 % Inhalation Nebu Soln Take 3 mL by nebulization as needed for Wheezing. 30 each 0    Esomeprazole Magnesium (NEXIUM OR) Take by mouth.      sucralfate 1 GM/10ML Oral Suspension Take 10 mL (1 g total) by mouth 4 (four) times daily. 1200 mL 0    Insulin Pen Needle (BD PEN NEEDLE YANDY U/F) 32G X 4 MM Does not apply Misc Inject 1 each into the skin daily. 100 each 3    Multiple Vitamin Oral Tab Take 1 tablet by mouth daily.          REVIEW OF SYSTEMS   Comprehensive review of systems done. Negative except what is outlined in the above HPI.     PHYSICAL EXAMIMATION    vitals were not taken for this visit.   GENERAL: Very pleasant patient is in no apparent distress. Sitting comfortably in the examination chair.   HEENT: Normocephalic, atraumatic.  RESPIRATORY RATE: Easy and Even  SKIN: Warm and dry  NEURO: Awake, alert and orientated. Speech fluent, comprehension intact, answering questions appropriately.     SPINE:  Gait/Coordination: Intact, non-antalgic gait.     Moving bilateral upper and lower extremities spontaneously to full resistance     Lower Extremity Strength:     Iliopsoas  Hamstrings   Quads    D-flexion P-flexion Great Toe   Right       5         5       5         5 5 5   Left       5         5       5         5 5 5     Tests:   Test Right   (POS or NEG) Left   (POS or NEG)   SLR Neg Neg   Clonus Neg Neg     DTR:  Bilateral lower  extremities 2/4    DATA:   None    IMAGING:   No recent imaging     MEDICAL DECISION MAKING:     ASSESSMENT and PLAN:    ICD-10-CM    1. Lumbar pain  M54.50 OP REFERRAL TO [facility]      2. Muscle tightness  M62.89 OP REFERRAL TO [facility]      3. Chronic midline thoracic back pain  M54.6 OP REFERRAL TO [facility]    G89.29       4. Pain of right sacroiliac joint  M53.3 OP REFERRAL TO [facility]      5. Lumbar radiculopathy  M54.16 OP REFERRAL TO [facility]        PLAN:   1. Medication: None prescribed   -Medrol Dosepak offered, patient would like to hold at this time  2. Imaging:    - Reviewed today:    -No recent imaging   - Ordered today:    -None  3. Activity:    -PT reordered with updates for the lumbar spine   -Encourage core/spine strengthening   -Encourage posture work  4.  Pain services:   -Recommend follow-up to discuss further injections, patient states she is due typically every few months to keep her symptoms at baseline.  5.  Brain fog:   -Recommend follow-up with rheumatology and/or neurology to discuss brain fog  6. Follow up as needed or call or follow up sooner or go to the ED for any new, worsening or concerning signs or symptoms     I reviewed imaging. I discussed the plan and reviewed imaging with the patient. The patient agrees with the plan, verbalized understanding and is appreciative. All questions were sought out and thoroughly answered to satisfaction.       Total visit time: 30 minutes  More than 50% spent coordinating care, providing patient education, discussing pain services, discussing PT, discussing neurology/rheumatology and counseling.    Angelita May M.S., PA-C  [facility] [address], [facility] [address]  [place] [address]  [phone]  11/6/2024 1:03 PM    Dragon speech recognition software was used to prepare this note. If a word or phrase is  confusing, it is likely due to a failure of recognition. Please contact me with any questions or clarifications.         [1]   Allergies  Allergen Reactions    Demerol OTHER (SEE COMMENTS)     Difficulty breathing    Levofloxacin RASH and Tightness in Chest    Reglan [Metoclopramide] OTHER (SEE COMMENTS)     Tardive dyskinesia    Betadine [Povidone Iodine] ITCHING    Meperidine NAUSEA AND VOMITING    Other OTHER (SEE COMMENTS)     Sensitive skin, milk,dust mites    Quinolones UNKNOWN

## 2024-11-06 NOTE — PROGRESS NOTES
Established patient:  Reason for follow up:   Mid-low back pain, R leg pain   Symptoms started on Saturday      Numeric Rating Scale:        Pain at Present: 6/10

## 2024-11-07 ENCOUNTER — PATIENT MESSAGE (OUTPATIENT)
Dept: SURGERY | Facility: CLINIC | Age: 49
End: 2024-11-07

## 2024-11-07 DIAGNOSIS — M54.50 LUMBAR PAIN: Primary | ICD-10-CM

## 2024-11-07 NOTE — TELEPHONE ENCOUNTER
Message below noted.    Pt requesting if she should get imaging to make sure nothing serious is going on with her back.     LOV 11/07/24  \"PLAN:   1. Medication: None prescribed                -Medrol Dosepak offered, patient would like to hold at this time  2. Imaging:                 - Reviewed today:                              -No recent imaging                - Ordered today:                              -None  3. Activity:                 -PT reordered with updates for the lumbar spine                -Encourage core/spine strengthening                -Encourage posture work  4.  Pain services:                -Recommend follow-up to discuss further injections, patient states she is due typically every few months to keep her symptoms at baseline.  5.  Brain fog:                -Recommend follow-up with rheumatology and/or neurology to discuss brain fog  6. Follow up as needed or call or follow up sooner or go to the ED for any new, worsening or concerning signs or symptoms\"    Routed to Provider.

## 2024-11-08 ENCOUNTER — LAB ENCOUNTER (OUTPATIENT)
Dept: LAB | Age: 49
End: 2024-11-08
Attending: INTERNAL MEDICINE
Payer: COMMERCIAL

## 2024-11-08 ENCOUNTER — HOSPITAL ENCOUNTER (OUTPATIENT)
Dept: GENERAL RADIOLOGY | Age: 49
Discharge: HOME | End: 2024-11-08
Attending: PHYSICIAN ASSISTANT
Payer: COMMERCIAL

## 2024-11-08 DIAGNOSIS — E78.00 ELEVATED CHOLESTEROL: ICD-10-CM

## 2024-11-08 DIAGNOSIS — E67.3 HYPERVITAMINOSIS D: ICD-10-CM

## 2024-11-08 DIAGNOSIS — R76.8 LOW IMMUNOGLOBULIN LEVEL: ICD-10-CM

## 2024-11-08 DIAGNOSIS — M54.50 LUMBAR PAIN: ICD-10-CM

## 2024-11-08 DIAGNOSIS — E67.8 EXCESSIVE VITAMIN B6 INTAKE: ICD-10-CM

## 2024-11-08 LAB
CHOLEST SERPL-MCNC: 230 MG/DL (ref ?–200)
FASTING PATIENT LIPID ANSWER: YES
HDLC SERPL-MCNC: 72 MG/DL (ref 40–59)
IMMUNOGLOBULIN PNL SER-MCNC: 448 MG/DL (ref 650–1600)
LDLC SERPL CALC-MCNC: 139 MG/DL (ref ?–100)
NONHDLC SERPL-MCNC: 158 MG/DL (ref ?–130)
TRIGL SERPL-MCNC: 107 MG/DL (ref 30–149)
VIT D+METAB SERPL-MCNC: 76.1 NG/ML (ref 30–100)
VLDLC SERPL CALC-MCNC: 20 MG/DL (ref 0–30)

## 2024-11-08 PROCEDURE — 80061 LIPID PANEL: CPT

## 2024-11-08 PROCEDURE — 82784 ASSAY IGA/IGD/IGG/IGM EACH: CPT

## 2024-11-08 PROCEDURE — 84207 ASSAY OF VITAMIN B-6: CPT

## 2024-11-08 PROCEDURE — 36415 COLL VENOUS BLD VENIPUNCTURE: CPT

## 2024-11-08 PROCEDURE — 82306 VITAMIN D 25 HYDROXY: CPT

## 2024-11-08 PROCEDURE — 86317 IMMUNOASSAY INFECTIOUS AGENT: CPT

## 2024-11-08 PROCEDURE — 72110 X-RAY EXAM L-2 SPINE 4/>VWS: CPT | Performed by: PHYSICIAN ASSISTANT

## 2024-11-11 RX ORDER — METHYLPHENIDATE HYDROCHLORIDE 20 MG/1
TABLET ORAL 2 TIMES DAILY
Refills: 0 | OUTPATIENT
Start: 2024-11-11

## 2024-11-12 NOTE — TELEPHONE ENCOUNTER
.A refill request was received for:  Requested Prescriptions     Pending Prescriptions Disp Refills    methylphenidate 20 MG Oral Tab  0     Sig: Take by mouth 2 (two) times daily.       Last refill date:   9/16/24    Last office visit: 4/24/24    Follow up due:  Future Appointments   Date Time Provider Department Center   11/15/2024  9:30 AM Zain Crawford PA [facility]   12/2/2024  2:45 PM Pinky Ramirez, PT [facility]   12/6/2024  1:00 PM Lissette Diaz PT [facility]   12/9/2024  9:15 AM Pinky Ramirez, PT [facility]   12/13/2024 11:15 AM Lissette Diaz PT [facility]   12/16/2024  9:15 AM Pinky Ramierz, PT [facility]   12/20/2024  9:00 AM Lissette Diaz PT [facility]   12/23/2024  9:15 AM Pinky Ramirez, PT [facility]   12/24/2024  9:30 AM Lissette Diaz PT [facility]   12/30/2024  9:15 AM Geraldine Schofield, PTA [facility]   1/3/2025  9:45 AM Lissette Diaz PT [facility]   1/6/2025  9:15 AM Pinky Ramirez, PT [facility]   1/10/2025  9:00 AM Lissette Diaz, PT [facility]

## 2024-11-13 ENCOUNTER — TELEPHONE (OUTPATIENT)
Dept: RHEUMATOLOGY | Facility: CLINIC | Age: 49
End: 2024-11-13

## 2024-11-13 DIAGNOSIS — D80.1 HYPOGAMMAGLOBULINEMIA (HCC): Primary | ICD-10-CM

## 2024-11-13 LAB
PNEU AB TYPE 1*: 1 UG/ML
PNEU AB TYPE 12 (12F)*: 1.1 UG/ML
PNEU AB TYPE 14*: 28.7 UG/ML
PNEU AB TYPE 17 (17F)*: 3.2 UG/ML
PNEU AB TYPE 19 (19F)*: 2.9 UG/ML
PNEU AB TYPE 2*: <0.2 UG/ML
PNEU AB TYPE 20*: 4.7 UG/ML
PNEU AB TYPE 22 (22F)*: 3.3 UG/ML
PNEU AB TYPE 23 (23F)*: <0.1 UG/ML
PNEU AB TYPE 26 (6B)*: 1.1 UG/ML
PNEU AB TYPE 3*: 0.2 UG/ML
PNEU AB TYPE 34 (10A)*: 7.1 UG/ML
PNEU AB TYPE 4*: 0.5 UG/ML
PNEU AB TYPE 43 (11A)*: 1.3 UG/ML
PNEU AB TYPE 5*: 0.4 UG/ML
PNEU AB TYPE 51 (7F)*: 0.4 UG/ML
PNEU AB TYPE 54 (15B)*: 6 UG/ML
PNEU AB TYPE 56 (18C)*: 1.3 UG/ML
PNEU AB TYPE 57 (19A)*: 3.9 UG/ML
PNEU AB TYPE 68 (9V)*: 1.5 UG/ML
PNEU AB TYPE 70 (33F)*: 1.3 UG/ML
PNEU AB TYPE 8*: 2.2 UG/ML
PNEU AB TYPE 9 (9N)*: <0.1 UG/ML
VITAMIN B6: 68.4 UG/L

## 2024-11-15 ENCOUNTER — VIRTUAL PHONE E/M (OUTPATIENT)
Dept: PAIN CLINIC | Facility: CLINIC | Age: 49
End: 2024-11-15
Payer: COMMERCIAL

## 2024-11-15 DIAGNOSIS — M79.18 MYOFASCIAL PAIN: Primary | ICD-10-CM

## 2024-11-15 PROCEDURE — 99442 PHONE E/M BY PHYS 11-20 MIN: CPT | Performed by: PHYSICIAN ASSISTANT

## 2024-11-15 NOTE — PROGRESS NOTES
HPI:   Nadya Mina presents with complaints of Neck pain with B trapezius/rhomboid pain and n/t to [facility].      The pain is described as moderate aching that is intermittent.  The patient’s activity level has increased since last visit.  The pain is worst unrelated to time of day.    Changes in condition/history since last visit: Patient is here today for follow up, having undergone TPI on 9/27/24.  Procedure was well tolerated and had no adverse effects.  Overall, reports excellent initial relief, and was 80-90% improved.  With time, this has begun to return, and wishes to repeat injection.      Prior to this, had good relief with hardware injection on 3/21/23.        Of note, she has been evaluated by rheumatology, and was scheduled to begin PT.  Has yet to initiate this, and did follow with neurosurgery.  Sent for XR, and encouraged to begin PT for LBP.  She begins 12/2/24.  In addition, she states that she was diagnosed with thoracic outlet syndrome, and is going to initiate PT for this as well.      Last procedure: Bilateral TPI   Date: 9/27/24 (4/24/23, 12/22/23, 5/22/24)    Percent relief: 80-90%    Duration: ~2 months    Previous procedure: Bilateral cervical hardware injection date: 3/21/2023    Percent relief: 80%    Duration: 60% sustained    The following activities will increase the patient’s pain: prolonged activity     The following activities decrease the patient’s pain: limiting activity level    Functional Assessment: Patient reports that they are able to complete all of their ADL's such as eating, bathing, using the toilet, dressing and getting up from a bed or a chair independently.    Current Medications:  Current Outpatient Medications   Medication Sig Dispense Refill    Lovastatin 40 MG Oral Tab Take 1 tablet (40 mg total) by mouth nightly. 90 tablet 3    methylphenidate 20 MG Oral Tab Take by mouth 2 (two) times daily.      ondansetron 4 MG Oral Tablet Dispersible Take 1 tablet (4 mg total)  by mouth every 8 (eight) hours as needed for Nausea. 20 tablet 0    ALPRAZolam 0.25 MG Oral Tab Take 1 tablet (0.25 mg total) by mouth nightly as needed for Sleep or Anxiety. 30 tablet 2    montelukast 10 MG Oral Tab Take 1 tablet (10 mg total) by mouth nightly. 90 tablet 0    citalopram 40 MG Oral Tab Take 1 tablet (40 mg total) by mouth daily. 90 tablet 0    Ferrous Sulfate (FEROSUL) 325 (65 Fe) MG Oral Tab Take 1 tablet (325 mg total) by mouth daily with breakfast. 90 tablet 3    Glucose Blood (ONETOUCH VERIO) In Vitro Strip CHECK BLOOD SUGAR ONCE DAILY 100 strip 1    dicyclomine 20 MG Oral Tab Take 2 tablets (40 mg total) by mouth every evening. 30 tablet 0    albuterol 108 (90 Base) MCG/ACT Inhalation Aero Soln Inhale 1-2 puffs into the lungs every 4 to 6 hours as needed. 51 g 4    sodium chloride 0.9 % Inhalation Nebu Soln Take 3 mL by nebulization as needed for Wheezing. 30 each 0    Esomeprazole Magnesium (NEXIUM OR) Take by mouth.      sucralfate 1 GM/10ML Oral Suspension Take 10 mL (1 g total) by mouth 4 (four) times daily. 1200 mL 0    Insulin Pen Needle (BD PEN NEEDLE YANDY U/F) 32G X 4 MM Does not apply Misc Inject 1 each into the skin daily. 100 each 3    Multiple Vitamin Oral Tab Take 1 tablet by mouth daily.        Side effects of medications: None    Relief obtained from medication management: n/a    Patient requires assistance with: No assistance required    Reviewed Patient History Dated: 9/27/24 no changes noted  Patient is currently on pain medications:  No  [place] Prescription Monitoring review: N/A    Physical Exam:   Vitals: [facility] 01/01/2004   VAS Pain Score:  7/10    General Appearance: Well developed, well nourished, normal build, independent body habitus, no apparent physical disabilities, well groomed    Neurological Exam: WNL-Orientation to time, place and person, normal mood & effect, normal concentration & attention span  Inspection: non-antalgic, no acute distress   Pain with ROM C  spine in all planes of motion  Pain to palpation superior R thoracic paraspinal musculature, trap, rhomboid  No focal sensory/motor deficits  Negative spurling  Radiology/Lab Test Reviewed: XR:    MRI C spine:  CONCLUSION:         1. Stable postoperative changes in the cervical spine as above spanning C3-C7.       2. Stable minimal degenerative changes in the cervical spine as above.  There is no significant spinal canal or neural foraminal stenosis at any level.       3. No focal spinal cord signal abnormality identified    CT 3/30/24:    C2-C3:  No significant disc/facet abnormality, spinal stenosis, or foraminal stenosis.   C3-C4:  Laminectomy change.  No spinal stenosis or foraminal stenosis.   C4-C5:  Laminectomy and fusion change.  No spinal stenosis or foraminal stenosis.   C5-C6:  Laminectomy and fusion change.  No spinal stenosis or foraminal stenosis.   C6-C7:  Laminectomy and fusion change.  No spinal stenosis or foraminal stenosis.   C7-T1:  No significant disc/facet abnormality, spinal stenosis, or foraminal stenosis.     Did the pt have a positive pain response from the most recent TPI?: yes  Percentage of relief obtained: 100%  Duration of pain relief: >3 months, though still with some moderate relief, though pain is returning  Does the pt have recurring myofacial pain that is causing functional limitation?: yes     Patient educated and verbalized understanding.  Medical Decision Making:   Diagnosis:    Encounter Diagnosis   Name Primary?    Myofascial pain Yes       Impression: Elimination of pain after bilateral trap and cervical paraspinal trigger point injections on 4/24/2023, though pain did eventually return, and underwent repeat injections on 12/22/23 and 5/22/24 to similar response.  Again, pain returned, and had most recent TPI on 9/27/24, to excellent initial relief, though pain has again begun to return.  Wishes to repeat procedure.    Of note, she has been evaluated by rheumatology, and  was sent to physical therapy, though has yet to initiate this.  She states that she was diagnosed with thoracic outlet syndrome, and is scheduled to begin PT 12/2/24.  In addition, she had flare of LBP, and was seen by neurosurgery.  XR showed facet arthritis L4/5 and L5/S1, and is again planning on PT.  If ineffective, consider MBNB.    Plan: Patient to schedule the following injection: B trapezius, cervical paraspinal, and rhomboid TPI Levels: as stated, Procedure and risks were discussed with pt. including headache, bleeding, infection and potential nerve damage.  F/u 2-3 weeks.    No orders of the defined types were placed in this encounter.      Meds & Refills for this Visit:  Requested Prescriptions      No prescriptions requested or ordered in this encounter       Imaging & Consults:  None    The patient indicates understanding of these issues and agrees to the plan.    JOSH Padilla

## 2024-11-18 ENCOUNTER — TELEPHONE (OUTPATIENT)
Dept: PAIN CLINIC | Facility: CLINIC | Age: 49
End: 2024-11-18

## 2024-11-18 NOTE — TELEPHONE ENCOUNTER
Order Questions    Question Answer Comment   Anesthesia Type Local    Provider Elías    Location Office    Procedure Other (please add comment) bilateral cervical, trap, rhomboid TPI   CPT (Hit enter after each entry) INJECTION; SINGLE/MULTIPLE TRIGGER POINT(S), 3> MUSCLE    Medical clearance requested (will send to Pain Navigator) No    Patient has Medicare coverage? No

## 2024-11-18 NOTE — TELEPHONE ENCOUNTER
Contacted patient informed that referral authorization has been obtained, offered patient to schedule. Patient VU and agreed to schedule.     Scheduled patient for In Office -bilateral cervical, trap, rhomboid TPI on 11/25/24 @ 10:00 AM.

## 2024-11-18 NOTE — TELEPHONE ENCOUNTER
PATIENT NAME:  SHERRI INIGUEZ/ [phone] (home)/ There is no work phone number on file.  PATIENT :  1975  REFERRAL ID #:  [id number]  REFERRAL STATUS:  Authorized [1]  REVIEW REFERRAL NOTES FOR MORE INFORMATION:  DATE AUTHORIZED:  11/15/2024 // EXPIRATION DATE: 11/15/2025   REFERRED BY:  SHAD VENCES MD (TEL: [phone])  REFERRED TO: SHAD VENCES MD (TEL: [phone])     No vendor specified on referral. / No vendor phone number known.  No facility specified on referral  REFERRED FOR:    Diagnoses:    M79.18 (ICD-10-CM) - 729.1 (ICD-9-CM) - Myofascial pain  Procedures:     [id number] - [facility] PAIN NAVIGATOR   NUMBER OF VISITS AUTH: 1

## 2024-11-25 ENCOUNTER — NURSE ONLY (OUTPATIENT)
Dept: PAIN CLINIC | Facility: CLINIC | Age: 49
End: 2024-11-25
Payer: COMMERCIAL

## 2024-11-25 ENCOUNTER — OFFICE VISIT (OUTPATIENT)
Dept: PAIN CLINIC | Facility: CLINIC | Age: 49
End: 2024-11-25
Payer: COMMERCIAL

## 2024-11-25 VITALS — DIASTOLIC BLOOD PRESSURE: 80 MMHG | OXYGEN SATURATION: 98 % | SYSTOLIC BLOOD PRESSURE: 120 MMHG | HEART RATE: 100 BPM

## 2024-11-25 DIAGNOSIS — M79.18 CERVICAL MYOFASCIAL PAIN SYNDROME: Primary | ICD-10-CM

## 2024-11-25 RX ORDER — TRIAMCINOLONE ACETONIDE 40 MG/ML
80 INJECTION, SUSPENSION INTRA-ARTICULAR; INTRAMUSCULAR ONCE
Status: COMPLETED | OUTPATIENT
Start: 2024-11-25 | End: 2024-11-25

## 2024-11-25 RX ORDER — BUPIVACAINE HYDROCHLORIDE 5 MG/ML
18 INJECTION, SOLUTION EPIDURAL; INTRACAUDAL ONCE
Status: COMPLETED | OUTPATIENT
Start: 2024-11-25 | End: 2024-11-25

## 2024-11-25 NOTE — PROGRESS NOTES
Patient presents in office today with reported pain in neck area    Current pain level reported = 7/10    Last reported dose of n/a

## 2024-11-25 NOTE — PROCEDURES
Date of procedure: November 25, 2024    Preop diagnosis: Cervical myofascial pain     Postop diagnosis: Same      procedure: Bilateral cervical TPI     Surgeon: Rock Mckinley     Anesthesia: None        Indication: Patient is a 48-year-old female with a history of neck pain     Procedure detail: Informed consent obtained.  Timeout was done.  Skin overlying the cervical spine was prepped in usual sterile fashion.  Subsequently, a 27-gauge needle was used to deliver a total mixture of 80 mg of triamcinolone with 18 cc of 0.5% bupivacaine to multiple trigger points.  Muscles injected include the levator scapulae, trapezius, and rhomboid.  Patient tolerated procedure well.  Excellent hemostasis.  No objective subjective weakness.     Rock Mckinley MD

## 2024-11-25 NOTE — PROGRESS NOTES
Timeout completed prior to procedure @1011.  Participants present for timeout:  Dr. Mckinley, RN Layne , and patient.

## 2024-11-27 ENCOUNTER — TELEPHONE (OUTPATIENT)
Dept: PHYSICAL THERAPY | Facility: HOSPITAL | Age: 49
End: 2024-11-27

## 2024-11-29 ENCOUNTER — HOSPITAL ENCOUNTER (OUTPATIENT)
Dept: BONE DENSITY | Age: 49
Discharge: HOME | End: 2024-11-29
Attending: PHYSICIAN ASSISTANT
Payer: COMMERCIAL

## 2024-11-29 DIAGNOSIS — Z87.39 HISTORY OF OSTEOPENIA: ICD-10-CM

## 2024-11-29 PROCEDURE — 77080 DXA BONE DENSITY AXIAL: CPT | Performed by: PHYSICIAN ASSISTANT

## 2024-12-02 DIAGNOSIS — F32.0 CURRENT MILD EPISODE OF MAJOR DEPRESSIVE DISORDER, UNSPECIFIED WHETHER RECURRENT (HCC): ICD-10-CM

## 2024-12-02 DIAGNOSIS — J45.20 MILD INTERMITTENT ASTHMA WITHOUT COMPLICATION (HCC): ICD-10-CM

## 2024-12-02 DIAGNOSIS — F41.9 ANXIETY: ICD-10-CM

## 2024-12-03 RX ORDER — CITALOPRAM HYDROBROMIDE 40 MG/1
40 TABLET ORAL DAILY
Qty: 90 TABLET | Refills: 0 | Status: SHIPPED | OUTPATIENT
Start: 2024-12-03

## 2024-12-03 RX ORDER — MONTELUKAST SODIUM 10 MG/1
10 TABLET ORAL NIGHTLY
Qty: 90 TABLET | Refills: 0 | Status: SHIPPED | OUTPATIENT
Start: 2024-12-03

## 2024-12-06 ENCOUNTER — TELEPHONE (OUTPATIENT)
Dept: FAMILY MEDICINE CLINIC | Facility: CLINIC | Age: 49
End: 2024-12-06

## 2024-12-06 ENCOUNTER — APPOINTMENT (OUTPATIENT)
Dept: PHYSICAL THERAPY | Facility: HOSPITAL | Age: 49
End: 2024-12-06
Attending: INTERNAL MEDICINE
Payer: COMMERCIAL

## 2024-12-06 NOTE — PROGRESS NOTES
Pt is here for follow up on post PT for neck. Pt states that not much has changes, and that PT seem to not have helped. English

## 2024-12-11 ENCOUNTER — OFFICE VISIT (OUTPATIENT)
Dept: FAMILY MEDICINE CLINIC | Facility: CLINIC | Age: 49
End: 2024-12-11
Payer: COMMERCIAL

## 2024-12-11 VITALS
BODY MASS INDEX: 25.66 KG/M2 | TEMPERATURE: 98 F | HEART RATE: 97 BPM | DIASTOLIC BLOOD PRESSURE: 72 MMHG | HEIGHT: 65 IN | WEIGHT: 154 LBS | SYSTOLIC BLOOD PRESSURE: 118 MMHG | OXYGEN SATURATION: 97 % | RESPIRATION RATE: 16 BRPM

## 2024-12-11 DIAGNOSIS — G47.33 OSA (OBSTRUCTIVE SLEEP APNEA): ICD-10-CM

## 2024-12-11 DIAGNOSIS — K56.609 SMALL BOWEL OBSTRUCTION (HCC): Primary | ICD-10-CM

## 2024-12-11 DIAGNOSIS — K52.9 INFLAMMATION OF INTESTINE: ICD-10-CM

## 2024-12-11 PROCEDURE — 3008F BODY MASS INDEX DOCD: CPT | Performed by: PHYSICIAN ASSISTANT

## 2024-12-11 PROCEDURE — 3078F DIAST BP <80 MM HG: CPT | Performed by: PHYSICIAN ASSISTANT

## 2024-12-11 PROCEDURE — 99214 OFFICE O/P EST MOD 30 MIN: CPT | Performed by: PHYSICIAN ASSISTANT

## 2024-12-11 PROCEDURE — 3074F SYST BP LT 130 MM HG: CPT | Performed by: PHYSICIAN ASSISTANT

## 2024-12-11 NOTE — PROGRESS NOTES
Subjective:   Patient ID: Nadya Mina is a 49 year old female.    HPI  Patient presents for follow up of recent admission to [facility]  Was diagnosed with bowel obstruction  Resolved without surgery, NG tube used  CT scan showed inflammatory vs infectious colitis and there was concern for possible Crohn's disease  She reports family h/o ulcerative colitis  Since discharge she has continued to have intermittent discomfort  Very nauseous all the time, no vomiting  No appetite  Since discharge she has been able to pass gas, stools are firm in the morning and softer later in the day  No fever or chills  No blood in the stool  She was given bentyl - unsure how much this is helping  She needs referral to GI and surgery    History/Other:   Review of Systems   Constitutional:  Negative for chills, fatigue and fever.   HENT:  Negative for congestion, ear pain, rhinorrhea and sore throat.    Eyes:  Negative for visual disturbance.   Respiratory:  Negative for cough, shortness of breath and wheezing.    Cardiovascular:  Negative for chest pain, palpitations and leg swelling.   Gastrointestinal:  Positive for abdominal distention, abdominal pain, diarrhea and nausea. Negative for vomiting.   Genitourinary:  Negative for dysuria, frequency and hematuria.   Musculoskeletal:  Negative for arthralgias, gait problem and myalgias.   Skin:  Negative for rash.   Neurological:  Negative for weakness, light-headedness and headaches.   Hematological:  Negative for adenopathy.   Psychiatric/Behavioral:  Negative for dysphoric mood. The patient is not nervous/anxious.      Current Outpatient Medications   Medication Sig Dispense Refill    montelukast 10 MG Oral Tab Take 1 tablet (10 mg total) by mouth nightly. 90 tablet 0    citalopram 40 MG Oral Tab Take 1 tablet (40 mg total) by mouth daily. 90 tablet 0    buPROPion  MG Oral Tablet 24 Hr Take 1 tablet (150 mg total) by mouth daily. 30 tablet 0    methylphenidate 20 MG Oral  Tab Take 1 tablet (20 mg total) by mouth 2 (two) times daily. 60 tablet 0    Lovastatin 40 MG Oral Tab Take 1 tablet (40 mg total) by mouth nightly. 90 tablet 3    ondansetron 4 MG Oral Tablet Dispersible Take 1 tablet (4 mg total) by mouth every 8 (eight) hours as needed for Nausea. 20 tablet 0    ALPRAZolam 0.25 MG Oral Tab Take 1 tablet (0.25 mg total) by mouth nightly as needed for Sleep or Anxiety. 30 tablet 2    Ferrous Sulfate (FEROSUL) 325 (65 Fe) MG Oral Tab Take 1 tablet (325 mg total) by mouth daily with breakfast. 90 tablet 3    Glucose Blood (ONETOUCH VERIO) In Vitro Strip CHECK BLOOD SUGAR ONCE DAILY 100 strip 1    dicyclomine 20 MG Oral Tab Take 2 tablets (40 mg total) by mouth every evening. 30 tablet 0    albuterol 108 (90 Base) MCG/ACT Inhalation Aero Soln Inhale 1-2 puffs into the lungs every 4 to 6 hours as needed. 51 g 4    sodium chloride 0.9 % Inhalation Nebu Soln Take 3 mL by nebulization as needed for Wheezing. 30 each 0    Esomeprazole Magnesium (NEXIUM OR) Take by mouth.      sucralfate 1 GM/10ML Oral Suspension Take 10 mL (1 g total) by mouth 4 (four) times daily. 1200 mL 0    Insulin Pen Needle (BD PEN NEEDLE YANDY U/F) 32G X 4 MM Does not apply Misc Inject 1 each into the skin daily. 100 each 3    Multiple Vitamin Oral Tab Take 1 tablet by mouth daily.       Allergies:Allergies[1]    Objective:   Physical Exam  Vitals and nursing note reviewed.   Constitutional:       General: She is not in acute distress.     Appearance: Normal appearance. She is well-developed.   HENT:      Head: Normocephalic and atraumatic.      Right Ear: Tympanic membrane, ear canal and external ear normal.      Left Ear: Tympanic membrane, ear canal and external ear normal.      Nose: Nose normal.      Mouth/Throat:      Mouth: Mucous membranes are moist.   Eyes:      Extraocular Movements: Extraocular movements intact.      Conjunctiva/sclera: Conjunctivae normal.      Pupils: Pupils are equal, round, and  reactive to light.   Neck:      Thyroid: No thyromegaly.   Cardiovascular:      Rate and Rhythm: Normal rate and regular rhythm.      Pulses: Normal pulses.      Heart sounds: Normal heart sounds.   Pulmonary:      Effort: Pulmonary effort is normal.      Breath sounds: Normal breath sounds. No wheezing or rales.   Abdominal:      General: Bowel sounds are normal. There is no distension.      Palpations: Abdomen is soft. There is no mass.      Tenderness: There is abdominal tenderness in the right lower quadrant, periumbilical area and left lower quadrant.   Musculoskeletal:         General: No tenderness. Normal range of motion.      Cervical back: Normal range of motion and neck supple.   Lymphadenopathy:      Cervical: No cervical adenopathy.   Skin:     General: Skin is warm and dry.      Findings: No rash.   Neurological:      General: No focal deficit present.      Mental Status: She is alert and oriented to person, place, and time.   Psychiatric:         Mood and Affect: Mood normal.         Behavior: Behavior normal.         Assessment & Plan:   1. Small bowel obstruction (HCC)  2. Inflammation of intestine  Symptoms improved but not resolved. Based on imaging results, there is concern for Crohn's disease. She has family h/o UC. She is recommended to undergo colonoscopy. Given referral to GI/surgery for this. Continue supportive measures. Advance diet as tolerated. Follow up if symptoms persist/worsen.   - Gastro Referral - In Network  - Surgery Referral - In Network    3. BOSTON (obstructive sleep apnea)  Order for CPAP machine and supplies placed.   - DME - External             [1]   Allergies  Allergen Reactions    Demerol OTHER (SEE COMMENTS)     Difficulty breathing    Levofloxacin RASH and Tightness in Chest    Reglan [Metoclopramide] OTHER (SEE COMMENTS)     Tardive dyskinesia    Betadine [Povidone Iodine] ITCHING    Meperidine NAUSEA AND VOMITING    Other OTHER (SEE COMMENTS)     Sensitive skin,  milk,dust mites    Quinolones UNKNOWN

## 2024-12-13 ENCOUNTER — APPOINTMENT (OUTPATIENT)
Dept: PHYSICAL THERAPY | Facility: HOSPITAL | Age: 49
End: 2024-12-13
Attending: INTERNAL MEDICINE
Payer: COMMERCIAL

## 2024-12-13 ENCOUNTER — TELEPHONE (OUTPATIENT)
Dept: PHYSICAL THERAPY | Facility: HOSPITAL | Age: 49
End: 2024-12-13

## 2024-12-16 ENCOUNTER — APPOINTMENT (OUTPATIENT)
Dept: PHYSICAL THERAPY | Facility: HOSPITAL | Age: 49
End: 2024-12-16
Attending: PHYSICIAN ASSISTANT
Payer: COMMERCIAL

## 2024-12-18 ENCOUNTER — TELEPHONE (OUTPATIENT)
Dept: FAMILY MEDICINE CLINIC | Facility: CLINIC | Age: 49
End: 2024-12-18

## 2024-12-18 NOTE — TELEPHONE ENCOUNTER
Please review previous message   Pt taking Wellbutrin,Citalopram and Zofran.  Last dose of Zofran was yesterday.Patient was taking for nausea.Patient states she experienced  cold symptoms the last couple of days,cough,fever the last 2 days but not today.Patient states symptoms improved today but she  is experiencing a head shaking sensation.Denies any other symptoms  She states it feels like Citalopram withdrawal but patient states she has not missed a dose.  Patient concerned about Serotinin syndrome.  Please advise

## 2024-12-18 NOTE — TELEPHONE ENCOUNTER
Patient called said she was in hospital & is taking Zofran and antidepressants. Hasn't been taking Zofran as often but is concerned about possible serotonin syndrome.     Says she's experiencing head shaking. Doesn't know if its because of her fever because she's also sick.Said she hasn't taken Zofran today. Please advise.

## 2024-12-19 NOTE — TELEPHONE ENCOUNTER
----- Message from Shahla Joaquin sent at 11/22/2024  1:45 PM CST -----  Cholesterol is still a bit higher than we'd like. Would he like to:  1) Keep working on nutrition on his own.  2) See a nutritionist to go over cholesterol.  3) Start a second cholesterol pill (continue his current one, too).    Either way, we should recheck a fasting cholesterol in 3 months.    Stop zofran and wellbutrin. Continue citalopram at same dose. Follow up in 2 weeks. Sooner prn. If symptoms worsen, go to ER right away.

## 2024-12-23 ENCOUNTER — APPOINTMENT (OUTPATIENT)
Dept: PHYSICAL THERAPY | Facility: HOSPITAL | Age: 49
End: 2024-12-23
Attending: PHYSICIAN ASSISTANT
Payer: COMMERCIAL

## 2024-12-23 RX ORDER — BUPROPION HYDROCHLORIDE 150 MG/1
150 TABLET ORAL DAILY
Qty: 30 TABLET | Refills: 0 | Status: SHIPPED | OUTPATIENT
Start: 2024-12-23

## 2024-12-24 ENCOUNTER — APPOINTMENT (OUTPATIENT)
Dept: PHYSICAL THERAPY | Facility: HOSPITAL | Age: 49
End: 2024-12-24
Attending: INTERNAL MEDICINE
Payer: COMMERCIAL

## 2024-12-30 ENCOUNTER — APPOINTMENT (OUTPATIENT)
Dept: PHYSICAL THERAPY | Facility: HOSPITAL | Age: 49
End: 2024-12-30
Attending: PHYSICIAN ASSISTANT
Payer: COMMERCIAL

## 2024-12-31 ENCOUNTER — TELEPHONE (OUTPATIENT)
Dept: FAMILY MEDICINE CLINIC | Facility: CLINIC | Age: 49
End: 2024-12-31

## 2024-12-31 NOTE — TELEPHONE ENCOUNTER
Zonia butt from [facility] [phone] asking for a copy of the baseline sleep study as well as chart notes and face to face encounter notes.    Please fax to [phone]    Please advise

## 2025-01-03 ENCOUNTER — TELEPHONE (OUTPATIENT)
Dept: FAMILY MEDICINE CLINIC | Facility: CLINIC | Age: 50
End: 2025-01-03

## 2025-01-03 ENCOUNTER — APPOINTMENT (OUTPATIENT)
Dept: PHYSICAL THERAPY | Facility: HOSPITAL | Age: 50
End: 2025-01-03
Attending: INTERNAL MEDICINE
Payer: COMMERCIAL

## 2025-01-03 ENCOUNTER — TELEPHONE (OUTPATIENT)
Dept: PHYSICAL THERAPY | Facility: HOSPITAL | Age: 50
End: 2025-01-03

## 2025-01-03 DIAGNOSIS — E78.00 ELEVATED CHOLESTEROL: ICD-10-CM

## 2025-01-03 RX ORDER — LOVASTATIN 20 MG/1
20 TABLET ORAL NIGHTLY
Qty: 90 TABLET | Refills: 3 | Status: SHIPPED | OUTPATIENT
Start: 2025-01-03

## 2025-01-03 NOTE — TELEPHONE ENCOUNTER
A refill request was received for:  Requested Prescriptions     Pending Prescriptions Disp Refills    LOVASTATIN 20 MG Oral Tab [Pharmacy Med Name: LOVASTATIN 20MG TABLETS] 90 tablet 0     Sig: TAKE 1 TABLET(20 MG) BY MOUTH EVERY NIGHT       Last refill date:   11/10/2024    Last office visit: 12/11/2024    Follow up due:  Future Appointments   Date Time Provider Department Center   1/10/2025  9:00 AM Lissette Diaz, PT [facility]     `

## 2025-01-05 DIAGNOSIS — F90.8 OTHER SPECIFIED ATTENTION DEFICIT HYPERACTIVITY DISORDER (ADHD): ICD-10-CM

## 2025-01-06 ENCOUNTER — APPOINTMENT (OUTPATIENT)
Dept: PHYSICAL THERAPY | Facility: HOSPITAL | Age: 50
End: 2025-01-06
Attending: PHYSICIAN ASSISTANT
Payer: COMMERCIAL

## 2025-01-06 RX ORDER — METHYLPHENIDATE HYDROCHLORIDE 20 MG/1
20 TABLET ORAL 2 TIMES DAILY
Qty: 60 TABLET | Refills: 0 | Status: SHIPPED | OUTPATIENT
Start: 2025-01-06

## 2025-01-06 NOTE — TELEPHONE ENCOUNTER
A refill request was received for:  Requested Prescriptions     Pending Prescriptions Disp Refills    methylphenidate 20 MG Oral Tab 60 tablet 0     Sig: Take 1 tablet (20 mg total) by mouth 2 (two) times daily.       Last refill date:   11/15/2024    Last office visit: 12/11/2024    Follow up due:  Future Appointments   Date Time Provider Department Center   1/10/2025  9:00 AM Lissette Diaz, PT [facility]

## 2025-01-10 ENCOUNTER — OFFICE VISIT (OUTPATIENT)
Dept: PHYSICAL THERAPY | Facility: HOSPITAL | Age: 50
End: 2025-01-10
Attending: INTERNAL MEDICINE
Payer: COMMERCIAL

## 2025-01-10 PROCEDURE — 97164 PT RE-EVAL EST PLAN CARE: CPT | Performed by: PHYSICAL THERAPIST

## 2025-01-10 PROCEDURE — 97110 THERAPEUTIC EXERCISES: CPT | Performed by: PHYSICAL THERAPIST

## 2025-01-10 NOTE — PROGRESS NOTES
Diagnosis:   Muscle tightness (M62.89)  Chronic midline thoracic back pain (M54.6,G89.29)  Lumbar pain (M54.50)  Pain of right sacroiliac joint (M53.3)  Lumbar radiculopathy (M54.16)      Referring Provider: Larisa Livingston  Date of Evaluation:    12/2/24    Precautions:   cervical fusion  Next MD visit:   none scheduled  Date of Surgery: n/a   Insurance Primary/Secondary: [employer] [place] HMO / N/A     # Auth Visits: 8            Subjective: States that she has been ill since IE and has not been able to attend PT .  Hasn't been doing much due to recent hospitalizations due to bowel obstruction   Pneumonia  Saw Dr. Livingston and he told me that I have thoracic outlet  .  Has been having Has had TPI   With some relief , and I get them ~2-3 months , and I know when my arms get numb is when I need to get them again .   Neck pain is constant , notes a pop occ pain with turning head     Back pain is not horrible, has been doing exercise, but states that she is  mindful of low back and aleshia with lifting     Cleaned house yesterday dn it took all day , notes that her pain increased overall.     Bilateral Hands occ numb, like I touched an outlet,   Pain along anterior right chest   Strength is pretty good.       Sensation comes back eventually , Shakes arm and then they come back lasts about 10'  AGG:     Sitting with arms crossed   Arms under pillow makes hands go numb.       Pain:   2.3 /10     7/8 last few weeks vacuuming made it worse.     Wakes up at night       Hooking bra painful   , showering ok   Anything that I have to move my arms behind my back  it sol      No coldness in hands bilaterally fingers swell randomly .   Middle finger more numb then that last there mostly   same on left side.         Objective: seen for treatment.   Re-eval of TOS signs.  - temperature, pulse change   TTP along UT, levators,   Scap protraction   Bilaterally. 4Strength  UE/Scapular   Shoulder Flex: R 4-/5, L 4-/5  Shoulder ABD (C5): R 3/5, L  3/5  Biceps (C6): R 3/5, L 3/5  Wrist ext (C6): R 3/5, L 3/5  Triceps (C7): R 3/5, L 3/5  Wrist Flex (C7): R 3/5, L 3/5  Digit Flex (C8): R 3/5, L 3/5  Thumb Ext (C8): R 4/5, L 4/5  Interossei (T1): R 4/5, L 4/5            Assessment: re-assessed and screened for TOS.  Reviewed previous HEP to include scap squeezes and general LE strengthening. Discussed need for continued PT and general strength and ROM, endurance       Goals:     Pt will improve transversus abdominis recruitment to perform proper isometric contraction without requiring verbal or tactile cuing to promote advancement of therex   Pt will demonstrate good understanding of proper posture and body mechanics to decrease pain and improve spinal safety   Pt will improve lumbar spine AROM flexion to normal levels to allow increased ease with bending forward to don shoes   Pt will report improved symptom centralization and absence of radicular symptoms for 7 consecutive days to improve function with ADL   Pt will have decreased paraspinal mm tension to tolerate standing >60 minutes for work and home activities   Pt will demonstrate improved core strength to be able to perform lifting with <1-2/10 pain   Pt will be independent and compliant with comprehensive HEP to maintain progress achieved in PT       Plan: Progress as outlined in POC       Neck Disability Index Score  Score: 46 % (1/13/2025 10:18 AM)           HEP: scap squeezes     Charges: T       Total Timed Treatment: 45 min  Total Treatment Time: 45 min

## 2025-01-13 ENCOUNTER — APPOINTMENT (OUTPATIENT)
Dept: PHYSICAL THERAPY | Facility: HOSPITAL | Age: 50
End: 2025-01-13
Attending: FAMILY MEDICINE
Payer: COMMERCIAL

## 2025-01-13 RX ORDER — ALBUTEROL SULFATE 90 UG/1
1-2 INHALANT RESPIRATORY (INHALATION)
Qty: 51 G | Refills: 4 | Status: SHIPPED | OUTPATIENT
Start: 2025-01-13

## 2025-01-20 ENCOUNTER — PATIENT MESSAGE (OUTPATIENT)
Dept: RHEUMATOLOGY | Facility: CLINIC | Age: 50
End: 2025-01-20

## 2025-01-20 DIAGNOSIS — G95.9 CERVICAL MYELOPATHY WITH CERVICAL RADICULOPATHY (HCC): Primary | ICD-10-CM

## 2025-01-20 DIAGNOSIS — M54.12 CERVICAL MYELOPATHY WITH CERVICAL RADICULOPATHY (HCC): Primary | ICD-10-CM

## 2025-01-21 RX ORDER — BUPROPION HYDROCHLORIDE 150 MG/1
150 TABLET ORAL DAILY
Qty: 30 TABLET | Refills: 0 | Status: SHIPPED | OUTPATIENT
Start: 2025-01-21

## 2025-01-23 ENCOUNTER — PATIENT MESSAGE (OUTPATIENT)
Dept: FAMILY MEDICINE CLINIC | Facility: CLINIC | Age: 50
End: 2025-01-23

## 2025-01-23 RX ORDER — CYCLOBENZAPRINE HCL 10 MG
10 TABLET ORAL NIGHTLY
Qty: 30 TABLET | Refills: 0 | Status: SHIPPED | OUTPATIENT
Start: 2025-01-23 | End: 2025-02-22

## 2025-01-25 ENCOUNTER — TELEPHONE (OUTPATIENT)
Age: 50
End: 2025-01-25

## 2025-01-25 NOTE — TELEPHONE ENCOUNTER
[facility]-Jordyn Walton, LCSW, EMDR  [address], [address]  [place] [address]  [phone]    [facility]-Nadia Marquez, LCSW  [address]  [place] [address]  [phone]    [facility]-Emy Espinal, LCSW  [address], [address]  [place] [address]  [phone]    [facility]-Melania Valentine, \[facility]\""W  [facility] [place], hospitals   [address]  [place] [address]  [phone]    [facility]-Livia Jimenez, \[facility]\""CHRIS Preston, \[facility]\""W  [address]  [place] [address]  [phone]

## 2025-01-28 ENCOUNTER — HOSPITAL ENCOUNTER (OUTPATIENT)
Age: 50
Discharge: HOME | End: 2025-01-28
Payer: COMMERCIAL

## 2025-01-28 ENCOUNTER — TELEPHONE (OUTPATIENT)
Facility: CLINIC | Age: 50
End: 2025-01-28

## 2025-01-28 VITALS
OXYGEN SATURATION: 97 % | DIASTOLIC BLOOD PRESSURE: 81 MMHG | SYSTOLIC BLOOD PRESSURE: 116 MMHG | HEART RATE: 108 BPM | TEMPERATURE: 98 F | RESPIRATION RATE: 18 BRPM

## 2025-01-28 DIAGNOSIS — M26.621 ARTHRALGIA OF RIGHT TEMPOROMANDIBULAR JOINT: Primary | ICD-10-CM

## 2025-01-28 PROCEDURE — 99213 OFFICE O/P EST LOW 20 MIN: CPT | Performed by: NURSE PRACTITIONER

## 2025-01-28 NOTE — ED INITIAL ASSESSMENT (HPI)
Patient here with worsening right sided jaw pain and right side ear pain for about a week. Denied fever, chills, or body aches.

## 2025-01-28 NOTE — ED PROVIDER NOTES
Patient Seen in: [facility]      History     Chief Complaint   Patient presents with    Sinus Problem     Right jaw and ear pain. Can't chew without pain - Entered by patient     Stated Complaint: Sinus Problem - Right jaw and ear pain. Can’t chew without pain    Subjective:   49-year-old female with right sided jaw pain.  No trauma or injury.  Has been bugging her for a week.  She sees a rheumatologist and told her she possibly have thoracic outlet syndrome which could cause some jaw pain.  She came in here to make sure she did not have an infection.  No fevers.  Has been taking combination Advil and Tylenol pill, and was also given cyclobenzaprine, by her doctor.              Objective:     Past Medical History:    Abdominal distention    Abdominal pain    Abnormal uterine bleeding    Acute bronchitis    Acute esophagitis    Acute upper respiratory infections of unspecified site    Allergic rhinitis    Grass, mold, regular seasonal    Anxiety state, unspecified    Arthritis    In my back    Asthma (HCC)    Attention deficit disorder without mention of hyperactivity    Back problem    cervical and lumbar    Bad breath    Started when stomach pain got worse    Bloating    Cauda equina compression (HCC)    Cauda equina syndrome without mention of neurogenic bladder    Constipation    Decorative tattoo    Depression    Diabetes (HCC)    Diet controlled; no meds    Diabetes (HCC)    Diarrhea, unspecified    Dysesthesia    Dyspareunia    Easy bruising    Endometriosis    Esophageal reflux    Essential hypertension    Fatigue    Flatulence/gas pain/belching    Food intolerance    Frequent urination    Frequent UTI    Headache disorder    Heartburn    Heavy menses    Hematuria    Hiatal hernia    High cholesterol    Hyperlipidemia    Hypertension    IBS (irritable bowel syndrome)    Irregular bowel habits    Irregular menstrual cycle    Itch of skin    Occasionally on my legs, not due to dry skin    Leaking  of urine    Lipid screening    Loss of appetite    Lump or mass in breast    Malaise    Malignant hyperthermia    patient's son at 5 years of age - 2006    Mastodynia    Menses painful    Migraines    Nausea    Night sweats    Obstructive sleep apnea    Osteoarthritis    Other screening mammogram    Pain in joints    Pain with bowel movements    Pap smear for cervical cancer screening    wnl    Personal history of urinary (tract) infection    Pneumonia due to organism    PONV (postoperative nausea and vomiting)    Problems with swallowing    Sleep apnea    Sleep disturbance    Stress    Uncomfortable fullness after meals    Unspecified disorder of thyroid    hypothyroidism    Unspecified viral infection, in conditions classified elsewhere and of unspecified site    Visual impairment    glasses    Vomiting    Weight loss              Past Surgical History:   Procedure Laterality Date    Adenoidectomy      Child    Appendectomy      Appendectomy      Blood patch(bedside)      Colonoscopy N/A 2015    Procedure: COLONOSCOPY;  Surgeon: Marlene Payne MD;  Location:  ENDOSCOPY    Endometrial ablation      2004    Hysterectomy  2015    Hysteroscopy,ablation endometrium      Laminectomy,cervical  2020    Laparoscopy,diagnostic      multiple    Lysis of adhesions      Kevin localization wire 1 site right (cpt=19281)      in her 20's    Myringotomy, laser-assisted      Child          Oophorectomy      Other Right 2018    ARTHROSCOPIC ACROMIOPLASTY LYSIS OF ADHESIONS RIGHT SHOULDER    Other  2018    ANTERIOR DISCECTOMY AND FUSION. CERVICAL 4-CERVICAL 5 AND CERVICAL 5-CERVICAL 6. ALLOGRAFT, PLATE AND SCREWS    Other  2021    ANTERIOR CERVICAL DISCECTOMY AND FUSION CERVICAL 6- CERVICAL 7 WITH INTERBODY DEVICE, REMOVAL OF SURGICAL SCREW RIGHT C6    Other surgical history      esophagogastroduodenoscopy    Other surgical history      right thumb trigger finger release     Other  surgical history      ganglion cyst removed left wrist     Other surgical history  10/30/2019    Cystoscopy- Dr. Sofia    Sigmoidoscopy,diagnostic      Tarsal tunnel release  2005    right foot    Tonsillectomy      Total abdom hysterectomy                  No pertinent social history.            Review of Systems   Constitutional: Negative.    HENT:          Right sided jaw pain   All other systems reviewed and are negative.      Positive for stated complaint: Sinus Problem - Right jaw and ear pain. Can’t chew without pain  Other systems are as noted in HPI.  Constitutional and vital signs reviewed.      All other systems reviewed and negative except as noted above.    Physical Exam     ED Triage Vitals [01/28/25 1353]   /81   Pulse 108   Resp 18   Temp 98.3 °F (36.8 °C)   Temp src Oral   SpO2 97 %   O2 Device None (Room air)       Current Vitals:   Vital Signs  BP: 116/81  Pulse: 108  Resp: 18  Temp: 98.3 °F (36.8 °C)  Temp src: Oral    Oxygen Therapy  SpO2: 97 %  O2 Device: None (Room air)        Physical Exam  Vitals and nursing note reviewed.   Constitutional:       General: She is not in acute distress.     Appearance: Normal appearance. She is not ill-appearing, toxic-appearing or diaphoretic.   HENT:      Head:      Jaw: Tenderness and pain on movement present. No trismus or swelling.      Comments: Tenderness with palpation of the jaw, right by the TMJ region.  No swelling, warmth.     Right Ear: Tympanic membrane, ear canal and external ear normal. No mastoid tenderness.      Left Ear: Tympanic membrane, ear canal and external ear normal.      Mouth/Throat:      Lips: Pink. No lesions.      Mouth: Mucous membranes are moist. No oral lesions or angioedema.      Dentition: No dental abscesses.      Tongue: No lesions.      Palate: No lesions.      Pharynx: Oropharynx is clear. Uvula midline.   Pulmonary:      Effort: Pulmonary effort is normal. No respiratory distress.   Skin:     Coloration: Skin is not  jaundiced or pale.   Neurological:      General: No focal deficit present.      Mental Status: She is alert.   Psychiatric:         Mood and Affect: Mood normal.             ED Course   Labs Reviewed - No data to display                MDM              Medical Decision Making  Nontoxic adult female patient with right jaw pain.  Differential diagnosis considered but not limited to include parotitis, sialoadenitis, TMJ pain, otitis media, mastoiditis.    No signs of infection.  Mastoid bone is not tender nor swollen nor erythematous, unlikely to be mastoiditis.  There is no parotid gland tenderness.  No abscess, not likely parotitis or sialoadenitis.  Tenderness right at the TMJ area.    Recommend OTC NSAIDs.  Patient states she will follow-up with her doctor.    Supportive/home management of diagnosis/illness/injury discussed. Red flag symptoms discussed.  Signs and symptoms/criteria that would necessitate reevaluation, including ER evaluation discussed.  Patient and/or responsible adult verbalize and agree with management and plan of care.    Speech recognition software was used during this dictation.  There may be minor errors in transcription.          Disposition and Plan     Clinical Impression:  1. Arthralgia of right temporomandibular joint         Disposition:  Discharge  1/28/2025  2:14 pm    Follow-up:  Alva Fiore DO  [address] [place] [id number]  [phone]    Call in 2 days            Medications Prescribed:  Discharge Medication List as of 1/28/2025  2:16 PM              Supplementary Documentation:

## 2025-01-30 NOTE — TELEPHONE ENCOUNTER
Pt states she went to urgent care and they did not see any signs of infection pt is wondering if can put in for her to get ultrasound done sooner or if she should just wait it out

## 2025-01-31 ENCOUNTER — OFFICE VISIT (OUTPATIENT)
Dept: PHYSICAL THERAPY | Facility: HOSPITAL | Age: 50
End: 2025-01-31
Attending: FAMILY MEDICINE
Payer: COMMERCIAL

## 2025-01-31 PROCEDURE — 97140 MANUAL THERAPY 1/> REGIONS: CPT | Performed by: PHYSICAL THERAPIST

## 2025-01-31 NOTE — PROGRESS NOTES
Patient: Nadya Mina (49 year old, female) Referring Provider:  Insurance:   Diagnosis:  Muscle tightness (M62.89)  Chronic midline thoracic back pain (M54.6,G89.29)  Lumbar pain (M54.50)  Pain of right sacroiliac joint (M53.3)  Lumbar radiculopathy (M54.16)  Jennifer Rogers  [facility]   Date of Surgery: No data recorded Next MD visit:  N/A   Precautions:    No data recorded Referral Information:    Date of Evaluation: Req: 5, Auth: 5, Exp: 4/1/2025    No data recorded POC Auth Visits:          Today's Date   1/31/2025    Subjective  Has noted increased jaw, neck pain on the right side , current pain level tells me that it is time for shots in my neck and shoulder .  Waiting on US to determine if it is TO .  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     Neck 3   2 at rest grabbing something elbow    Hands are cold but not bad.     Has been working on exercise.   Scap squeezes are helping the most . Noted that her HR went to 130 most recently while sitting  at rest the other day .  did not notice chest pain or anything else.       Pain: 3/10     Objective  +First rib tightness on right  TTP along right scalenes, first rib and +reproduction of pain in right UE . +dural tightness of right UE       Seen for treatment          Assessment  Symptoms reproduced with palpation and inferior glide of right first rib.  Dural tightness noted in right UE, and given exerices to help with first rib and UE nerve flossing .    Goals (to be met in 10 visits)  Pt will improve transversus abdominis recruitment to perform proper isometric contraction without requiring verbal or tactile cuing to promote advancement of therex   Pt will demonstrate good understanding of proper posture and body mechanics to decrease pain and improve spinal safety   Pt will  improve lumbar spine AROM flexion to normal levels to allow increased ease with bending forward to don shoes   Pt will report improved symptom centralization and absence of radicular symptoms for 7 consecutive days to improve function with ADL   Pt will have decreased paraspinal mm tension to tolerate standing >60 minutes for work and home activities   Pt will demonstrate improved core strength to be able to perform lifting with <1-2/10 pain   Pt will be independent and compliant with comprehensive HEP to maintain progress achieved in PT       Plan  Progress as outlined in POC    Treatment Last 4 Visits       1/31/2025   Treatment   Manual Therapy STM to bilateral UT, levators .  Suboccipitals, scalenes  Inferior glide to right first rib gr2 multiple bouts     UE flossing multiple bouts    Manual Therapy Min 45   Total of Timed Procedures 45   Total of Service Based 0   Total Treatment Time 45         HEP  Access Code: [email or web address]  URL: [email or web address]  Date: 01/31/2025  Prepared by: Lissette Diaz    Exercises  - Supine Posterior Scalene Stretch  - 1 x daily - 7 x weekly - 1 sets - 10 reps - 10-15 seconds  hold  - First Rib Mobilization with Strap  - 1 x daily - 7 x weekly - 1 sets - 10 reps  - Seated Upper Extremity Nerve Glide  - 1 x daily - 7 x weekly - 1 sets - 10 reps  - Standing Shoulder Row with Anchored Resistance  - 1 x daily - 7 x weekly - 2 sets - 10 reps    Charges  High Complexity        MT 3     45'

## 2025-01-31 NOTE — TELEPHONE ENCOUNTER
Spoke with patient. She denies having headache or blurred vision. Has US scheduled for 2/5/25. Should she try to get in sooner. US approved.     Mentions she noted elevated HR on fitbit. Fitbit alarmed of HR at 140 while sitting and cross stitching. Then another time fitbit alarmed with HR 130s while standing and brushing hair.     Saw physical therapist today was instructed to contact Dr. Livingston.     Elevated heart rate has never occurred before.     Saw Immunologist on Wednesday and noted BP higher than normal 120/88.     And saw GI today and noted HR higher than normal usually 70s and it was in the 90s.

## 2025-02-03 ENCOUNTER — APPOINTMENT (OUTPATIENT)
Dept: PHYSICAL THERAPY | Facility: HOSPITAL | Age: 50
End: 2025-02-03
Attending: FAMILY MEDICINE
Payer: COMMERCIAL

## 2025-02-03 ENCOUNTER — TELEPHONE (OUTPATIENT)
Dept: PHYSICAL THERAPY | Facility: HOSPITAL | Age: 50
End: 2025-02-03

## 2025-02-03 ENCOUNTER — HOSPITAL ENCOUNTER (OUTPATIENT)
Dept: GENERAL RADIOLOGY | Facility: HOSPITAL | Age: 50
Discharge: HOME | End: 2025-02-03
Attending: FAMILY MEDICINE
Payer: COMMERCIAL

## 2025-02-03 DIAGNOSIS — Z87.19 HISTORY OF SMALL BOWEL OBSTRUCTION: ICD-10-CM

## 2025-02-03 PROCEDURE — 74019 RADEX ABDOMEN 2 VIEWS: CPT | Performed by: FAMILY MEDICINE

## 2025-02-03 NOTE — TELEPHONE ENCOUNTER
Left detailed voice message regarding the following from Dr. Livingston. Sent a detailed MyChart message.     Pt needs to go see PCP to evaluate tachycardia. Not a rheumatology issue.

## 2025-02-05 ENCOUNTER — HOSPITAL ENCOUNTER (OUTPATIENT)
Dept: ULTRASOUND IMAGING | Facility: HOSPITAL | Age: 50
Discharge: HOME | End: 2025-02-05
Attending: INTERNAL MEDICINE
Payer: COMMERCIAL

## 2025-02-05 DIAGNOSIS — I77.1 ARTERIAL INSUFFICIENCY: ICD-10-CM

## 2025-02-05 DIAGNOSIS — G54.0 NEUROGENIC THORACIC OUTLET SYNDROME: ICD-10-CM

## 2025-02-05 DIAGNOSIS — G62.9 NEUROPATHY: ICD-10-CM

## 2025-02-05 DIAGNOSIS — Z86.19 FREQUENT INFECTIONS: ICD-10-CM

## 2025-02-05 PROCEDURE — 93923 UPR/LXTR ART STDY 3+ LVLS: CPT | Performed by: INTERNAL MEDICINE

## 2025-02-06 ENCOUNTER — APPOINTMENT (OUTPATIENT)
Dept: PHYSICAL THERAPY | Facility: HOSPITAL | Age: 50
End: 2025-02-06
Attending: FAMILY MEDICINE
Payer: COMMERCIAL

## 2025-02-10 ENCOUNTER — OFFICE VISIT (OUTPATIENT)
Dept: PHYSICAL THERAPY | Facility: HOSPITAL | Age: 50
End: 2025-02-10
Attending: FAMILY MEDICINE
Payer: COMMERCIAL

## 2025-02-10 PROCEDURE — 97140 MANUAL THERAPY 1/> REGIONS: CPT | Performed by: PHYSICAL THERAPIST

## 2025-02-10 PROCEDURE — 97112 NEUROMUSCULAR REEDUCATION: CPT | Performed by: PHYSICAL THERAPIST

## 2025-02-10 NOTE — PROGRESS NOTES
Patient: Nadya Mina (49 year old, female) Referring Provider:  Insurance:   Diagnosis: Muscle tightness (M62.89)  Chronic midline thoracic back pain (M54.6,G89.29)  Lumbar pain (M54.50)  Pain of right sacroiliac joint (M53.3)  Lumbar radiculopathy (M54.16) Jennifer Rogers  [employer]BS [place] [employer]   Date of Surgery: No data recorded Next MD visit:  N/A   Precautions:    No data recorded Referral Information:    Date of Evaluation: Req: 5, Auth: 5, Exp: 4/1/2025    No data recorded POC Auth Visits:  5       Today's Date   2/10/2025    Subjective  Had the US and stated that i dont have TO in either arm , Continues to note pain and occ numbness in her hand and occ elbow aleshia on the right side.  Noted decreased discomfort aleshia in elbow at end of treatment.       Pain: 3/10     Objective  Discussed and educated on pain symptoms, pain management                Assessment  Symtpoms continue to be present in right >Left UE , but with dural tightness aleshia noted.  Trigger points in right UE /UT , and some of her symptoms decreased overall after TE and general strenghtening exerices, aleshia those of foam beam super 6.  She is encouraged at the end of the treatment by and impromvent in her symtpoms.    Goals (to be met in 10 visits)   Goals       Therapy Goals     Pt will improve transversus abdominis recruitment to perform proper isometric contraction without requiring verbal or tactile cuing to promote advancement of therex   Pt will demonstrate good understanding of proper posture and body mechanics to decrease pain and improve spinal safety   Pt will improve lumbar spine AROM flexion to normal levels to allow increased ease with bending forward to don shoes   Pt will report improved symptom centralization and absence of radicular symptoms for 7 consecutive days to improve function with ADL   Pt will have decreased paraspinal mm tension to tolerate standing >60 minutes for work and home activities   Pt will demonstrate improved core  strength to be able to perform lifting with <1-2/10 pain   Pt will be independent and compliant with comprehensive HEP to maintain progress achieved in PT               Plan  Continue with PT as indicated 2x/week x4 weeks    Treatment Last 4 Visits       1/31/2025 2/10/2025   PT Treatment   Treatment Day  2   Therapeutic Exercise HEP instruction  UBE x5' (2,3)    Neuro Re-Ed  Foam roll (noodle bent) x10 super 6    Manual Therapy STM to bilateral UT, levators .  Suboccipitals, scalenes  Inferior glide to right first rib gr2 multiple bouts     UE flossing multiple bouts  STM to bilateral UT, levators .  Suboccipitals, scalenes   Inferior glide to right first rib gr2 multiple bouts   Pain science education           Therapeutic Exercise Min  4   Neuro Re-Ed Min  30   Manual Therapy Min 45 10   Total of Timed Procedures 45 44   Total of Service Based 0 0   Total Treatment Time 45 44         HEP  Access Code: [email or web address]  URL: [email or web address]  Date: 01/31/2025  Prepared by: Lissette Diaz    Exercises  - Supine Posterior Scalene Stretch  - 1 x daily - 7 x weekly - 1 sets - 10 reps - 10-15 seconds  hold  - First Rib Mobilization with Strap  - 1 x daily - 7 x weekly - 1 sets - 10 reps  - Seated Upper Extremity Nerve Glide  - 1 x daily - 7 x weekly - 1 sets - 10 reps  - Standing Shoulder Row with Anchored Resistance  - 1 x daily - 7 x weekly - 2 sets - 10 reps    Charges      2 NMR 1 MT

## 2025-02-13 ENCOUNTER — APPOINTMENT (OUTPATIENT)
Dept: PHYSICAL THERAPY | Facility: HOSPITAL | Age: 50
End: 2025-02-13
Attending: FAMILY MEDICINE
Payer: COMMERCIAL

## 2025-02-19 RX ORDER — BUPROPION HYDROCHLORIDE 150 MG/1
150 TABLET ORAL DAILY
Qty: 30 TABLET | Refills: 0 | OUTPATIENT
Start: 2025-02-19

## 2025-02-19 RX ORDER — BUPROPION HYDROCHLORIDE 150 MG/1
150 TABLET ORAL DAILY
Qty: 30 TABLET | Refills: 0 | Status: SHIPPED | OUTPATIENT
Start: 2025-02-19

## 2025-02-25 ENCOUNTER — HOSPITAL ENCOUNTER (OUTPATIENT)
Age: 50
Discharge: HOME | End: 2025-02-25
Payer: COMMERCIAL

## 2025-02-25 VITALS
RESPIRATION RATE: 18 BRPM | TEMPERATURE: 98 F | DIASTOLIC BLOOD PRESSURE: 91 MMHG | OXYGEN SATURATION: 97 % | SYSTOLIC BLOOD PRESSURE: 115 MMHG | HEART RATE: 98 BPM

## 2025-02-25 DIAGNOSIS — M77.11 LATERAL EPICONDYLITIS OF RIGHT ELBOW: Primary | ICD-10-CM

## 2025-02-25 PROCEDURE — 99213 OFFICE O/P EST LOW 20 MIN: CPT | Performed by: NURSE PRACTITIONER

## 2025-02-25 PROCEDURE — A4565 SLINGS: HCPCS | Performed by: NURSE PRACTITIONER

## 2025-02-25 RX ORDER — PREDNISONE 20 MG/1
40 TABLET ORAL DAILY
Qty: 10 TABLET | Refills: 0 | Status: SHIPPED | OUTPATIENT
Start: 2025-02-25 | End: 2025-03-02

## 2025-02-25 NOTE — DISCHARGE INSTRUCTIONS
Rest from exacerbating activities for the next week. Apply ice/cold compress for 20min at a time 4-6x daily for the next 2-3 days. Keep the right elevated when resting as much as possible. You may take Motrin every 6 hours as needed for pain. Take this with food. If additional pain control is needed, you may also take Tylenol every 6 hours. Follow up with your primary provider or the orthopedic specialist provided in your discharge instructions in the next 2-3 days. Seek additional care in the ER for new or worsening symptoms, fever or increasing pain.

## 2025-02-25 NOTE — ED PROVIDER NOTES
Patient Seen in: [facility]    History   CC: elbow pain  HPI: Nadya Mina 49 year old female  who presents c/o right sided lateral elbow pain which has been present without known injury/trauma x1mo. Not painful at rest, however exacerbated with movement/lifting. Denies numbness/tingling/weakness or limitation in ROM associated. Does not work, but watches her 2yo and 2yo grandchildren during the day and does a lot of lifting. ++right hand dominant.     Past Medical History:    Abdominal distention    Abdominal pain    Abnormal uterine bleeding    Acute bronchitis    Acute esophagitis    Acute upper respiratory infections of unspecified site    Allergic rhinitis    Grass, mold, regular seasonal    Anxiety state, unspecified    Arthritis    In my back    Asthma (HCC)    Attention deficit disorder without mention of hyperactivity    Back pain    Back problem    cervical and lumbar    Bad breath    Started when stomach pain got worse    Bloating    Cauda equina compression (HCC)    Cauda equina syndrome without mention of neurogenic bladder    Constipation    Decorative tattoo    Depression    Diabetes (HCC)    Diet controlled; no meds    Diabetes (HCC)    Diabetes mellitus (HCC)    Diarrhea, unspecified    Dysesthesia    Dyspareunia    Easy bruising    Endometriosis    Esophageal reflux    Essential hypertension    Fatigue    Flatulence/gas pain/belching    Food intolerance    Frequent urination    Frequent UTI    Headache disorder    Heartburn    Heavy menses    Hematuria    Hiatal hernia    High cholesterol    History of depression    Hyperlipidemia    Hypertension    IBS (irritable bowel syndrome)    Irregular bowel habits    Irregular menstrual cycle    Itch of skin    Occasionally on my legs, not due to dry skin    Leaking of urine    Lipid screening    Loss of appetite    Lump or mass in breast    Malaise    Malignant hyperthermia    patient's son at 4 years of age - 2006    Mastodynia     Menses painful    Migraines    Nausea    Night sweats    Obstructive sleep apnea    Osteoarthritis    Other screening mammogram    Pain in joints    Pain with bowel movements    Pap smear for cervical cancer screening    wnl    Personal history of urinary (tract) infection    Pneumonia due to organism    PONV (postoperative nausea and vomiting)    Problems with swallowing    Sleep apnea    Sleep disturbance    Stress    Uncomfortable fullness after meals    Unspecified disorder of thyroid    hypothyroidism    Unspecified viral infection, in conditions classified elsewhere and of unspecified site    Visual impairment    glasses    Vomiting    Wears glasses    Weight loss       Past Surgical History:   Procedure Laterality Date    Adenoidectomy      Child    Appendectomy      Appendectomy      Blood patch(bedside)      Colonoscopy N/A 2015    Procedure: COLONOSCOPY;  Surgeon: Marlene Payne MD;  Location:  ENDOSCOPY    Endometrial ablation      2004    Hysterectomy  2015    Hysteroscopy,ablation endometrium      Laminectomy,cervical  2020    Laparoscopy,diagnostic      multiple    Lysis of adhesions      Kevin localization wire 1 site right (cpt=19281)      in her 20's    Myringotomy, laser-assisted      Child          Oophorectomy      Other Right 2018    ARTHROSCOPIC ACROMIOPLASTY LYSIS OF ADHESIONS RIGHT SHOULDER    Other  2018    ANTERIOR DISCECTOMY AND FUSION. CERVICAL 4-CERVICAL 5 AND CERVICAL 5-CERVICAL 6. ALLOGRAFT, PLATE AND SCREWS    Other  2021    ANTERIOR CERVICAL DISCECTOMY AND FUSION CERVICAL 6- CERVICAL 7 WITH INTERBODY DEVICE, REMOVAL OF SURGICAL SCREW RIGHT C6    Other surgical history      esophagogastroduodenoscopy    Other surgical history      right thumb trigger finger release     Other surgical history      ganglion cyst removed left wrist     Other surgical history  10/30/2019    Cystoscopy- Dr. Sofia    Sigmoidoscopy,diagnostic      Tarsal  tunnel release      right foot    Tonsillectomy      Total abdom hysterectomy         Family History   Problem Relation Age of Onset    Breast Cancer Mother 55    Diabetes Mother     Stroke Mother     Other (Other) Mother     Other (hypothyroidism) Mother     Cancer Mother         Breast cancer    Heart Disorder Mother     Diabetes Father     Hypertension Father     Colon Polyps Father     Other (ADD) Father     Heart Disorder Father     Cancer Sister         Uterin and thyroid    BRCA gene + Sister     Uterine Cancer Sister     Bleeding Disorders Sister     Other (thyroid cancer) Sister     Other (Other) Daughter         Mvp, eds    Migraines Son     Diabetes Son     Other (Other) Son         Multiple issues    Anemia Son     Asthma Son     Depression Son     Genetic Disease Son         Omra Silver Syndrome    Heart Disorder Maternal Grandmother         CHF    Stroke Maternal Grandfather     Diabetes Paternal Grandmother     Cancer Paternal Cousin Female         cervical    Heart Disorder Daughter     Musculo-skelatal Disorder Daughter        Social History     Socioeconomic History    Marital status:    Tobacco Use    Smoking status: Former     Current packs/day: 0.00     Average packs/day: 0.5 packs/day for 10.0 years (5.0 ttl pk-yrs)     Types: Cigarettes     Quit date: 1989     Years since quittin.0    Smokeless tobacco: Never   Vaping Use    Vaping status: Never Used   Substance and Sexual Activity    Alcohol use: Yes     Alcohol/week: 2.0 standard drinks of alcohol     Types: 2 Cans of beer per week     Comment: occasionally    Drug use: No    Sexual activity: Yes     Partners: Male   Other Topics Concern    Caffeine Concern No    Stress Concern Yes    Weight Concern Yes    Special Diet No    Exercise Yes    Seat Belt Yes       ROS:  Systems reviewed: All pertinent positives noted in HPI. Unless otherwise noted, additional systems reviewed are negative.   Vital signs  reviewed.    Positive for stated complaint: Arm or Hand Injury - Right elbow, severe pain when straightening and bending, w*  Other systems are as noted in HPI.  Constitutional and vital signs reviewed.      All other systems reviewed and negative except as noted above.    PSFH elements reviewed from today and agreed except as otherwise stated in HPI.             Constitutional and vital signs reviewed.        Physical Exam     ED Triage Vitals [02/25/25 0841]   BP (!) 115/91   Pulse 98   Resp 18   Temp 98.2 °F (36.8 °C)   Temp src Oral   SpO2 97 %   O2 Device None (Room air)       Current:BP (!) 115/91   Pulse 98   Temp 98.2 °F (36.8 °C) (Oral)   Resp 18   LMP 01/01/2004   SpO2 97%         PE:  General - Appears well, non-toxic and in NAD  Head - Appears symmetrical without deformity/swelling cranium, scalp, or facial bones  Skin - no rashes or petechiae noted, pink warm and dry throughout, mmm, no obvious signs of swelling/trauma/deformity, cap refill <2 seconds distal UE  Neuro - A&O x4, sensation equal to both medial and lateral aspects of upper extremities, steady gait  MSK - makes purposeful movements of upper extremities with full ROM noted, hand grasp strength equal bilat, radial pulses 2+ bilat.  + Reproducible tenderness is elicited with palpation to the right proximal extensor tendons carpi radialis brevis.  No medial epicondyle, olecranon, upper arm, wrist or hand tenderness to the right upper extremity  Psych - Interactive and appropriate      ED Course   Labs Reviewed - No data to display    MDM     DDx: Lateral epicondylitis, elbow trauma, radiculopathy    Discussed general lateral epicondylitis, rest, ice, elevation, Tylenol or Motrin as needed for discomfort in addition to the prednisone as prescribed and indications/precautions on use reviewed.  Patient will use a sling mainly to remind her not to use her right arm for lifting.  Discussed sling instructions and precautions explicitly.   Follow-up and return/ED precautions reviewed. Patient is historian and demonstrates understanding of all instruction and agrees with plan of care.      Disposition and Plan     Clinical Impression:  1. Lateral epicondylitis of right elbow        Disposition:  Discharge    Follow-up:  Manuel Tovar PA  [address] [place] [address] [id number] [phone]    Go in 1 week  As needed      Medications Prescribed:  Current Discharge Medication List        START taking these medications    Details   predniSONE 20 MG Oral Tab Take 2 tablets (40 mg total) by mouth daily for 5 days.  Qty: 10 tablet, Refills: 0

## 2025-03-01 DIAGNOSIS — J45.20 MILD INTERMITTENT ASTHMA WITHOUT COMPLICATION (HCC): ICD-10-CM

## 2025-03-03 ENCOUNTER — OFFICE VISIT (OUTPATIENT)
Dept: RHEUMATOLOGY | Facility: CLINIC | Age: 50
End: 2025-03-03
Payer: COMMERCIAL

## 2025-03-03 VITALS
SYSTOLIC BLOOD PRESSURE: 132 MMHG | BODY MASS INDEX: 27.36 KG/M2 | TEMPERATURE: 97 F | RESPIRATION RATE: 16 BRPM | OXYGEN SATURATION: 96 % | WEIGHT: 164.19 LBS | HEART RATE: 115 BPM | DIASTOLIC BLOOD PRESSURE: 82 MMHG | HEIGHT: 65 IN

## 2025-03-03 DIAGNOSIS — M77.11 RIGHT LATERAL EPICONDYLITIS: ICD-10-CM

## 2025-03-03 DIAGNOSIS — G89.29 CHRONIC LOW BACK PAIN, UNSPECIFIED BACK PAIN LATERALITY, UNSPECIFIED WHETHER SCIATICA PRESENT: ICD-10-CM

## 2025-03-03 DIAGNOSIS — M54.50 CHRONIC LOW BACK PAIN, UNSPECIFIED BACK PAIN LATERALITY, UNSPECIFIED WHETHER SCIATICA PRESENT: ICD-10-CM

## 2025-03-03 DIAGNOSIS — G54.0 NEUROGENIC THORACIC OUTLET SYNDROME: Primary | ICD-10-CM

## 2025-03-03 DIAGNOSIS — D80.1 HYPOGAMMAGLOBULINEMIA (HCC): ICD-10-CM

## 2025-03-03 DIAGNOSIS — M62.838 MUSCLE SPASM: ICD-10-CM

## 2025-03-03 PROCEDURE — 3075F SYST BP GE 130 - 139MM HG: CPT | Performed by: INTERNAL MEDICINE

## 2025-03-03 PROCEDURE — 3008F BODY MASS INDEX DOCD: CPT | Performed by: INTERNAL MEDICINE

## 2025-03-03 PROCEDURE — G2211 COMPLEX E/M VISIT ADD ON: HCPCS | Performed by: INTERNAL MEDICINE

## 2025-03-03 PROCEDURE — 99214 OFFICE O/P EST MOD 30 MIN: CPT | Performed by: INTERNAL MEDICINE

## 2025-03-03 PROCEDURE — 3079F DIAST BP 80-89 MM HG: CPT | Performed by: INTERNAL MEDICINE

## 2025-03-03 RX ORDER — BACLOFEN 10 MG/1
10 TABLET ORAL NIGHTLY PRN
Qty: 90 TABLET | Refills: 0 | Status: SHIPPED | OUTPATIENT
Start: 2025-03-03

## 2025-03-03 NOTE — H&P
The following individual(s) verbally consented to be recorded using ambient AI listening technology and understand that they can each withdraw their consent to this listening technology at any point by asking the clinician to turn off or pause the recording:    Patient name: Nadya DEXTER Mina  Additional names:

## 2025-03-03 NOTE — PROGRESS NOTES
Rheumatology f/u Patient Note  =====================================================================================================    Chief complaint: neuropathic pain, arm pain  Chief Complaint   Patient presents with    Follow - Up     LOV 9/16/2024  Rapid 3 score is 5.7     Referring (will send letter)  Angelita May     PCP  Alva Fiore DO  Fax: [phone]  Phone: [phone]    =====================================================================================================  HPI   Date of visit: 9/16/2024    Nadya Mina is a 49 year old female     Here for further evaluation of polyarthralgia and neuropathic pain.  TPI do help. Still with pain after cervical surgeries.   Diagnosed with cervical radiculopathy/myelopathy. That lead to multiple surgeries:  12/19/2018: Dr. Valencia: ACDF C4-6  7/6/2020: Dr. Valencia: C3-7 laminoplasty  12/14/2020: Dr. Valencia: Posterior cervical C3-7 laminectomy, removal of laminoplasty hardware cervical 3-6 segmental instrumentation bilaterally cervical 5, cervical 6, cervical 7, posterior lateral fusion bilaterally cervical 5, cervical 6, cervical 7  9/8/2021: Dr. Valencia: ACDF C6-7  Arms are numb in the morning/evening. 2020 EMG/NCS: negative.  Has had this neuropathic pain even prior to the EMG  Diagnosed with [place]: on CPAP x 1 year.     No physical therapy or exercises  -2 kids with hypermobile EDS. Daughter with POTS and MVP.  -one child with CVID.  -patient is not hypermobile.     Chronic migraines.   -hx of osteopenia, s/p MARIAM-BSO in 2015 at the age of 40.   -hx of osteopenia (femoral T-score -1.3 in 2020 and loose screws in the bone: was on forteo x 2.5 years,   -Fractured bone of foot 3 x in the past.   ==============================================================================================================  Today's Visit: 03/03/25    History of Present Illness  Nadya Mina is a 49 year old female with thoracic outlet syndrome who presents with worsening  pain and numbness.    She has been experiencing worsening symptoms related to thoracic outlet syndrome, including pain and numbness. The pain has slightly worsened, and she has not received her trigger point injections recently. The pain and numbness are exacerbated when she raises her hands above her head, particularly upon waking with her arms in a raised position. The numbness often radiates down to her middle finger and sometimes affects three fingers, taking longer to resolve in the morning.    She reports symptoms consistent with tennis elbow, experiencing significant pain when attempting to grasp objects, such as her purse. The pain is localized to the area of the common extensor tendon in her forearm. She has not had an ultrasound or x-ray but was informed by a healthcare provider that she might have torn tendons. She has been using a brace, although it has not been effective.    Her past medical history includes surgeries for radiculopathy and myopathy, which have left her with residual pain. She has been in a collar for five years following neck surgeries, contributing to muscle tightness. She has tried various muscle relaxers in the past, including Flexeril and diazepam, with limited success. She is currently taking Wellbutrin and citalopram.    She is being evaluated for Common Variable Immunodeficiency (CVID), with recent blood work showing low IgG levels.     She has a history of small bowel obstruction due to adhesions from previous surgeries, which required hospitalization and an NG tube in December 2024. She is currently pausing iron supplements as advised by her gastroenterologist.        Medications:  Current Outpatient Medications on File Prior to Visit   Medication Sig Dispense Refill    BUPROPION  MG Oral Tablet 24 Hr TAKE 1 TABLET(150 MG) BY MOUTH DAILY 30 tablet 0    albuterol 108 (90 Base) MCG/ACT Inhalation Aero Soln Inhale 1-2 puffs into the lungs every 4 to 6 hours as needed. 51 g 4     methylphenidate 20 MG Oral Tab Take 1 tablet (20 mg total) by mouth 2 (two) times daily. 60 tablet 0    montelukast 10 MG Oral Tab Take 1 tablet (10 mg total) by mouth nightly. 90 tablet 0    citalopram 40 MG Oral Tab Take 1 tablet (40 mg total) by mouth daily. 90 tablet 0    Lovastatin 40 MG Oral Tab Take 1 tablet (40 mg total) by mouth nightly. 90 tablet 3    ondansetron 4 MG Oral Tablet Dispersible Take 1 tablet (4 mg total) by mouth every 8 (eight) hours as needed for Nausea. 20 tablet 0    ALPRAZolam 0.25 MG Oral Tab Take 1 tablet (0.25 mg total) by mouth nightly as needed for Sleep or Anxiety. 30 tablet 2    sodium chloride 0.9 % Inhalation Nebu Soln Take 3 mL by nebulization as needed for Wheezing. 30 each 0    Esomeprazole Magnesium (NEXIUM OR) Take by mouth.      Multiple Vitamin Oral Tab Take 1 tablet by mouth daily.      Ferrous Sulfate (FEROSUL) 325 (65 Fe) MG Oral Tab Take 1 tablet (325 mg total) by mouth daily with breakfast. (Patient not taking: Reported on 3/3/2025) 90 tablet 3    Glucose Blood (ONETOUCH VERIO) In Vitro Strip CHECK BLOOD SUGAR ONCE DAILY (Patient not taking: Reported on 3/3/2025) 100 strip 1    Insulin Pen Needle (BD PEN NEEDLE YANDY U/F) 32G X 4 MM Does not apply Misc Inject 1 each into the skin daily. (Patient not taking: Reported on 3/3/2025) 100 each 3     No current facility-administered medications on file prior to visit.   Allergies:  Allergies   Allergen Reactions    Demerol OTHER (SEE COMMENTS)     Difficulty breathing    Levofloxacin RASH and Tightness in Chest    Reglan [Metoclopramide] OTHER (SEE COMMENTS)     Tardive dyskinesia    Betadine [Povidone Iodine] ITCHING    Meperidine NAUSEA AND VOMITING    Other OTHER (SEE COMMENTS)     Sensitive skin, milk,dust mites    Quinolones UNKNOWN         Objective    Vitals:    03/03/25 1302   BP: 132/82   Pulse: 115   Resp: 16   Temp: 96.8 °F (36 °C)   SpO2: 96%   Weight: 164 lb 3.2 oz (74.5 kg)   Height: 5' 5\" (1.651 m)     GEN:  NAD, well-nourished.   HEENT: Head: NCAT. Face: No lesions. Eyes: Conjunctiva clear.   PULM:  easy effort  Extremities: No cyanosis, edema or deformities.   Neurologic: Strength, CN2-12 grossly intact   Skin: No lesions or rashes.  MSK: 28 joint count performed. No evidence of synovitis in mcp, pip, dip, wrist, elbows, shoulders, hips, knees, ankles, mtp unless otherwise noted. Full ROM of elbows, wrists, knees.     No peripheral joint synovitis.  Abduction of the bilateral hands above 90 degrees at the shoulders elicits paresthesias radiating down the fingers.  Placing palms on head makes pain no better  -Tender palpation in the cervical paraspinals.  Kyphotic posture noted.  Significant tenderness to palpation in the bilateral cervical trapezius muscles  -Scapular winging bilaterally  -Tender to palpation overlying the right lateral epicondyle.  Able to extend the wrist    Labs:  11/2024  Vitamin 86.9 -> 68.4 (slightly elevated)  76.1  Low strep pneumo titers  IgG 448  SPEP wnl  Vit D 125.6    Lab Results   Component Value Date    WBC 5.5 09/27/2024    RBC 4.59 09/27/2024    HGB 14.5 09/27/2024    HCT 43.4 09/27/2024    .0 09/27/2024    MPV 10.5 (H) 05/09/2011    MCV 94.6 09/27/2024    MCH 31.6 09/27/2024    MCHC 33.4 09/27/2024    RDW 12.4 09/27/2024    NEPRELIM 2.93 09/27/2024    NEPERCENT 53.5 09/27/2024    LYPERCENT 33.9 09/27/2024    MOPERCENT 9.7 09/27/2024    EOPERCENT 2.0 09/27/2024    BAPERCENT 0.7 09/27/2024    NE 2.93 09/27/2024    LYMABS 1.86 09/27/2024    MOABSO 0.53 09/27/2024    EOABSO 0.11 09/27/2024    BAABSO 0.04 09/27/2024     Lab Results   Component Value Date     (H) 09/27/2024    BUN 15 09/27/2024    BUNCREA NOT APPLICABLE 10/04/2021    CREATSERUM 0.96 09/27/2024    ANIONGAP 9 09/27/2024    GFR 80 02/06/2018    GFRNAA 74 07/25/2022    GFRAA 85 07/25/2022    CA 10.4 09/27/2024    OSMOCALC 292 09/27/2024    ALKPHO 70 09/27/2024    AST 28 09/27/2024    ALT 42 09/27/2024    BILT 0.3  09/27/2024    TP 7.6 09/27/2024    TP 7.1 09/27/2024    ALB 5.3 (H) 09/27/2024    ALB 4.66 09/27/2024    GLOBULIN 2.3 09/27/2024    AGRATIO 1.8 10/04/2021     09/27/2024    K 3.6 09/27/2024     09/27/2024    CO2 28.0 09/27/2024         No results found for: \"ANATI\", \"SULLY\", \"ANAS\", \"ANASCRN\", \"ANASCRNRFLX\", \"ESPERANZA\"  No results found for: \"SSA\", \"SSAUR\", \"ANTISSA\", \"SSA52\", \"SSA60\", \"SSADD\", \"SSB\", \"ANTISSB\"  No results found for: \"DSDNA\", \"ANTIDSDNA\", \"SMUD\", \"ANTISM\", \"SM\", \"RNP\", \"ANTIRNP\", \"SMITHRNP\"  No results found for: \"SCL70\", \"SCL\", \"PJKCJJW86\"  No results found for: \"C3\", \"C4\"  No results found for: \"DRVVT\", \"LAINT\", \"PTTLUPUS\", \"LUPUSINTERP\", \"LA\", \"T5JX4PATYD\", \"Y1OJ8LNFFP\", \"P0NDRZXMGY\", \"S9KVUUMBLW\"  No results found for: \"CARDIOLIPIGG\", \"CARDIOLIPIGM\", \"CARDIOLIPIGA\", \"CARDIOIGA\", \"CARLIP\"    Additional Labs:    Radiology:  US ART UPPER EXT BILAT DOPPLER W SEG PRESSURES (CPT=93923)    Result Date: 2/5/2025  CONCLUSION:  There is no evidence of significant arterial stenosis.   LOCATION:      Dictated by (CST): Clinton Corrigan MD on 2/05/2025 at 3:38 PM     Finalized by (CST): Clinton Corrigan MD on 2/05/2025 at 3:39 PM       XR ABDOMEN OBSTRUCTIVE SERIES ROUTINE(2 VW)(CPT=74019)    Result Date: 2/3/2025  CONCLUSION:  No acute radiographic findings.   LOCATION:  [id number]    Dictated by (CST): Gabe Yen MD on 2/03/2025 at 12:59 PM     Finalized by (CST): Gabe Yen MD on 2/03/2025 at 12:59 PM        Radiology review:      =====================================================================================================  Assessment and Plan    Assessment:  1. Neurogenic thoracic outlet syndrome    2. Muscle spasm    3. Chronic low back pain, unspecified back pain laterality, unspecified whether sciatica present    4. Hypogammaglobulinemia (HCC)      #neurogenic thoracic outlet syndrome.   Patient has underwent multiple cervical surgeries including cervical fusion for previously noted  radiculopathy/myelopathy. Prior EMG/NCS negative for any radicular pathology.  -No evidence of fibromyalgia or peripheral inflammatory arthritis.  -No significant improvement with physical therapy  -Arterial duplex Dopplers negative for arterial thoracic outlet syndrome    #Cervical DJD: S/p fusion  #Bilateral knee osteoarthritis  #History of right frozen shoulder  #Scoliosis of the thoracic spine: with scapular winging indicating thoracic paraspinal and core weakness  #Lumbar degenerative disc disease (DJD)    #osteopenia: Clinically noted to have weak bones during cervical fusion surgery.  Fractured foot in the past. S/p MARIAM-BSO in 2015.  S/p 2 years of Forteo with improvement in DEXA.  -11/2024 DEXA WNL  ?  Plan:  -Hypervitaminosis B6 improving  -Hypervitaminosis D: Patient stopped vitamin D supplement  -History of CVID in child.  Patient with frequent infections.  Low IgG noted.  Getting CVID testing with allergy and immunology.    Repeat trigger point injections for trapezius muscle spasm.  Try baclofen to relax the muscles.  TENS unit recommended as well.  Consider acupuncture for myofascial pain syndrome/low back pain  -Many medications contraindicated relatively speaking given patient on Wellbutrin and Celexa.  -Try tennis elbow brace for the right tennis elbow  -Try Voltaren gel for the right tennis elbow        Rtc 6 months    Diagnoses and all orders for this visit:    Neurogenic thoracic outlet syndrome  -     baclofen 10 MG Oral Tab; Take 1 tablet (10 mg total) by mouth nightly as needed.  -     [facility] - Internal Referral    Muscle spasm  -     baclofen 10 MG Oral Tab; Take 1 tablet (10 mg total) by mouth nightly as needed.  -     [facility] ([place] - Internal Referral    Chronic low back pain, unspecified back pain laterality, unspecified whether sciatica present  -     baclofen 10 MG Oral Tab; Take 1 tablet (10 mg total)  by mouth nightly as needed.  -     [facility]cup[facility] ([place]) - Internal Referral    Hypogammaglobulinemia (HCC)  -     baclofen 10 MG Oral Tab; Take 1 tablet (10 mg total) by mouth nightly as needed.  -     Acupuncture [facility] ([place]) - Internal Referral      Return in about 6 months (around 9/3/2025).      The above plan of care, diagnosis, orders, and follow-up were discussed with the patient. Questions related to this recommended plan of care were answered.    Thank you for referring this delightful patient to me. Please feel free to contact me with any questions.     This report was performed utilizing speech recognition software technology. Despite proofreading, speech recognition errors could escape detection. If a word or phrase is confusing or out of context, please do not hesitate to call for   clarification.       Kind regards      Larisa Livingston MD  [facility]

## 2025-03-03 NOTE — TELEPHONE ENCOUNTER
A refill request was received for:  Requested Prescriptions     Pending Prescriptions Disp Refills    MONTELUKAST 10 MG Oral Tab [Pharmacy Med Name: MONTELUKAST 10MG TABLETS] 90 tablet 0     Sig: TAKE 1 TABLET(10 MG) BY MOUTH EVERY NIGHT       Last refill date:12/03/24       Last office visit: 12/11/24      Future Appointments   Date Time Provider Department Center   3/3/2025  1:15 PM Larisa Livingston MD [facility]   3/17/2025 11:30 AM OS [facility] [place]

## 2025-03-03 NOTE — PATIENT INSTRUCTIONS
Get trigger point injections, acupuncture    Try tennis elbow brace    Horse liniement       Try TENS unit [employer] Muscle Stimulator 24 massage mode unit. This can help for fibromyalgia and neck/back pain.       Purchase Voltaren (diclofenac) 1% gel over-the-counter. It is in an orange packaging.     Total dose should not exceed 32 g per day, overall affected joints. Voltaren Gel should  be measured onto the enclosed dosing card to the appropriate 2 g or 4 g designation.   Knees: Apply the gel (4 g) to the affected area up to 4 times daily. Do not apply  more than 16 g daily to any one affected joint of the lower extremities.   Hands, wrists, elbows: Apply the gel (2 g) to the affected area up to 4 times daily. Do not apply  more than 8 g daily to any one affected joint of the upper extremities

## 2025-03-04 RX ORDER — MONTELUKAST SODIUM 10 MG/1
10 TABLET ORAL NIGHTLY
Qty: 90 TABLET | Refills: 3 | Status: SHIPPED | OUTPATIENT
Start: 2025-03-04

## 2025-03-10 ENCOUNTER — TELEPHONE (OUTPATIENT)
Dept: FAMILY MEDICINE CLINIC | Facility: CLINIC | Age: 50
End: 2025-03-10

## 2025-03-10 DIAGNOSIS — F32.0 CURRENT MILD EPISODE OF MAJOR DEPRESSIVE DISORDER, UNSPECIFIED WHETHER RECURRENT: ICD-10-CM

## 2025-03-10 DIAGNOSIS — F90.8 OTHER SPECIFIED ATTENTION DEFICIT HYPERACTIVITY DISORDER (ADHD): ICD-10-CM

## 2025-03-10 DIAGNOSIS — F41.9 ANXIETY: ICD-10-CM

## 2025-03-10 RX ORDER — METHYLPHENIDATE HYDROCHLORIDE 20 MG/1
20 TABLET ORAL 2 TIMES DAILY
Qty: 60 TABLET | Refills: 0 | Status: SHIPPED | OUTPATIENT
Start: 2025-03-10

## 2025-03-10 RX ORDER — CITALOPRAM HYDROBROMIDE 40 MG/1
40 TABLET ORAL DAILY
Qty: 90 TABLET | Refills: 0 | Status: SHIPPED | OUTPATIENT
Start: 2025-03-10

## 2025-03-10 NOTE — TELEPHONE ENCOUNTER
Pt states she hit couch on Friday and fell. She has been having right rib pain and daughter works at [employer] so she had her go to  ER for xray.  Pt stating she was dx with Contusion, but xrays were unclear due to breast tissue.  Unsure if Jennifer wants further imaging? She is trying to get the report to send to us.  No available appt for follow up .

## 2025-03-10 NOTE — TELEPHONE ENCOUNTER
Was in ER today for rib contusion but they couldn't really see it on the XRAY. They told her to follow up with her pcp. Please advise.

## 2025-03-10 NOTE — TELEPHONE ENCOUNTER
Treatment for rib contusion or even fracture is conservative. Try to control pain with otc analgesics. She can follow up if these are not sufficient. Instruct patient to use an incentive spirometer intermittently throughout the day after analgesics have taken effect. Holding a pillow or similar soft brace against the fracture/contusion site reduces discomfort while using the spirometer or when coughing or sneezing. It may take up to 6 weeks to fully heal.

## 2025-03-10 NOTE — TELEPHONE ENCOUNTER
.A refill request was received for:  Requested Prescriptions     Pending Prescriptions Disp Refills    citalopram 40 MG Oral Tab 90 tablet 0     Sig: Take 1 tablet (40 mg total) by mouth daily.    methylphenidate 20 MG Oral Tab 60 tablet 0     Sig: Take 1 tablet (20 mg total) by mouth 2 (two) times daily.       Last refill date:   methylphenidate 1/6/25  Citalopram 12/3/24    Last office visit: 12/11/24    Follow up due:  Future Appointments   Date Time Provider Department Center   3/17/2025 11:30 AM [facility] [place]   9/3/2025  1:15 PM Larisa Livingston MD [facility] [place]

## 2025-03-17 ENCOUNTER — LAB ENCOUNTER (OUTPATIENT)
Dept: LAB | Age: 50
End: 2025-03-17
Attending: PHYSICIAN ASSISTANT
Payer: COMMERCIAL

## 2025-03-17 ENCOUNTER — HOSPITAL ENCOUNTER (OUTPATIENT)
Dept: ULTRASOUND IMAGING | Age: 50
Discharge: HOME | End: 2025-03-17
Attending: PHYSICIAN ASSISTANT
Payer: COMMERCIAL

## 2025-03-17 DIAGNOSIS — E03.9 HYPOTHYROIDISM, ADULT: ICD-10-CM

## 2025-03-17 DIAGNOSIS — E04.1 THYROID NODULE: ICD-10-CM

## 2025-03-17 DIAGNOSIS — E78.2 MIXED HYPERLIPIDEMIA: ICD-10-CM

## 2025-03-17 DIAGNOSIS — E11.69 TYPE 2 DIABETES MELLITUS WITH OTHER SPECIFIED COMPLICATION, WITHOUT LONG-TERM CURRENT USE OF INSULIN (HCC): ICD-10-CM

## 2025-03-17 LAB
CHOLEST SERPL-MCNC: 254 MG/DL (ref ?–200)
FASTING PATIENT LIPID ANSWER: YES
HDLC SERPL-MCNC: 71 MG/DL (ref 40–59)
LDLC SERPL CALC-MCNC: 159 MG/DL (ref ?–100)
NONHDLC SERPL-MCNC: 183 MG/DL (ref ?–130)
TRIGL SERPL-MCNC: 135 MG/DL (ref 30–149)
TSI SER-ACNC: 0.71 UIU/ML (ref 0.55–4.78)
VLDLC SERPL CALC-MCNC: 26 MG/DL (ref 0–30)

## 2025-03-17 PROCEDURE — 76536 US EXAM OF HEAD AND NECK: CPT | Performed by: PHYSICIAN ASSISTANT

## 2025-03-17 PROCEDURE — 80061 LIPID PANEL: CPT

## 2025-03-17 PROCEDURE — 84443 ASSAY THYROID STIM HORMONE: CPT

## 2025-03-17 PROCEDURE — 36415 COLL VENOUS BLD VENIPUNCTURE: CPT

## 2025-03-18 ENCOUNTER — OFFICE VISIT (OUTPATIENT)
Dept: PAIN CLINIC | Facility: CLINIC | Age: 50
End: 2025-03-18
Payer: COMMERCIAL

## 2025-03-18 VITALS
SYSTOLIC BLOOD PRESSURE: 128 MMHG | DIASTOLIC BLOOD PRESSURE: 78 MMHG | OXYGEN SATURATION: 99 % | BODY MASS INDEX: 27 KG/M2 | WEIGHT: 164 LBS | HEART RATE: 100 BPM

## 2025-03-18 DIAGNOSIS — M79.18 MYOFASCIAL PAIN: Primary | ICD-10-CM

## 2025-03-18 PROCEDURE — 3074F SYST BP LT 130 MM HG: CPT | Performed by: PHYSICIAN ASSISTANT

## 2025-03-18 PROCEDURE — 99214 OFFICE O/P EST MOD 30 MIN: CPT | Performed by: PHYSICIAN ASSISTANT

## 2025-03-18 PROCEDURE — 3078F DIAST BP <80 MM HG: CPT | Performed by: PHYSICIAN ASSISTANT

## 2025-03-18 RX ORDER — HYDROCODONE BITARTRATE AND ACETAMINOPHEN 5; 325 MG/1; MG/1
1 TABLET ORAL EVERY 6 HOURS PRN
COMMUNITY
Start: 2025-03-10

## 2025-03-18 NOTE — PROGRESS NOTES
Patient presents in office today with reported pain in cervical spine, headaches    Current pain level reported = 8-9/10    Last reported dose of muscle relaxer yesterday      Narcotic Contract renewal none    Urine Drug screen none

## 2025-03-18 NOTE — PATIENT INSTRUCTIONS
Refill policies:    Allow 2-3 business days for refills; controlled substances may take longer.  Contact your pharmacy at least 5 days prior to running out of medication and have them send an electronic request or submit request through the “request refill” option in your [employer] account.  Refills are not addressed on weekends; covering physicians do not authorize routine medications on weekends.  No narcotics or controlled substances are refilled after noon on Fridays or by on call physicians.  By law, narcotics must be electronically prescribed.  A 30 day supply with no refills is the maximum allowed.  If your prescription is due for a refill, you may be due for a follow up appointment.  To best provide you care, patients receiving routine medications need to be seen at least once a year.  Patients receiving narcotic/controlled substance medications need to be seen at least once every 3 months.  In the event that your preferred pharmacy does not have the requested medication in stock (e.g. Backordered), it is your responsibility to find another pharmacy that has the requested medication available.  We will gladly send a new prescription to that pharmacy at your request.    Scheduling Tests:    If your physician has ordered radiology tests such as MRI or CT scans, please contact Central Scheduling at [phone] right away to schedule the test.  Once scheduled, the [facility] Centralized Referral Team will work with your insurance carrier to obtain pre-certification or prior authorization.  Depending on your insurance carrier, approval may take 3-10 days.  It is highly recommended patients assure they have received an authorization before having a test performed.  If test is done without insurance authorization, patient may be responsible for the entire amount billed.      Precertification and Prior Authorizations:  If your physician has recommended that you have a procedure or additional testing performed the [facility]  Centralized Referral Team will contact your insurance carrier to obtain pre-certification or prior authorization.    You are strongly encouraged to contact your insurance carrier to verify that your procedure/test has been approved and is a COVERED benefit.  Although the [facility] Centralized Referral Team does its due diligence, the insurance carrier gives the disclaimer that \"Although the procedure is authorized, this does not guarantee payment.\"    Ultimately the patient is responsible for payment.   Thank you for your understanding in this matter.  Paperwork Completion:  If you require FMLA or disability paperwork for your recovery, please make sure to either drop it off or have it faxed to our office at [phone]. Be sure the form has your name and date of birth on it.  The form will be faxed to our Forms Department and they will complete it for you.  There is a 25$ fee for all forms that need to be filled out.  Please be aware there is a 10-14 day turnaround time.  You will need to sign a release of information (GURWINDER) form if your paperwork does not come with one.  You may call the Forms Department with any questions at [phone].  Their fax number is [phone].

## 2025-03-18 NOTE — PROGRESS NOTES
HPI:   Nadya Mina presents with complaints of R sided neck pain with R occipital pain.      The pain is described as moderate aching that is intermittent.  The patient’s activity level has increased since last visit.  The pain is worst unrelated to time of day.    Changes in condition/history since last visit: Patient is here today for follow up, having undergone TPI on 11/25/24.  Procedure was well tolerated and had no adverse effects.  Overall, reports excellent initial relief, and was 80-90% improved.  While L sided pain continues to be improved, has had return of R sided pain, now with radiating occipital pain.  Wishes to discuss injections.      Prior to this, had good relief with hardware injection on 3/21/23.        Last procedure: Bilateral TPI   Date: 11/25/24 (4/24/23, 12/22/23, 5/22/24, 9/27/24)    Percent relief: 80-90%    Duration: lasting for L, 3.5 months for R    Previous procedure: Bilateral cervical hardware injection date: 3/21/2023    Percent relief: 80%    Duration: 60% sustained    The following activities will increase the patient’s pain: prolonged activity     The following activities decrease the patient’s pain: limiting activity level    Functional Assessment: Patient reports that they are able to complete all of their ADL's such as eating, bathing, using the toilet, dressing and getting up from a bed or a chair independently.    Current Medications:  Current Outpatient Medications   Medication Sig Dispense Refill    HYDROcodone-acetaminophen 5-325 MG Oral Tab Take 1 tablet by mouth every 6 (six) hours as needed for Pain.      citalopram 40 MG Oral Tab Take 1 tablet (40 mg total) by mouth daily. 90 tablet 0    methylphenidate 20 MG Oral Tab Take 1 tablet (20 mg total) by mouth 2 (two) times daily. 60 tablet 0    montelukast 10 MG Oral Tab Take 1 tablet (10 mg total) by mouth nightly. 90 tablet 3    baclofen 10 MG Oral Tab Take 1 tablet (10 mg total) by mouth nightly as needed. 90  tablet 0    BUPROPION  MG Oral Tablet 24 Hr TAKE 1 TABLET(150 MG) BY MOUTH DAILY 30 tablet 0    albuterol 108 (90 Base) MCG/ACT Inhalation Aero Soln Inhale 1-2 puffs into the lungs every 4 to 6 hours as needed. 51 g 4    Lovastatin 40 MG Oral Tab Take 1 tablet (40 mg total) by mouth nightly. 90 tablet 3    ondansetron 4 MG Oral Tablet Dispersible Take 1 tablet (4 mg total) by mouth every 8 (eight) hours as needed for Nausea. 20 tablet 0    ALPRAZolam 0.25 MG Oral Tab Take 1 tablet (0.25 mg total) by mouth nightly as needed for Sleep or Anxiety. 30 tablet 2    Ferrous Sulfate (FEROSUL) 325 (65 Fe) MG Oral Tab Take 1 tablet (325 mg total) by mouth daily with breakfast. (Patient not taking: Reported on 3/3/2025) 90 tablet 3    Glucose Blood (ONETOUCH VERIO) In Vitro Strip CHECK BLOOD SUGAR ONCE DAILY (Patient not taking: Reported on 3/3/2025) 100 strip 1    sodium chloride 0.9 % Inhalation Nebu Soln Take 3 mL by nebulization as needed for Wheezing. 30 each 0    Esomeprazole Magnesium (NEXIUM OR) Take by mouth.      Insulin Pen Needle (BD PEN NEEDLE YANDY U/F) 32G X 4 MM Does not apply Misc Inject 1 each into the skin daily. (Patient not taking: Reported on 3/3/2025) 100 each 3    Multiple Vitamin Oral Tab Take 1 tablet by mouth daily.        Side effects of medications: None    Relief obtained from medication management: n/a    Patient requires assistance with: No assistance required    Reviewed Patient History Dated: 11/25/24 no changes noted  Patient is currently on pain medications:  No  [place] Prescription Monitoring review: N/A    Physical Exam:   Vitals: [facility] 01/01/2004   VAS Pain Score:  7/10    General Appearance: Well developed, well nourished, normal build, independent body habitus, no apparent physical disabilities, well groomed    Neurological Exam: WNL-Orientation to time, place and person, normal mood & effect, normal concentration & attention span  Inspection: non-antalgic, no acute distress   Pain  with ROM C spine in all planes of motion  Pain to palpation superior R cervical paraspinal musculature, trap, rhomboid  No focal sensory/motor deficits  Negative spurling  Radiology/Lab Test Reviewed: XR:    MRI C spine:  CONCLUSION:         1. Stable postoperative changes in the cervical spine as above spanning C3-C7.       2. Stable minimal degenerative changes in the cervical spine as above.  There is no significant spinal canal or neural foraminal stenosis at any level.       3. No focal spinal cord signal abnormality identified    CT 3/30/24:    C2-C3:  No significant disc/facet abnormality, spinal stenosis, or foraminal stenosis.   C3-C4:  Laminectomy change.  No spinal stenosis or foraminal stenosis.   C4-C5:  Laminectomy and fusion change.  No spinal stenosis or foraminal stenosis.   C5-C6:  Laminectomy and fusion change.  No spinal stenosis or foraminal stenosis.   C6-C7:  Laminectomy and fusion change.  No spinal stenosis or foraminal stenosis.   C7-T1:  No significant disc/facet abnormality, spinal stenosis, or foraminal stenosis.     Did the pt have a positive pain response from the most recent TPI?: yes  Percentage of relief obtained: 100%  Duration of pain relief: >3 months, though still with some moderate relief, though pain is returning  Does the pt have recurring myofacial pain that is causing functional limitation?: yes     Patient educated and verbalized understanding.  Medical Decision Making:   Diagnosis:    Encounter Diagnosis   Name Primary?    Myofascial pain Yes     Impression: Elimination of pain after bilateral trap and cervical paraspinal trigger point injections on 4/24/2023, though pain did eventually return, and underwent repeat injections on 12/22/23, 5/22/24, 9/27/24, 11/25/24 to similar response, and after most recent injection left-sided pain continues to be eliminated.  She has had recent return of right-sided pain now associated with radiating occipital pain as well.  Wishes a  repeat procedure, and order is placed.  Plan: Patient to schedule the following injection: R trapezius, cervical paraspinal, and rhomboid TPI Levels: as stated, Procedure and risks were discussed with pt. including headache, bleeding, infection and potential nerve damage.  F/u 2-3 weeks.    No orders of the defined types were placed in this encounter.      Meds & Refills for this Visit:  Requested Prescriptions      No prescriptions requested or ordered in this encounter       Imaging & Consults:  None    The patient indicates understanding of these issues and agrees to the plan.    JOSH Padilla

## 2025-03-20 RX ORDER — BUPROPION HYDROCHLORIDE 150 MG/1
150 TABLET ORAL DAILY
Qty: 30 TABLET | Refills: 0 | Status: SHIPPED | OUTPATIENT
Start: 2025-03-20

## 2025-03-21 ENCOUNTER — TELEPHONE (OUTPATIENT)
Dept: PAIN CLINIC | Facility: CLINIC | Age: 50
End: 2025-03-21

## 2025-03-21 NOTE — TELEPHONE ENCOUNTER
Zain Crawford PA  You1 hour ago (2:10 PM)       If UE complaints associated with red flag symptoms (new onset weakness, dropping thing, loss of sensation) should be evaluated in ER.  Otherwise, if this is a chronic/intermittent issue, would have her follow with us or her surgeon to discuss imaging.      With regards to the headache, if this is the R occipital pain, we are aware of it.  If it is a more generalized headache, would defer to PCP, as we would not routinely manage headaches.       Contacted pt to discuss. Pt states numbness in her arms has happened before, usually just before getting TPI. Asked pt if she is experiencing weakness in her UE, which she confirms. Asked pt if she has noticed issues with grasp leading to dropping objects, which she confirms. Pt reports dropping a glass earlier today. Asked pt if she has noticed loss of sensation in her UE, which pt confirms. Pt reports having difficulty removing items from . Advised pt that Zain does recommend being evaluated in the ED for these symptoms to ensure there is not an issue w/in the cervical spine.     Asked pt if HA is still right-sided only, which pt denies. Pt states it seems to begin at the base of the skull and travel upward throughout the head, but does seem to be worse on the right side. Advised pt we can treat the right-sided HA w/ the planned TPI, however if she is suffering a more global HA, would ask about this in the ED or speak w/ PCP. Pt VU.

## 2025-03-21 NOTE — TELEPHONE ENCOUNTER
LOV 3/18/25:    Medical Decision Making:   Diagnosis:         Encounter Diagnosis   Name Primary?    Myofascial pain Yes      Impression: Elimination of pain after bilateral trap and cervical paraspinal trigger point injections on 4/24/2023, though pain did eventually return, and underwent repeat injections on 12/22/23, 5/22/24, 9/27/24, 11/25/24 to similar response, and after most recent injection left-sided pain continues to be eliminated.  She has had recent return of right-sided pain now associated with radiating occipital pain as well.  Wishes a repeat procedure, and order is placed.  Plan: Patient to schedule the following injection: R trapezius, cervical paraspinal, and rhomboid TPI Levels: as stated, Procedure and risks were discussed with pt. including headache, bleeding, infection and potential nerve damage.  F/u 2-3 weeks.    No orders of the defined types were placed in this encounter.        Meds & Refills for this Visit:  Requested Prescriptions        No prescriptions requested or ordered in this encounter         Imaging & Consults:  None     The patient indicates understanding of these issues and agrees to the plan.     JOSH Padilla

## 2025-03-21 NOTE — TELEPHONE ENCOUNTER
Patient states she has had a constant headache all week and both arms are going numb. She would like to discuss with RN. Please call back.

## 2025-03-24 ENCOUNTER — TELEPHONE (OUTPATIENT)
Dept: SURGERY | Facility: CLINIC | Age: 50
End: 2025-03-24

## 2025-03-24 NOTE — TELEPHONE ENCOUNTER
Message below noted.    Patient requesting imaging order instead of proceeding to ER as advised by JOSH Colindres. Patient is experiencing neck pain at bas of skull where neck meets. Patient states she has not had this pain since prior to surgery with Dr. Valencia on 9/08/21.    Please see TE from 3/21/25. \"Pt states numbness in her arms has happened before, usually just before getting TPI. Asked pt if she is experiencing weakness in her UE, which she confirms. Asked pt if she has noticed issues with grasp leading to dropping objects, which she confirms. Pt reports dropping a glass earlier today. Asked pt if she has noticed loss of sensation in her UE, which pt confirms. Pt reports having difficulty removing items from . Advised pt that Zain does recommend being evaluated in the ED for these symptoms to ensure there is not an issue w/in the cervical spine.     Asked pt if HA is still right-sided only, which pt denies. Pt states it seems to begin at the base of the skull and travel upward throughout the head, but does seem to be worse on the right side. Advised pt we can treat the right-sided HA w/ the planned TPI, however if she is suffering a more global HA, would ask about this in the ED or speak w/ PCP. Pt VU.\"    LOV 11/06/24  \"PLAN:   1. Medication: None prescribed                -Medrol Dosepak offered, patient would like to hold at this time  2. Imaging:                 - Reviewed today:                              -No recent imaging                - Ordered today:                              -None  3. Activity:                 -PT reordered with updates for the lumbar spine                -Encourage core/spine strengthening                -Encourage posture work  4.  Pain services:                -Recommend follow-up to discuss further injections, patient states she is due typically every few months to keep her symptoms at baseline.  5.  Brain fog:                -Recommend follow-up with  rheumatology and/or neurology to discuss brain fog  6. Follow up as needed or call or follow up sooner or go to the ED for any new, worsening or concerning signs or symptoms\"    Routed to Provider.

## 2025-03-24 NOTE — TELEPHONE ENCOUNTER
If this weakness is acute, she should proceed directly to the ED for expedited work up. Otherwise she should be evaluated by an [facility] to have the appropriate imaging ordered.

## 2025-03-24 NOTE — TELEPHONE ENCOUNTER
Pt states she is having pain where neck and skull meet.Pt states she hasn't had this pain since prior to surgery with Dr Valencia. Pt asking if imaging can be ordered as she would rather not go to ED as suggested y Zain Crawford, per pt. Please call to discuss.

## 2025-03-24 NOTE — TELEPHONE ENCOUNTER
Message below noted.    Called patient to discuss. Patient states weakness is new. Advised due to weakness being acute, NSGY Providers advise she proceed to ER for expedited work-up.    Advised patient to reach back out with any other questions or concerns.     Patient acknowledged and appreciative of call. Patient will go to ER for assessment and imaging.

## 2025-03-25 ENCOUNTER — HOSPITAL ENCOUNTER (EMERGENCY)
Facility: HOSPITAL | Age: 50
Discharge: HOME | End: 2025-03-25
Attending: EMERGENCY MEDICINE
Payer: COMMERCIAL

## 2025-03-25 VITALS
BODY MASS INDEX: 26.33 KG/M2 | RESPIRATION RATE: 16 BRPM | DIASTOLIC BLOOD PRESSURE: 94 MMHG | HEART RATE: 87 BPM | SYSTOLIC BLOOD PRESSURE: 130 MMHG | TEMPERATURE: 97 F | WEIGHT: 158 LBS | OXYGEN SATURATION: 99 % | HEIGHT: 65 IN

## 2025-03-25 DIAGNOSIS — M54.12 CERVICAL RADICULOPATHY: Primary | ICD-10-CM

## 2025-03-25 PROCEDURE — 99283 EMERGENCY DEPT VISIT LOW MDM: CPT

## 2025-03-25 PROCEDURE — 99282 EMERGENCY DEPT VISIT SF MDM: CPT

## 2025-03-25 NOTE — ED INITIAL ASSESSMENT (HPI)
Sharp pain at base of skull, pt has been having trigger point injections for cervical radiculopathy.  Pt states bilateral hand numbness and increased pain

## 2025-03-25 NOTE — ED PROVIDER NOTES
Patient Seen in: [facility]      History     Chief Complaint   Patient presents with    Headache    Neck Pain    Weakness     Stated Complaint: neck and  head pain for 1 week, arms feel weak    Subjective:   HPI      49-year-old female history of cervical fusions and cervical radiculopathy, trigger point injections presents for evaluation of weakness.  States that she has a history of cervicalgia and cervical radiculopathy with occasional bilateral upper extremity paresthesias.  Over the last week and a half or so she has felt some subtle changes in her hand grasp strength.  Noticing some things like having more trouble opening her water bottle than usual.  No significant change in her neck pain or headaches.  She has no numbness or sensory changes.    Objective:     Past Medical History:    Abdominal distention    Abdominal pain    Abnormal uterine bleeding    Acute bronchitis    Acute esophagitis    Acute upper respiratory infections of unspecified site    Allergic rhinitis    Grass, mold, regular seasonal    Anxiety state, unspecified    Arthritis    In my back    Asthma (HCC)    Attention deficit disorder without mention of hyperactivity    Back pain    Back problem    cervical and lumbar    Bad breath    Started when stomach pain got worse    Bloating    Cauda equina compression (HCC)    Cauda equina syndrome without mention of neurogenic bladder    Constipation    Decorative tattoo    Depression    Diabetes (HCC)    Diet controlled; no meds    Diabetes (HCC)    Diabetes mellitus (HCC)    Diarrhea, unspecified    Dysesthesia    Dyspareunia    Easy bruising    Endometriosis    Esophageal reflux    Essential hypertension    Fatigue    Flatulence/gas pain/belching    Food intolerance    Frequent urination    Frequent UTI    Headache disorder    Heartburn    Heavy menses    Hematuria    Hiatal hernia    High cholesterol    History of depression    Hyperlipidemia    Hypertension    IBS  (irritable bowel syndrome)    Irregular bowel habits    Irregular menstrual cycle    Itch of skin    Occasionally on my legs, not due to dry skin    Leaking of urine    Lipid screening    Loss of appetite    Lump or mass in breast    Malaise    Malignant hyperthermia    patient's son at 5 years of age - 2006    Mastodynia    Menses painful    Migraines    Nausea    Night sweats    Obstructive sleep apnea    Osteoarthritis    Other screening mammogram    Pain in joints    Pain with bowel movements    Pap smear for cervical cancer screening    wnl    Personal history of urinary (tract) infection    Pneumonia due to organism    PONV (postoperative nausea and vomiting)    Problems with swallowing    Sleep apnea    Sleep disturbance    Stress    Uncomfortable fullness after meals    Unspecified disorder of thyroid    hypothyroidism    Unspecified viral infection, in conditions classified elsewhere and of unspecified site    Visual impairment    glasses    Vomiting    Wears glasses    Weight loss              Past Surgical History:   Procedure Laterality Date    Adenoidectomy      Child    Appendectomy      Appendectomy      Blood patch(bedside)      Colonoscopy N/A 2015    Procedure: COLONOSCOPY;  Surgeon: Marlene Payne MD;  Location:  ENDOSCOPY    Endometrial ablation      2004    Hysterectomy  2015    Hysteroscopy,ablation endometrium      Laminectomy,cervical  2020    Laparoscopy,diagnostic      multiple    Lysis of adhesions      Kevin localization wire 1 site right (cpt=19281)      in her 20's    Myringotomy, laser-assisted      Child          Oophorectomy      Other Right 2018    ARTHROSCOPIC ACROMIOPLASTY LYSIS OF ADHESIONS RIGHT SHOULDER    Other  2018    ANTERIOR DISCECTOMY AND FUSION. CERVICAL 4-CERVICAL 5 AND CERVICAL 5-CERVICAL 6. ALLOGRAFT, PLATE AND SCREWS    Other  2021    ANTERIOR CERVICAL DISCECTOMY AND FUSION CERVICAL 6- CERVICAL 7 WITH INTERBODY DEVICE,  REMOVAL OF SURGICAL SCREW RIGHT C6    Other surgical history      esophagogastroduodenoscopy    Other surgical history      right thumb trigger finger release     Other surgical history      ganglion cyst removed left wrist     Other surgical history  10/30/2019    Cystoscopy- Dr. Sofia    Sigmoidoscopy,diagnostic      Tarsal tunnel release  2005    right foot    Tonsillectomy      Total abdom hysterectomy                  Social History     Socioeconomic History    Marital status:    Tobacco Use    Smoking status: Former     Current packs/day: 0.00     Average packs/day: 0.5 packs/day for 10.0 years (5.0 ttl pk-yrs)     Types: Cigarettes     Quit date: 1989     Years since quittin.1    Smokeless tobacco: Never   Vaping Use    Vaping status: Never Used   Substance and Sexual Activity    Alcohol use: Yes     Alcohol/week: 2.0 standard drinks of alcohol     Types: 2 Cans of beer per week     Comment: occasionally    Drug use: No    Sexual activity: Yes     Partners: Male   Other Topics Concern    Caffeine Concern No    Stress Concern Yes    Weight Concern Yes    Special Diet No    Exercise Yes    Seat Belt Yes                  Physical Exam     ED Triage Vitals [25 1030]   BP (!) 160/98   Pulse 104   Resp 16   Temp 97.2 °F (36.2 °C)   Temp src Temporal   SpO2 99 %   O2 Device None (Room air)       Current Vitals:   Vital Signs  BP: (!) 130/94  Pulse: 87  Resp: 16  Temp: 97.2 °F (36.2 °C)  Temp src: Temporal    Oxygen Therapy  SpO2: 99 %  O2 Device: None (Room air)        Physical Exam  Constitutional:       General: Is not in acute distress.     Appearance: Is not ill-appearing.   Neck: Mild paracervical tenderness  HENT:      Head: Normocephalic and atraumatic.      Mouth/Throat:      Mouth: Mucous membranes are moist.   Eyes:      Conjunctiva/sclera: Conjunctivae normal.      Pupils: Pupils are equal, round, and reactive to light.   Cardiovascular:      Rate and Rhythm: Normal rate and  regular rhythm.   Pulmonary:      Effort: Pulmonary effort is normal. No respiratory distress.   Musculoskeletal:      Right lower leg: No edema.      Left lower leg: No edema.   Skin:     General: Skin is warm and dry.   Neurological: Sensation intact in upper extremities, 5 out of 5 strength throughout including hand grasp     General: No focal deficit present.      Mental Status: Is alert.       ED Course   Labs Reviewed - No data to display                MDM      Considered all emergent etiologies, differential includes but is not limited to: Cervicalgia, cervical radiculopathy, transverse myelitis, cervical cord compression thoracic outlet syndrome     I have independently visualized the radiology images, my focus/limited interpretation: N/A     Defer to radiologist for other/incidental findings    Normal examination.  Patient does have some subjective feelings of weakness in her hand grasp with specifically limiting her water bottle but no acute weakness and seems to be very gradual progression of her known disease rather than an acute cord compression or transverse myelitis.  Discussed definitive testing and offered MRI in the emergency department.  She is comfortable following up with her outpatient doctors and getting an outpatient MRI though and would prefer to go home.  Given that this is not an acute process seemingly and her normal objective examination comfortable with discharge and outpatient follow-up although did discuss at length that if she has worsening symptoms or a sudden change she should return to the ER for definitive imaging        Medical Decision Making      Disposition and Plan     Clinical Impression:  1. Cervical radiculopathy         Disposition:  Discharge  3/25/2025 10:52 am    Follow-up:  Zain Crawford  [address]   [facility] [id number]  [phone]    Follow up            Medications Prescribed:  Current Discharge Medication List              Supplementary  Documentation:

## 2025-04-04 ENCOUNTER — OFFICE VISIT (OUTPATIENT)
Dept: FAMILY MEDICINE CLINIC | Facility: CLINIC | Age: 50
End: 2025-04-04
Payer: COMMERCIAL

## 2025-04-04 VITALS
HEIGHT: 65 IN | WEIGHT: 162 LBS | HEART RATE: 116 BPM | RESPIRATION RATE: 16 BRPM | OXYGEN SATURATION: 97 % | BODY MASS INDEX: 26.99 KG/M2 | SYSTOLIC BLOOD PRESSURE: 118 MMHG | DIASTOLIC BLOOD PRESSURE: 78 MMHG

## 2025-04-04 DIAGNOSIS — G47.33 OBSTRUCTIVE SLEEP APNEA: ICD-10-CM

## 2025-04-04 DIAGNOSIS — F41.9 ANXIETY: ICD-10-CM

## 2025-04-04 DIAGNOSIS — D83.9 CVID (COMMON VARIABLE IMMUNODEFICIENCY) (HCC): ICD-10-CM

## 2025-04-04 DIAGNOSIS — M77.10 LATERAL EPICONDYLITIS, UNSPECIFIED LATERALITY: ICD-10-CM

## 2025-04-04 DIAGNOSIS — E11.9 TYPE 2 DIABETES MELLITUS WITHOUT COMPLICATION, WITHOUT LONG-TERM CURRENT USE OF INSULIN (HCC): Primary | ICD-10-CM

## 2025-04-04 DIAGNOSIS — J30.9 ALLERGIC RHINITIS, UNSPECIFIED SEASONALITY, UNSPECIFIED TRIGGER: ICD-10-CM

## 2025-04-04 DIAGNOSIS — F32.0 CURRENT MILD EPISODE OF MAJOR DEPRESSIVE DISORDER, UNSPECIFIED WHETHER RECURRENT: ICD-10-CM

## 2025-04-04 DIAGNOSIS — R11.0 NAUSEA: ICD-10-CM

## 2025-04-04 DIAGNOSIS — M54.12 CERVICAL MYELOPATHY WITH CERVICAL RADICULOPATHY (HCC): ICD-10-CM

## 2025-04-04 DIAGNOSIS — G95.9 CERVICAL MYELOPATHY WITH CERVICAL RADICULOPATHY (HCC): ICD-10-CM

## 2025-04-04 DIAGNOSIS — F90.8 OTHER SPECIFIED ATTENTION DEFICIT HYPERACTIVITY DISORDER (ADHD): ICD-10-CM

## 2025-04-04 PROCEDURE — 99214 OFFICE O/P EST MOD 30 MIN: CPT | Performed by: PHYSICIAN ASSISTANT

## 2025-04-04 PROCEDURE — 3074F SYST BP LT 130 MM HG: CPT | Performed by: PHYSICIAN ASSISTANT

## 2025-04-04 PROCEDURE — 3008F BODY MASS INDEX DOCD: CPT | Performed by: PHYSICIAN ASSISTANT

## 2025-04-04 PROCEDURE — 3078F DIAST BP <80 MM HG: CPT | Performed by: PHYSICIAN ASSISTANT

## 2025-04-04 RX ORDER — AZELASTINE 1 MG/ML
2 SPRAY, METERED NASAL 2 TIMES DAILY
Qty: 30 ML | Refills: 2 | Status: SHIPPED | OUTPATIENT
Start: 2025-04-04

## 2025-04-04 RX ORDER — METHYLPHENIDATE HYDROCHLORIDE 20 MG/1
20 TABLET ORAL 2 TIMES DAILY
Qty: 60 TABLET | Refills: 0 | Status: SHIPPED | OUTPATIENT
Start: 2025-04-04 | End: 2025-05-04

## 2025-04-04 RX ORDER — ONDANSETRON 4 MG/1
4 TABLET, ORALLY DISINTEGRATING ORAL EVERY 8 HOURS PRN
Qty: 20 TABLET | Refills: 2 | Status: SHIPPED | OUTPATIENT
Start: 2025-04-04

## 2025-04-04 RX ORDER — BUPROPION HYDROCHLORIDE 300 MG/1
300 TABLET ORAL DAILY
Qty: 30 TABLET | Refills: 0 | Status: SHIPPED | OUTPATIENT
Start: 2025-04-04

## 2025-04-04 RX ORDER — METHYLPHENIDATE HYDROCHLORIDE 20 MG/1
20 TABLET ORAL 2 TIMES DAILY
Qty: 60 TABLET | Refills: 0 | Status: SHIPPED | OUTPATIENT
Start: 2025-05-05 | End: 2025-06-04

## 2025-04-04 RX ORDER — METHYLPHENIDATE HYDROCHLORIDE 20 MG/1
20 TABLET ORAL 2 TIMES DAILY
Qty: 60 TABLET | Refills: 0 | Status: SHIPPED | OUTPATIENT
Start: 2025-06-05 | End: 2025-07-05

## 2025-04-04 NOTE — PROGRESS NOTES
The following individual(s) verbally consented to be recorded using ambient AI listening technology and understand that they can each withdraw their consent to this listening technology at any point by asking the clinician to turn off or pause the recording:    Patient name: Nadya RENTERIA Keeley

## 2025-04-04 NOTE — PROGRESS NOTES
Subjective:   Nadya Mina is a 49 year old female who presents for Follow - Up (Pt.is here for follow up)       History/Other:   History of Present Illness    The patient presents to discuss chronic issues.    She has been experiencing high blood sugar levels, with recent readings ranging from 112 to 200 mg/dL. There is a significant decrease in appetite and unexplained weight gain, which she attributes to stress and the pain she is experiencing.    She describes significant neck pain, which has been persistent. A recent visit to the ER due to weakness and dropping things led to a CT scan, revealing issues from C3 to C6. A temporary trigger point injection provided relief for six to eight hours. She is scheduled for further injections on the 14th.    She reports having tennis elbow, initially diagnosed at an urgent care without an x-ray. She was given a sleeve. Despite wearing a band continuously, the pain persists. She started experiencing this issue around March 3rd.    She has a history of immune system concerns, with low IgA, IgG, and IgM levels compared to her son Freddie. Despite receiving a pneumococcal vaccine, her levels remain low, and she frequently experiences nasal congestion and a persistent cough. She questions the absence of an immune deficiency diagnosis despite her symptoms.    She uses a CPAP machine for sleep apnea, diagnosed with 28 episodes of stopped breathing per hour. She experiences chronic fatigue and falls asleep randomly, which she believes is related to her sleep apnea.    She has a history of sinusitis, asthma, and allergies, for which she has been treated with antibiotics and other medications. She experiences frequent epistaxis, which she associates with nasal sprays like Flonase. No significant color in the mucus she expels, indicating no current infection. She reports a persistent cough and nasal congestion. She experiences epistaxis, which she attributes to nasal spray use.  She has a history of tachycardia, which she does not associate with her current medication use.     Chief Complaint Reviewed and Verified  Nursing Notes Reviewed and   Verified  Allergies Reviewed  Medications Reviewed         Tobacco:  She smoked tobacco in the past but quit greater than 12 months ago.  Social History     Tobacco Use   Smoking Status Former    Current packs/day: 0.00    Average packs/day: 0.5 packs/day for 10.0 years (5.0 ttl pk-yrs)    Types: Cigarettes    Quit date: 1989    Years since quittin.1   Smokeless Tobacco Never        Current Outpatient Medications   Medication Sig Dispense Refill    azelastine 0.1 % Nasal Solution 2 sprays by Nasal route 2 (two) times daily. 30 mL 2    ondansetron 4 MG Oral Tablet Dispersible Take 1 tablet (4 mg total) by mouth every 8 (eight) hours as needed for Nausea. 20 tablet 2    buPROPion  MG Oral Tablet 24 Hr Take 1 tablet (300 mg total) by mouth daily. 30 tablet 0    methylphenidate (RITALIN) 20 MG Oral Tab Take 1 tablet (20 mg total) by mouth 2 (two) times daily. 60 tablet 0    [START ON 2025] methylphenidate (RITALIN) 20 MG Oral Tab Take 1 tablet (20 mg total) by mouth 2 (two) times daily. 60 tablet 0    [START ON 2025] methylphenidate (RITALIN) 20 MG Oral Tab Take 1 tablet (20 mg total) by mouth 2 (two) times daily. 60 tablet 0    Lovastatin 40 MG Oral Tab Take 2 tablets (80 mg total) by mouth nightly. 180 tablet 1    citalopram 40 MG Oral Tab Take 1 tablet (40 mg total) by mouth daily. 90 tablet 0    montelukast 10 MG Oral Tab Take 1 tablet (10 mg total) by mouth nightly. 90 tablet 3    baclofen 10 MG Oral Tab Take 1 tablet (10 mg total) by mouth nightly as needed. 90 tablet 0    albuterol 108 (90 Base) MCG/ACT Inhalation Aero Soln Inhale 1-2 puffs into the lungs every 4 to 6 hours as needed. 51 g 4    ALPRAZolam 0.25 MG Oral Tab Take 1 tablet (0.25 mg total) by mouth nightly as needed for Sleep or Anxiety. 30 tablet 2    Glucose  Blood (ONETOUCH VERIO) In Vitro Strip CHECK BLOOD SUGAR ONCE DAILY 100 strip 1    sodium chloride 0.9 % Inhalation Nebu Soln Take 3 mL by nebulization as needed for Wheezing. 30 each 0    Esomeprazole Magnesium (NEXIUM OR) Take by mouth.      Insulin Pen Needle (BD PEN NEEDLE YANDY U/F) 32G X 4 MM Does not apply Misc Inject 1 each into the skin daily. 100 each 3    Multiple Vitamin Oral Tab Take 1 tablet by mouth daily.      HYDROcodone-acetaminophen 5-325 MG Oral Tab Take 1 tablet by mouth every 6 (six) hours as needed for Pain. (Patient not taking: Reported on 4/4/2025)      Ferrous Sulfate (FEROSUL) 325 (65 Fe) MG Oral Tab Take 1 tablet (325 mg total) by mouth daily with breakfast. (Patient not taking: Reported on 3/3/2025) 90 tablet 3       Review of Systems:  Pertinent items are noted in HPI.        Objective:   /78   Pulse 116   Resp 16   Ht 5' 5\" (1.651 m)   Wt 162 lb (73.5 kg)   LMP 01/01/2004   SpO2 97%   BMI 26.96 kg/m²  Estimated body mass index is 26.96 kg/m² as calculated from the following:    Height as of this encounter: 5' 5\" (1.651 m).    Weight as of this encounter: 162 lb (73.5 kg).  Results       Physical Exam  GENERAL: Alert, cooperative, well developed, no acute distress.  HEENT: Normocephalic, mild redness in throat, normal oropharynx, moist mucous membranes.  CHEST: Clear to auscultation bilaterally, no wheezes, rhonchi, or crackles.  CARDIOVASCULAR: Normal heart rate and rhythm, S1 and S2 normal without murmurs.  ABDOMEN: Soft, non-tender, non-distended, without organomegaly, normal bowel sounds.  EXTREMITIES: No cyanosis or edema.  NEUROLOGICAL: Cranial nerves grossly intact, moves all extremities without gross motor or sensory deficit.      Assessment & Plan:   Assessment & Plan  Diabetes Mellitus  Elevated blood glucose levels, potentially stress-induced. A1c previously at 5.7%.  - Check A1c level and treat accordingly  - Monitor blood glucose levels.  - Encourage diet and  exercise to maintain A1c below 7.0%.    Cervical Spondylosis  Chronic neck pain with weakness and dropping objects. CT scan showed degenerative changes. Trigger point injections provided temporary relief.  - Proceed with scheduled trigger point injections on April 14.  - Consider referral to neurosurgeon for further evaluation if symptoms persist.  - Discuss potential change in specialist for ongoing management.    Lateral Epicondylitis (Tennis Elbow)  Persistent elbow pain despite brace use. Pain unchanged since March 3. Hesitant about further injections.  - Continue wearing the recommended brace for 2-4 weeks.  - Consider referral to orthopedics if no improvement.  - Consider physical therapy or steroid injection if symptoms persist.    Common Variable Immunodeficiency (CVID)  Low IgA, IgG, and IgM levels with no significant titer improvement post-vaccine. Symptoms include chronic cough and nasal congestion. Immunologist did not confirm immune deficiency.  - Repeat immune labs 4-6 weeks after pneumococcal vaccine.  - Follow up with immunologist after labs.  - Consider referral to another allergist for second opinion.    Allergic Rhinitis  Chronic nasal congestion and postnasal drip. Flonase use resulted in epistaxis. Hesitant to use steroid nasal sprays.  - Prescribe azelastine nasal spray.  - Monitor for symptom relief and side effects.    Obstructive Sleep Apnea  28 apneic episodes per hour. CPAP machine order delayed due to insurance requiring a secondary diagnosis.  - Submit documentation of hypersomnia and chronic fatigue to insurance for CPAP approval.    Tachycardia  Episodes possibly related to methylphenidate use. Cardiologist evaluation showed no significant cardiac issues.  - Monitor heart rate and symptoms.  - Consider alternative causes if symptoms persist.    Medication Management  Current medications include bupropion, Xanax, and methylphenidate. Bupropion dose increased from 150 mg to 300 mg.  Experiences nausea.  - Increase bupropion to 300 mg daily for 30 days.  - Prescribe antiemetic medication.  - Monitor for side effects and efficacy of increased bupropion dose.    Recording duration: 31 minutes        No follow-ups on file.        Jose Rogers PA-C, 4/4/2025, 2:12 PM         The following individual(s) verbally consented to be recorded using ambient AI listening technology and understand that they can each withdraw their consent to this listening technology at any point by asking the clinician to turn off or pause the recording:    Patient name: Nadya DEXTER Mina

## 2025-04-11 ENCOUNTER — TELEPHONE (OUTPATIENT)
Dept: FAMILY MEDICINE CLINIC | Facility: CLINIC | Age: 50
End: 2025-04-11

## 2025-04-14 ENCOUNTER — NURSE ONLY (OUTPATIENT)
Dept: PAIN CLINIC | Facility: CLINIC | Age: 50
End: 2025-04-14
Payer: COMMERCIAL

## 2025-04-14 ENCOUNTER — OFFICE VISIT (OUTPATIENT)
Dept: PAIN CLINIC | Facility: CLINIC | Age: 50
End: 2025-04-14
Payer: COMMERCIAL

## 2025-04-14 VITALS — DIASTOLIC BLOOD PRESSURE: 82 MMHG | SYSTOLIC BLOOD PRESSURE: 124 MMHG | HEART RATE: 106 BPM | OXYGEN SATURATION: 96 %

## 2025-04-14 DIAGNOSIS — M79.18 MYOFASCIAL PAIN: Primary | ICD-10-CM

## 2025-04-14 PROCEDURE — 20553 NJX 1/MLT TRIGGER POINTS 3/>: CPT | Performed by: ANESTHESIOLOGY

## 2025-04-14 PROCEDURE — 3079F DIAST BP 80-89 MM HG: CPT | Performed by: ANESTHESIOLOGY

## 2025-04-14 PROCEDURE — 3074F SYST BP LT 130 MM HG: CPT | Performed by: ANESTHESIOLOGY

## 2025-04-14 RX ORDER — TRIAMCINOLONE ACETONIDE 40 MG/ML
80 INJECTION, SUSPENSION INTRA-ARTICULAR; INTRAMUSCULAR ONCE
Status: COMPLETED | OUTPATIENT
Start: 2025-04-14 | End: 2025-04-14

## 2025-04-14 RX ORDER — BUPIVACAINE HYDROCHLORIDE 5 MG/ML
18 INJECTION, SOLUTION EPIDURAL; INTRACAUDAL; PERINEURAL ONCE
Status: COMPLETED | OUTPATIENT
Start: 2025-04-14 | End: 2025-04-14

## 2025-04-14 NOTE — PROGRESS NOTES
Timeout completed prior to procedure @ 1478.  Participants present for timeout:  Dr. Mckinley, SAUL Fournier, Tessa HUGHES, pt's son, and patient.

## 2025-04-14 NOTE — PROGRESS NOTES
Patient presents in office today with reported pain in cervical region    Current pain level reported = 7/10    Last reported dose of Motrin this AM    Narcotic Contract renewal N/A    Urine Drug screen N/A

## 2025-04-14 NOTE — PROCEDURES
Date of procedure: April 14, 2025      Preop diagnosis: Cervical myofascial pain     Postop diagnosis: Same      procedure: Bilateral cervical TPI     Surgeon: Rock Mckinley     Anesthesia: None        Indication: Patient is a 48-year-old female with a history of neck pain     Procedure detail: Informed consent obtained.  Timeout was done.  Skin overlying the cervical spine was prepped in usual sterile fashion.  Subsequently, a 27-gauge needle was used to deliver a total mixture of 80 mg of triamcinolone with 18 cc of 0.5% bupivacaine to multiple trigger points.  Muscles injected include the levator scapulae, trapezius, and rhomboid.  Patient tolerated procedure well.  Excellent hemostasis.  No objective subjective weakness.     Rock Mckinley MD

## 2025-04-14 NOTE — PATIENT INSTRUCTIONS
Refill policies:    Allow 2-3 business days for refills; controlled substances may take longer.  Contact your pharmacy at least 5 days prior to running out of medication and have them send an electronic request or submit request through the “request refill” option in your [employer] account.  Refills are not addressed on weekends; covering physicians do not authorize routine medications on weekends.  No narcotics or controlled substances are refilled after noon on Fridays or by on call physicians.  By law, narcotics must be electronically prescribed.  A 30 day supply with no refills is the maximum allowed.  If your prescription is due for a refill, you may be due for a follow up appointment.  To best provide you care, patients receiving routine medications need to be seen at least once a year.  Patients receiving narcotic/controlled substance medications need to be seen at least once every 3 months.  In the event that your preferred pharmacy does not have the requested medication in stock (e.g. Backordered), it is your responsibility to find another pharmacy that has the requested medication available.  We will gladly send a new prescription to that pharmacy at your request.    Scheduling Tests:    If your physician has ordered radiology tests such as MRI or CT scans, please contact Central Scheduling at [phone] right away to schedule the test.  Once scheduled, the [facility] Centralized Referral Team will work with your insurance carrier to obtain pre-certification or prior authorization.  Depending on your insurance carrier, approval may take 3-10 days.  It is highly recommended patients assure they have received an authorization before having a test performed.  If test is done without insurance authorization, patient may be responsible for the entire amount billed.      Precertification and Prior Authorizations:  If your physician has recommended that you have a procedure or additional testing performed the [facility]  Centralized Referral Team will contact your insurance carrier to obtain pre-certification or prior authorization.    You are strongly encouraged to contact your insurance carrier to verify that your procedure/test has been approved and is a COVERED benefit.  Although the [facility] Centralized Referral Team does its due diligence, the insurance carrier gives the disclaimer that \"Although the procedure is authorized, this does not guarantee payment.\"    Ultimately the patient is responsible for payment.   Thank you for your understanding in this matter.  Paperwork Completion:  If you require FMLA or disability paperwork for your recovery, please make sure to either drop it off or have it faxed to our office at [phone]. Be sure the form has your name and date of birth on it.  The form will be faxed to our Forms Department and they will complete it for you.  There is a 25$ fee for all forms that need to be filled out.  Please be aware there is a 10-14 day turnaround time.  You will need to sign a release of information (GURWINDER) form if your paperwork does not come with one.  You may call the Forms Department with any questions at [phone].  Their fax number is [phone].

## 2025-04-14 NOTE — TELEPHONE ENCOUNTER
Patient called and stated Original order was sent in December for CPAP. but they needed a second diagnosis. Patient states [facility] was supposed to change the order for the CPAP and say chronic fatigue and other reasons. Says she called the CPAP place and they said the order was not changed. Please advise.   
[employer] message sent to patient.  
Satisfactory

## 2025-04-15 ENCOUNTER — LAB ENCOUNTER (OUTPATIENT)
Dept: LAB | Age: 50
End: 2025-04-15
Attending: FAMILY MEDICINE
Payer: COMMERCIAL

## 2025-04-15 DIAGNOSIS — E61.1 IRON DEFICIENCY: ICD-10-CM

## 2025-04-15 DIAGNOSIS — Z87.19 HISTORY OF SMALL BOWEL OBSTRUCTION: ICD-10-CM

## 2025-04-15 LAB
BASOPHILS # BLD AUTO: 0.03 X10(3) UL (ref 0–0.2)
BASOPHILS NFR BLD AUTO: 0.3 %
DEPRECATED HBV CORE AB SER IA-ACNC: 116 NG/ML (ref 50–306)
EOSINOPHIL # BLD AUTO: 0.01 X10(3) UL (ref 0–0.7)
EOSINOPHIL NFR BLD AUTO: 0.1 %
ERYTHROCYTE [DISTWIDTH] IN BLOOD BY AUTOMATED COUNT: 12.2 %
HCT VFR BLD AUTO: 43.5 % (ref 35–48)
HGB BLD-MCNC: 14.4 G/DL (ref 12–16)
IMM GRANULOCYTES # BLD AUTO: 0.04 X10(3) UL (ref 0–1)
IMM GRANULOCYTES NFR BLD: 0.4 %
IRON SATN MFR SERPL: 24 % (ref 15–50)
IRON SERPL-MCNC: 88 UG/DL (ref 50–170)
LYMPHOCYTES # BLD AUTO: 1.79 X10(3) UL (ref 1–4)
LYMPHOCYTES NFR BLD AUTO: 16.7 %
MCH RBC QN AUTO: 31.6 PG (ref 26–34)
MCHC RBC AUTO-ENTMCNC: 33.1 G/DL (ref 31–37)
MCV RBC AUTO: 95.6 FL (ref 80–100)
MONOCYTES # BLD AUTO: 0.69 X10(3) UL (ref 0.1–1)
MONOCYTES NFR BLD AUTO: 6.5 %
NEUTROPHILS # BLD AUTO: 8.13 X10 (3) UL (ref 1.5–7.7)
NEUTROPHILS # BLD AUTO: 8.13 X10(3) UL (ref 1.5–7.7)
NEUTROPHILS NFR BLD AUTO: 76 %
PLATELET # BLD AUTO: 314 10(3)UL (ref 150–450)
RBC # BLD AUTO: 4.55 X10(6)UL (ref 3.8–5.3)
TOTAL IRON BINDING CAPACITY: 368 UG/DL (ref 250–425)
TRANSFERRIN SERPL-MCNC: 306 MG/DL (ref 250–380)
WBC # BLD AUTO: 10.7 X10(3) UL (ref 4–11)

## 2025-04-15 PROCEDURE — 36415 COLL VENOUS BLD VENIPUNCTURE: CPT

## 2025-04-15 PROCEDURE — 83550 IRON BINDING TEST: CPT

## 2025-04-15 PROCEDURE — 85025 COMPLETE CBC W/AUTO DIFF WBC: CPT

## 2025-04-15 PROCEDURE — 82728 ASSAY OF FERRITIN: CPT

## 2025-04-15 PROCEDURE — 83540 ASSAY OF IRON: CPT

## 2025-05-05 DIAGNOSIS — R73.9 HYPERGLYCEMIA: ICD-10-CM

## 2025-05-05 RX ORDER — BLOOD SUGAR DIAGNOSTIC
1 STRIP MISCELLANEOUS DAILY
Qty: 100 STRIP | Refills: 1 | Status: SHIPPED | OUTPATIENT
Start: 2025-05-05

## 2025-05-14 DIAGNOSIS — F41.9 ANXIETY: ICD-10-CM

## 2025-05-14 DIAGNOSIS — F32.0 CURRENT MILD EPISODE OF MAJOR DEPRESSIVE DISORDER, UNSPECIFIED WHETHER RECURRENT: ICD-10-CM

## 2025-05-14 RX ORDER — BUPROPION HYDROCHLORIDE 300 MG/1
300 TABLET ORAL DAILY
Qty: 30 TABLET | Refills: 1 | Status: SHIPPED | OUTPATIENT
Start: 2025-05-14

## 2025-06-08 DIAGNOSIS — F90.8 OTHER SPECIFIED ATTENTION DEFICIT HYPERACTIVITY DISORDER (ADHD): ICD-10-CM

## 2025-06-08 DIAGNOSIS — J45.20 MILD INTERMITTENT ASTHMA WITHOUT COMPLICATION (HCC): ICD-10-CM

## 2025-06-08 DIAGNOSIS — F32.0 CURRENT MILD EPISODE OF MAJOR DEPRESSIVE DISORDER, UNSPECIFIED WHETHER RECURRENT: ICD-10-CM

## 2025-06-08 DIAGNOSIS — R11.0 NAUSEA: ICD-10-CM

## 2025-06-08 DIAGNOSIS — F41.9 ANXIETY: ICD-10-CM

## 2025-06-08 RX ORDER — METHYLPHENIDATE HYDROCHLORIDE 20 MG/1
20 TABLET ORAL 2 TIMES DAILY
Qty: 60 TABLET | Refills: 0 | Status: CANCELLED | OUTPATIENT
Start: 2025-06-08 | End: 2025-07-08

## 2025-06-08 RX ORDER — BUPROPION HYDROCHLORIDE 300 MG/1
300 TABLET ORAL DAILY
Qty: 30 TABLET | Refills: 1 | Status: CANCELLED | OUTPATIENT
Start: 2025-06-08

## 2025-06-08 RX ORDER — MONTELUKAST SODIUM 10 MG/1
10 TABLET ORAL NIGHTLY
Qty: 90 TABLET | Refills: 3 | Status: CANCELLED | OUTPATIENT
Start: 2025-06-08

## 2025-06-09 RX ORDER — CITALOPRAM HYDROBROMIDE 40 MG/1
40 TABLET ORAL DAILY
Qty: 90 TABLET | Refills: 0 | Status: SHIPPED | OUTPATIENT
Start: 2025-06-09

## 2025-06-09 RX ORDER — ONDANSETRON 4 MG/1
4 TABLET, ORALLY DISINTEGRATING ORAL EVERY 8 HOURS PRN
Qty: 20 TABLET | Refills: 2 | Status: SHIPPED | OUTPATIENT
Start: 2025-06-09

## 2025-06-09 NOTE — TELEPHONE ENCOUNTER
A refill request was received for:  Requested Prescriptions     Pending Prescriptions Disp Refills    citalopram 40 MG Oral Tab 90 tablet 0     Sig: Take 1 tablet (40 mg total) by mouth daily.    ondansetron 4 MG Oral Tablet Dispersible 20 tablet 2     Sig: Take 1 tablet (4 mg total) by mouth every 8 (eight) hours as needed for Nausea.       Last refill date: 03/10/25 citalopram  Ondansetron 04/04/25      Last office visit: 04/04/25      Future Appointments   Date Time Provider Department Center   6/11/2025  2:30 PM Zain Crawford PA [facility]   9/3/2025  1:15 PM Larisa Livingston MD [facility]

## 2025-06-11 ENCOUNTER — APPOINTMENT (OUTPATIENT)
Dept: GENERAL RADIOLOGY | Age: 50
End: 2025-06-11
Attending: NURSE PRACTITIONER
Payer: COMMERCIAL

## 2025-06-11 ENCOUNTER — HOSPITAL ENCOUNTER (OUTPATIENT)
Age: 50
Discharge: HOME | End: 2025-06-11
Payer: COMMERCIAL

## 2025-06-11 VITALS
RESPIRATION RATE: 18 BRPM | DIASTOLIC BLOOD PRESSURE: 68 MMHG | OXYGEN SATURATION: 96 % | HEART RATE: 66 BPM | SYSTOLIC BLOOD PRESSURE: 114 MMHG | TEMPERATURE: 99 F

## 2025-06-11 DIAGNOSIS — J45.901 ASTHMA EXACERBATION, MILD (HCC): ICD-10-CM

## 2025-06-11 DIAGNOSIS — J06.9 VIRAL URI WITH COUGH: Primary | ICD-10-CM

## 2025-06-11 PROCEDURE — 99214 OFFICE O/P EST MOD 30 MIN: CPT | Performed by: NURSE PRACTITIONER

## 2025-06-11 PROCEDURE — 71046 X-RAY EXAM CHEST 2 VIEWS: CPT | Performed by: NURSE PRACTITIONER

## 2025-06-11 RX ORDER — PREDNISONE 20 MG/1
20 TABLET ORAL DAILY
Qty: 5 TABLET | Refills: 0 | Status: SHIPPED | OUTPATIENT
Start: 2025-06-11 | End: 2025-06-16

## 2025-06-11 NOTE — ED PROVIDER NOTES
Patient Seen in: [facility] [place] [facility]        History  Chief Complaint   Patient presents with    Cough     I am coughing so bad I almost pass out. I do have asthma - Entered by patient     Stated Complaint: Cough - I am coughing so bad I almost pass out. I do have asthma    Subjective:   50-year-old female presents today with persistent cough over the last week.  Has had mild URI symptoms.  Cough is nonproductive.  Does have a history of asthma.  Does have a neb machine at home as well as an albuterol Hailer but has not been using.  Denies any nausea vomiting diarrhea.  No fever or chills.  Alert oriented x 3.  No other symptoms or concerns.  The patient's medication list, past medical history and social history elements as listed in today's nurse's notes were reviewed and agreed (except as otherwise stated in the HPI).  The patient's family history reviewed and determined to be noncontributory to the presenting problem              Objective:     Past Medical History:    Abdominal distention    Abdominal pain    Abnormal uterine bleeding    Acute bronchitis    Acute esophagitis    Acute upper respiratory infections of unspecified site    Allergic rhinitis    Grass, mold, regular seasonal    Anxiety state, unspecified    Arthritis    In my back    Asthma (HCC)    Attention deficit disorder without mention of hyperactivity    Back pain    Back problem    cervical and lumbar    Bad breath    Started when stomach pain got worse    Bloating    Cauda equina compression (HCC)    Cauda equina syndrome without mention of neurogenic bladder    Constipation    Decorative tattoo    Depression    Diabetes (HCC)    Diet controlled; no meds    Diabetes (HCC)    Diabetes mellitus (HCC)    Diarrhea, unspecified    Dysesthesia    Dyspareunia    Easy bruising    Endometriosis    Esophageal reflux    Essential hypertension    Fatigue    Flatulence/gas pain/belching    Food intolerance    Frequent urination    Frequent UTI     Headache disorder    Heartburn    Heavy menses    Hematuria    Hiatal hernia    High cholesterol    History of depression    Hyperlipidemia    Hypertension    IBS (irritable bowel syndrome)    Irregular bowel habits    Irregular menstrual cycle    Itch of skin    Occasionally on my legs, not due to dry skin    Leaking of urine    Lipid screening    Loss of appetite    Lump or mass in breast    Malaise    Malignant hyperthermia    patient's son at 5 years of age - 2006    Mastodynia    Menses painful    Migraines    Nausea    Night sweats    Obstructive sleep apnea    Osteoarthritis    Other screening mammogram    Pain in joints    Pain with bowel movements    Pap smear for cervical cancer screening    wnl    Personal history of urinary (tract) infection    Pneumonia due to organism    PONV (postoperative nausea and vomiting)    Problems with swallowing    Sleep apnea    Sleep disturbance    Stress    Uncomfortable fullness after meals    Unspecified disorder of thyroid    hypothyroidism    Unspecified viral infection, in conditions classified elsewhere and of unspecified site    Visual impairment    glasses    Vomiting    Wears glasses    Weight loss              Past Surgical History:   Procedure Laterality Date    Adenoidectomy      Child    Appendectomy  1991    Appendectomy      Blood patch(bedside)      Colonoscopy N/A 2015    Procedure: COLONOSCOPY;  Surgeon: Marlene Payne MD;  Location:  ENDOSCOPY    Endometrial ablation      2004    Hysterectomy  2015    Hysteroscopy,ablation endometrium      Laminectomy,cervical  2020    Laparoscopy,diagnostic      multiple    Lysis of adhesions      Kevin localization wire 1 site right (cpt=19281)      in her 20's    Myringotomy, laser-assisted      Child          Oophorectomy      Other Right 2018    ARTHROSCOPIC ACROMIOPLASTY LYSIS OF ADHESIONS RIGHT SHOULDER    Other  2018    ANTERIOR DISCECTOMY AND FUSION. CERVICAL 4-CERVICAL 5  AND CERVICAL 5-CERVICAL 6. ALLOGRAFT, PLATE AND SCREWS    Other  2021    ANTERIOR CERVICAL DISCECTOMY AND FUSION CERVICAL 6- CERVICAL 7 WITH INTERBODY DEVICE, REMOVAL OF SURGICAL SCREW RIGHT C6    Other surgical history      esophagogastroduodenoscopy    Other surgical history      right thumb trigger finger release     Other surgical history      ganglion cyst removed left wrist     Other surgical history  10/30/2019    Cystoscopy- Dr. Sofia    Sigmoidoscopy,diagnostic      Tarsal tunnel release      right foot    Tonsillectomy      Total abdom hysterectomy                  Social History     Socioeconomic History    Marital status:    Tobacco Use    Smoking status: Former     Current packs/day: 0.00     Average packs/day: 0.5 packs/day for 10.0 years (5.0 ttl pk-yrs)     Types: Cigarettes     Quit date: 1989     Years since quittin.3    Smokeless tobacco: Never   Vaping Use    Vaping status: Never Used   Substance and Sexual Activity    Alcohol use: Yes     Alcohol/week: 2.0 standard drinks of alcohol     Types: 2 Cans of beer per week     Comment: occasionally    Drug use: No    Sexual activity: Yes     Partners: Male   Other Topics Concern    Caffeine Concern No    Stress Concern Yes    Weight Concern Yes    Special Diet No    Exercise Yes    Seat Belt Yes              Review of Systems    Positive for stated complaint: Cough - I am coughing so bad I almost pass out. I do have asthma  Other systems are as noted in HPI.  Constitutional and vital signs reviewed.      All other systems reviewed and negative except as noted above.                  Physical Exam    ED Triage Vitals [25 1302]   /68   Pulse 66   Resp 18   Temp 98.5 °F (36.9 °C)   Temp src Oral   SpO2 96 %   O2 Device None (Room air)       Current Vitals:   Vital Signs  BP: 114/68  Pulse: 66  Resp: 18  Temp: 98.5 °F (36.9 °C)  Temp src: Oral    Oxygen Therapy  SpO2: 96 %  O2 Device: None (Room  air)            Physical Exam  Vitals and nursing note reviewed.   Constitutional:       Appearance: She is well-developed.   HENT:      Head: Normocephalic.      Right Ear: Tympanic membrane and ear canal normal.      Left Ear: Tympanic membrane and ear canal normal.      Nose: Mucosal edema and congestion present.      Mouth/Throat:      Mouth: Mucous membranes are moist.      Pharynx: Oropharynx is clear. Uvula midline.   Eyes:      Conjunctiva/sclera: Conjunctivae normal.      Pupils: Pupils are equal, round, and reactive to light.   Cardiovascular:      Rate and Rhythm: Normal rate and regular rhythm.   Pulmonary:      Effort: Pulmonary effort is normal.      Breath sounds: Normal breath sounds.   Musculoskeletal:      Cervical back: Normal range of motion and neck supple.   Skin:     General: Skin is warm and dry.   Neurological:      Mental Status: She is alert and oriented to person, place, and time.                 ED Course  Labs Reviewed - No data to display                         MDM            Medical Decision Making  Differential diagnosis includes but is not limited to: COVID-19, viral URI, strep throat, influenza, pneumonia, sinusitis, bronchitis, asthma exacerbation    Presented today with worsening cough over the last week.  Chest x-ray shows no consolidation acute findings.  Despec viral cause of illness with asthma exacerbation.  Will give prescription for prednisone to take as directed.  Patient instructed to monitor her blood sugar.  Does have albuterol at home to use as needed encouraged to keep with her during these times when out and about.  Supportive care discussed.  To follow-up with her primary care physician in 1 week if symptoms do not improve.  Patient verbalized understanding and agreed to plan of care.    Amount and/or Complexity of Data Reviewed  Radiology: ordered and independent interpretation performed. Decision-making details documented in ED Course.     Details: Chest  x-ray    Risk  OTC drugs.  Prescription drug management.        Disposition and Plan     Clinical Impression:  1. Viral URI with cough    2. Asthma exacerbation, mild (HCC)         Disposition:  Discharge  6/11/2025  1:41 pm    Follow-up:  Alva Fiore DO  [address] [place] [id number]  [phone]    In 1 week  As needed          Medications Prescribed:  Current Discharge Medication List        START taking these medications    Details   predniSONE 20 MG Oral Tab Take 1 tablet (20 mg total) by mouth daily for 5 days.  Qty: 5 tablet, Refills: 0                   Supplementary Documentation:

## 2025-06-11 NOTE — ED INITIAL ASSESSMENT (HPI)
Pt with cough since sat. Getting worse. Pt thought it was allergies with weather change. Coughing so much she nearly passed out. Used inhaler this am.

## 2025-06-15 DIAGNOSIS — F90.8 OTHER SPECIFIED ATTENTION DEFICIT HYPERACTIVITY DISORDER (ADHD): ICD-10-CM

## 2025-06-16 RX ORDER — METHYLPHENIDATE HYDROCHLORIDE 20 MG/1
20 TABLET ORAL 2 TIMES DAILY
Qty: 60 TABLET | Refills: 0 | Status: SHIPPED | OUTPATIENT
Start: 2025-06-16 | End: 2025-07-16

## 2025-06-16 NOTE — TELEPHONE ENCOUNTER
A refill request was received for:  Requested Prescriptions     Pending Prescriptions Disp Refills    methylphenidate (RITALIN) 20 MG Oral Tab 60 tablet 0     Sig: Take 1 tablet (20 mg total) by mouth 2 (two) times daily.       Last refill date: 06/05/25      Last office visit: 04/04/25      Future Appointments   Date Time Provider Department Center   6/18/2025  3:00 PM Zain Crawford PA [facility]   9/3/2025  1:15 PM Larisa Livingston MD [facility]

## 2025-07-01 ENCOUNTER — HOSPITAL ENCOUNTER (OUTPATIENT)
Age: 50
Discharge: HOME | End: 2025-07-01
Payer: COMMERCIAL

## 2025-07-01 VITALS
OXYGEN SATURATION: 96 % | RESPIRATION RATE: 16 BRPM | WEIGHT: 160 LBS | HEIGHT: 65 IN | DIASTOLIC BLOOD PRESSURE: 83 MMHG | SYSTOLIC BLOOD PRESSURE: 131 MMHG | BODY MASS INDEX: 26.66 KG/M2 | TEMPERATURE: 99 F | HEART RATE: 87 BPM

## 2025-07-01 DIAGNOSIS — L03.311 CELLULITIS OF ABDOMINAL WALL: Primary | ICD-10-CM

## 2025-07-01 PROCEDURE — 99213 OFFICE O/P EST LOW 20 MIN: CPT | Performed by: NURSE PRACTITIONER

## 2025-07-01 RX ORDER — ALBUTEROL SULFATE 0.83 MG/ML
2.5 SOLUTION RESPIRATORY (INHALATION) EVERY 4 HOURS PRN
Qty: 75 ML | Refills: 0 | Status: SHIPPED | OUTPATIENT
Start: 2025-07-01

## 2025-07-01 RX ORDER — CEPHALEXIN 500 MG/1
500 CAPSULE ORAL 4 TIMES DAILY
Qty: 28 CAPSULE | Refills: 0 | Status: SHIPPED | OUTPATIENT
Start: 2025-07-01 | End: 2025-07-08

## 2025-07-01 NOTE — ED PROVIDER NOTES
Patient Seen in: [facility]        History  Chief Complaint   Patient presents with    Rash     Blotchy rash mainly on right side, but one spot on left hip - Entered by patient    Rash Skin Problem     Stated Complaint: Rash - Blotchy rash mainly on right side, but one spot on left hip    Subjective:   This a 50-year-old female with below stated medical history.  Presents to immediate care for multiple spots of spreading redness on the abdomen, right arm, and left arm.  States she woke up with the spots on Sunday and they are spreading.  Recently exposed to shingles.  No fevers.  No trauma or injury.  Patient is possibly correlating these areas with insect bites.  Using topical over-the-counter creams with little relief.     The history is provided by the patient.                     Objective:     Past Medical History:    Abdominal distention    Abdominal pain    Abnormal uterine bleeding    Acute bronchitis    Acute esophagitis    Acute upper respiratory infections of unspecified site    Allergic rhinitis    Grass, mold, regular seasonal    Anxiety state, unspecified    Arthritis    In my back    Asthma (HCC)    Attention deficit disorder without mention of hyperactivity    Back pain    Back problem    cervical and lumbar    Bad breath    Started when stomach pain got worse    Bloating    Cauda equina compression (HCC)    Cauda equina syndrome without mention of neurogenic bladder    Constipation    Decorative tattoo    Depression    Diabetes (HCC)    Diet controlled; no meds    Diabetes (HCC)    Diabetes mellitus (HCC)    Diarrhea, unspecified    Dysesthesia    Dyspareunia    Easy bruising    Endometriosis    Esophageal reflux    Essential hypertension    Fatigue    Flatulence/gas pain/belching    Food intolerance    Frequent urination    Frequent UTI    Headache disorder    Heartburn    Heavy menses    Hematuria    Hiatal hernia    High cholesterol    History of depression    Hyperlipidemia     Hypertension    IBS (irritable bowel syndrome)    Irregular bowel habits    Irregular menstrual cycle    Itch of skin    Occasionally on my legs, not due to dry skin    Leaking of urine    Lipid screening    Loss of appetite    Lump or mass in breast    Malaise    Malignant hyperthermia    patient's son at 5 years of age - 2006    Mastodynia    Menses painful    Migraines    Nausea    Night sweats    Obstructive sleep apnea    Osteoarthritis    Other screening mammogram    Pain in joints    Pain with bowel movements    Pap smear for cervical cancer screening    wnl    Personal history of urinary (tract) infection    Pneumonia due to organism    PONV (postoperative nausea and vomiting)    Problems with swallowing    Sleep apnea    Sleep disturbance    Stress    Uncomfortable fullness after meals    Unspecified disorder of thyroid    hypothyroidism    Unspecified viral infection, in conditions classified elsewhere and of unspecified site    Visual impairment    glasses    Vomiting    Wears glasses    Weight loss              Past Surgical History:   Procedure Laterality Date    Adenoidectomy      Child    Appendectomy      Appendectomy      Blood patch(bedside)      Colonoscopy N/A 2015    Procedure: COLONOSCOPY;  Surgeon: Marlene Payne MD;  Location:  ENDOSCOPY    Endometrial ablation      2004    Hysterectomy  2015    Hysteroscopy,ablation endometrium      Laminectomy,cervical  2020    Laparoscopy,diagnostic      multiple    Lysis of adhesions      Kevin localization wire 1 site right (cpt=19281)      in her 's    Myringotomy, laser-assisted      Child          Oophorectomy      Other Right 2018    ARTHROSCOPIC ACROMIOPLASTY LYSIS OF ADHESIONS RIGHT SHOULDER    Other  2018    ANTERIOR DISCECTOMY AND FUSION. CERVICAL 4-CERVICAL 5 AND CERVICAL 5-CERVICAL 6. ALLOGRAFT, PLATE AND SCREWS    Other  2021    ANTERIOR CERVICAL DISCECTOMY AND FUSION CERVICAL 6- CERVICAL 7  WITH INTERBODY DEVICE, REMOVAL OF SURGICAL SCREW RIGHT C6    Other surgical history      esophagogastroduodenoscopy    Other surgical history      right thumb trigger finger release     Other surgical history      ganglion cyst removed left wrist     Other surgical history  10/30/2019    Cystoscopy- Dr. Sofia    Sigmoidoscopy,diagnostic      Tarsal tunnel release      right foot    Tonsillectomy      Total abdom hysterectomy                  Social History     Socioeconomic History    Marital status:    Tobacco Use    Smoking status: Former     Current packs/day: 0.00     Average packs/day: 0.5 packs/day for 10.0 years (5.0 ttl pk-yrs)     Types: Cigarettes     Quit date: 1989     Years since quittin.4    Smokeless tobacco: Never   Vaping Use    Vaping status: Never Used   Substance and Sexual Activity    Alcohol use: Yes     Alcohol/week: 2.0 standard drinks of alcohol     Types: 2 Cans of beer per week     Comment: occasionally    Drug use: No    Sexual activity: Yes     Partners: Male   Other Topics Concern    Caffeine Concern No    Stress Concern Yes    Weight Concern Yes    Special Diet No    Exercise Yes    Seat Belt Yes              Review of Systems   Constitutional:  Negative for chills and fever.   HENT:  Negative for congestion and sore throat.    Respiratory:  Negative for cough.    Cardiovascular:  Negative for chest pain.   Gastrointestinal:  Negative for abdominal pain.   Genitourinary:  Negative for dysuria.   Musculoskeletal:  Negative for back pain, neck pain and neck stiffness.   Skin:  Positive for rash.   Neurological:  Negative for headaches.       Positive for stated complaint: Rash - Blotchy rash mainly on right side, but one spot on left hip  Other systems are as noted in HPI.  Constitutional and vital signs reviewed.      All other systems reviewed and negative except as noted above.                  Physical Exam    ED Triage Vitals [25 1524]   /83   Pulse  87   Resp 16   Temp 98.7 °F (37.1 °C)   Temp src Oral   SpO2 96 %   O2 Device None (Room air)       Current Vitals:   Vital Signs  BP: 131/83  Pulse: 87  Resp: 16  Temp: 98.7 °F (37.1 °C)  Temp src: Oral    Oxygen Therapy  SpO2: 96 %  O2 Device: None (Room air)            Physical Exam  Vitals and nursing note reviewed.   Constitutional:       General: She is not in acute distress.     Appearance: Normal appearance. She is not ill-appearing, toxic-appearing or diaphoretic.   HENT:      Head: Normocephalic and atraumatic.      Right Ear: External ear normal.      Left Ear: External ear normal.      Nose: Nose normal.      Mouth/Throat:      Mouth: Mucous membranes are moist.      Pharynx: Oropharynx is clear.   Eyes:      General:         Right eye: No discharge.         Left eye: No discharge.      Extraocular Movements: Extraocular movements intact.      Conjunctiva/sclera: Conjunctivae normal.   Cardiovascular:      Rate and Rhythm: Normal rate.      Heart sounds: Normal heart sounds.   Pulmonary:      Effort: Pulmonary effort is normal.      Breath sounds: Normal breath sounds.   Musculoskeletal:      Cervical back: Neck supple.      Right lower leg: No edema.      Left lower leg: No edema.   Skin:     General: Skin is warm and dry.      Capillary Refill: Capillary refill takes less than 2 seconds.      Findings: Erythema present. No abscess, lesion or rash.          Neurological:      Mental Status: She is alert and oriented to person, place, and time.   Psychiatric:         Mood and Affect: Mood normal.         Behavior: Behavior normal.                 ED Course  Labs Reviewed - No data to display       DC home                  MDM       Vital signs stable.  Patient is well-appearing and nontoxic looking.  Presents to immediate care for areas of rash on the abdomen, bilateral arms with right spreading redness.    Differential diagnosis includes but is not limited to insect bites, cellulitis, abscess, MRSA,  contact dermatitis, shingles    Rash crosses the midline of the body and is not vesicular.  No suspicion for shingles.    Patient has multiple patches of spreading erythema on the right side of the abdomen.  No evidence of abscess.    High clinical suspicion for cellulitis developing from insect bites.    DC home.  Course of cephalexin prescribed.  Continue topical hydrocortisone cream.  We discussed the use of over-the-counter antihistamines and cool compresses.  PCP follow-up recommended.  ER precautions reviewed.  Patient verbalized understanding, and agreed with plan of care.  All questions answered.         Medical Decision Making      Disposition and Plan     Clinical Impression:  1. Cellulitis of abdominal wall         Disposition:  Discharge  7/1/2025  3:52 pm    Follow-up:  Alva Fiore DO  [address] [place] [id number]  [phone]      As needed          Medications Prescribed:  Discharge Medication List as of 7/1/2025  3:57 PM        START taking these medications    Details   cephALEXin 500 MG Oral Cap Take 1 capsule (500 mg total) by mouth 4 (four) times daily for 7 days., Normal, Disp-28 capsule, R-0                   Supplementary Documentation:

## 2025-07-01 NOTE — DISCHARGE INSTRUCTIONS
Take cephalexin as prescribed.  Cool compresses.  Continue topical creams.  Over-the-counter antihistamines.  PCP follow-up as needed.

## 2025-07-01 NOTE — ED INITIAL ASSESSMENT (HPI)
Pt with rash noted body on Sunday. Recent exposure to shingles. Itching and pain. Using cortisone roll on topical.

## 2025-07-05 ENCOUNTER — PATIENT MESSAGE (OUTPATIENT)
Dept: FAMILY MEDICINE CLINIC | Facility: CLINIC | Age: 50
End: 2025-07-05

## 2025-07-07 DIAGNOSIS — F41.9 ANXIETY: ICD-10-CM

## 2025-07-07 DIAGNOSIS — F32.0 CURRENT MILD EPISODE OF MAJOR DEPRESSIVE DISORDER, UNSPECIFIED WHETHER RECURRENT: ICD-10-CM

## 2025-07-07 RX ORDER — ALPRAZOLAM 0.25 MG
0.25 TABLET ORAL NIGHTLY PRN
Qty: 30 TABLET | Refills: 2 | Status: CANCELLED | OUTPATIENT
Start: 2025-07-07

## 2025-07-07 NOTE — TELEPHONE ENCOUNTER
.A refill request was received for:  Requested Prescriptions     Pending Prescriptions Disp Refills    ALPRAZolam 0.25 MG Oral Tab 30 tablet 2     Sig: Take 1 tablet (0.25 mg total) by mouth nightly as needed for Sleep or Anxiety.    buPROPion  MG Oral Tablet 24 Hr 30 tablet 1     Sig: Take 1 tablet (300 mg total) by mouth daily.       Last refill date:   bupropion 5/14/25  Alprazolam 9/25/24    Last office visit: 4/4/25    Follow up due:  Future Appointments   Date Time Provider Department Center   7/23/2025  3:30 PM Jennifer Rogers PA-C [facility] [address] [address]   9/3/2025  1:15 PM Larisa Livingston MD [id number]

## 2025-07-08 RX ORDER — BUPROPION HYDROCHLORIDE 300 MG/1
300 TABLET ORAL DAILY
Qty: 30 TABLET | Refills: 0 | Status: SHIPPED | OUTPATIENT
Start: 2025-07-08

## 2025-07-08 NOTE — TELEPHONE ENCOUNTER
One month of wellbutrin approved. Alprazolam denied, should not be mixed with stimulant and muscle relaxer. Also will need to have psych manage going forward, [facility] referral placed. Per collaborating physician.

## 2025-07-15 ENCOUNTER — TELEPHONE (OUTPATIENT)
Age: 50
End: 2025-07-15

## 2025-07-15 NOTE — TELEPHONE ENCOUNTER
Hello,     The [facility] team has attempted to reach you regarding an order from Jose Rogers's office. We are reaching out in order to assist you in coordinating care and resources that may meet your needs. Please give our office a call at [phone]. For more immediate assistance you can contact our 24-hour help line at [phone]. We look forward to hearing from you soon.

## 2025-07-16 ENCOUNTER — TELEPHONE (OUTPATIENT)
Age: 50
End: 2025-07-16

## 2025-07-22 ENCOUNTER — PATIENT MESSAGE (OUTPATIENT)
Dept: FAMILY MEDICINE CLINIC | Facility: CLINIC | Age: 50
End: 2025-07-22

## 2025-07-22 DIAGNOSIS — G54.0 NEUROGENIC THORACIC OUTLET SYNDROME: ICD-10-CM

## 2025-07-22 DIAGNOSIS — M62.838 MUSCLE SPASM: Primary | ICD-10-CM

## 2025-07-22 DIAGNOSIS — D80.1 HYPOGAMMAGLOBULINEMIA (HCC): ICD-10-CM

## 2025-07-22 DIAGNOSIS — M54.50 CHRONIC LOW BACK PAIN, UNSPECIFIED BACK PAIN LATERALITY, UNSPECIFIED WHETHER SCIATICA PRESENT: ICD-10-CM

## 2025-07-22 DIAGNOSIS — G89.29 CHRONIC LOW BACK PAIN, UNSPECIFIED BACK PAIN LATERALITY, UNSPECIFIED WHETHER SCIATICA PRESENT: ICD-10-CM

## 2025-07-23 ENCOUNTER — LAB ENCOUNTER (OUTPATIENT)
Dept: LAB | Age: 50
End: 2025-07-23
Attending: PHYSICIAN ASSISTANT
Payer: COMMERCIAL

## 2025-07-23 ENCOUNTER — OFFICE VISIT (OUTPATIENT)
Dept: FAMILY MEDICINE CLINIC | Facility: CLINIC | Age: 50
End: 2025-07-23
Payer: COMMERCIAL

## 2025-07-23 VITALS
DIASTOLIC BLOOD PRESSURE: 72 MMHG | SYSTOLIC BLOOD PRESSURE: 124 MMHG | OXYGEN SATURATION: 98 % | RESPIRATION RATE: 16 BRPM | HEIGHT: 65 IN | HEART RATE: 78 BPM | WEIGHT: 160 LBS | BODY MASS INDEX: 26.66 KG/M2

## 2025-07-23 DIAGNOSIS — E11.9 TYPE 2 DIABETES MELLITUS WITHOUT COMPLICATION, WITHOUT LONG-TERM CURRENT USE OF INSULIN (HCC): ICD-10-CM

## 2025-07-23 DIAGNOSIS — G54.0 THORACIC OUTLET SYNDROME: ICD-10-CM

## 2025-07-23 DIAGNOSIS — M25.59 PAIN IN OTHER JOINT: ICD-10-CM

## 2025-07-23 DIAGNOSIS — E78.00 ELEVATED CHOLESTEROL: ICD-10-CM

## 2025-07-23 DIAGNOSIS — J45.30 MILD PERSISTENT ASTHMA WITHOUT COMPLICATION (HCC): Primary | ICD-10-CM

## 2025-07-23 DIAGNOSIS — B02.9 HERPES ZOSTER WITHOUT COMPLICATION: ICD-10-CM

## 2025-07-23 LAB
ALBUMIN SERPL-MCNC: 5.2 G/DL (ref 3.2–4.8)
ALBUMIN/GLOB SERPL: 2 {RATIO} (ref 1–2)
ALP LIVER SERPL-CCNC: 101 U/L (ref 39–100)
ALT SERPL-CCNC: 46 U/L (ref 10–49)
ANION GAP SERPL CALC-SCNC: 9 MMOL/L (ref 0–18)
AST SERPL-CCNC: 35 U/L (ref ?–34)
BASOPHILS # BLD AUTO: 0.04 X10(3) UL (ref 0–0.2)
BASOPHILS NFR BLD AUTO: 0.6 %
BILIRUB SERPL-MCNC: 0.4 MG/DL (ref 0.3–1.2)
BUN BLD-MCNC: 16 MG/DL (ref 9–23)
CALCIUM BLD-MCNC: 9.9 MG/DL (ref 8.7–10.6)
CHLORIDE SERPL-SCNC: 101 MMOL/L (ref 98–112)
CHOLEST SERPL-MCNC: 212 MG/DL (ref ?–200)
CO2 SERPL-SCNC: 30 MMOL/L (ref 21–32)
CREAT BLD-MCNC: 0.94 MG/DL (ref 0.55–1.02)
CREAT UR-SCNC: 21.7 MG/DL
DEPRECATED HBV CORE AB SER IA-ACNC: 77 NG/ML (ref 50–306)
EGFRCR SERPLBLD CKD-EPI 2021: 74 ML/MIN/1.73M2 (ref 60–?)
EOSINOPHIL # BLD AUTO: 0.26 X10(3) UL (ref 0–0.7)
EOSINOPHIL NFR BLD AUTO: 3.6 %
ERYTHROCYTE [DISTWIDTH] IN BLOOD BY AUTOMATED COUNT: 12.2 %
EST. AVERAGE GLUCOSE BLD GHB EST-MCNC: 114 MG/DL (ref 68–126)
FASTING PATIENT LIPID ANSWER: YES
FASTING STATUS PATIENT QL REPORTED: YES
GLOBULIN PLAS-MCNC: 2.6 G/DL (ref 2–3.5)
GLUCOSE BLD-MCNC: 89 MG/DL (ref 70–99)
HBA1C MFR BLD: 5.6 % (ref ?–5.7)
HCT VFR BLD AUTO: 40.4 % (ref 35–48)
HDLC SERPL-MCNC: 74 MG/DL (ref 40–59)
HGB BLD-MCNC: 14 G/DL (ref 12–16)
IMM GRANULOCYTES # BLD AUTO: 0.01 X10(3) UL (ref 0–1)
IMM GRANULOCYTES NFR BLD: 0.1 %
IRON SATN MFR SERPL: 24 % (ref 15–50)
IRON SERPL-MCNC: 92 UG/DL (ref 50–170)
LDLC SERPL CALC-MCNC: 104 MG/DL (ref ?–100)
LYMPHOCYTES # BLD AUTO: 2.38 X10(3) UL (ref 1–4)
LYMPHOCYTES NFR BLD AUTO: 32.7 %
MCH RBC QN AUTO: 32.3 PG (ref 26–34)
MCHC RBC AUTO-ENTMCNC: 34.7 G/DL (ref 31–37)
MCV RBC AUTO: 93.1 FL (ref 80–100)
MICROALBUMIN UR-MCNC: <0.3 MG/DL
MONOCYTES # BLD AUTO: 0.73 X10(3) UL (ref 0.1–1)
MONOCYTES NFR BLD AUTO: 10 %
NEUTROPHILS # BLD AUTO: 3.85 X10 (3) UL (ref 1.5–7.7)
NEUTROPHILS # BLD AUTO: 3.85 X10(3) UL (ref 1.5–7.7)
NEUTROPHILS NFR BLD AUTO: 53 %
NONHDLC SERPL-MCNC: 138 MG/DL (ref ?–130)
OSMOLALITY SERPL CALC.SUM OF ELEC: 291 MOSM/KG (ref 275–295)
PLATELET # BLD AUTO: 327 10(3)UL (ref 150–450)
POTASSIUM SERPL-SCNC: 3.8 MMOL/L (ref 3.5–5.1)
PROT SERPL-MCNC: 7.8 G/DL (ref 5.7–8.2)
RBC # BLD AUTO: 4.34 X10(6)UL (ref 3.8–5.3)
SODIUM SERPL-SCNC: 140 MMOL/L (ref 136–145)
TOTAL IRON BINDING CAPACITY: 378 UG/DL (ref 250–425)
TRANSFERRIN SERPL-MCNC: 324 MG/DL (ref 250–380)
TRIGL SERPL-MCNC: 199 MG/DL (ref 30–149)
VIT B12 SERPL-MCNC: 827 PG/ML (ref 211–911)
VIT D+METAB SERPL-MCNC: 40.4 NG/ML (ref 30–100)
VLDLC SERPL CALC-MCNC: 34 MG/DL (ref 0–30)
WBC # BLD AUTO: 7.3 X10(3) UL (ref 4–11)

## 2025-07-23 PROCEDURE — 83550 IRON BINDING TEST: CPT

## 2025-07-23 PROCEDURE — 3008F BODY MASS INDEX DOCD: CPT | Performed by: PHYSICIAN ASSISTANT

## 2025-07-23 PROCEDURE — 82570 ASSAY OF URINE CREATININE: CPT

## 2025-07-23 PROCEDURE — 82607 VITAMIN B-12: CPT

## 2025-07-23 PROCEDURE — 82306 VITAMIN D 25 HYDROXY: CPT

## 2025-07-23 PROCEDURE — 36415 COLL VENOUS BLD VENIPUNCTURE: CPT

## 2025-07-23 PROCEDURE — 80061 LIPID PANEL: CPT

## 2025-07-23 PROCEDURE — 83036 HEMOGLOBIN GLYCOSYLATED A1C: CPT

## 2025-07-23 PROCEDURE — 3074F SYST BP LT 130 MM HG: CPT | Performed by: PHYSICIAN ASSISTANT

## 2025-07-23 PROCEDURE — 85025 COMPLETE CBC W/AUTO DIFF WBC: CPT

## 2025-07-23 PROCEDURE — 82728 ASSAY OF FERRITIN: CPT

## 2025-07-23 PROCEDURE — 83540 ASSAY OF IRON: CPT

## 2025-07-23 PROCEDURE — 3078F DIAST BP <80 MM HG: CPT | Performed by: PHYSICIAN ASSISTANT

## 2025-07-23 PROCEDURE — 80053 COMPREHEN METABOLIC PANEL: CPT

## 2025-07-23 PROCEDURE — 82043 UR ALBUMIN QUANTITATIVE: CPT

## 2025-07-23 PROCEDURE — 99214 OFFICE O/P EST MOD 30 MIN: CPT | Performed by: PHYSICIAN ASSISTANT

## 2025-07-23 RX ORDER — FLUTICASONE FUROATE AND VILANTEROL 100; 25 UG/1; UG/1
1 POWDER RESPIRATORY (INHALATION) DAILY
Qty: 60 EACH | Refills: 3 | Status: SHIPPED | OUTPATIENT
Start: 2025-07-23

## 2025-07-23 RX ORDER — DICLOFENAC SODIUM 75 MG/1
75 TABLET, DELAYED RELEASE ORAL 2 TIMES DAILY
Qty: 30 TABLET | Refills: 0 | Status: SHIPPED | OUTPATIENT
Start: 2025-07-23

## 2025-07-23 RX ORDER — BACLOFEN 10 MG/1
10 TABLET ORAL NIGHTLY PRN
Qty: 90 TABLET | Refills: 0 | Status: SHIPPED | OUTPATIENT
Start: 2025-07-23

## 2025-07-23 NOTE — TELEPHONE ENCOUNTER
Baclofen 10mg, 90 tablets, 0 refills    Last office visit: 4/14/25    Next Rheum Apt:9/3/2025 Larisa Livingston MD    Last fill: 3/3/25    Labs:   Lab Results   Component Value Date    CREATSERUM 0.96 09/27/2024    GFR 80 02/06/2018    ALKPHO 70 09/27/2024    AST 28 09/27/2024    ALT 42 09/27/2024    BILT 0.3 09/27/2024    TP 7.6 09/27/2024    TP 7.1 09/27/2024    ALB 5.3 (H) 09/27/2024    ALB 4.66 09/27/2024       Lab Results   Component Value Date    WBC 10.7 04/15/2025    HGB 14.4 04/15/2025    .0 04/15/2025    NEPRELIM 8.13 (H) 04/15/2025    NEPERCENT 76.0 04/15/2025    LYPERCENT 16.7 04/15/2025    NE 8.13 (H) 04/15/2025    LYMABS 1.79 04/15/2025

## 2025-07-23 NOTE — PROGRESS NOTES
Subjective:   Nadya Mina is a 50 year old female who presents for Asthma (Has been having a terrible cough for some time and not sure if related to her asthma or not ) and Urgent Care F/u (Rash-right rib area on the front and left hip /)       History/Other:   History of Present Illness  Nadya Mina is a 50 year old female with asthma who presents for follow-up on asthma management.    She experiences episodes of excessive coughing, one of which nearly led to syncope, prompting an urgent care visit. She uses an albuterol inhaler and nebulizer, typically once a day if she remains indoors, and finds them effective when used consistently. She has previously used a daily maintenance inhaler but is not currently on one.    She has developed a rash characterized by itchy, large bumps resembling mosquito bites, primarily on her hip and other areas. The rash has decreased in size but remains slightly itchy. She has a history of sensitive skin and has experienced heat-related bumps previously. Her mother-in-law recently had a shingles flare-up.    She notes elevated blood sugars, with morning readings around 157 mg/dL, and weight gain despite reduced food intake and increased activity. She underwent a total hysterectomy in 2015 and has not been on blood sugar medications for years. She is concerned about muscle breakdown and attributes some symptoms to menopause.    She experiences neck pain and numbness in her arms, which she associates with thoracic outlet syndrome. She has received trigger point injections, the last being in April, but they provide only temporary relief. She has not had surgery for thoracic outlet syndrome but was diagnosed by a rheumatologist.    She reports persistent elbow pain, described as tennis elbow, which has not fully resolved. She uses a brace when the pain worsens and takes dual-action ibuprofen, which helps when taken in higher doses.   Chief Complaint Reviewed and Verified   Nursing Notes Reviewed and   Verified  Tobacco Reviewed  Allergies Reviewed  Medications Reviewed    OB Status Reviewed         Tobacco:  She smoked tobacco in the past but quit greater than 12 months ago.  Tobacco Use[1]     Current Medications[2]       Review of Systems:  Pertinent items are noted in HPI.        Objective:   /72   Pulse 78   Resp 16   Ht 5' 5\" (1.651 m)   Wt 160 lb (72.6 kg)   LMP 01/01/2004   SpO2 98%   BMI 26.63 kg/m²  Estimated body mass index is 26.63 kg/m² as calculated from the following:    Height as of this encounter: 5' 5\" (1.651 m).    Weight as of this encounter: 160 lb (72.6 kg).  Results       Physical Exam  GENERAL: Alert, cooperative, well developed, no acute distress.  HEENT: Normocephalic, normal oropharynx, moist mucous membranes.  CHEST: Clear to auscultation bilaterally, no wheezes, rhonchi, or crackles.  CARDIOVASCULAR: Normal heart rate and rhythm, S1 and S2 normal without murmurs.  ABDOMEN: Soft, non-tender, non-distended, without organomegaly, normal bowel sounds.  EXTREMITIES: No cyanosis or edema.  NEUROLOGICAL: Cranial nerves grossly intact, moves all extremities without gross motor or sensory deficit.  SKIN: Rash resolving, no signs of infection.      Assessment & Plan:     Assessment & Plan  Asthma  Asthma poorly controlled with recent exacerbation. Current treatment includes albuterol inhaler and nebulizer.  - Prescribe Breo Ellipta inhaler once daily, instruct to rinse mouth after use.  - Continue albuterol inhaler as needed.  - Send prescription to [facility].  - Consider changing rescue inhaler to Airsupra to reduce oral steroid use and hospital visits.    Shingles  Rash resolving, initially misdiagnosed, consistent with shingles. Discussed natural immunity and future vaccine recommendation.  - Apply calamine lotion for itching.  - Encourage shingles vaccine when eligible.    Type 2 DM  Elevated morning blood sugar levels, no current  medication. Possible contributing factors include stress, infection, and menopause.  - Order A1c test to assess blood sugar control.  -consider medication if needed  - Include iron level in blood work due to fatigue and previous iron deficiency.    Thoracic outlet syndrome  Symptoms suggest thoracic outlet syndrome, previously diagnosed by specialist  - Refer to Dr. Hayes at [facility] for evaluation and potential intervention.    Tennis elbow  Chronic elbow pain persists despite bracing and NSAIDs.  - Prescribe diclofenac for pain management as needed, avoiding daily use for extended periods.    Recording duration: 22 minutes        No follow-ups on file.        Jose Rogers PA-C, 2025, 3:55 PM           [1]   Social History  Tobacco Use   Smoking Status Former    Current packs/day: 0.00    Average packs/day: 0.5 packs/day for 10.0 years (5.0 ttl pk-yrs)    Types: Cigarettes    Quit date: 1989    Years since quittin.4   Smokeless Tobacco Never   [2]   Current Outpatient Medications   Medication Sig Dispense Refill    buPROPion  MG Oral Tablet 24 Hr Take 1 tablet (300 mg total) by mouth daily. 30 tablet 0    ALBUTEROL (2.5 MG/3ML) 0.083% Inhalation Nebu Soln TAKE 3 ML BY NEBULIZATION EVERY 4 HOURS AS NEEDED FOR WHEEZING. 75 mL 0    citalopram 40 MG Oral Tab Take 1 tablet (40 mg total) by mouth daily. 90 tablet 0    ondansetron 4 MG Oral Tablet Dispersible Take 1 tablet (4 mg total) by mouth every 8 (eight) hours as needed for Nausea. 20 tablet 2    ONETOUCH VERIO In Vitro Strip CHECK BLOOD SUGAR ONCE DAILY 100 strip 1    azelastine 0.1 % Nasal Solution 2 sprays by Nasal route 2 (two) times daily. 30 mL 2    Lovastatin 40 MG Oral Tab Take 2 tablets (80 mg total) by mouth nightly. 180 tablet 1    montelukast 10 MG Oral Tab Take 1 tablet (10 mg total) by mouth nightly. 90 tablet 3    baclofen 10 MG Oral Tab Take 1 tablet (10 mg total) by mouth nightly as needed. 90 tablet 0    albuterol 108 (90  Base) MCG/ACT Inhalation Aero Soln Inhale 1-2 puffs into the lungs every 4 to 6 hours as needed. 51 g 4    ALPRAZolam 0.25 MG Oral Tab Take 1 tablet (0.25 mg total) by mouth nightly as needed for Sleep or Anxiety. 30 tablet 2    sodium chloride 0.9 % Inhalation Nebu Soln Take 3 mL by nebulization as needed for Wheezing. 30 each 0    Esomeprazole Magnesium (NEXIUM OR) Take by mouth.      Insulin Pen Needle (BD PEN NEEDLE YANDY U/F) 32G X 4 MM Does not apply Misc Inject 1 each into the skin daily. 100 each 3    Multiple Vitamin Oral Tab Take 1 tablet by mouth in the morning.

## 2025-07-28 ENCOUNTER — OFFICE VISIT (OUTPATIENT)
Dept: PAIN CLINIC | Facility: CLINIC | Age: 50
End: 2025-07-28
Payer: COMMERCIAL

## 2025-07-28 VITALS
BODY MASS INDEX: 26.66 KG/M2 | RESPIRATION RATE: 16 BRPM | SYSTOLIC BLOOD PRESSURE: 102 MMHG | WEIGHT: 160 LBS | HEART RATE: 91 BPM | HEIGHT: 65 IN | DIASTOLIC BLOOD PRESSURE: 78 MMHG

## 2025-07-28 DIAGNOSIS — Z98.1 HISTORY OF FUSION OF CERVICAL SPINE: Primary | ICD-10-CM

## 2025-07-28 DIAGNOSIS — M54.12 CERVICAL RADICULITIS: ICD-10-CM

## 2025-07-28 PROCEDURE — 3078F DIAST BP <80 MM HG: CPT | Performed by: PHYSICIAN ASSISTANT

## 2025-07-28 PROCEDURE — 3074F SYST BP LT 130 MM HG: CPT | Performed by: PHYSICIAN ASSISTANT

## 2025-07-28 PROCEDURE — 99214 OFFICE O/P EST MOD 30 MIN: CPT | Performed by: PHYSICIAN ASSISTANT

## 2025-07-28 PROCEDURE — 3008F BODY MASS INDEX DOCD: CPT | Performed by: PHYSICIAN ASSISTANT

## 2025-07-28 NOTE — PROGRESS NOTES
HPI:   Nadya Mina presents with complaints of B UE numbness/tingling.      The pain is described as moderate aching that is intermittent.  The patient’s activity level has increased since last visit.  The pain is worst unrelated to time of day.    Changes in condition/history since last visit: Patient is here today for follow up, having undergone TPI on 4/14/25.  Procedure was well tolerated and had no adverse effects.  Overall, reports good relief, and was 90% improved.  On some levels, this continues to be the case, though is now with B UE n/t, different from previous presentation.  She is scheduled tomorrow to see neurosurgery.      Prior to this, had good relief with hardware injection on 3/21/23.          Last procedure: Bilateral TPI   Date: 4/14/25 (4/24/23, 12/22/23, 5/22/24, 9/27/24, 11/25/24)    Percent relief: 90%    Duration: sustained       Previous procedure: Bilateral cervical hardware injection date: 3/21/2023    Percent relief: 80%    Duration: 60% sustained      The following activities will increase the patient’s pain: prolonged activity     The following activities decrease the patient’s pain: limiting activity level    Functional Assessment: Patient reports that they are able to complete all of their ADL's such as eating, bathing, using the toilet, dressing and getting up from a bed or a chair independently.    Current Medications:  Current Outpatient Medications   Medication Sig Dispense Refill    baclofen 10 MG Oral Tab Take 1 tablet (10 mg total) by mouth nightly as needed. 90 tablet 0    fluticasone furoate-vilanterol (BREO ELLIPTA) 100-25 MCG/ACT Inhalation Aerosol Powder, Breath Activated Inhale 1 puff into the lungs daily. 60 each 3    diclofenac 75 MG Oral Tab EC Take 1 tablet (75 mg total) by mouth 2 (two) times daily. 30 tablet 0    buPROPion  MG Oral Tablet 24 Hr Take 1 tablet (300 mg total) by mouth daily. 30 tablet 0    ALBUTEROL (2.5 MG/3ML) 0.083% Inhalation Nebu Soln  TAKE 3 ML BY NEBULIZATION EVERY 4 HOURS AS NEEDED FOR WHEEZING. 75 mL 0    citalopram 40 MG Oral Tab Take 1 tablet (40 mg total) by mouth daily. 90 tablet 0    ondansetron 4 MG Oral Tablet Dispersible Take 1 tablet (4 mg total) by mouth every 8 (eight) hours as needed for Nausea. 20 tablet 2    ONETOUCH VERIO In Vitro Strip CHECK BLOOD SUGAR ONCE DAILY 100 strip 1    azelastine 0.1 % Nasal Solution 2 sprays by Nasal route 2 (two) times daily. 30 mL 2    Lovastatin 40 MG Oral Tab Take 2 tablets (80 mg total) by mouth nightly. 180 tablet 1    montelukast 10 MG Oral Tab Take 1 tablet (10 mg total) by mouth nightly. 90 tablet 3    albuterol 108 (90 Base) MCG/ACT Inhalation Aero Soln Inhale 1-2 puffs into the lungs every 4 to 6 hours as needed. 51 g 4    ALPRAZolam 0.25 MG Oral Tab Take 1 tablet (0.25 mg total) by mouth nightly as needed for Sleep or Anxiety. 30 tablet 2    sodium chloride 0.9 % Inhalation Nebu Soln Take 3 mL by nebulization as needed for Wheezing. 30 each 0    Esomeprazole Magnesium (NEXIUM OR) Take by mouth.      Insulin Pen Needle (BD PEN NEEDLE YANDY U/F) 32G X 4 MM Does not apply Misc Inject 1 each into the skin daily. 100 each 3    Multiple Vitamin Oral Tab Take 1 tablet by mouth in the morning.        Side effects of medications: None    Relief obtained from medication management: n/a    Patient requires assistance with: No assistance required    Reviewed Patient History Dated: 11/25/24 no changes noted  Patient is currently on pain medications:  No  [place] Prescription Monitoring review: N/A    Physical Exam:   Vitals: Resp 16   Ht 65\"   Wt 160 lb (72.6 kg)   LMP 01/01/2004   BMI 26.63 kg/m²   VAS Pain Score:  7/10    General Appearance: Well developed, well nourished, normal build, independent body habitus, no apparent physical disabilities, well groomed    Neurological Exam: WNL-Orientation to time, place and person, normal mood & effect, normal concentration & attention span  Inspection:  non-antalgic, no acute distress   No focal sensory deficits  5/5 str B UEs  Negative spurling  Radiology/Lab Test Reviewed: XR:    MRI C spine:  CONCLUSION:         1. Stable postoperative changes in the cervical spine as above spanning C3-C7.       2. Stable minimal degenerative changes in the cervical spine as above.  There is no significant spinal canal or neural foraminal stenosis at any level.       3. No focal spinal cord signal abnormality identified    CT 3/30/24:    C2-C3:  No significant disc/facet abnormality, spinal stenosis, or foraminal stenosis.   C3-C4:  Laminectomy change.  No spinal stenosis or foraminal stenosis.   C4-C5:  Laminectomy and fusion change.  No spinal stenosis or foraminal stenosis.   C5-C6:  Laminectomy and fusion change.  No spinal stenosis or foraminal stenosis.   C6-C7:  Laminectomy and fusion change.  No spinal stenosis or foraminal stenosis.   C7-T1:  No significant disc/facet abnormality, spinal stenosis, or foraminal stenosis.     Did the pt have a positive pain response from the most recent TPI?: yes  Percentage of relief obtained: 100%  Duration of pain relief: >3 months, though still with some moderate relief, though pain is returning  Does the pt have recurring myofacial pain that is causing functional limitation?: yes     Patient educated and verbalized understanding.  Medical Decision Making:   Diagnosis:    Encounter Diagnoses   Name Primary?    History of fusion of cervical spine Yes    Cervical radiculitis        Impression: Elimination of pain after bilateral trap and cervical paraspinal trigger point injections, most recently on 4/14/25.  On some levels, continues to be improved, in that she has relatively mild pain at this time, though has had return of B UE numbness/tingling.  Most recent CT that we have on file shows no areas of significant stenosis, central or foraminal.  She is meeting with neurosurgery tomorrow, and will await their recommendations regards  possibility of updated imaging.  We could consider an VALENTE inferior to previous surgery, though will await surgical discussion and possible updated imaging.    Plan: Patient to follow up PRN.  Meeting with neurosurgery tomorrow for return of B UE numbness/tingling.  If updated imaging is ordered, happy to see her back after completion of imaging, and could consider VALENTE inferior to previous surgery if appropriate.    No orders of the defined types were placed in this encounter.      Meds & Refills for this Visit:  Requested Prescriptions      No prescriptions requested or ordered in this encounter       Imaging & Consults:  None    The patient indicates understanding of these issues and agrees to the plan.    JOSH Padilla

## 2025-07-28 NOTE — PATIENT INSTRUCTIONS
Refill policies:    Allow 2-3 business days for refills; controlled substances may take longer.  Contact your pharmacy at least 5 days prior to running out of medication and have them send an electronic request or submit request through the “request refill” option in your [employer] account.  Refills are not addressed on weekends; covering physicians do not authorize routine medications on weekends.  No narcotics or controlled substances are refilled after noon on Fridays or by on call physicians.  By law, narcotics must be electronically prescribed.  A 30 day supply with no refills is the maximum allowed.  If your prescription is due for a refill, you may be due for a follow up appointment.  To best provide you care, patients receiving routine medications need to be seen at least once a year.  Patients receiving narcotic/controlled substance medications need to be seen at least once every 3 months.  In the event that your preferred pharmacy does not have the requested medication in stock (e.g. Backordered), it is your responsibility to find another pharmacy that has the requested medication available.  We will gladly send a new prescription to that pharmacy at your request.    Scheduling Tests:    If your physician has ordered radiology tests such as MRI or CT scans, please contact Central Scheduling at [phone] right away to schedule the test.  Once scheduled, the [facility] Centralized Referral Team will work with your insurance carrier to obtain pre-certification or prior authorization.  Depending on your insurance carrier, approval may take 3-10 days.  It is highly recommended patients assure they have received an authorization before having a test performed.  If test is done without insurance authorization, patient may be responsible for the entire amount billed.      Precertification and Prior Authorizations:  If your physician has recommended that you have a procedure or additional testing performed the [facility]  Centralized Referral Team will contact your insurance carrier to obtain pre-certification or prior authorization.    You are strongly encouraged to contact your insurance carrier to verify that your procedure/test has been approved and is a COVERED benefit.  Although the [facility] Centralized Referral Team does its due diligence, the insurance carrier gives the disclaimer that \"Although the procedure is authorized, this does not guarantee payment.\"    Ultimately the patient is responsible for payment.   Thank you for your understanding in this matter.  Paperwork Completion:  If you require FMLA or disability paperwork for your recovery, please make sure to either drop it off or have it faxed to our office at [phone]. Be sure the form has your name and date of birth on it.  The form will be faxed to our Forms Department and they will complete it for you.  There is a 25$ fee for all forms that need to be filled out.  Please be aware there is a 10-14 day turnaround time.  You will need to sign a release of information (GURWINDER) form if your paperwork does not come with one.  You may call the Forms Department with any questions at [phone].  Their fax number is [phone].

## 2025-07-29 ENCOUNTER — OFFICE VISIT (OUTPATIENT)
Dept: SURGERY | Facility: CLINIC | Age: 50
End: 2025-07-29
Payer: COMMERCIAL

## 2025-07-29 VITALS
HEART RATE: 86 BPM | SYSTOLIC BLOOD PRESSURE: 120 MMHG | OXYGEN SATURATION: 98 % | DIASTOLIC BLOOD PRESSURE: 74 MMHG | HEIGHT: 65 IN | BODY MASS INDEX: 26.66 KG/M2 | WEIGHT: 160 LBS

## 2025-07-29 DIAGNOSIS — Z98.1 HISTORY OF FUSION OF CERVICAL SPINE: ICD-10-CM

## 2025-07-29 DIAGNOSIS — M79.18 MYOFASCIAL PAIN: ICD-10-CM

## 2025-07-29 DIAGNOSIS — M54.2 CERVICAL PAIN: ICD-10-CM

## 2025-07-29 DIAGNOSIS — G54.0 THORACIC OUTLET SYNDROME: Primary | ICD-10-CM

## 2025-07-29 PROCEDURE — 3078F DIAST BP <80 MM HG: CPT | Performed by: PHYSICIAN ASSISTANT

## 2025-07-29 PROCEDURE — 99213 OFFICE O/P EST LOW 20 MIN: CPT | Performed by: PHYSICIAN ASSISTANT

## 2025-07-29 PROCEDURE — 3074F SYST BP LT 130 MM HG: CPT | Performed by: PHYSICIAN ASSISTANT

## 2025-07-29 PROCEDURE — 3008F BODY MASS INDEX DOCD: CPT | Performed by: PHYSICIAN ASSISTANT

## 2025-08-01 ENCOUNTER — PATIENT MESSAGE (OUTPATIENT)
Dept: PAIN CLINIC | Facility: CLINIC | Age: 50
End: 2025-08-01

## 2025-08-01 ENCOUNTER — TELEPHONE (OUTPATIENT)
Dept: SURGERY | Facility: CLINIC | Age: 50
End: 2025-08-01

## 2025-08-01 ENCOUNTER — PATIENT MESSAGE (OUTPATIENT)
Dept: SURGERY | Facility: CLINIC | Age: 50
End: 2025-08-01

## 2025-08-01 DIAGNOSIS — G54.0 THORACIC OUTLET SYNDROME: Primary | ICD-10-CM

## 2025-08-14 ENCOUNTER — TELEPHONE (OUTPATIENT)
Age: 50
End: 2025-08-14

## 2025-08-18 ENCOUNTER — PATIENT MESSAGE (OUTPATIENT)
Dept: FAMILY MEDICINE CLINIC | Facility: CLINIC | Age: 50
End: 2025-08-18

## 2025-08-18 DIAGNOSIS — F90.8 OTHER SPECIFIED ATTENTION DEFICIT HYPERACTIVITY DISORDER (ADHD): ICD-10-CM

## 2025-08-19 ENCOUNTER — PATIENT MESSAGE (OUTPATIENT)
Dept: PAIN CLINIC | Facility: CLINIC | Age: 50
End: 2025-08-19

## 2025-08-19 DIAGNOSIS — M79.18 MYOFASCIAL PAIN: Primary | ICD-10-CM

## 2025-08-21 RX ORDER — METHYLPHENIDATE HYDROCHLORIDE 20 MG/1
20 TABLET ORAL 2 TIMES DAILY
Qty: 60 TABLET | Refills: 0 | Status: SHIPPED | OUTPATIENT
Start: 2025-08-21 | End: 2025-09-20

## 2025-08-25 ENCOUNTER — TELEPHONE (OUTPATIENT)
Dept: PAIN CLINIC | Facility: CLINIC | Age: 50
End: 2025-08-25

## (undated) DIAGNOSIS — F41.9 ANXIETY: ICD-10-CM

## (undated) DIAGNOSIS — E78.00 ELEVATED CHOLESTEROL: ICD-10-CM

## (undated) DIAGNOSIS — F32.0 CURRENT MILD EPISODE OF MAJOR DEPRESSIVE DISORDER, UNSPECIFIED WHETHER RECURRENT (HCC): ICD-10-CM

## (undated) DIAGNOSIS — F32.A ANXIETY AND DEPRESSION: ICD-10-CM

## (undated) DIAGNOSIS — R11.0 NAUSEA: ICD-10-CM

## (undated) DIAGNOSIS — G47.09 OTHER INSOMNIA: ICD-10-CM

## (undated) DIAGNOSIS — F41.9 ANXIETY AND DEPRESSION: ICD-10-CM

## (undated) DIAGNOSIS — F39 MOOD DISORDER (HCC): ICD-10-CM

## (undated) DIAGNOSIS — F90.8 ATTENTION DEFICIT HYPERACTIVITY DISORDER (ADHD), OTHER TYPE: ICD-10-CM

## (undated) DIAGNOSIS — G43.109 MIGRAINE WITH AURA AND WITHOUT STATUS MIGRAINOSUS, NOT INTRACTABLE: ICD-10-CM

## (undated) DIAGNOSIS — K58.9 IRRITABLE BOWEL SYNDROME WITHOUT DIARRHEA: ICD-10-CM

## (undated) DEVICE — SUTURE ETHILON 3-0 PS-2

## (undated) DEVICE — STERILE SYNTHETIC POLYISOPRENE POWDER-FREE SURGICAL GLOVES WITH HYDROGEL COATING: Brand: PROTEXIS

## (undated) DEVICE — BANDAID CURAD 3IN X 1IN

## (undated) DEVICE — GAUZE SPONGES,12 PLY: Brand: CURITY

## (undated) DEVICE — 3.0MM PRECISION ROUND

## (undated) DEVICE — KENDALL SCD EXPRESS SLEEVES, KNEE LENGTH, MEDIUM: Brand: KENDALL SCD

## (undated) DEVICE — DRESSING BIOPATCH 1X4 CNTR

## (undated) DEVICE — STERILE POLYISOPRENE POWDER-FREE SURGICAL GLOVES: Brand: PROTEXIS

## (undated) DEVICE — ABSORBABLE HEMOSTAT (OXIDIZED REGENERATED CELLULOSE, U.S.P.): Brand: SURGICEL

## (undated) DEVICE — CHLORAPREP ORANGE TINT 10.5ML

## (undated) DEVICE — SPONGE RAYTEC 4X4 RF DETECT

## (undated) DEVICE — [RESECTOR CUTTER, ARTHROSCOPIC SHAVER BLADE,  DO NOT RESTERILIZE,  DO NOT USE IF PACKAGE IS DAMAGED,  KEEP DRY,  KEEP AWAY FROM SUNLIGHT]: Brand: FORMULA

## (undated) DEVICE — LIGHT HANDLE

## (undated) DEVICE — DRAPE TABLE COVER 44X90 TC-10

## (undated) DEVICE — 3M(TM) MEDIPORE(TM) +PAD SOFT CLOTH ADHESIVE WOUND DRESSING 3569: Brand: 3M™ MEDIPORE™

## (undated) DEVICE — AVANOS* TUOHY EPIDURAL NEEDLE: Brand: AVANOS

## (undated) DEVICE — DRAPE C-ARM UNIVERSAL

## (undated) DEVICE — CAP,BOUFFANT,SPUNBOND,BLUE,24": Brand: MEDLINE INDUSTRIES, INC.

## (undated) DEVICE — SUTURE VICRYL 3-0 RB-1

## (undated) DEVICE — 3M™ TEGADERM™ TRANSPARENT FILM DRESSING, 1626W, 4 IN X 4-3/4 IN (10 CM X 12 CM), 50 EACH/CARTON, 4 CARTON/CASE: Brand: 3M™ TEGADERM™

## (undated) DEVICE — UNDYED BRAIDED (POLYGLACTIN 910), SYNTHETIC ABSORBABLE SUTURE: Brand: COATED VICRYL

## (undated) DEVICE — #15 STERILE STAINLESS BLADE: Brand: STERILE STAINLESS BLADES

## (undated) DEVICE — SUTURE VICRYL 0 CT-1

## (undated) DEVICE — Device

## (undated) DEVICE — GLOVE SURG SENSICARE SZ 6-1/2

## (undated) DEVICE — BANDAID COVERLET 1X3

## (undated) DEVICE — 3M(TM) TEGADERM(TM) TRANSPARENT FILM DRESSING FRAME STYLE 1628: Brand: 3M™ TEGADERM™

## (undated) DEVICE — SUPER SPONGES,MEDIUM: Brand: KERLIX

## (undated) DEVICE — FLOSEAL HEMOSTATIC MATRIX, 5ML: Brand: FLOSEAL HEMOSTATIC MATRIX

## (undated) DEVICE — GLOVE SURG SENSICARE SZ 7-1/2

## (undated) DEVICE — REMOVER PREP SOLUTION 4OZ

## (undated) DEVICE — 3M™ MEDIPORE™ SOFT CLOTH TAPE, 4 INCH X 10 YARDS, 12 ROLLS/CASE, 2964: Brand: 3M™ MEDIPORE™

## (undated) DEVICE — CAUTERY BLADE 2IN INS E1455

## (undated) DEVICE — DRAIN SILICONE FLAT 7MM

## (undated) DEVICE — TRANSPOSAL ULTRAFLEX DUO/QUAD ULTRA CART MANIFOLD

## (undated) DEVICE — CODMAN® SURGICAL PATTIES 1" X 3" (2.54CM X 7.62CM): Brand: CODMAN®

## (undated) DEVICE — PAIN TRAY: Brand: MEDLINE INDUSTRIES, INC.

## (undated) DEVICE — 3M(TM) MEDIPORE(TM) +PAD SOFT CLOTH ADHESIVE WOUND DRESSING 3566: Brand: 3M™ MEDIPORE™

## (undated) DEVICE — SOL  .9 1000ML BTL

## (undated) DEVICE — MICRO COVER: Brand: UNBRANDED

## (undated) DEVICE — SUTURE VICRYL 2-0 CP-2

## (undated) DEVICE — DRAIN RESERVOIR RELIAVAC 100CC

## (undated) DEVICE — SHOULDER TRACTION KIT AR-1635

## (undated) DEVICE — SCD SLEEVE KNEE HI BLEND

## (undated) DEVICE — TZ MEDICAL TITANIUM DISTRACTION PINS 12MM: Brand: TZ MEDICAL TITANIUM DISTRACTION PINS

## (undated) DEVICE — GOWN,SIRUS,FABRIC-REINFORCED,X-LARGE: Brand: MEDLINE

## (undated) DEVICE — SHOULDER ARTHROSCOPY CDS-LF: Brand: MEDLINE INDUSTRIES, INC.

## (undated) DEVICE — PROXIMATE SKIN STAPLERS (35 WIDE) CONTAINS 35 STAINLESS STEEL STAPLES (FIXED HEAD): Brand: PROXIMATE

## (undated) DEVICE — SUTURE ETHILON 4-0 FS-1

## (undated) DEVICE — PREMIUM WET SKIN PREP TRAY: Brand: MEDLINE INDUSTRIES, INC.

## (undated) DEVICE — SUTURE VICRYL 0 CP-2

## (undated) DEVICE — LAMINECTOMY CDS: Brand: MEDLINE INDUSTRIES, INC.

## (undated) DEVICE — DRAPE,THYROID,SOFT,STERILE: Brand: MEDLINE

## (undated) DEVICE — GOWN SURG AERO CHROME XXL

## (undated) DEVICE — 3.0MM ROUND FLUTED AGGRESSIVE

## (undated) DEVICE — SOLUTION SURG DURA PREP HAZMAT

## (undated) DEVICE — SUTURE VICRYL 3-0 SH

## (undated) DEVICE — ABDOMINAL PAD: Brand: DERMACEA

## (undated) DEVICE — GLOVE SURG TRIUMPH SZ 8

## (undated) DEVICE — BLADE CRANI SHAVER 4412A

## (undated) DEVICE — PAD POLAR CARE KNEE/SHOULDER

## (undated) DEVICE — Device: Brand: INTELLICART™

## (undated) DEVICE — 10K/24K ARTHROSCOPY INFLOW TUBE SET: Brand: 10K/24K

## (undated) DEVICE — SOL LACT RINGERS 3000ML

## (undated) DEVICE — NEEDLE SPINAL 22X3-1/2 BLK

## (undated) DEVICE — [AGGRESSIVE PLUS CUTTER, ARTHROSCOPIC SHAVER BLADE,  DO NOT RESTERILIZE,  DO NOT USE IF PACKAGE IS DAMAGED,  KEEP DRY,  KEEP AWAY FROM SUNLIGHT]: Brand: FORMULA

## (undated) DEVICE — PAD SACRAL SPAN AID

## (undated) DEVICE — MARKER SKIN 2 TIP

## (undated) DEVICE — ADHESIVE MASTISOL 2/3CC VL

## (undated) DEVICE — NON-ADHERENT STRIPS,OIL EMULSION: Brand: CURITY

## (undated) DEVICE — STERILE LATEX POWDER-FREE SURGICAL GLOVES WITH HYDROGEL COATING, SMOOTH FINISH, STRAIGHT FINGER: Brand: PROTEXIS

## (undated) DEVICE — SUTURE MONOCRYL 4-0 PS-2

## (undated) DEVICE — MARKER SKIN PREP RESIST STRL

## (undated) DEVICE — DRAPE MICROSCOPE NEURO PENTERO

## (undated) DEVICE — SPONGE: SPECIALTY PEANUT XR 100/CS: Brand: MEDICAL ACTION INDUSTRIES

## (undated) DEVICE — DERMABOND LIQUID ADHESIVE

## (undated) DEVICE — VIOLET BRAIDED (POLYGLACTIN 910), SYNTHETIC ABSORBABLE SUTURE: Brand: COATED VICRYL

## (undated) DEVICE — GLOVE SURG SENSICARE SZ 7

## (undated) DEVICE — FRAZIER SUCTION INSTRUMENT 12 FR W/CONTROL VENT & OBTURATOR: Brand: FRAZIER

## (undated) DEVICE — KIT ASCP FX PUSHLOCK LOPRFL

## (undated) DEVICE — MAYFIELD® DISPOSABLE ADULT SKULL PIN (PLASTIC BASE): Brand: MAYFIELD®

## (undated) DEVICE — SKIN MARKER DUAL TIP WITH RULER CAP AND LABELS: Brand: DEVON

## (undated) DEVICE — GAMMEX® NON-LATEX PI TEXTURED SIZE 7.5, STERILE POLYISOPRENE POWDER-FREE SURGICAL GLOVE: Brand: GAMMEX

## (undated) DEVICE — ELECTRODE ESURG 6.5IN 3/32IN

## (undated) DEVICE — 5.0MM PRECISION ROUND

## (undated) DEVICE — CODMAN® SURGICAL PATTIES 1/2" X 1/2" (1.27CM X 1.27CM): Brand: CODMAN®

## (undated) DEVICE — POLAR CARE CUBE COOLING UNIT

## (undated) DEVICE — C-ARM: Brand: UNBRANDED

## (undated) DEVICE — 5.0MM X 7.0MM FLUTED DRUM

## (undated) DEVICE — ESSENTIAL SHOULDER SLING

## (undated) DEVICE — NEEDLE SPINAL 25X3-1/2 BLUE

## (undated) DEVICE — VAPR S 90 ELECTRODE 40 MM 90 DEGREES SUCTION WITH INTEGRATED HANDPIECE: Brand: VAPR

## (undated) DEVICE — REMOVER DURAPREP 3M

## (undated) DEVICE — KENDALL SCD EXPRESS SLEEVES, THIGH LENGTH, MEDIUM: Brand: KENDALL SCD

## (undated) DEVICE — CHLORAPREP SWABSTICK 1.7ML

## (undated) DEVICE — CODMAN® SURGICAL PATTIES 1/2" X 3" (1.27CM X 7.62CM): Brand: CODMAN®

## (undated) NOTE — LETTER
[facility] Testing Department  Phone: [phone]  OUTSIDE TESTING RESULT REQUEST      TO:   Dr. Callie Chi and staff Today's Date: 2/5/18    FAX #: [phone]     IMPORTANT: FOR YOUR IMMEDIATE ATTENTION  Please FAX all test results listed b

## (undated) NOTE — LETTER
20        RE: Kris Mendez     : 1975    Dear Dr. Car Earing,    This letter is to inform you that your patient is being scheduled for surgery with Dr. Ghassan Paige on 20 at [facility]. We have asked the patient to contact your office to

## (undated) NOTE — LETTER
ASTHMA ACTION PLAN for Chidi Bennett     : 1975     Date: 2019  Provider:  Dee Esposito DO  Phone for doctor or clinic: [facility], [facility], [address]  [phone]    ACT Venida Lamp

## (undated) NOTE — LETTER
Patient Name: Nadya Mina        : 1975       Medical Record #: [id number]    CONSENT FOR PROCEDURES/SEDATION    Date: 2024       Time: 8:09 AM        1. I authorize the performance upon Nadya RENTERIA Keeley the following:    ______BILATERAL CERVICAL, TRAPEZIUS AND RHOMBOID TPI_____________    2. I authorize Dr. Mckinley (and whomever is designated as the doctor’s assistant), to perform the above mentioned procedures.    3. If any unforeseen conditions arise during this procedure calling for additional procedures, operations, or medications (including anesthesia and blood transfusion), I  further request and authorize the doctor to do whatever he/she deems advisable in my interest.    4. I consent to the taking and reproduction of any photographs in the course of this procedure for professional purposes.    5. I consent to the administration of such sedation as may be considered necessary or advisable by the physician responsible for this service, with the exception of  _____________________________.    6. I have been informed by my doctor of the nature and purpose of this procedure/sedation, possible alternative methods of treatment, risk involved and possible complications.      Signature of Patient:  ___________________________    Signature of person authorized to consent for patient: Relationship to patient:  ___________________________    ___________________    Witness: ____________________     Date: ______________    Provider: ____________________     Date: ______________

## (undated) NOTE — LETTER
20      RE: Junie Mcghee     : 1975    Dear Dr. Apurva Perez,    This letter is to inform you that your patient is being scheduled for surgery with Dr. Glory Quijano on 2020 at [facility]. We have asked the patient to contact your office to

## (undated) NOTE — LETTER
Patient Name: Nadya Mina        : 1975       Medical Record #: [id number]    CONSENT FOR PROCEDURES/SEDATION    Date: 2024       Time: 7:38 AM        1. I authorize the performance upon Nadya RENTERIA Keeley the following:    _               _BILATERAL CERVICAL ,TRAPEZIUS AND RHOMBOID TPI                        _    2. I authorize Dr. Mckinley (and whomever is designated as the doctor’s assistant), to perform the above mentioned procedures.    3. If any unforeseen conditions arise during this procedure calling for additional procedures, operations, or medications (including anesthesia and blood transfusion), I  further request and authorize the doctor to do whatever he/she deems advisable in my interest.    4. I consent to the taking and reproduction of any photographs in the course of this procedure for professional purposes.    5. I consent to the administration of such sedation as may be considered necessary or advisable by the physician responsible for this service, with the exception of  _____________________________.    6. I have been informed by my doctor of the nature and purpose of this procedure/sedation, possible alternative methods of treatment, risk involved and possible complications.      Signature of Patient:  ___________________________    Signature of person authorized to consent for patient: Relationship to patient:  ___________________________    ___________________    Witness: ____________________     Date: ______________    Provider: ____________________     Date: ______________

## (undated) NOTE — MR AVS SNAPSHOT
[address]  [address] [facility], [address] [address] [phone]               Thank you for choosing us for your health care visit with Johanna Cowden, DO.   We are glad to serve you and happy to provide you with this vanegas TAKE 1 TABLET BY MOUTH EVERY DAY. TAKE WITH CITALOPRAM 20MG TO EQUAL TOTAL DOSE OF 60MG   Commonly known as:  CeleXA           Dicyclomine HCl 20 MG Tabs   Take 1 tablet (20 mg total) by mouth 4 (four) times daily before meals and nightly.  Every 6 hrs as n - Montelukast Sodium 10 MG Tabs  - topiramate 25 MG Tabs            Today's Orders     Neuro Referral - In Network    Complete by:  As directed    Assoc Dx:  Neck pain [M54.2], Degenerative disc disease, cervical [M50.30], Paresthesia [R20.2]           MR Imaging:  MRI SPINE CERVICAL (CPT=72141)    Instructions:  IMPORTANT    Your physician has ordered a radiology test that may require authorization from your insurance company.   Your physician or the clinic staff will work with your insurance company to o office, you can view your past visit information in [facility] by going to Visits < Visit Summaries. NaHere questions? Call [phone] for help. [facility]Here is NOT to be used for urgent needs. For medical emergencies, dial 911.            Visit [facility]

## (undated) NOTE — MR AVS SNAPSHOT
[address], [address] [place] [id number] [phone]               Thank you for choosing us for your health care visit with Melvina Hughes DO.   We are glad to serve you and happy to provide you with th the maximum allowed. ? If your prescription is due for a refill, you may be due for a follow up appointment. ? To best provide you care, patients receiving routine medications need to be seen at least once a year.      protocol for controlled subst Reglan [Metoclopramide] Other (See Comments)    Tardive dyskinesia    Levofloxacin Rash, Tightness in Chest    Quinolones                    Current Medications          This list is accurate as of: 5/8/17 11:59 PM.  Always use your most recent med list. Generic drug:  Albuterol Sulfate HFA           VITAMIN B COMPLEX OR   Inject  as directed every 30 (thirty) days. Vitamin D 2000 units Caps   Take  by mouth.  daily                   MyChart     Visit MyChart  You can access your MyChart to more a

## (undated) NOTE — LETTER
ASTHMA ACTION PLAN for Butch Hansen     : 1975     Date: 2018  Provider:  Johanna Cowden, DO  Phone for doctor or clinic: [facility], [address] [facility] [address], [address] [place] [address], [address]  [phone]    DANIEL Menendez

## (undated) NOTE — LETTER
11/09/19        Junie Mcghee  [address] [facility]      Dear Emma Duncan,    [address] [facility] records indicate that you have outstanding lab work and or testing that was ordered for you and has not yet been completed:  Orders Placed This Encounter

## (undated) NOTE — ED AVS SNAPSHOT
Osmin Loaiza   MRN: [id number]    Department:  [facility] Emergency Department   Date of Visit:  2/10/2020           Disclosure     Insurance plans vary and the physician(s) referred by the ER may not be covered by your plan.  Please contact yo tell this physician (or your personal doctor if your instructions are to return to your personal doctor) about any new or lasting problems. The primary care or specialist physician will see patients referred from the [facility] Emergency Department.  Diogo Jimenez

## (undated) NOTE — LETTER
18    Patient: Devin Alvarez  : 1975 Visit date: 2018    Dear  Dr. Bg Marti, DO,    Thank you for referring Devin Alvarez to my practice. Please find my assessment and plan below.               PROCEDURE:  NUCLEAR MEDICINE HEPATOBILIARY complaint of abdominal pain for 6 months. FINDINGS:    LIVER:  Uniform echotexture. BILIARY:  Nondistended gallbladder. No shadowing calculus. Normal caliber bile ducts. CBD is measured at 0.4 cm. PANCREAS:  Uniform echogenicity.   SPLEEN:  Uni laparoscopy/any laparotomy for lysis of adhesions and removal of an ovarian cyst.  This was performed by Dr. Kia Dobson. In 2015, the patient underwent a total abdominal hysterectomy/bilateral salpingo-oophorectomy with Dr. Aylin Nicholas.   Dr. Wilton Kirby assisted with ejection fraction. However, she states that her symptoms started shortly after that time. I therefore recommend repeating her HIDA scan.     If the patient's HIDA scan is negative for low ejection fraction, the patient does not need to follow-up further w

## (undated) NOTE — LETTER
20          Randy Late  :  1975      To Whom It May Concern: This patient was seen in our office on 20 . Patient may have corrective dental work done. No need for antibiotic from our perspective.   If this office may be of [facility]

## (undated) NOTE — LETTER
Date: 7/2/2021    Patient Name: Fredy Segura          To Whom it may concern: This letter has been written at the patient's request. The above patient was seen at the [facility] for treatment of a medical condition.     This patient zelda

## (undated) NOTE — LETTER
Patient Name: Lacie Holden  YOB: 1975          MRN number:  [id number]  Date:  6/6/2018  Referring Physician:  JOSH Rosen        SPINE EVALUATION:    Referring Physician: Mr. Indy Hanson   Diagnosis: Cervical Instability with Cervical impairments to decrease symptoms of cervical instability through postural awareness, increasing thoracic rotation and extension, and increasing posterior scapular strength. Precautions:  None  OBJECTIVE:   Observation/Posture:  The patient presents wit min     Total Treatment Time: 40 min     PLAN OF CARE:    Goals (8 visits):    1. Increase FOTO assessment > 11% from INE to DC. 2. Patient will be aware of postural limitations and be able to correct them independently.     3. Patient will have an increas

## (undated) NOTE — LETTER
Patient Name: Osmin Loaiza  YOB: 1975          MRN number:  [id number]  Date:  2/14/2018  Referring Physician:  Yenifer Booth MD        UPPER EXTREMITY EVALUATION:    Referring Physician: Dr. Tru Tam  Diagnosis: Right Shoulder Adhesi Cervical Screen:  Full AROM of the cervical spine. Palpation: generalized increased tenderness to the touch. Sensation: Intact sensation per all dermatomes.       AROM/PROM:   Shoulder    Flexion: R 55/145; L 170  Abduction: R 80/125; L 170  ER: R 45; Electronically signed by therapist: Elicia Padgett PT    Physician's certification required: Yes  I certify the need for these services furnished under this plan of treatment and while under my care.     X___________________________________________________ Da

## (undated) NOTE — LETTER
10/02/20        [facility]  [address] [facility]      Dear Gissel Vanessa,    [address] [facility] records indicate that you have outstanding lab work and or testing that was ordered for you and has not yet been completed:  Orders Placed This Encounter

## (undated) NOTE — LETTER
ASTHMA ACTION PLAN for Nadya Mina     : 1975     Date: 25  Doctor:  Jose Rogers PA-C  Phone for doctor or clinic: [facility], [address] [place] [id number] [phone]      ACT Score: 12    ACT Goal: 20 or greater    Call your provider if you require your rescue/quick reliever medication more than 2-3 times in a 24 hour period.    If you require your rescue inhaler/medication more than 2-3 times weekly, your asthma may not be under proper control and you should seek medical attention.    *Quick Relievers are Xopenex and Albuterol*    You can use the colors of a traffic light to help learn about your asthma medicines.  Year Round       1. Green - Go! % of Personal Best Peak Flow   Use controller medicine.   Breathing is good  No cough or wheeze  Can work and play Medicine How much to take When to take it    Medications       Leukotriene Modulators Instructions     montelukast 10 MG Oral Tab Take 1 tablet (10 mg total) by mouth nightly.       Sympathomimetics Instructions     fluticasone furoate-vilanterol (BREO ELLIPTA) 100-25 MCG/ACT Inhalation Aerosol Powder, Breath Activated Inhale 1 puff into the lungs daily.     ALBUTEROL (2.5 MG/3ML) 0.083% Inhalation Nebu Soln TAKE 3 ML BY NEBULIZATION EVERY 4 HOURS AS NEEDED FOR WHEEZING.     albuterol 108 (90 Base) MCG/ACT Inhalation Aero Soln Inhale 1-2 puffs into the lungs every 4 to 6 hours as needed.                    2. Yellow - Caution. 50-79% Personal Best Peak Flow  Use reliever medicine to keep an asthma attack from getting bad.   Cough  Quick Relievers  Wheezing  Tight Chest  Wake up at night Medicine How much to take When to take it    If symptoms are not improving in 24-48 hrs, call office for further instructions  Medications       Leukotriene Modulators Instructions     montelukast 10 MG Oral Tab Take 1 tablet (10 mg total) by mouth nightly.       Sympathomimetics  Instructions     fluticasone furoate-vilanterol (BREO ELLIPTA) 100-25 MCG/ACT Inhalation Aerosol Powder, Breath Activated Inhale 1 puff into the lungs daily.     ALBUTEROL (2.5 MG/3ML) 0.083% Inhalation Nebu Soln TAKE 3 ML BY NEBULIZATION EVERY 4 HOURS AS NEEDED FOR WHEEZING.     albuterol 108 (90 Base) MCG/ACT Inhalation Aero Soln Inhale 1-2 puffs into the lungs every 4 to 6 hours as needed.                    3. Red - Stop! Danger! <50% Personal Best Peak Flow  Continue Controller Medications But ADD:   Medicine not helping  Breathing is hard and fast  Nose opens wide  Can't walk  Ribs show  Can't talk well Medicine How much to take When to take it    If your symptoms do not improve in ONE hour -  go to the emergency room or call 911 immediately! If symptoms improve, call office for appointment immediately.    Albuterol inhaler 2 puffs every 20 minutes for three treatments       Don't forget:  Rinse mouth after using inhaler  Use spacer for inhaler  Remember to get your Flu vaccine every fall!    [x] Asthma Action Plan reviewed with the caregiver and patient, and a copy of the plan was given to the patient/caregiver.   [] Asthma Action Plan reviewed with the caregiver and patient on the phone, and copy mailed to patient/caregiver or sent via [employer].     Signatures:   Provider  Jose Rogers PA-C Patient  Nadya Mina Caretaker

## (undated) NOTE — LETTER
[facility] Testing Department  Phone: [phone]  OUTSIDE TESTING RESULT REQUEST      TO:  Dr. Jorge Bobby Date: 12/4/20    FAX #: [phone]     IMPORTANT: FOR YOUR IMMEDIATE ATTENTION    Please FAX all test results listed be

## (undated) NOTE — LETTER
Patient Name: Nadya Mina        : 1975       Medical Record #: [id number]    CONSENT FOR PROCEDURES/SEDATION    Date: 2025       Time: 8:31 AM        1. I authorize the performance upon Nadya Mina the following:    _______________BILATERAL CERVICAL TRIGGER POINT INJECTIONS___________    2. I authorize Dr. Mckinley (and whomever is designated as the doctor’s assistant), to perform the above mentioned procedures.    3. If any unforeseen conditions arise during this procedure calling for additional procedures, operations, or medications (including anesthesia and blood transfusion), I  further request and authorize the doctor to do whatever he/she deems advisable in my interest.    4. I consent to the taking and reproduction of any photographs in the course of this procedure for professional purposes.    5. I consent to the administration of such sedation as may be considered necessary or advisable by the physician responsible for this service, with the exception of  _____________________________.    6. I have been informed by my doctor of the nature and purpose of this procedure/sedation, possible alternative methods of treatment, risk involved and possible complications.      Signature of Patient:  ___________________________    Signature of person authorized to consent for patient: Relationship to patient:  ___________________________    ___________________    Witness: ____________________     Date: ______________    Provider: ____________________     Date: ______________

## (undated) NOTE — LETTER
01/11/18        [address]      Dear Yesi Hinkle,    15[address] [facility] records indicate that you have outstanding lab work and or testing that was ordered for you and has not yet been completed:          HGB A1C      T4 FREE Denise Dickson

## (undated) NOTE — LETTER
[address], [place]  Authorization for Invasive Procedures  1.  I hereby authorize Dr. Adriana Way , my physician and whomever may be designated as the doctor's assistant, to perform the following operation and/or procedure:  Cervic performed for the purposes of advancing medicine, science, and/or education, provided my identity is not revealed. If the procedure has been videotaped, the physician/surgeon will obtain the original videotape.  The hospital will not be responsible for stor My signature below affirms that prior to the time of the procedure, I have explained to the patient and/or her legal representative, the risks and benefits involved in the proposed treatment and any reasonable alternative to the proposed treatment.  I have

## (undated) NOTE — LETTER
ASTHMA ACTION PLAN for Junie Mcghee     : 1975     Date: 7/3/2020  Provider:  Paz Snow DO  Phone for doctor or clinic: [facility], [facility] 2, Nolvia Blount  [address] [place] [address] 2, [address]  [address] [place] [address]  [phone]    ACT S

## (undated) NOTE — MR AVS SNAPSHOT
[address]  [address] [place] [id number] [phone]               Thank you for choosing us for your health care visit with Shalonda Palm DO.   We are glad to serve you and happy to provide you with this vanegas Guru, [address]    [address]: [phone]    Mohsen Marcum        [facility]:  [address] [address] in [facility] in [address] [place] [address], [address] * Citalopram Hydrobromide 20 MG Tabs   TAKE 1 TABLET BY MOUTH EVERY DAY. TAKE WITH CITALOPRAM 40MG TO EQUAL TOTAL DOSE OF 60MG   Commonly known as:  CeleXA           * Citalopram Hydrobromide 40 MG Tabs   TAKE 1 TABLET BY MOUTH EVERY DAY.  TAKE WITH CITALO * Notice: This list has 2 medication(s) that are the same as other medications prescribed for you. Read the directions carefully, and ask your doctor or other care provider to review them with you.          Where to Get Your Medications      You can get t

## (undated) NOTE — MR AVS SNAPSHOT
[address]  [address] [facility], [address] [address] [phone]               Thank you for choosing us for your health care visit with Chyna Henderson DO.   We are glad to serve you and happy to provide you with this vanegas working days from the date of this order for the referral to be processed. Please wait 3 working days to schedule your appointment unless notified.       [facility] in [facility] and [facility] & [address], [address] schedule your appointment. Failure to obtain required authorization numbers can create reimbursement difficulties for you. Assoc Dx:   Hematuria [R31.9]          Reason for Today's Visit     Back Pain           Medical Issues Discussed Today     ADD (att Take 1 tablet by mouth every 6 (six) hours as needed for Pain. What changed:  Another medication with the same name was added. Make sure you understand how and when to take each.    Commonly known as:  NORCO           * HYDROcodone-acetaminophen  MG discharge instructions in [employer] by going to Visits < Admission Summaries. If you've been to the Emergency Department or your doctor's office, you can view your past visit information in [employer] by going to Visits < Visit Summaries. [place] questions?

## (undated) NOTE — LETTER
05/01/19        Reggie Adkins  [address] [facility]      Dear Denae Gonzales,    8979 [facility] records indicate that you have outstanding lab work and or testing that was ordered for you and has not yet been completed:  Orders Placed This Encounter

## (undated) NOTE — ED AVS SNAPSHOT
Madison Baron   MRN: [id number]    Department:  [facility] Emergency Department   Date of Visit:  1/11/2019           Disclosure     Insurance plans vary and the physician(s) referred by the ER may not be covered by your plan.  Please contact your tell this physician (or your personal doctor if your instructions are to return to your personal doctor) about any new or lasting problems. The primary care or specialist physician will see patients referred from the [facility] Emergency Department.  Gabrielle Villasenor

## (undated) NOTE — LETTER
Patient Name: Nadya Mina        : 1975       Medical Record #: [id number]    CONSENT FOR PROCEDURES/SEDATION    Date: 2024       Time: 8:46 AM        1. I authorize the performance upon Nadya Mina the following:    ______BILATERAL CERVICAL TRAPEZIUS TRIGGER POINT INJECTION______    2. I authorize Dr. Mckinley (and whomever is designated as the doctor’s assistant), to perform the above mentioned procedures.    3. If any unforeseen conditions arise during this procedure calling for additional procedures, operations, or medications (including anesthesia and blood transfusion), I  further request and authorize the doctor to do whatever he/she deems advisable in my interest.    4. I consent to the taking and reproduction of any photographs in the course of this procedure for professional purposes.    5. I consent to the administration of such sedation as may be considered necessary or advisable by the physician responsible for this service, with the exception of  _____________________________.    6. I have been informed by my doctor of the nature and purpose of this procedure/sedation, possible alternative methods of treatment, risk involved and possible complications.      Signature of Patient:  ___________________________    Signature of person authorized to consent for patient: Relationship to patient:  ___________________________    ___________________    Witness: ____________________     Date: ______________    Provider: ____________________     Date: ______________

## (undated) NOTE — LETTER
ASTHMA ACTION PLAN for Vinod Narayanan     : 1975     Date: 2018  Provider:  Jessica Mcneil DO  Phone for doctor or clinic: [facility], [address]  [phone]    ACT

## (undated) NOTE — LETTER
OUTSIDE TESTING RESULT REQUEST     IMPORTANT: FOR YOUR IMMEDIATE ATTENTION  Please FAX all test results listed below to: [phone]         * * * * If testing is NOT complete, arrange with patient A.S.A.P. * * * *      Patient Name: Jennifer Hardin

## (undated) NOTE — LETTER
19          Junie Mcghee  :  1975      To Whom It May Concern: This patient was seen in our office on 19 . Work status:  Regular duty max lift 10lbs. May return to work status per above effective 19.     If this office may

## (undated) NOTE — LETTER
Patient Name: Jetta Blizzard  YOB: 1975          MRN number:  [id number]  Date:  3/20/2018  Referring Physician:  Kojo Brooks      Dear Dr. Carlos Bell:     As you know, your patient Page Joshua has received 12 sessions of Physical The

## (undated) NOTE — LETTER
20          Osmin Loaiza  :  1975      To Whom It May Concern: This patient was seen in our office on 20 . Please excuse from work due to medical condition, begin 2/3/2020 until further notice.     If this office may be of further

## (undated) NOTE — MR AVS SNAPSHOT
[address]  [address]  [address] [address] [phone]               Thank you for choosing us for your health care visit with MARIO Seth.   We are glad to serve you and happy to provide you with this summary of One tablet every 4 to 6 hours as needed for headaches   Commonly known as:  FIORICET           * Citalopram Hydrobromide 20 MG Tabs   TAKE 1 TABLET BY MOUTH EVERY DAY.  TAKE WITH CITALOPRAM 40MG TO EQUAL TOTAL DOSE OF 60MG   Commonly known as:  CeleXA GLUCOSE (URINE DIPSTICK) neg Negative mg/dL    BILIRUBIN neg Negative    KETONES (URINE DIPSTICK) neg Negative mg/dL    SPECIFIC GRAVITY 1.020 1.005 - 1.030    OCCULT BLOOD trace Negative    PH, URINE 7.0 4.5 - 8.0    PROTEIN (URINE DIPSTICK) neg Negative Tips for increasing your physical activity – Adults who are physically active are less likely to develop some chronic diseases than adults who are inactive.      HOW TO GET STARTED: HOW TO STAY MOTIVATED:   Start activities slowly and build up over time Do

## (undated) NOTE — LETTER
21      RE: Randy Bernstein     : 1975    Dear Dr. Carri Castillo,    This letter is to inform you that your patient is being scheduled for surgery with Dr. Flaca Winston on 21 at [facility]. We have asked the patient to contact your office to brandon

## (undated) NOTE — LETTER
Patient Name: Hermelindo Turner  : 1975  MRN: [id number]  Patient Address: [address] [place] [address]      Coronavirus Disease 2019 (COVID-19)     [facility] is committed to the safety and well-being of our patients, members carefully. If your symptoms get worse, call your healthcare provider immediately. 3. Get rest and stay hydrated.    4. If you have a medical appointment, call the healthcare provider ahead of time and tell them that you have or may have COVID-19.  5. For m of fever-reducing medications; and  · Improvement in respiratory symptoms (e.g., cough, shortness of breath); and  · At least 10 days have passed since symptoms first appeared OR if asymptomatic patient or date of symptom onset is unclear then use 10 days donors must:    · Have had a confirmed diagnosis of COVID-19  · Be symptom-free for at least 14 days*    *Some people will be required to have a repeat COVID-19 test in order to be eligible to donate.  If you’re instructed by Jeane that a repeat test is r

## (undated) NOTE — MR AVS SNAPSHOT
[facility], [address], [address] [place] [address]. [address] [address] [phone]               Thank you for choosing us for your health care visit with Chelsey Bernardo DO.   We are glad to serve you and happy to provide you with th Referral Orders      Normal Orders This Visit    OP REFERRAL TO [facility] PHYSICAL THERAPY & Radha Fallon [[id number] CUSTOM]  Order #:  [id number]         **THIS IS NOT A REFERRAL**  Your physician has recommended you to [address] Physical [facility].   If your insurance re Instructions and Information about Your Health      Refill policies:    ? Allow 2 business days for refills; controlled substances may take longer. ?  Contact your pharmacy at least 5 days prior to running out of medication and have them send an electronic testing performed. [facility]) will contact your insurance carrier to obtain pre-certification or prior authorization.     Unfortunately, [facility] has seen an increase in denial of payment even though the procedure/test has been pre-cert Take 40 mg by mouth 2 (two) times daily. Commonly known as:  NEXIUM           folic acid 1 MG Tabs   Take 1 mg by mouth daily.    Commonly known as:  FOLVITE           gabapentin 100 MG Caps   Week 1 take 1 cap TID, week 2 take 2 caps TID   Commonly known - Methylphenidate HCl 20 MG Tabs            MyChart     Visit MyChart  You can access your MyChart to more actively manage your health care and view more details from this visit by going to [email or web address].   If you've recently had a stay at t

## (undated) NOTE — LETTER
ASTHMA ACTION PLAN for Reilly Koch     : 1975     Date: 2019  Provider:  Regina Conde DO  Phone for doctor or clinic: [facility], [address], [address] [facility]   [address], [address] [place] [address] Road  [address] [address]  [phone]    ACT

## (undated) NOTE — Clinical Note
ASTHMA ACTION PLAN for Junie Mcghee     : 1975     Date: 2017  Provider:  Paz Snow DO  Phone for doctor or clinic: [facility], [address] [place] [address], [address] [place] [address], [address]  [phone]    ACT

## (undated) NOTE — LETTER
Patient Name: Jessica Pinedo  YOB: 1975          MRN number:  [id number]  Date:  12/28/2017  Referring Physician:  Alisson Esquivel      Discharge Summary    Pt has attended 7 visits in Physical Therapy.      Subjective: Right shoulder pain 6/

## (undated) NOTE — LETTER
Date: 9/24/2018    Patient Name: Jakub Zelaya          To Whom it may concern:     This letter has been written at the patient's request. The above patient was seen at the [facility] for the treatment of a medical condition on 9/21/2018

## (undated) NOTE — LETTER
Patient Name: Lizeth Watson  YOB: 1975          MRN number:  [id number]  Date:  3/6/2018  Referring Physician:  Liza Cartagena     Dear Dr. Jeanie Larkin:     Your patient Yann Narvaez has received 8 visits of PT and would benefit from cont

## (undated) NOTE — LETTER
[address], [place]  Authorization for Invasive Procedures  1.  I hereby authorize Dr. Tex Mckeon , my physician and whomever may be designated as the doctor's assistant, to perform the following operation and/or procedure:  Myelog performed for the purposes of advancing medicine, science, and/or education, provided my identity is not revealed. If the procedure has been videotaped, the physician/surgeon will obtain the original videotape.  The hospital will not be responsible for stor My signature below affirms that prior to the time of the procedure, I have explained to the patient and/or her legal representative, the risks and benefits involved in the proposed treatment and any reasonable alternative to the proposed treatment.  I have

## (undated) NOTE — MR AVS SNAPSHOT
[address]  [address] [facility], [address] [address] [phone]               Thank you for choosing us for your health care visit with Tierra Manzano DO.   We are glad to serve you and happy to provide you with this vanegas Please wait 3 working days to schedule your appointment unless notified.       [facility] in [facility] and [address], [address] [place] [address], [address] [place] [address]    [address] [facility] Commonly known as:  WELLBUTRIN XL           Butalbital-APAP-Caffeine -40 MG Tabs   One tablet every 4 to 6 hours as needed for headaches   Commonly known as:  FIORICET           * Citalopram Hydrobromide 20 MG Tabs   TAKE 1 TABLET BY MOUTH EVERY DAY. medications prescribed for you. Read the directions carefully, and ask your doctor or other care provider to review them with you.          Where to Get Your Medications      You can get these medications from any pharmacy     Bring a paper prescription for

## (undated) NOTE — LETTER
12/07/20        Osmin Loaiza  [address] [facility]      Dear Luisito's,    1579 [facility] records indicate that you have outstanding lab work and or testing that was ordered for you and has not yet been completed:  Orders Placed This Encounter

## (undated) NOTE — LETTER
20          Kaylene Quinonez  :  1975      To Whom It May Concern: This patient was seen in our office on 20 . Work status: Please excuse from work 2020.     If this office may be of further assistance, please do not hesit

## (undated) NOTE — LETTER
ASTHMA ACTION PLAN for Lacie Holden     : 1975     Date: 2020  Provider:  Armand Garcia DO  Phone for doctor or clinic: 6[address] [facility] 2, [address] 2, [address]  [phone]    ACT

## (undated) NOTE — LETTER
2018      RE: Tanesha Call     : 1975    Dear Dr. Dominic Melo,    This letter is to inform you that your patient is being scheduled for surgery with Dr. Rosetta Cranker on 2018 at [facility]. We have asked the patient to contact your office

## (undated) NOTE — ED AVS SNAPSHOT
[facility] in [address]    Phone:  [phone]    Fax:  [phone]           Mrs. Lopez [facility]   MRN: [id number]    Department:  [facility] in [facility]   Date of Visit:  3/6/201 These include: Advil (Ibuprofen), Aleve (Naproxen/Naprosyn) and Aspirin      NOT TYLENOL. NSAIDs only work when taken in the proper dosage and schedule. Only taking them when you have pain, may not help.     You may take 600 mg of ibuprofen every 6 h primary care or a specialist physician for a follow-up visit, please tell this physician (or your personal doctor if your instructions are to return to your personal doctor) about any new or lasting problems.  The primary care or specialist physician will s We are concerned for your overall well being:    - If you are a smoker or have smoked in the last 12 months, we encourage you to explore options for quitting.     - If you have concerns related to behavioral health issues or thoughts of harming yourself, vomiting, fever, hematuria or frequent urination. FINDINGS:    KIDNEYS:  Normal.  No mass, obstruction, or calcification. ADRENALS:  Normal.  No mass or enlargement.     LIVER:  Normal.  No enlargement, atrophy, abnormal density, view more details from this visit by going to [email or web address]. If you've recently had a stay at the Hospital you can access your discharge instructions in [employer] by going to Visits < Admission Summaries.  If you've been to the Emergency Depar